# Patient Record
Sex: FEMALE | Race: WHITE | NOT HISPANIC OR LATINO | Employment: OTHER | URBAN - NONMETROPOLITAN AREA
[De-identification: names, ages, dates, MRNs, and addresses within clinical notes are randomized per-mention and may not be internally consistent; named-entity substitution may affect disease eponyms.]

---

## 2018-01-01 ENCOUNTER — OFFICE VISIT (OUTPATIENT)
Dept: FAMILY MEDICINE CLINIC | Facility: CLINIC | Age: 78
End: 2018-01-01

## 2018-01-01 ENCOUNTER — TELEPHONE (OUTPATIENT)
Dept: FAMILY MEDICINE CLINIC | Facility: CLINIC | Age: 78
End: 2018-01-01

## 2018-01-01 ENCOUNTER — LAB (OUTPATIENT)
Dept: LAB | Facility: HOSPITAL | Age: 78
End: 2018-01-01

## 2018-01-01 VITALS
BODY MASS INDEX: 21.79 KG/M2 | OXYGEN SATURATION: 99 % | HEIGHT: 61 IN | WEIGHT: 115.38 LBS | SYSTOLIC BLOOD PRESSURE: 128 MMHG | DIASTOLIC BLOOD PRESSURE: 70 MMHG | HEART RATE: 85 BPM

## 2018-01-01 VITALS
HEIGHT: 61 IN | HEART RATE: 97 BPM | RESPIRATION RATE: 18 BRPM | WEIGHT: 113.7 LBS | SYSTOLIC BLOOD PRESSURE: 142 MMHG | DIASTOLIC BLOOD PRESSURE: 80 MMHG | TEMPERATURE: 97 F | OXYGEN SATURATION: 98 % | BODY MASS INDEX: 21.47 KG/M2

## 2018-01-01 VITALS
WEIGHT: 113.13 LBS | DIASTOLIC BLOOD PRESSURE: 66 MMHG | HEIGHT: 61 IN | HEART RATE: 87 BPM | OXYGEN SATURATION: 99 % | SYSTOLIC BLOOD PRESSURE: 132 MMHG | BODY MASS INDEX: 21.36 KG/M2

## 2018-01-01 DIAGNOSIS — J01.00 ACUTE MAXILLARY SINUSITIS, RECURRENCE NOT SPECIFIED: Primary | ICD-10-CM

## 2018-01-01 DIAGNOSIS — I25.10 CORONARY ARTERY DISEASE INVOLVING NATIVE HEART WITHOUT ANGINA PECTORIS, UNSPECIFIED VESSEL OR LESION TYPE: ICD-10-CM

## 2018-01-01 DIAGNOSIS — G43.809 OTHER MIGRAINE WITHOUT STATUS MIGRAINOSUS, NOT INTRACTABLE: ICD-10-CM

## 2018-01-01 DIAGNOSIS — M81.0 OSTEOPOROSIS, UNSPECIFIED OSTEOPOROSIS TYPE, UNSPECIFIED PATHOLOGICAL FRACTURE PRESENCE: ICD-10-CM

## 2018-01-01 DIAGNOSIS — I38 VALVULAR HEART DISEASE: Primary | ICD-10-CM

## 2018-01-01 DIAGNOSIS — R60.9 EDEMA, UNSPECIFIED TYPE: ICD-10-CM

## 2018-01-01 DIAGNOSIS — J02.9 SORE THROAT: Primary | ICD-10-CM

## 2018-01-01 DIAGNOSIS — I38 VALVULAR HEART DISEASE: ICD-10-CM

## 2018-01-01 DIAGNOSIS — M81.0 OSTEOPOROSIS, UNSPECIFIED OSTEOPOROSIS TYPE, UNSPECIFIED PATHOLOGICAL FRACTURE PRESENCE: Primary | ICD-10-CM

## 2018-01-01 LAB
ALBUMIN SERPL-MCNC: 4.2 G/DL (ref 3.4–4.8)
ALBUMIN/GLOB SERPL: 0.8 G/DL (ref 1.1–1.8)
ALP SERPL-CCNC: 61 U/L (ref 38–126)
ALT SERPL W P-5'-P-CCNC: 22 U/L (ref 9–52)
ANION GAP SERPL CALCULATED.3IONS-SCNC: 9 MMOL/L (ref 5–15)
AST SERPL-CCNC: 33 U/L (ref 14–36)
BILIRUB SERPL-MCNC: 0.3 MG/DL (ref 0.2–1.3)
BUN BLD-MCNC: 16 MG/DL (ref 7–21)
BUN/CREAT SERPL: 29.1 (ref 7–25)
CALCIUM SPEC-SCNC: 9.2 MG/DL (ref 8.4–10.2)
CHLORIDE SERPL-SCNC: 101 MMOL/L (ref 95–110)
CO2 SERPL-SCNC: 28 MMOL/L (ref 22–31)
CREAT BLD-MCNC: 0.55 MG/DL (ref 0.5–1)
DEPRECATED RDW RBC AUTO: 43.3 FL (ref 36.4–46.3)
ERYTHROCYTE [DISTWIDTH] IN BLOOD BY AUTOMATED COUNT: 13.4 % (ref 11.5–14.5)
GFR SERPL CREATININE-BSD FRML MDRD: 107 ML/MIN/1.73 (ref 39–90)
GLOBULIN UR ELPH-MCNC: 5.3 GM/DL (ref 2.3–3.5)
GLUCOSE BLD-MCNC: 84 MG/DL (ref 60–100)
HCT VFR BLD AUTO: 36.4 % (ref 35–45)
HGB BLD-MCNC: 11.9 G/DL (ref 12–15.5)
MCH RBC QN AUTO: 29 PG (ref 26.5–34)
MCHC RBC AUTO-ENTMCNC: 32.7 G/DL (ref 31.4–36)
MCV RBC AUTO: 88.8 FL (ref 80–98)
PLATELET # BLD AUTO: 188 10*3/MM3 (ref 150–450)
PMV BLD AUTO: 9.8 FL (ref 8–12)
POTASSIUM BLD-SCNC: 4 MMOL/L (ref 3.5–5.1)
PROT SERPL-MCNC: 9.5 G/DL (ref 6.3–8.6)
RBC # BLD AUTO: 4.1 10*6/MM3 (ref 3.77–5.16)
SODIUM BLD-SCNC: 138 MMOL/L (ref 137–145)
WBC NRBC COR # BLD: 6.16 10*3/MM3 (ref 3.2–9.8)

## 2018-01-01 PROCEDURE — 99214 OFFICE O/P EST MOD 30 MIN: CPT | Performed by: FAMILY MEDICINE

## 2018-01-01 PROCEDURE — 85027 COMPLETE CBC AUTOMATED: CPT

## 2018-01-01 PROCEDURE — 36415 COLL VENOUS BLD VENIPUNCTURE: CPT

## 2018-01-01 PROCEDURE — 80053 COMPREHEN METABOLIC PANEL: CPT

## 2018-01-01 RX ORDER — AMITRIPTYLINE HYDROCHLORIDE 25 MG/1
50 TABLET, FILM COATED ORAL NIGHTLY
Qty: 180 TABLET | Refills: 1 | Status: SHIPPED | OUTPATIENT
Start: 2018-01-01 | End: 2019-01-01

## 2018-01-01 RX ORDER — CLINDAMYCIN HYDROCHLORIDE 300 MG/1
300 CAPSULE ORAL 3 TIMES DAILY
Qty: 30 CAPSULE | Refills: 0 | Status: SHIPPED | OUTPATIENT
Start: 2018-01-01 | End: 2019-01-01

## 2018-01-01 RX ORDER — FUROSEMIDE 20 MG/1
20 TABLET ORAL DAILY
Qty: 30 TABLET | Refills: 1 | Status: SHIPPED | OUTPATIENT
Start: 2018-01-01 | End: 2019-01-01

## 2018-01-01 RX ORDER — IBUPROFEN 600 MG/1
600 TABLET ORAL 3 TIMES DAILY PRN
Qty: 90 TABLET | Refills: 5 | Status: SHIPPED | OUTPATIENT
Start: 2018-01-01 | End: 2019-01-01 | Stop reason: ALTCHOICE

## 2018-01-01 RX ORDER — ALENDRONATE SODIUM 70 MG/1
70 TABLET ORAL
Qty: 12 TABLET | Refills: 1 | Status: SHIPPED | OUTPATIENT
Start: 2018-01-01 | End: 2019-01-01 | Stop reason: SDUPTHER

## 2018-01-01 RX ORDER — ALENDRONATE SODIUM 70 MG/1
70 TABLET ORAL
Qty: 12 TABLET | Refills: 1 | Status: SHIPPED | OUTPATIENT
Start: 2018-01-01 | End: 2018-01-01 | Stop reason: SDUPTHER

## 2018-01-03 ENCOUNTER — APPOINTMENT (OUTPATIENT)
Dept: LAB | Facility: HOSPITAL | Age: 78
End: 2018-01-03

## 2018-01-03 ENCOUNTER — TELEPHONE (OUTPATIENT)
Dept: INTERNAL MEDICINE | Facility: CLINIC | Age: 78
End: 2018-01-03

## 2018-01-03 ENCOUNTER — OFFICE VISIT (OUTPATIENT)
Dept: INTERNAL MEDICINE | Facility: CLINIC | Age: 78
End: 2018-01-03

## 2018-01-03 VITALS
BODY MASS INDEX: 21.34 KG/M2 | DIASTOLIC BLOOD PRESSURE: 90 MMHG | SYSTOLIC BLOOD PRESSURE: 130 MMHG | WEIGHT: 113 LBS | HEIGHT: 61 IN

## 2018-01-03 DIAGNOSIS — M81.0 OSTEOPOROSIS WITHOUT CURRENT PATHOLOGICAL FRACTURE, UNSPECIFIED OSTEOPOROSIS TYPE: ICD-10-CM

## 2018-01-03 DIAGNOSIS — G43.709 CHRONIC MIGRAINE WITHOUT AURA, NOT INTRACTABLE, WITHOUT STATUS MIGRAINOSUS: Primary | ICD-10-CM

## 2018-01-03 DIAGNOSIS — R03.0 ELEVATED BLOOD-PRESSURE READING WITHOUT DIAGNOSIS OF HYPERTENSION: ICD-10-CM

## 2018-01-03 LAB
25(OH)D3 SERPL-MCNC: 42.9 NG/ML (ref 30–100)
ALBUMIN SERPL-MCNC: 4.4 G/DL (ref 3.4–4.8)
ALBUMIN/GLOB SERPL: 1.1 G/DL (ref 1.1–1.8)
ALP SERPL-CCNC: 75 U/L (ref 38–126)
ALT SERPL W P-5'-P-CCNC: 28 U/L (ref 9–52)
ANION GAP SERPL CALCULATED.3IONS-SCNC: 11 MMOL/L (ref 5–15)
AST SERPL-CCNC: 35 U/L (ref 14–36)
BILIRUB SERPL-MCNC: 0.3 MG/DL (ref 0.2–1.3)
BUN BLD-MCNC: 18 MG/DL (ref 7–21)
BUN/CREAT SERPL: 30 (ref 7–25)
CALCIUM SPEC-SCNC: 9.7 MG/DL (ref 8.4–10.2)
CHLORIDE SERPL-SCNC: 99 MMOL/L (ref 95–110)
CO2 SERPL-SCNC: 28 MMOL/L (ref 22–31)
CREAT BLD-MCNC: 0.6 MG/DL (ref 0.5–1)
GFR SERPL CREATININE-BSD FRML MDRD: 97 ML/MIN/1.73 (ref 39–90)
GLOBULIN UR ELPH-MCNC: 3.9 GM/DL (ref 2.3–3.5)
GLUCOSE BLD-MCNC: 97 MG/DL (ref 60–100)
POTASSIUM BLD-SCNC: 4.2 MMOL/L (ref 3.5–5.1)
PROT SERPL-MCNC: 8.3 G/DL (ref 6.3–8.6)
SODIUM BLD-SCNC: 138 MMOL/L (ref 137–145)
VIT B12 BLD-MCNC: 523 PG/ML (ref 239–931)

## 2018-01-03 PROCEDURE — 82306 VITAMIN D 25 HYDROXY: CPT | Performed by: INTERNAL MEDICINE

## 2018-01-03 PROCEDURE — 80053 COMPREHEN METABOLIC PANEL: CPT | Performed by: INTERNAL MEDICINE

## 2018-01-03 PROCEDURE — 82607 VITAMIN B-12: CPT | Performed by: INTERNAL MEDICINE

## 2018-01-03 PROCEDURE — 36415 COLL VENOUS BLD VENIPUNCTURE: CPT | Performed by: INTERNAL MEDICINE

## 2018-01-03 PROCEDURE — 99203 OFFICE O/P NEW LOW 30 MIN: CPT | Performed by: INTERNAL MEDICINE

## 2018-01-03 RX ORDER — AMITRIPTYLINE HYDROCHLORIDE 25 MG/1
50 TABLET, FILM COATED ORAL NIGHTLY
Qty: 60 TABLET | Refills: 5 | Status: SHIPPED | OUTPATIENT
Start: 2018-01-03 | End: 2018-04-23 | Stop reason: SDUPTHER

## 2018-01-03 RX ORDER — PROMETHAZINE HYDROCHLORIDE 25 MG/1
25 TABLET ORAL EVERY 6 HOURS PRN
Qty: 30 TABLET | Refills: 0 | Status: SHIPPED | OUTPATIENT
Start: 2018-01-03 | End: 2019-01-01 | Stop reason: SDUPTHER

## 2018-01-03 RX ORDER — MECLIZINE HCL 25MG 25 MG/1
25 TABLET, CHEWABLE ORAL 3 TIMES DAILY PRN
COMMUNITY
End: 2018-01-19 | Stop reason: SDUPTHER

## 2018-01-03 RX ORDER — PROMETHAZINE HYDROCHLORIDE 25 MG/1
25 TABLET ORAL EVERY 6 HOURS PRN
Qty: 30 TABLET | Refills: 0 | Status: SHIPPED | OUTPATIENT
Start: 2018-01-03 | End: 2018-01-03 | Stop reason: SDUPTHER

## 2018-01-03 RX ORDER — AMITRIPTYLINE HYDROCHLORIDE 25 MG/1
20 TABLET, FILM COATED ORAL NIGHTLY
COMMUNITY
End: 2018-01-03 | Stop reason: SDUPTHER

## 2018-01-03 RX ORDER — AMITRIPTYLINE HYDROCHLORIDE 25 MG/1
50 TABLET, FILM COATED ORAL NIGHTLY
Qty: 60 TABLET | Refills: 5 | Status: SHIPPED | OUTPATIENT
Start: 2018-01-03 | End: 2018-01-03 | Stop reason: SDUPTHER

## 2018-01-03 RX ORDER — ALENDRONATE SODIUM 70 MG/1
70 TABLET ORAL
COMMUNITY
End: 2018-04-23 | Stop reason: SDUPTHER

## 2018-01-03 NOTE — PROGRESS NOTES
Subjective   Yaneli Bates is a 77 y.o. female with PMH of aortic valve replacement with bioprosthetic valve, chronic migraines, multiple foods intolerances and osteoporosis.  Patient is in to establish PCP.    Patient is still having frequent migraines. Describes throbbing headaches accompanied by nausea and photophobia. She usually takes ASA OTC for acute migraines.  Patient could not tolerate Midrin and Imitrex in the past. She was seen by neurologist and had Botox injections which were not effective.  She reports that she had MRI head which was normal.  Elavil is helping some with headaches and they are not as severe as in the past.      Her BP is noted to be elevated. She denies any history of HTN.  She denies any chest pain, dyspnea or edema in lower extremities  Patient follows with cardiologist in [place] on regular basis.    Osteoporosis.  She had bone density in August 2017 and was started on Fosamax.  Patient has not been taking medication as prescribed. She is not sure if it did cause some GI upset.  She is taking OTC Calcium and vitamin D.    Past Medical History:   Diagnosis Date   • Allergic    • Arthritis    • Coronary artery disease 2014    valave replacement   • Fibromyalgia, primary    • Headache    • Infectious viral hepatitis    • Low back pain    • Osteoporosis    • Visual impairment      Past Surgical History:   Procedure Laterality Date   • AORTIC VALVE REPAIR/REPLACEMENT  2014,may   • COLONOSCOPY     • KNEE SURGERY Right 1967   • TONSILLECTOMY         Social History   Substance Use Topics   • Smoking status: Never Smoker   • Smokeless tobacco: Never Used   • Alcohol use No     Family History   Problem Relation Age of Onset   • Arthritis Mother    • Cancer Mother    • Heart disease Mother    • Hyperlipidemia Mother    • Hypertension Mother    • Vision loss Mother    • Arthritis Father    • Hearing loss Father    • Heart disease Father    • Hyperlipidemia Father    • Hypertension Father    •  Vision loss Father    • Hypertension Sister    • Vision loss Sister    • Hyperlipidemia Brother    • Vision loss Brother    • Cancer Daughter    • Early death Daughter      Allergies   Allergen Reactions   • Codeine GI Intolerance   • Penicillins Hives   • Sulfa Antibiotics Hives   • Vicodin [Hydrocodone-Acetaminophen] GI Intolerance     vomiting   • Amoxicillin Rash     No current outpatient prescriptions on file prior to visit.     No current facility-administered medications on file prior to visit.      Review of Systems   Constitutional: Negative for activity change and fatigue.   Eyes: Negative.    Respiratory: Negative for chest tightness and shortness of breath.    Cardiovascular: Negative for chest pain, palpitations and leg swelling.   Gastrointestinal: Negative for abdominal pain, constipation and diarrhea.   Endocrine: Negative for cold intolerance, heat intolerance, polydipsia and polyuria.   Genitourinary: Negative for flank pain and frequency.   Musculoskeletal: Positive for back pain.   Neurological: Positive for headaches. Negative for dizziness and light-headedness.   Psychiatric/Behavioral: Negative for sleep disturbance. The patient is not nervous/anxious.        Objective   Physical Exam   Constitutional: She is oriented to person, place, and time. She appears well-developed and well-nourished.   HENT:   Head: Normocephalic and atraumatic.   Nose: Nose normal.   Mouth/Throat: Oropharynx is clear and moist.   Eyes: Conjunctivae and EOM are normal. Pupils are equal, round, and reactive to light. Left eye exhibits no discharge. No scleral icterus.   Neck: Neck supple. No JVD present. No thyromegaly present.   Cardiovascular: Normal rate and regular rhythm.    No murmur heard.  Pulmonary/Chest: Effort normal and breath sounds normal.   Abdominal: Soft. Bowel sounds are normal. She exhibits no mass.   Musculoskeletal: Normal range of motion. She exhibits no deformity.   Neurological: She is alert and  oriented to person, place, and time. She has normal reflexes.   Skin: Skin is warm and dry. No rash noted.   Psychiatric: She has a normal mood and affect. Her behavior is normal. Judgment and thought content normal.   Nursing note and vitals reviewed.          There is no problem list on file for this patient.              Assessment/Plan   Yaneli was seen today for establish care.    Diagnoses and all orders for this visit:    Chronic migraine without aura, not intractable, without status migrainosus  -     Vitamin B12    Elevated blood-pressure reading without diagnosis of hypertension    Osteoporosis without current pathological fracture, unspecified osteoporosis type  -     Comprehensive Metabolic Panel  -     Vitamin D 25 hydroxy    Other orders  -     amitriptyline (ELAVIL) 25 MG tablet; Take 2 tablets by mouth Every Night.  -     promethazine (PHENERGAN) 25 MG tablet; Take 1 tablet by mouth Every 6 (Six) Hours As Needed for Nausea or Vomiting.           Plan      1. Patient will increase Elavil to 50 mg qhs.      Medication side effects discussed.     Promethazine 25 mg as need for acute headache and nausea.      Advised to avoid excessive use of ASA.  2. Patient will start monitoring BP at home.      DASH.      1.5 gr Sodium restriction.  3. Discussed different options for treatment of osteoporosis.      Patient would like to try Fosamax again.      OTC Calcium and vitamin D.      Weight bearing exercises.     Will obtain results of previous bone density.        Follow up in 6 months.          This document has been electronically signed by Shante Tellez MD on January 3, 2018 12:21 PM

## 2018-01-19 RX ORDER — MECLIZINE HCL 25MG 25 MG/1
25 TABLET, CHEWABLE ORAL 3 TIMES DAILY PRN
Qty: 90 TABLET | Refills: 1 | Status: SHIPPED | OUTPATIENT
Start: 2018-01-19 | End: 2019-01-01

## 2018-02-28 ENCOUNTER — OFFICE VISIT (OUTPATIENT)
Dept: INTERNAL MEDICINE | Facility: CLINIC | Age: 78
End: 2018-02-28

## 2018-02-28 VITALS
HEIGHT: 61 IN | SYSTOLIC BLOOD PRESSURE: 140 MMHG | WEIGHT: 114.2 LBS | DIASTOLIC BLOOD PRESSURE: 82 MMHG | BODY MASS INDEX: 21.56 KG/M2

## 2018-02-28 DIAGNOSIS — J30.89 ACUTE NON-SEASONAL ALLERGIC RHINITIS, UNSPECIFIED TRIGGER: Primary | ICD-10-CM

## 2018-02-28 DIAGNOSIS — R42 DIZZINESS: ICD-10-CM

## 2018-02-28 PROCEDURE — 99213 OFFICE O/P EST LOW 20 MIN: CPT | Performed by: INTERNAL MEDICINE

## 2018-02-28 RX ORDER — FLUTICASONE PROPIONATE 50 MCG
2 SPRAY, SUSPENSION (ML) NASAL DAILY
Qty: 1 BOTTLE | Refills: 1 | Status: SHIPPED | OUTPATIENT
Start: 2018-02-28 | End: 2018-04-23

## 2018-04-23 RX ORDER — AMITRIPTYLINE HYDROCHLORIDE 25 MG/1
50 TABLET, FILM COATED ORAL NIGHTLY
Qty: 180 TABLET | Refills: 1 | Status: SHIPPED | OUTPATIENT
Start: 2018-04-23 | End: 2018-01-01 | Stop reason: SDUPTHER

## 2018-04-23 RX ORDER — ALENDRONATE SODIUM 70 MG/1
70 TABLET ORAL
Qty: 12 TABLET | Refills: 1 | Status: SHIPPED | OUTPATIENT
Start: 2018-04-23 | End: 2018-01-01 | Stop reason: SDUPTHER

## 2018-08-14 ENCOUNTER — TELEPHONE (OUTPATIENT)
Dept: CARDIOLOGY | Age: 78
End: 2018-08-14

## 2018-08-14 NOTE — TELEPHONE ENCOUNTER
The chart hasn't been abstracted so I don't have any diagnoses to associate it with. We will get this done closer to the time of her 2019 appointment.

## 2018-09-25 NOTE — PROGRESS NOTES
Subjective   Yaneli Bates is a 78 y.o. female.   Cc:Establish care  History of Present Illness The patient comes in for check of their chronic medical issues which include Migraines,Osteoporosis and chronic nausea. She is at her base line. .      The following portions of the patient's history were reviewed and updated as appropriate: allergies, current medications, past family history, past medical history, past social history, past surgical history and problem list.    Review of Systems   Constitutional: Negative for fatigue and fever.   Respiratory: Negative for cough, chest tightness and stridor.    Cardiovascular: Negative for chest pain, palpitations and leg swelling.   Musculoskeletal: Positive for back pain. Negative for arthralgias, neck pain and neck stiffness.   Neurological: Positive for headache.       Objective   Physical Exam   Constitutional: She appears well-developed and well-nourished.   HENT:   Head: Normocephalic and atraumatic.   Right Ear: External ear normal.   Left Ear: External ear normal.   Nose: Nose normal.   Mouth/Throat: Oropharynx is clear and moist.   Eyes: Pupils are equal, round, and reactive to light.   Cardiovascular: Normal rate, regular rhythm and normal heart sounds.  Exam reveals no gallop and no friction rub.    No murmur heard.  Pulmonary/Chest: Effort normal and breath sounds normal. No respiratory distress. She has no wheezes. She has no rales.   Abdominal: Soft. Bowel sounds are normal. She exhibits no distension. There is no tenderness.   Skin: Skin is warm and dry.   Vitals reviewed.        Assessment/Plan      Yaneli was seen today for establish care.    Diagnoses and all orders for this visit:    Valvular heart disease  -     Comprehensive metabolic panel; Future  -     CBC No Differential; Future    Osteoporosis, unspecified osteoporosis type, unspecified pathological fracture presence    Other migraine without status migrainosus, not intractable    Other orders  -      amitriptyline (ELAVIL) 25 MG tablet; Take 2 tablets by mouth Every Night.  -     ibuprofen (ADVIL,MOTRIN) 600 MG tablet; Take 1 tablet by mouth 3 (Three) Times a Day As Needed for Mild Pain .      Return to the clinic in 3 month/s.  Will contact with results as needed.  She doesn't do Mammograms anymore.  She will get a flu shot later next mpnth.

## 2018-11-12 NOTE — TELEPHONE ENCOUNTER
alendronate (FOSAMAX) 70 MG tablet NEEDS TO GO TO [facility] IN [place] NOT [facility]. ALSO PATIENT IS ASKING IF SHE IS SUPPOSED TO HAVE LABS DONE. PLEASE CALL HER [phone]

## 2018-12-26 NOTE — PROGRESS NOTES
Subjective   Yaneli Bates is a 78 y.o. female.    cc: Maxillary pain and pedal edema  History of Present Illness The patient comes in for check of their chronic medical issues which include Tenderness over her maxillary sinus. It has been bothering her for several days. She has also had pain in her lower extremities with swelling as well. .      The following portions of the patient's history were reviewed and updated as appropriate: allergies, current medications, past family history, past medical history, past social history, past surgical history and problem list.    Review of Systems   Constitutional: Negative for fatigue and fever.   Respiratory: Negative for cough, chest tightness and stridor.    Cardiovascular: Positive for leg swelling. Negative for chest pain and palpitations.       Objective   Physical Exam   Constitutional: She appears well-developed and well-nourished.   HENT:   Head: Normocephalic and atraumatic.   Right Ear: External ear normal.   Left Ear: External ear normal.   Nose: Nose normal.   Mouth/Throat: Oropharynx is clear and moist.   There is tenderness over the Maxillary sinuses.   Cardiovascular: Normal rate, regular rhythm and normal heart sounds. Exam reveals no gallop and no friction rub.   No murmur heard.  Pulmonary/Chest: Effort normal and breath sounds normal. No stridor. No respiratory distress. She has no wheezes. She has no rales.   Abdominal: Soft. Bowel sounds are normal. She exhibits no distension. There is no tenderness. There is no guarding.   Musculoskeletal: She exhibits edema.   Skin: Skin is warm and dry.   Vitals reviewed.        Assessment/Plan   Yaneli was seen today for leg pain.    Diagnoses and all orders for this visit:    Acute maxillary sinusitis, recurrence not specified    Edema, unspecified type    Coronary artery disease involving native heart without angina pectoris, unspecified vessel or lesion type    Other orders  -     clindamycin (CLEOCIN) 300 MG  capsule; Take 1 capsule by mouth 3 (Three) Times a Day.  -     furosemide (LASIX) 20 MG tablet; Take 1 tablet by mouth Daily.        Return to the clinic in 1 month/s.  Will contact with results as needed.

## 2019-01-01 ENCOUNTER — OFFICE VISIT (OUTPATIENT)
Dept: GASTROENTEROLOGY | Facility: CLINIC | Age: 79
End: 2019-01-01

## 2019-01-01 ENCOUNTER — APPOINTMENT (OUTPATIENT)
Dept: GENERAL RADIOLOGY | Facility: HOSPITAL | Age: 79
End: 2019-01-01

## 2019-01-01 ENCOUNTER — LAB (OUTPATIENT)
Dept: LAB | Facility: HOSPITAL | Age: 79
End: 2019-01-01

## 2019-01-01 ENCOUNTER — TELEPHONE (OUTPATIENT)
Dept: FAMILY MEDICINE CLINIC | Facility: CLINIC | Age: 79
End: 2019-01-01

## 2019-01-01 ENCOUNTER — APPOINTMENT (OUTPATIENT)
Dept: CARDIOLOGY | Facility: HOSPITAL | Age: 79
End: 2019-01-01

## 2019-01-01 ENCOUNTER — HOSPITAL ENCOUNTER (INPATIENT)
Facility: HOSPITAL | Age: 79
LOS: 12 days | Discharge: HOME HEALTH | End: 2019-04-23
Attending: FAMILY MEDICINE | Admitting: HOSPITALIST

## 2019-01-01 ENCOUNTER — OFFICE VISIT (OUTPATIENT)
Dept: FAMILY MEDICINE CLINIC | Facility: CLINIC | Age: 79
End: 2019-01-01

## 2019-01-01 ENCOUNTER — APPOINTMENT (OUTPATIENT)
Dept: CT IMAGING | Facility: HOSPITAL | Age: 79
End: 2019-01-01

## 2019-01-01 ENCOUNTER — HOSPITAL ENCOUNTER (INPATIENT)
Facility: HOSPITAL | Age: 79
LOS: 5 days | End: 2019-05-20
Attending: EMERGENCY MEDICINE | Admitting: HOSPITALIST

## 2019-01-01 ENCOUNTER — APPOINTMENT (OUTPATIENT)
Dept: ULTRASOUND IMAGING | Facility: HOSPITAL | Age: 79
End: 2019-01-01

## 2019-01-01 ENCOUNTER — HOSPITAL ENCOUNTER (OUTPATIENT)
Dept: MRI IMAGING | Facility: HOSPITAL | Age: 79
Discharge: HOME | End: 2019-02-13
Admitting: NURSE PRACTITIONER

## 2019-01-01 ENCOUNTER — READMISSION MANAGEMENT (OUTPATIENT)
Dept: CALL CENTER | Facility: HOSPITAL | Age: 79
End: 2019-01-01

## 2019-01-01 ENCOUNTER — APPOINTMENT (OUTPATIENT)
Dept: INTERVENTIONAL RADIOLOGY/VASCULAR | Facility: HOSPITAL | Age: 79
End: 2019-01-01

## 2019-01-01 ENCOUNTER — OFFICE VISIT (OUTPATIENT)
Dept: CARDIOLOGY | Facility: CLINIC | Age: 79
End: 2019-01-01

## 2019-01-01 ENCOUNTER — HOSPITAL ENCOUNTER (EMERGENCY)
Facility: HOSPITAL | Age: 79
Discharge: HOME | End: 2019-03-15
Attending: FAMILY MEDICINE | Admitting: FAMILY MEDICINE

## 2019-01-01 ENCOUNTER — LAB REQUISITION (OUTPATIENT)
Dept: LAB | Facility: HOSPITAL | Age: 79
End: 2019-01-01

## 2019-01-01 ENCOUNTER — APPOINTMENT (OUTPATIENT)
Dept: NUCLEAR MEDICINE | Facility: HOSPITAL | Age: 79
End: 2019-01-01

## 2019-01-01 ENCOUNTER — ANESTHESIA EVENT (OUTPATIENT)
Dept: GASTROENTEROLOGY | Facility: HOSPITAL | Age: 79
End: 2019-01-01

## 2019-01-01 ENCOUNTER — ANESTHESIA (OUTPATIENT)
Dept: GASTROENTEROLOGY | Facility: HOSPITAL | Age: 79
End: 2019-01-01

## 2019-01-01 ENCOUNTER — DOCUMENTATION (OUTPATIENT)
Dept: FAMILY MEDICINE CLINIC | Facility: CLINIC | Age: 79
End: 2019-01-01

## 2019-01-01 ENCOUNTER — LAB REQUISITION (OUTPATIENT)
Dept: LAB | Facility: OTHER | Age: 79
End: 2019-01-01

## 2019-01-01 ENCOUNTER — DOCUMENTATION (OUTPATIENT)
Dept: CARDIOLOGY | Facility: CLINIC | Age: 79
End: 2019-01-01

## 2019-01-01 ENCOUNTER — EPISODE CHANGES (OUTPATIENT)
Dept: CASE MANAGEMENT | Facility: OTHER | Age: 79
End: 2019-01-01

## 2019-01-01 ENCOUNTER — HOSPITAL ENCOUNTER (OUTPATIENT)
Facility: HOSPITAL | Age: 79
Discharge: HOME | End: 2019-03-14
Attending: EMERGENCY MEDICINE | Admitting: INTERNAL MEDICINE

## 2019-01-01 VITALS
DIASTOLIC BLOOD PRESSURE: 74 MMHG | HEIGHT: 61 IN | BODY MASS INDEX: 22.05 KG/M2 | WEIGHT: 116.8 LBS | SYSTOLIC BLOOD PRESSURE: 170 MMHG | RESPIRATION RATE: 18 BRPM | OXYGEN SATURATION: 97 % | HEART RATE: 78 BPM

## 2019-01-01 VITALS
WEIGHT: 142.86 LBS | DIASTOLIC BLOOD PRESSURE: 67 MMHG | HEIGHT: 61 IN | OXYGEN SATURATION: 91 % | TEMPERATURE: 96 F | HEART RATE: 91 BPM | SYSTOLIC BLOOD PRESSURE: 100 MMHG | RESPIRATION RATE: 8 BRPM | BODY MASS INDEX: 26.97 KG/M2

## 2019-01-01 VITALS
DIASTOLIC BLOOD PRESSURE: 62 MMHG | HEIGHT: 61 IN | WEIGHT: 110.8 LBS | BODY MASS INDEX: 20.92 KG/M2 | HEART RATE: 92 BPM | SYSTOLIC BLOOD PRESSURE: 118 MMHG

## 2019-01-01 VITALS
WEIGHT: 114.3 LBS | TEMPERATURE: 97.6 F | HEIGHT: 61 IN | OXYGEN SATURATION: 97 % | DIASTOLIC BLOOD PRESSURE: 80 MMHG | HEART RATE: 91 BPM | SYSTOLIC BLOOD PRESSURE: 148 MMHG | BODY MASS INDEX: 21.58 KG/M2 | RESPIRATION RATE: 18 BRPM

## 2019-01-01 VITALS
OXYGEN SATURATION: 93 % | RESPIRATION RATE: 18 BRPM | WEIGHT: 115 LBS | HEART RATE: 105 BPM | SYSTOLIC BLOOD PRESSURE: 164 MMHG | HEIGHT: 61 IN | BODY MASS INDEX: 21.71 KG/M2 | TEMPERATURE: 99.6 F | DIASTOLIC BLOOD PRESSURE: 74 MMHG

## 2019-01-01 VITALS
BODY MASS INDEX: 21.49 KG/M2 | OXYGEN SATURATION: 97 % | HEIGHT: 61 IN | WEIGHT: 113.8 LBS | SYSTOLIC BLOOD PRESSURE: 140 MMHG | DIASTOLIC BLOOD PRESSURE: 80 MMHG | HEART RATE: 63 BPM

## 2019-01-01 VITALS
OXYGEN SATURATION: 98 % | DIASTOLIC BLOOD PRESSURE: 76 MMHG | BODY MASS INDEX: 20.94 KG/M2 | HEART RATE: 78 BPM | SYSTOLIC BLOOD PRESSURE: 134 MMHG | RESPIRATION RATE: 16 BRPM | WEIGHT: 110.89 LBS | HEIGHT: 61 IN

## 2019-01-01 VITALS
HEART RATE: 99 BPM | SYSTOLIC BLOOD PRESSURE: 134 MMHG | HEIGHT: 61 IN | BODY MASS INDEX: 21.52 KG/M2 | DIASTOLIC BLOOD PRESSURE: 80 MMHG | WEIGHT: 114 LBS

## 2019-01-01 VITALS
OXYGEN SATURATION: 96 % | DIASTOLIC BLOOD PRESSURE: 65 MMHG | HEIGHT: 61 IN | WEIGHT: 132.6 LBS | HEART RATE: 77 BPM | RESPIRATION RATE: 18 BRPM | SYSTOLIC BLOOD PRESSURE: 144 MMHG | TEMPERATURE: 98.1 F | BODY MASS INDEX: 25.04 KG/M2

## 2019-01-01 VITALS
RESPIRATION RATE: 16 BRPM | WEIGHT: 121.8 LBS | OXYGEN SATURATION: 94 % | HEIGHT: 61 IN | HEART RATE: 80 BPM | TEMPERATURE: 97 F | BODY MASS INDEX: 23 KG/M2 | DIASTOLIC BLOOD PRESSURE: 61 MMHG | SYSTOLIC BLOOD PRESSURE: 134 MMHG

## 2019-01-01 VITALS
DIASTOLIC BLOOD PRESSURE: 82 MMHG | SYSTOLIC BLOOD PRESSURE: 146 MMHG | HEART RATE: 89 BPM | WEIGHT: 113.6 LBS | HEIGHT: 61 IN | BODY MASS INDEX: 21.45 KG/M2 | OXYGEN SATURATION: 99 % | RESPIRATION RATE: 18 BRPM

## 2019-01-01 VITALS
WEIGHT: 113.7 LBS | OXYGEN SATURATION: 97 % | HEIGHT: 61 IN | DIASTOLIC BLOOD PRESSURE: 80 MMHG | SYSTOLIC BLOOD PRESSURE: 158 MMHG | HEART RATE: 91 BPM | BODY MASS INDEX: 21.47 KG/M2 | TEMPERATURE: 97.2 F | RESPIRATION RATE: 18 BRPM

## 2019-01-01 VITALS
HEIGHT: 61 IN | DIASTOLIC BLOOD PRESSURE: 68 MMHG | HEART RATE: 100 BPM | SYSTOLIC BLOOD PRESSURE: 142 MMHG | BODY MASS INDEX: 21.41 KG/M2 | WEIGHT: 113.4 LBS

## 2019-01-01 VITALS
WEIGHT: 113.6 LBS | HEIGHT: 61 IN | SYSTOLIC BLOOD PRESSURE: 150 MMHG | RESPIRATION RATE: 16 BRPM | OXYGEN SATURATION: 94 % | BODY MASS INDEX: 21.45 KG/M2 | HEART RATE: 92 BPM | DIASTOLIC BLOOD PRESSURE: 80 MMHG

## 2019-01-01 DIAGNOSIS — Z74.09 IMPAIRED MOBILITY AND ADLS: ICD-10-CM

## 2019-01-01 DIAGNOSIS — R63.4 WEIGHT LOSS, UNINTENTIONAL: ICD-10-CM

## 2019-01-01 DIAGNOSIS — M54.41 ACUTE BILATERAL LOW BACK PAIN WITH BILATERAL SCIATICA: ICD-10-CM
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
## 2019-01-01 DIAGNOSIS — G89.29 CHRONIC LOW BACK PAIN, UNSPECIFIED BACK PAIN LATERALITY, WITH SCIATICA PRESENCE UNSPECIFIED: Primary | ICD-10-CM

## 2019-01-01 DIAGNOSIS — G62.9 PERIPHERAL POLYNEUROPATHY: ICD-10-CM

## 2019-01-01 DIAGNOSIS — D50.0 IRON DEFICIENCY ANEMIA DUE TO CHRONIC BLOOD LOSS: ICD-10-CM

## 2019-01-01 DIAGNOSIS — E87.1 HYPONATREMIA: ICD-10-CM

## 2019-01-01 DIAGNOSIS — I10 ESSENTIAL HYPERTENSION: ICD-10-CM

## 2019-01-01 DIAGNOSIS — D64.9 SYMPTOMATIC ANEMIA: Primary | ICD-10-CM

## 2019-01-01 DIAGNOSIS — K59.00 CONSTIPATION, UNSPECIFIED CONSTIPATION TYPE: ICD-10-CM

## 2019-01-01 DIAGNOSIS — D64.9 ANEMIA, UNSPECIFIED TYPE: Primary | ICD-10-CM
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
## 2019-01-01 DIAGNOSIS — R53.83 FATIGUE, UNSPECIFIED TYPE: ICD-10-CM

## 2019-01-01 DIAGNOSIS — R31.0 GROSS HEMATURIA: ICD-10-CM

## 2019-01-01 DIAGNOSIS — R60.9 PERIPHERAL EDEMA: ICD-10-CM

## 2019-01-01 DIAGNOSIS — M79.605 LEG PAIN, BILATERAL: ICD-10-CM

## 2019-01-01 DIAGNOSIS — T78.49XA OTHER ALLERGY, INITIAL ENCOUNTER: ICD-10-CM

## 2019-01-01 DIAGNOSIS — D64.9 ANEMIA, UNSPECIFIED TYPE: ICD-10-CM

## 2019-01-01 DIAGNOSIS — I11.9 HYPERTENSIVE HEART DISEASE WITHOUT HEART FAILURE: ICD-10-CM

## 2019-01-01 DIAGNOSIS — E83.42 HYPOMAGNESEMIA: ICD-10-CM

## 2019-01-01 DIAGNOSIS — M79.604 LEG PAIN, BILATERAL: ICD-10-CM

## 2019-01-01 DIAGNOSIS — R42 DIZZINESS: ICD-10-CM

## 2019-01-01 DIAGNOSIS — R60.9 PERIPHERAL EDEMA: Primary | ICD-10-CM

## 2019-01-01 DIAGNOSIS — N39.0 URINARY TRACT INFECTION WITH HEMATURIA, SITE UNSPECIFIED: ICD-10-CM

## 2019-01-01 DIAGNOSIS — D47.2 IGG MONOCLONAL PROTEIN DISORDER: Primary | ICD-10-CM

## 2019-01-01 DIAGNOSIS — R11.0 NAUSEA: Primary | ICD-10-CM

## 2019-01-01 DIAGNOSIS — R06.09 OTHER FORMS OF DYSPNEA: ICD-10-CM

## 2019-01-01 DIAGNOSIS — D50.9 IRON DEFICIENCY ANEMIA, UNSPECIFIED IRON DEFICIENCY ANEMIA TYPE: Primary | ICD-10-CM

## 2019-01-01 DIAGNOSIS — Z78.9 IMPAIRED MOBILITY AND ADLS: ICD-10-CM

## 2019-01-01 DIAGNOSIS — E87.6 HYPOKALEMIA: Primary | ICD-10-CM

## 2019-01-01 DIAGNOSIS — M54.41 CHRONIC BILATERAL LOW BACK PAIN WITH BILATERAL SCIATICA: ICD-10-CM

## 2019-01-01 DIAGNOSIS — G89.29 CHRONIC SI JOINT PAIN: Primary | ICD-10-CM

## 2019-01-01 DIAGNOSIS — Z95.2 ENCOUNTER FOR FOLLOW-UP FOR AORTIC VALVE REPLACEMENT: Primary | ICD-10-CM

## 2019-01-01 DIAGNOSIS — M53.3 CHRONIC SI JOINT PAIN: Primary | ICD-10-CM

## 2019-01-01 DIAGNOSIS — M81.0 OSTEOPOROSIS, UNSPECIFIED OSTEOPOROSIS TYPE, UNSPECIFIED PATHOLOGICAL FRACTURE PRESENCE: ICD-10-CM

## 2019-01-01 DIAGNOSIS — R11.0 NAUSEA: ICD-10-CM

## 2019-01-01 DIAGNOSIS — I73.9 PVD (PERIPHERAL VASCULAR DISEASE) (HCC): ICD-10-CM

## 2019-01-01 DIAGNOSIS — Z09 ENCOUNTER FOR FOLLOW-UP FOR AORTIC VALVE REPLACEMENT: Primary | ICD-10-CM

## 2019-01-01 DIAGNOSIS — E11.9 TYPE 2 DIABETES MELLITUS WITHOUT COMPLICATIONS (HCC): ICD-10-CM

## 2019-01-01 DIAGNOSIS — R31.9 URINARY TRACT INFECTION WITH HEMATURIA, SITE UNSPECIFIED: ICD-10-CM

## 2019-01-01 DIAGNOSIS — Z79.84 LONG TERM CURRENT USE OF ORAL HYPOGLYCEMIC DRUG: ICD-10-CM

## 2019-01-01 DIAGNOSIS — J01.00 ACUTE NON-RECURRENT MAXILLARY SINUSITIS: ICD-10-CM

## 2019-01-01 DIAGNOSIS — R93.7 ABNORMAL X-RAY OF LUMBAR SPINE: Primary | ICD-10-CM

## 2019-01-01 DIAGNOSIS — M53.3 CHRONIC SI JOINT PAIN: ICD-10-CM

## 2019-01-01 DIAGNOSIS — Z74.09 IMPAIRED FUNCTIONAL MOBILITY, BALANCE, GAIT, AND ENDURANCE: ICD-10-CM

## 2019-01-01 DIAGNOSIS — G89.29 CHRONIC BILATERAL LOW BACK PAIN WITH BILATERAL SCIATICA: ICD-10-CM

## 2019-01-01 DIAGNOSIS — M79.7 FIBROMYALGIA: ICD-10-CM

## 2019-01-01 DIAGNOSIS — E87.6 HYPOKALEMIA: ICD-10-CM

## 2019-01-01 DIAGNOSIS — R73.03 PREDIABETES: Primary | ICD-10-CM

## 2019-01-01 DIAGNOSIS — M54.42 ACUTE BILATERAL LOW BACK PAIN WITH BILATERAL SCIATICA: ICD-10-CM

## 2019-01-01 DIAGNOSIS — R53.83 OTHER FATIGUE: ICD-10-CM

## 2019-01-01 DIAGNOSIS — Z74.09 IMPAIRED MOBILITY AND ACTIVITIES OF DAILY LIVING: ICD-10-CM

## 2019-01-01 DIAGNOSIS — Z95.2 S/P AVR (AORTIC VALVE REPLACEMENT): Primary | ICD-10-CM

## 2019-01-01 DIAGNOSIS — R20.0 NUMBNESS AND TINGLING OF BOTH FEET: ICD-10-CM

## 2019-01-01 DIAGNOSIS — R20.2 PARESTHESIA OF BOTH LOWER EXTREMITIES: ICD-10-CM

## 2019-01-01 DIAGNOSIS — R53.1 GENERALIZED WEAKNESS: Primary | ICD-10-CM

## 2019-01-01 DIAGNOSIS — Z76.89 ENCOUNTER TO ESTABLISH CARE: Primary | ICD-10-CM

## 2019-01-01 DIAGNOSIS — E11.9 TYPE 2 DIABETES MELLITUS WITHOUT COMPLICATION, WITHOUT LONG-TERM CURRENT USE OF INSULIN (HCC): ICD-10-CM

## 2019-01-01 DIAGNOSIS — Z78.9 IMPAIRED MOBILITY AND ACTIVITIES OF DAILY LIVING: ICD-10-CM

## 2019-01-01 DIAGNOSIS — G89.29 CHRONIC SI JOINT PAIN: ICD-10-CM

## 2019-01-01 DIAGNOSIS — M54.42 CHRONIC BILATERAL LOW BACK PAIN WITH BILATERAL SCIATICA: ICD-10-CM

## 2019-01-01 DIAGNOSIS — M79.601 PARESTHESIA AND PAIN OF BOTH UPPER EXTREMITIES: ICD-10-CM

## 2019-01-01 DIAGNOSIS — J01.00 ACUTE NON-RECURRENT MAXILLARY SINUSITIS: Primary | ICD-10-CM

## 2019-01-01 DIAGNOSIS — Z00.00 ENCOUNTER FOR SCREENING AND PREVENTATIVE CARE: ICD-10-CM

## 2019-01-01 DIAGNOSIS — D64.9 SYMPTOMATIC ANEMIA: ICD-10-CM

## 2019-01-01 DIAGNOSIS — M25.50 ARTHRALGIA, UNSPECIFIED JOINT: Primary | ICD-10-CM

## 2019-01-01 DIAGNOSIS — D50.0 IRON DEFICIENCY ANEMIA DUE TO CHRONIC BLOOD LOSS: Primary | ICD-10-CM

## 2019-01-01 DIAGNOSIS — R20.2 PARESTHESIA AND PAIN OF BOTH UPPER EXTREMITIES: ICD-10-CM

## 2019-01-01 DIAGNOSIS — M54.5 CHRONIC LOW BACK PAIN, UNSPECIFIED BACK PAIN LATERALITY, WITH SCIATICA PRESENCE UNSPECIFIED: Primary | ICD-10-CM

## 2019-01-01 DIAGNOSIS — K59.04 CHRONIC IDIOPATHIC CONSTIPATION: ICD-10-CM

## 2019-01-01 DIAGNOSIS — K29.50 CHRONIC GASTRITIS WITHOUT BLEEDING, UNSPECIFIED GASTRITIS TYPE: ICD-10-CM

## 2019-01-01 DIAGNOSIS — R07.9 CHEST PAIN, RULE OUT ACUTE MYOCARDIAL INFARCTION: Primary | ICD-10-CM

## 2019-01-01 DIAGNOSIS — M79.602 PARESTHESIA AND PAIN OF BOTH UPPER EXTREMITIES: ICD-10-CM

## 2019-01-01 DIAGNOSIS — R20.2 NUMBNESS AND TINGLING OF BOTH FEET: ICD-10-CM

## 2019-01-01 DIAGNOSIS — Z95.2 HISTORY OF AORTIC VALVE REPLACEMENT: ICD-10-CM

## 2019-01-01 LAB
A PHAGOCYTOPH IGM TITR SER IF: NEGATIVE {TITER}
ABO + RH BLD: NORMAL
ABO GROUP BLD: NORMAL
ALBUMIN SERPL-MCNC: 1.5 G/DL (ref 3.5–5.2)
ALBUMIN SERPL-MCNC: 1.5 G/DL (ref 3.5–5.2)
ALBUMIN SERPL-MCNC: 1.6 G/DL (ref 3.5–5.2)
ALBUMIN SERPL-MCNC: 1.7 G/DL (ref 2.9–4.4)
ALBUMIN SERPL-MCNC: 1.8 G/DL (ref 3.5–5.2)
ALBUMIN SERPL-MCNC: 1.9 G/DL (ref 3.5–5.2)
ALBUMIN SERPL-MCNC: 2 G/DL (ref 3.5–5.2)
ALBUMIN SERPL-MCNC: 2 G/DL (ref 3.5–5.2)
ALBUMIN SERPL-MCNC: 2.1 G/DL (ref 3.5–5.2)
ALBUMIN SERPL-MCNC: 2.6 G/DL (ref 3.5–5.2)
ALBUMIN SERPL-MCNC: 2.7 G/DL (ref 3.4–4.8)
ALBUMIN SERPL-MCNC: 2.8 G/DL (ref 3.4–4.8)
ALBUMIN SERPL-MCNC: 2.8 G/DL (ref 3.5–5)
ALBUMIN SERPL-MCNC: 2.9 G/DL (ref 3.4–4.8)
ALBUMIN SERPL-MCNC: 3.2 G/DL (ref 3.4–4.8)
ALBUMIN SERPL-MCNC: 3.6 G/DL (ref 3.4–4.8)
ALBUMIN SERPL-MCNC: 4.1 G/DL (ref 3.4–4.8)
ALBUMIN/GLOB SERPL: 0.3 G/DL
ALBUMIN/GLOB SERPL: 0.4 G/DL
ALBUMIN/GLOB SERPL: 0.4 {RATIO} (ref 0.7–1.7)
ALBUMIN/GLOB SERPL: 0.5 G/DL (ref 1.1–1.8)
ALBUMIN/GLOB SERPL: 0.6 G/DL (ref 1.1–1.8)
ALBUMIN/GLOB SERPL: 0.8 G/DL (ref 1.1–1.8)
ALP SERPL-CCNC: 100 U/L (ref 39–117)
ALP SERPL-CCNC: 194 U/L (ref 39–117)
ALP SERPL-CCNC: 55 U/L (ref 38–126)
ALP SERPL-CCNC: 57 U/L (ref 38–126)
ALP SERPL-CCNC: 61 U/L (ref 39–117)
ALP SERPL-CCNC: 64 U/L (ref 39–117)
ALP SERPL-CCNC: 65 U/L (ref 39–117)
ALP SERPL-CCNC: 66 U/L (ref 38–126)
ALP SERPL-CCNC: 66 U/L (ref 39–117)
ALP SERPL-CCNC: 66 U/L (ref 39–117)
ALP SERPL-CCNC: 67 U/L (ref 38–126)
ALP SERPL-CCNC: 71 U/L (ref 39–117)
ALP SERPL-CCNC: 72 U/L (ref 39–117)
ALP SERPL-CCNC: 74 U/L (ref 39–117)
ALP SERPL-CCNC: 75 U/L (ref 38–126)
ALP SERPL-CCNC: 82 U/L (ref 38–126)
ALP SERPL-CCNC: 90 U/L (ref 38–126)
ALP SERPL-CCNC: 90 U/L (ref 39–117)
ALP SERPL-CCNC: 93 U/L (ref 39–117)
ALP SERPL-CCNC: 97 U/L (ref 39–117)
ALPHA GAL IGE: <0.1 KU/L
ALPHA1 GLOB FLD ELPH-MCNC: 0.5 G/DL (ref 0–0.4)
ALPHA2 GLOB SERPL ELPH-MCNC: 0.8 G/DL (ref 0.4–1)
ALT SERPL W P-5'-P-CCNC: 11 U/L (ref 9–52)
ALT SERPL W P-5'-P-CCNC: 11 U/L (ref 9–52)
ALT SERPL W P-5'-P-CCNC: 12 U/L
ALT SERPL W P-5'-P-CCNC: 13 U/L (ref 9–52)
ALT SERPL W P-5'-P-CCNC: 14 U/L (ref 1–33)
ALT SERPL W P-5'-P-CCNC: 14 U/L (ref 9–52)
ALT SERPL W P-5'-P-CCNC: 14 U/L (ref 9–52)
ALT SERPL W P-5'-P-CCNC: 17 U/L (ref 1–33)
ALT SERPL W P-5'-P-CCNC: 21 U/L (ref 9–52)
ALT SERPL W P-5'-P-CCNC: 30 U/L (ref 1–33)
ALT SERPL W P-5'-P-CCNC: 5 U/L (ref 1–33)
ALT SERPL W P-5'-P-CCNC: 6 U/L (ref 1–33)
ALT SERPL W P-5'-P-CCNC: 7 U/L (ref 1–33)
ALT SERPL W P-5'-P-CCNC: 8 U/L (ref 1–33)
ANION GAP SERPL CALCULATED.3IONS-SCNC: 10 MMOL/L
ANION GAP SERPL CALCULATED.3IONS-SCNC: 10 MMOL/L (ref 5–15)
ANION GAP SERPL CALCULATED.3IONS-SCNC: 11 MMOL/L
ANION GAP SERPL CALCULATED.3IONS-SCNC: 11.5 MMOL/L
ANION GAP SERPL CALCULATED.3IONS-SCNC: 12 MMOL/L
ANION GAP SERPL CALCULATED.3IONS-SCNC: 13 MMOL/L
ANION GAP SERPL CALCULATED.3IONS-SCNC: 14 MMOL/L
ANION GAP SERPL CALCULATED.3IONS-SCNC: 15 MMOL/L
ANION GAP SERPL CALCULATED.3IONS-SCNC: 17 MMOL/L
ANION GAP SERPL CALCULATED.3IONS-SCNC: 5 MMOL/L
ANION GAP SERPL CALCULATED.3IONS-SCNC: 5 MMOL/L (ref 5–15)
ANION GAP SERPL CALCULATED.3IONS-SCNC: 5 MMOL/L (ref 5–15)
ANION GAP SERPL CALCULATED.3IONS-SCNC: 6 MMOL/L (ref 5–15)
ANION GAP SERPL CALCULATED.3IONS-SCNC: 7 MMOL/L
ANION GAP SERPL CALCULATED.3IONS-SCNC: 7 MMOL/L
ANION GAP SERPL CALCULATED.3IONS-SCNC: 8 MMOL/L
ANION GAP SERPL CALCULATED.3IONS-SCNC: 8 MMOL/L (ref 5–15)
ANION GAP SERPL CALCULATED.3IONS-SCNC: 8.6 MMOL/L
ANION GAP SERPL CALCULATED.3IONS-SCNC: 9 MMOL/L
ANION GAP SERPL CALCULATED.3IONS-SCNC: 9 MMOL/L
ANISOCYTOSIS BLD QL: ABNORMAL
ARTERIAL PATENCY WRIST A: ABNORMAL
ARTICHOKE IGE QN: 68 MG/DL (ref 1–129)
ARTICHOKE IGE QN: 86 MG/DL (ref 1–129)
AST SERPL-CCNC: 112 U/L (ref 1–32)
AST SERPL-CCNC: 16 U/L (ref 1–32)
AST SERPL-CCNC: 17 U/L (ref 1–32)
AST SERPL-CCNC: 19 U/L (ref 1–32)
AST SERPL-CCNC: 20 U/L (ref 1–32)
AST SERPL-CCNC: 20 U/L (ref 1–32)
AST SERPL-CCNC: 21 U/L (ref 1–32)
AST SERPL-CCNC: 22 U/L (ref 1–32)
AST SERPL-CCNC: 23 U/L (ref 1–32)
AST SERPL-CCNC: 23 U/L (ref 1–32)
AST SERPL-CCNC: 26 U/L (ref 14–36)
AST SERPL-CCNC: 26 U/L (ref 1–32)
AST SERPL-CCNC: 34 U/L (ref 14–36)
AST SERPL-CCNC: 36 U/L (ref 14–36)
AST SERPL-CCNC: 37 U/L (ref 14–36)
AST SERPL-CCNC: 54 U/L (ref 14–36)
AST SERPL-CCNC: 58 U/L (ref 14–36)
AST SERPL-CCNC: 72 U/L (ref 14–36)
ATMOSPHERIC PRESS: 743 MMHG
B BURGDOR IGG+IGM SER-ACNC: <0.91 ISR (ref 0–0.9)
B PERT DNA SPEC QL NAA+PROBE: NOT DETECTED
B-GLOBULIN SERPL ELPH-MCNC: 0.5 G/DL (ref 0.7–1.3)
BACTERIA SPEC AEROBE CULT: ABNORMAL
BACTERIA SPEC AEROBE CULT: NORMAL
BACTERIA UR QL AUTO: ABNORMAL /HPF
BASE EXCESS BLDA CALC-SCNC: -5.2 MMOL/L (ref 0–2)
BASOPHILS # BLD AUTO: 0.02 10*3/MM3 (ref 0–0.2)
BASOPHILS # BLD AUTO: 0.03 10*3/MM3 (ref 0–0.2)
BASOPHILS # BLD AUTO: 0.04 10*3/MM3 (ref 0–0.2)
BASOPHILS # BLD AUTO: 0.05 10*3/MM3 (ref 0–0.2)
BASOPHILS # BLD AUTO: 0.05 10*3/MM3 (ref 0–0.2)
BASOPHILS # BLD MANUAL: 0.26 10*3/MM3 (ref 0–0.2)
BASOPHILS NFR BLD AUTO: 0.2 % (ref 0–1.5)
BASOPHILS NFR BLD AUTO: 0.3 % (ref 0–1.5)
BASOPHILS NFR BLD AUTO: 0.4 % (ref 0–1.5)
BASOPHILS NFR BLD AUTO: 0.4 % (ref 0–1.5)
BASOPHILS NFR BLD AUTO: 0.5 % (ref 0–1.5)
BASOPHILS NFR BLD AUTO: 0.5 % (ref 0–2)
BASOPHILS NFR BLD AUTO: 0.6 % (ref 0–1.5)
BASOPHILS NFR BLD AUTO: 2 % (ref 0–1.5)
BDY SITE: ABNORMAL
BEEF IGE QN: <0.1 KU/L
BH BB BLOOD EXPIRATION DATE: NORMAL
BH BB BLOOD TYPE BARCODE: 600
BH BB DISPENSE STATUS: NORMAL
BH BB PRODUCT CODE: NORMAL
BH BB UNIT NUMBER: NORMAL
BH CV ECHO MEAS - ACS: 1.3 CM
BH CV ECHO MEAS - AI DEC SLOPE: 130 CM/SEC^2
BH CV ECHO MEAS - AI DEC SLOPE: 262 CM/SEC^2
BH CV ECHO MEAS - AI MAX PG: 46.5 MMHG
BH CV ECHO MEAS - AI MAX PG: 87.2 MMHG
BH CV ECHO MEAS - AI MAX VEL: 341 CM/SEC
BH CV ECHO MEAS - AI MAX VEL: 467 CM/SEC
BH CV ECHO MEAS - AI P1/2T: 522.1 MSEC
BH CV ECHO MEAS - AI P1/2T: 768.3 MSEC
BH CV ECHO MEAS - AO MAX PG (FULL): 10.8 MMHG
BH CV ECHO MEAS - AO MAX PG (FULL): 8 MMHG
BH CV ECHO MEAS - AO MAX PG: 17.3 MMHG
BH CV ECHO MEAS - AO MAX PG: 22.8 MMHG
BH CV ECHO MEAS - AO MAX PG: 9.2 MMHG
BH CV ECHO MEAS - AO MEAN PG (FULL): 5 MMHG
BH CV ECHO MEAS - AO MEAN PG (FULL): 6 MMHG
BH CV ECHO MEAS - AO MEAN PG: 10 MMHG
BH CV ECHO MEAS - AO MEAN PG: 12 MMHG
BH CV ECHO MEAS - AO MEAN PG: 6 MMHG
BH CV ECHO MEAS - AO ROOT AREA (BSA CORRECTED): 1.7
BH CV ECHO MEAS - AO ROOT AREA (BSA CORRECTED): 1.9
BH CV ECHO MEAS - AO ROOT AREA: 6.2 CM^2
BH CV ECHO MEAS - AO ROOT AREA: 6.2 CM^2
BH CV ECHO MEAS - AO ROOT DIAM: 2.8 CM
BH CV ECHO MEAS - AO ROOT DIAM: 2.8 CM
BH CV ECHO MEAS - AO V2 MAX: 152 CM/SEC
BH CV ECHO MEAS - AO V2 MAX: 208 CM/SEC
BH CV ECHO MEAS - AO V2 MAX: 239 CM/SEC
BH CV ECHO MEAS - AO V2 MEAN: 109 CM/SEC
BH CV ECHO MEAS - AO V2 MEAN: 147 CM/SEC
BH CV ECHO MEAS - AO V2 MEAN: 160 CM/SEC
BH CV ECHO MEAS - AO V2 VTI: 27.6 CM
BH CV ECHO MEAS - AO V2 VTI: 44.6 CM
BH CV ECHO MEAS - AO V2 VTI: 46.7 CM
BH CV ECHO MEAS - ASC AORTA: 4.2 CM
BH CV ECHO MEAS - AVA(I,A): 1.1 CM^2
BH CV ECHO MEAS - AVA(I,A): 1.9 CM^2
BH CV ECHO MEAS - AVA(I,D): 1.1 CM^2
BH CV ECHO MEAS - AVA(I,D): 1.9 CM^2
BH CV ECHO MEAS - AVA(V,A): 1 CM^2
BH CV ECHO MEAS - AVA(V,A): 1.9 CM^2
BH CV ECHO MEAS - AVA(V,D): 1 CM^2
BH CV ECHO MEAS - AVA(V,D): 1.9 CM^2
BH CV ECHO MEAS - BSA(HAYCOCK): 1.5 M^2
BH CV ECHO MEAS - BSA(HAYCOCK): 1.5 M^2
BH CV ECHO MEAS - BSA(HAYCOCK): 1.7 M^2
BH CV ECHO MEAS - BSA: 1.5 M^2
BH CV ECHO MEAS - BSA: 1.5 M^2
BH CV ECHO MEAS - BSA: 1.6 M^2
BH CV ECHO MEAS - BZI_BMI: 20.8 KILOGRAMS/M^2
BH CV ECHO MEAS - BZI_BMI: 22.3 KILOGRAMS/M^2
BH CV ECHO MEAS - BZI_BMI: 26.6 KILOGRAMS/M^2
BH CV ECHO MEAS - BZI_METRIC_HEIGHT: 152.4 CM
BH CV ECHO MEAS - BZI_METRIC_HEIGHT: 154.9 CM
BH CV ECHO MEAS - BZI_METRIC_HEIGHT: 154.9 CM
BH CV ECHO MEAS - BZI_METRIC_WEIGHT: 49.9 KG
BH CV ECHO MEAS - BZI_METRIC_WEIGHT: 51.7 KG
BH CV ECHO MEAS - BZI_METRIC_WEIGHT: 64 KG
BH CV ECHO MEAS - EDV(CUBED): 50.7 ML
BH CV ECHO MEAS - EDV(CUBED): 51.9 ML
BH CV ECHO MEAS - EDV(CUBED): 55.3 ML
BH CV ECHO MEAS - EDV(MOD-SP2): 96.3 ML
BH CV ECHO MEAS - EDV(MOD-SP4): 64.3 ML
BH CV ECHO MEAS - EDV(TEICH): 58.1 ML
BH CV ECHO MEAS - EDV(TEICH): 59.3 ML
BH CV ECHO MEAS - EDV(TEICH): 62.3 ML
BH CV ECHO MEAS - EF(CUBED): 61.6 %
BH CV ECHO MEAS - EF(CUBED): 75.9 %
BH CV ECHO MEAS - EF(CUBED): 76 %
BH CV ECHO MEAS - EF(MOD-BP): 18 %
BH CV ECHO MEAS - EF(MOD-SP2): 21.4 %
BH CV ECHO MEAS - EF(MOD-SP4): 14.5 %
BH CV ECHO MEAS - EF(TEICH): 53.8 %
BH CV ECHO MEAS - EF(TEICH): 68.7 %
BH CV ECHO MEAS - EF(TEICH): 68.8 %
BH CV ECHO MEAS - ESV(CUBED): 12.2 ML
BH CV ECHO MEAS - ESV(CUBED): 12.5 ML
BH CV ECHO MEAS - ESV(CUBED): 21.3 ML
BH CV ECHO MEAS - ESV(MOD-SP2): 75.7 ML
BH CV ECHO MEAS - ESV(MOD-SP4): 55 ML
BH CV ECHO MEAS - ESV(TEICH): 18.1 ML
BH CV ECHO MEAS - ESV(TEICH): 18.5 ML
BH CV ECHO MEAS - ESV(TEICH): 28.8 ML
BH CV ECHO MEAS - FS: 27.3 %
BH CV ECHO MEAS - FS: 37.8 %
BH CV ECHO MEAS - FS: 37.8 %
BH CV ECHO MEAS - IVS/LVPW: 0.93
BH CV ECHO MEAS - IVS/LVPW: 1
BH CV ECHO MEAS - IVS/LVPW: 1.1
BH CV ECHO MEAS - IVSD: 0.8 CM
BH CV ECHO MEAS - IVSD: 0.82 CM
BH CV ECHO MEAS - IVSD: 0.99 CM
BH CV ECHO MEAS - LA DIMENSION: 3 CM
BH CV ECHO MEAS - LA DIMENSION: 3.7 CM
BH CV ECHO MEAS - LA/AO: 1.1
BH CV ECHO MEAS - LA/AO: 1.3
BH CV ECHO MEAS - LV DIASTOLIC VOL/BSA (35-75): 39.5 ML/M^2
BH CV ECHO MEAS - LV MASS(C)D: 103.3 GRAMS
BH CV ECHO MEAS - LV MASS(C)D: 82.3 GRAMS
BH CV ECHO MEAS - LV MASS(C)D: 94.1 GRAMS
BH CV ECHO MEAS - LV MASS(C)DI: 56 GRAMS/M^2
BH CV ECHO MEAS - LV MASS(C)DI: 57.8 GRAMS/M^2
BH CV ECHO MEAS - LV MASS(C)DI: 70.5 GRAMS/M^2
BH CV ECHO MEAS - LV MAX PG: 1.2 MMHG
BH CV ECHO MEAS - LV MAX PG: 6.6 MMHG
BH CV ECHO MEAS - LV MEAN PG: 1 MMHG
BH CV ECHO MEAS - LV MEAN PG: 4 MMHG
BH CV ECHO MEAS - LV SYSTOLIC VOL/BSA (12-30): 33.8 ML/M^2
BH CV ECHO MEAS - LV V1 MAX: 128 CM/SEC
BH CV ECHO MEAS - LV V1 MAX: 54.9 CM/SEC
BH CV ECHO MEAS - LV V1 MEAN: 37.8 CM/SEC
BH CV ECHO MEAS - LV V1 MEAN: 91.8 CM/SEC
BH CV ECHO MEAS - LV V1 VTI: 10.3 CM
BH CV ECHO MEAS - LV V1 VTI: 27.5 CM
BH CV ECHO MEAS - LVIDD: 3.7 CM
BH CV ECHO MEAS - LVIDD: 3.7 CM
BH CV ECHO MEAS - LVIDD: 3.8 CM
BH CV ECHO MEAS - LVIDS: 2.3 CM
BH CV ECHO MEAS - LVIDS: 2.3 CM
BH CV ECHO MEAS - LVIDS: 2.8 CM
BH CV ECHO MEAS - LVLD AP2: 7.5 CM
BH CV ECHO MEAS - LVLD AP4: 7.5 CM
BH CV ECHO MEAS - LVLS AP2: 7.6 CM
BH CV ECHO MEAS - LVLS AP4: 7.4 CM
BH CV ECHO MEAS - LVOT AREA (M): 2.8 CM^2
BH CV ECHO MEAS - LVOT AREA (M): 3.1 CM^2
BH CV ECHO MEAS - LVOT AREA (M): 3.1 CM^2
BH CV ECHO MEAS - LVOT AREA: 2.8 CM^2
BH CV ECHO MEAS - LVOT AREA: 3.1 CM^2
BH CV ECHO MEAS - LVOT AREA: 3.1 CM^2
BH CV ECHO MEAS - LVOT DIAM: 1.9 CM
BH CV ECHO MEAS - LVOT DIAM: 2 CM
BH CV ECHO MEAS - LVOT DIAM: 2 CM
BH CV ECHO MEAS - LVPWD: 0.8 CM
BH CV ECHO MEAS - LVPWD: 0.88 CM
BH CV ECHO MEAS - LVPWD: 0.88 CM
BH CV ECHO MEAS - MR MAX PG: 127.7 MMHG
BH CV ECHO MEAS - MR MAX PG: 134.6 MMHG
BH CV ECHO MEAS - MR MAX VEL: 565 CM/SEC
BH CV ECHO MEAS - MR MAX VEL: 580 CM/SEC
BH CV ECHO MEAS - MV A MAX VEL: 114 CM/SEC
BH CV ECHO MEAS - MV A MAX VEL: 84.9 CM/SEC
BH CV ECHO MEAS - MV DEC SLOPE: 453 CM/SEC^2
BH CV ECHO MEAS - MV DEC SLOPE: 771 CM/SEC^2
BH CV ECHO MEAS - MV E MAX VEL: 128 CM/SEC
BH CV ECHO MEAS - MV E MAX VEL: 57.2 CM/SEC
BH CV ECHO MEAS - MV E/A: 0.67
BH CV ECHO MEAS - MV E/A: 1.1
BH CV ECHO MEAS - MV MAX PG: 4.3 MMHG
BH CV ECHO MEAS - MV MEAN PG: 2 MMHG
BH CV ECHO MEAS - MV P1/2T MAX VEL: 137 CM/SEC
BH CV ECHO MEAS - MV P1/2T MAX VEL: 87.6 CM/SEC
BH CV ECHO MEAS - MV P1/2T: 33.3 MSEC
BH CV ECHO MEAS - MV P1/2T: 88.6 MSEC
BH CV ECHO MEAS - MV V2 MAX: 104 CM/SEC
BH CV ECHO MEAS - MV V2 MEAN: 67 CM/SEC
BH CV ECHO MEAS - MV V2 VTI: 14.1 CM
BH CV ECHO MEAS - MVA P1/2T LCG: 1.6 CM^2
BH CV ECHO MEAS - MVA P1/2T LCG: 2.5 CM^2
BH CV ECHO MEAS - MVA(P1/2T): 2.5 CM^2
BH CV ECHO MEAS - MVA(P1/2T): 6.6 CM^2
BH CV ECHO MEAS - MVA(VTI): 2.1 CM^2
BH CV ECHO MEAS - PA MAX PG (FULL): 0.25 MMHG
BH CV ECHO MEAS - PA MAX PG: 1.7 MMHG
BH CV ECHO MEAS - PA MAX PG: 5.6 MMHG
BH CV ECHO MEAS - PA V2 MAX: 118 CM/SEC
BH CV ECHO MEAS - PA V2 MAX: 65.2 CM/SEC
BH CV ECHO MEAS - RAP SYSTOLE: 10 MMHG
BH CV ECHO MEAS - RAP SYSTOLE: 8 MMHG
BH CV ECHO MEAS - RV MAX PG: 1.4 MMHG
BH CV ECHO MEAS - RV MEAN PG: 1 MMHG
BH CV ECHO MEAS - RV V1 MAX: 60.2 CM/SEC
BH CV ECHO MEAS - RV V1 MEAN: 43.4 CM/SEC
BH CV ECHO MEAS - RV V1 VTI: 13.3 CM
BH CV ECHO MEAS - RVDD: 2.5 CM
BH CV ECHO MEAS - RVSP: 39.4 MMHG
BH CV ECHO MEAS - RVSP: 58 MMHG
BH CV ECHO MEAS - SI(AO): 104.4 ML/M^2
BH CV ECHO MEAS - SI(AO): 186.8 ML/M^2
BH CV ECHO MEAS - SI(CUBED): 20.9 ML/M^2
BH CV ECHO MEAS - SI(CUBED): 26.2 ML/M^2
BH CV ECHO MEAS - SI(CUBED): 26.9 ML/M^2
BH CV ECHO MEAS - SI(LVOT): 17.9 ML/M^2
BH CV ECHO MEAS - SI(LVOT): 58.8 ML/M^2
BH CV ECHO MEAS - SI(MOD-SP2): 12.7 ML/M^2
BH CV ECHO MEAS - SI(MOD-SP4): 5.7 ML/M^2
BH CV ECHO MEAS - SI(TEICH): 20.6 ML/M^2
BH CV ECHO MEAS - SI(TEICH): 27.2 ML/M^2
BH CV ECHO MEAS - SI(TEICH): 27.8 ML/M^2
BH CV ECHO MEAS - SV(AO): 169.9 ML
BH CV ECHO MEAS - SV(AO): 274.6 ML
BH CV ECHO MEAS - SV(CUBED): 34.1 ML
BH CV ECHO MEAS - SV(CUBED): 38.5 ML
BH CV ECHO MEAS - SV(CUBED): 39.4 ML
BH CV ECHO MEAS - SV(LVOT): 29.2 ML
BH CV ECHO MEAS - SV(LVOT): 86.4 ML
BH CV ECHO MEAS - SV(MOD-SP2): 20.6 ML
BH CV ECHO MEAS - SV(MOD-SP4): 9.3 ML
BH CV ECHO MEAS - SV(TEICH): 33.6 ML
BH CV ECHO MEAS - SV(TEICH): 40 ML
BH CV ECHO MEAS - SV(TEICH): 40.7 ML
BH CV ECHO MEAS - TR MAX VEL: 271 CM/SEC
BH CV ECHO MEAS - TR MAX VEL: 342 CM/SEC
BILIRUB BLD-MCNC: NEGATIVE MG/DL
BILIRUB SERPL-MCNC: 0.2 MG/DL (ref 0.2–1.3)
BILIRUB SERPL-MCNC: 0.3 MG/DL (ref 0.2–1.2)
BILIRUB SERPL-MCNC: 0.3 MG/DL (ref 0.2–1.3)
BILIRUB SERPL-MCNC: 0.4 MG/DL (ref 0.2–1.2)
BILIRUB SERPL-MCNC: 0.5 MG/DL (ref 0.2–1.2)
BILIRUB SERPL-MCNC: 0.5 MG/DL (ref 0.2–1.3)
BILIRUB SERPL-MCNC: 0.7 MG/DL (ref 0.2–1.3)
BILIRUB UR QL STRIP: NEGATIVE
BLD GP AB SCN SERPL QL: NEGATIVE
BUN BLD-MCNC: 10 MG/DL (ref 7–21)
BUN BLD-MCNC: 10 MG/DL (ref 8–23)
BUN BLD-MCNC: 10 MG/DL (ref 8–23)
BUN BLD-MCNC: 11 MG/DL (ref 8–23)
BUN BLD-MCNC: 12 MG/DL (ref 7–21)
BUN BLD-MCNC: 12 MG/DL (ref 8–23)
BUN BLD-MCNC: 13 MG/DL (ref 7–21)
BUN BLD-MCNC: 13 MG/DL (ref 8–23)
BUN BLD-MCNC: 14 MG/DL (ref 7–21)
BUN BLD-MCNC: 14 MG/DL (ref 7–21)
BUN BLD-MCNC: 14 MG/DL (ref 8–23)
BUN BLD-MCNC: 15 MG/DL (ref 7–23)
BUN BLD-MCNC: 15 MG/DL (ref 8–23)
BUN BLD-MCNC: 16 MG/DL (ref 8–23)
BUN BLD-MCNC: 17 MG/DL (ref 8–23)
BUN BLD-MCNC: 18 MG/DL (ref 8–23)
BUN BLD-MCNC: 24 MG/DL (ref 8–23)
BUN BLD-MCNC: 8 MG/DL (ref 8–23)
BUN BLD-MCNC: 9 MG/DL (ref 8–23)
BUN/CREAT SERPL: 17 (ref 7–25)
BUN/CREAT SERPL: 18.6 (ref 7–25)
BUN/CREAT SERPL: 19.4 (ref 7–25)
BUN/CREAT SERPL: 19.6 (ref 7–25)
BUN/CREAT SERPL: 20.3 (ref 7–25)
BUN/CREAT SERPL: 20.8 (ref 7–25)
BUN/CREAT SERPL: 21.3 (ref 7–25)
BUN/CREAT SERPL: 21.6 (ref 7–25)
BUN/CREAT SERPL: 22 (ref 7–25)
BUN/CREAT SERPL: 22 (ref 7–25)
BUN/CREAT SERPL: 22.2 (ref 7–25)
BUN/CREAT SERPL: 22.7 (ref 7–25)
BUN/CREAT SERPL: 22.8 (ref 7–25)
BUN/CREAT SERPL: 23.4 (ref 7–25)
BUN/CREAT SERPL: 23.7 (ref 7–25)
BUN/CREAT SERPL: 24 (ref 7–25)
BUN/CREAT SERPL: 24.4 (ref 7–25)
BUN/CREAT SERPL: 24.5 (ref 7–25)
BUN/CREAT SERPL: 24.6 (ref 7–25)
BUN/CREAT SERPL: 25 (ref 7–25)
BUN/CREAT SERPL: 25.4 (ref 7–25)
BUN/CREAT SERPL: 25.6 (ref 7–25)
BUN/CREAT SERPL: 27.1 (ref 7–25)
BUN/CREAT SERPL: 27.3 (ref 7–25)
BUN/CREAT SERPL: 27.9 (ref 7–25)
BUN/CREAT SERPL: 28.6 (ref 7–25)
BUN/CREAT SERPL: 29.4 (ref 7–25)
BUN/CREAT SERPL: 32.5 (ref 7–25)
BUN/CREAT SERPL: 33.3 (ref 7–25)
BUN/CREAT SERPL: 37.5 (ref 7–25)
BUN/CREAT SERPL: 38.2 (ref 7–25)
BUN/CREAT SERPL: 40.6 (ref 7–25)
C PNEUM DNA NPH QL NAA+NON-PROBE: NOT DETECTED
CA-I BLD-MCNC: 3.71 MG/DL (ref 4.6–5.6)
CALCIUM SPEC-SCNC: 6.9 MG/DL (ref 8.6–10.5)
CALCIUM SPEC-SCNC: 7.1 MG/DL (ref 8.6–10.5)
CALCIUM SPEC-SCNC: 7.2 MG/DL (ref 8.6–10.5)
CALCIUM SPEC-SCNC: 7.4 MG/DL (ref 8.4–10.2)
CALCIUM SPEC-SCNC: 7.4 MG/DL (ref 8.6–10.5)
CALCIUM SPEC-SCNC: 7.6 MG/DL (ref 8.6–10.5)
CALCIUM SPEC-SCNC: 7.8 MG/DL (ref 8.6–10.5)
CALCIUM SPEC-SCNC: 7.9 MG/DL (ref 8.4–10.2)
CALCIUM SPEC-SCNC: 7.9 MG/DL (ref 8.6–10.5)
CALCIUM SPEC-SCNC: 8 MG/DL (ref 8.4–10.2)
CALCIUM SPEC-SCNC: 8 MG/DL (ref 8.4–10.2)
CALCIUM SPEC-SCNC: 8 MG/DL (ref 8.6–10.5)
CALCIUM SPEC-SCNC: 8.1 MG/DL (ref 8.4–10.2)
CALCIUM SPEC-SCNC: 8.1 MG/DL (ref 8.6–10.5)
CALCIUM SPEC-SCNC: 8.2 MG/DL (ref 8.4–10.2)
CALCIUM SPEC-SCNC: 8.2 MG/DL (ref 8.6–10.5)
CALCIUM SPEC-SCNC: 8.3 MG/DL (ref 8.6–10.5)
CALCIUM SPEC-SCNC: 8.7 MG/DL (ref 8.4–10.2)
CALCIUM SPEC-SCNC: 9 MG/DL (ref 8.4–10.2)
CHLORIDE SERPL-SCNC: 100 MMOL/L (ref 98–107)
CHLORIDE SERPL-SCNC: 101 MMOL/L (ref 95–110)
CHLORIDE SERPL-SCNC: 82 MMOL/L (ref 98–107)
CHLORIDE SERPL-SCNC: 88 MMOL/L (ref 98–107)
CHLORIDE SERPL-SCNC: 89 MMOL/L (ref 98–107)
CHLORIDE SERPL-SCNC: 90 MMOL/L (ref 98–107)
CHLORIDE SERPL-SCNC: 91 MMOL/L (ref 98–107)
CHLORIDE SERPL-SCNC: 91 MMOL/L (ref 98–107)
CHLORIDE SERPL-SCNC: 92 MMOL/L (ref 98–107)
CHLORIDE SERPL-SCNC: 93 MMOL/L (ref 98–107)
CHLORIDE SERPL-SCNC: 93 MMOL/L (ref 98–107)
CHLORIDE SERPL-SCNC: 94 MMOL/L (ref 98–107)
CHLORIDE SERPL-SCNC: 95 MMOL/L (ref 101–112)
CHLORIDE SERPL-SCNC: 95 MMOL/L (ref 95–110)
CHLORIDE SERPL-SCNC: 95 MMOL/L (ref 95–110)
CHLORIDE SERPL-SCNC: 95 MMOL/L (ref 98–107)
CHLORIDE SERPL-SCNC: 95 MMOL/L (ref 98–107)
CHLORIDE SERPL-SCNC: 96 MMOL/L (ref 98–107)
CHLORIDE SERPL-SCNC: 96 MMOL/L (ref 98–107)
CHLORIDE SERPL-SCNC: 97 MMOL/L (ref 95–110)
CHLORIDE SERPL-SCNC: 97 MMOL/L (ref 95–110)
CHLORIDE SERPL-SCNC: 97 MMOL/L (ref 98–107)
CHLORIDE SERPL-SCNC: 98 MMOL/L (ref 95–110)
CHLORIDE SERPL-SCNC: 98 MMOL/L (ref 98–107)
CHLORIDE SERPL-SCNC: 98 MMOL/L (ref 98–107)
CHLORIDE SERPL-SCNC: 99 MMOL/L (ref 95–110)
CHOLEST SERPL-MCNC: 113 MG/DL (ref 0–199)
CHOLEST SERPL-MCNC: 122 MG/DL (ref 0–200)
CHOLEST SERPL-MCNC: 189 MG/DL (ref 0–199)
CLARITY UR: ABNORMAL
CLARITY UR: CLEAR
CLARITY UR: CLEAR
CLARITY, POC: ABNORMAL
CO2 SERPL-SCNC: 16 MMOL/L (ref 22–29)
CO2 SERPL-SCNC: 20 MMOL/L (ref 22–29)
CO2 SERPL-SCNC: 21 MMOL/L (ref 22–29)
CO2 SERPL-SCNC: 23 MMOL/L (ref 22–29)
CO2 SERPL-SCNC: 23 MMOL/L (ref 22–29)
CO2 SERPL-SCNC: 24 MMOL/L (ref 22–29)
CO2 SERPL-SCNC: 24 MMOL/L (ref 22–31)
CO2 SERPL-SCNC: 25 MMOL/L (ref 22–29)
CO2 SERPL-SCNC: 25 MMOL/L (ref 22–31)
CO2 SERPL-SCNC: 26 MMOL/L (ref 22–29)
CO2 SERPL-SCNC: 26 MMOL/L (ref 22–30)
CO2 SERPL-SCNC: 26 MMOL/L (ref 22–31)
CO2 SERPL-SCNC: 26.5 MMOL/L (ref 22–29)
CO2 SERPL-SCNC: 27 MMOL/L (ref 22–29)
CO2 SERPL-SCNC: 27 MMOL/L (ref 22–29)
CO2 SERPL-SCNC: 27 MMOL/L (ref 22–31)
CO2 SERPL-SCNC: 27 MMOL/L (ref 22–31)
CO2 SERPL-SCNC: 27.4 MMOL/L (ref 22–29)
CO2 SERPL-SCNC: 28 MMOL/L (ref 22–29)
CO2 SERPL-SCNC: 29 MMOL/L (ref 22–29)
CO2 SERPL-SCNC: 29 MMOL/L (ref 22–29)
CO2 SERPL-SCNC: 31 MMOL/L (ref 22–31)
CO2 SERPL-SCNC: 32 MMOL/L (ref 22–31)
COARSE GRAN CASTS URNS QL MICRO: ABNORMAL /LPF
COARSE GRAN CASTS URNS QL MICRO: ABNORMAL /LPF
COLOR UR: ABNORMAL
COLOR UR: YELLOW
CONV HGE IGG TITER: NEGATIVE
COPPER BLD-MCNC: 1.05 UG/ML (ref 0.5–1.5)
CORTIS AM PEAK SERPL-MCNC: 17.65 MCG/DL
CREAT BLD-MCNC: 0.32 MG/DL (ref 0.5–1)
CREAT BLD-MCNC: 0.32 MG/DL (ref 0.5–1)
CREAT BLD-MCNC: 0.34 MG/DL (ref 0.5–1)
CREAT BLD-MCNC: 0.39 MG/DL (ref 0.57–1)
CREAT BLD-MCNC: 0.4 MG/DL (ref 0.5–1)
CREAT BLD-MCNC: 0.41 MG/DL (ref 0.57–1)
CREAT BLD-MCNC: 0.41 MG/DL (ref 0.57–1)
CREAT BLD-MCNC: 0.42 MG/DL (ref 0.57–1)
CREAT BLD-MCNC: 0.42 MG/DL (ref 0.5–1)
CREAT BLD-MCNC: 0.43 MG/DL (ref 0.57–1)
CREAT BLD-MCNC: 0.43 MG/DL (ref 0.57–1)
CREAT BLD-MCNC: 0.44 MG/DL (ref 0.5–1)
CREAT BLD-MCNC: 0.46 MG/DL (ref 0.57–1)
CREAT BLD-MCNC: 0.47 MG/DL (ref 0.57–1)
CREAT BLD-MCNC: 0.48 MG/DL (ref 0.57–1)
CREAT BLD-MCNC: 0.49 MG/DL (ref 0.57–1)
CREAT BLD-MCNC: 0.51 MG/DL (ref 0.52–1.04)
CREAT BLD-MCNC: 0.51 MG/DL (ref 0.57–1)
CREAT BLD-MCNC: 0.52 MG/DL (ref 0.57–1)
CREAT BLD-MCNC: 0.53 MG/DL (ref 0.57–1)
CREAT BLD-MCNC: 0.53 MG/DL (ref 0.57–1)
CREAT BLD-MCNC: 0.57 MG/DL (ref 0.57–1)
CREAT BLD-MCNC: 0.57 MG/DL (ref 0.57–1)
CREAT BLD-MCNC: 0.59 MG/DL (ref 0.57–1)
CREAT BLD-MCNC: 0.61 MG/DL (ref 0.57–1)
CREAT BLD-MCNC: 0.61 MG/DL (ref 0.57–1)
CREAT BLD-MCNC: 0.63 MG/DL (ref 0.57–1)
CREAT BLD-MCNC: 0.63 MG/DL (ref 0.57–1)
CREAT BLD-MCNC: 0.69 MG/DL (ref 0.57–1)
CREAT BLD-MCNC: 0.72 MG/DL (ref 0.5–1)
CREAT BLD-MCNC: 0.75 MG/DL (ref 0.57–1)
CREAT BLD-MCNC: 0.88 MG/DL (ref 0.57–1)
CRP SERPL-MCNC: 11.52 MG/DL (ref 0–0.5)
CYTO UR: NORMAL
DEPRECATED RDW RBC AUTO: 48.2 FL (ref 37–54)
DEPRECATED RDW RBC AUTO: 48.9 FL (ref 36.4–46.3)
DEPRECATED RDW RBC AUTO: 49 FL (ref 37–54)
DEPRECATED RDW RBC AUTO: 49.5 FL (ref 37–54)
DEPRECATED RDW RBC AUTO: 50 FL (ref 37–54)
DEPRECATED RDW RBC AUTO: 50 FL (ref 37–54)
DEPRECATED RDW RBC AUTO: 51.3 FL (ref 37–54)
DEPRECATED RDW RBC AUTO: 51.3 FL (ref 37–54)
DEPRECATED RDW RBC AUTO: 51.4 FL (ref 37–54)
DEPRECATED RDW RBC AUTO: 51.4 FL (ref 37–54)
DEPRECATED RDW RBC AUTO: 51.6 FL (ref 37–54)
DEPRECATED RDW RBC AUTO: 51.8 FL (ref 37–54)
DEPRECATED RDW RBC AUTO: 52.1 FL (ref 37–54)
DEPRECATED RDW RBC AUTO: 52.2 FL (ref 37–54)
DEPRECATED RDW RBC AUTO: 52.7 FL (ref 37–54)
DEPRECATED RDW RBC AUTO: 53.1 FL (ref 37–54)
DEPRECATED RDW RBC AUTO: 53.2 FL (ref 37–54)
DEPRECATED RDW RBC AUTO: 55 FL (ref 37–54)
DEPRECATED RDW RBC AUTO: 55 FL (ref 37–54)
DEPRECATED RDW RBC AUTO: 55.1 FL (ref 37–54)
DEPRECATED RDW RBC AUTO: 55.4 FL (ref 37–54)
DEPRECATED RDW RBC AUTO: 55.6 FL (ref 37–54)
DEPRECATED RDW RBC AUTO: 55.6 FL (ref 37–54)
DEPRECATED RDW RBC AUTO: 55.8 FL (ref 37–54)
DEPRECATED RDW RBC AUTO: 56.7 FL (ref 37–54)
DEPRECATED RDW RBC AUTO: 56.9 FL (ref 37–54)
DEPRECATED RDW RBC AUTO: 57 FL (ref 37–54)
DEPRECATED RDW RBC AUTO: 57.3 FL (ref 37–54)
DEPRECATED RDW RBC AUTO: 59.3 FL (ref 37–54)
DEPRECATED RDW RBC AUTO: 59.5 FL (ref 37–54)
DEPRECATED RDW RBC AUTO: 59.6 FL (ref 37–54)
DEPRECATED RDW RBC AUTO: 60.7 FL (ref 37–54)
E CHAFFEENSIS IGG TITR SER IF: NEGATIVE {TITER}
E. CHAFFEENSIS (HME) IGM TITER: NEGATIVE
EOSINOPHIL # BLD AUTO: 0 10*3/MM3 (ref 0–0.4)
EOSINOPHIL # BLD AUTO: 0 10*3/MM3 (ref 0–0.4)
EOSINOPHIL # BLD AUTO: 0.02 10*3/MM3 (ref 0–0.4)
EOSINOPHIL # BLD AUTO: 0.02 10*3/MM3 (ref 0–0.4)
EOSINOPHIL # BLD AUTO: 0.03 10*3/MM3 (ref 0–0.4)
EOSINOPHIL # BLD AUTO: 0.04 10*3/MM3 (ref 0–0.4)
EOSINOPHIL # BLD AUTO: 0.05 10*3/MM3 (ref 0–0.4)
EOSINOPHIL # BLD AUTO: 0.06 10*3/MM3 (ref 0–0.4)
EOSINOPHIL # BLD AUTO: 0.07 10*3/MM3 (ref 0–0.4)
EOSINOPHIL # BLD AUTO: 0.1 10*3/MM3 (ref 0–0.4)
EOSINOPHIL # BLD AUTO: 0.1 10*3/MM3 (ref 0–0.4)
EOSINOPHIL # BLD AUTO: 0.11 10*3/MM3 (ref 0–0.4)
EOSINOPHIL # BLD AUTO: 0.12 10*3/MM3 (ref 0–0.4)
EOSINOPHIL # BLD AUTO: 0.12 10*3/MM3 (ref 0–0.7)
EOSINOPHIL # BLD AUTO: 0.14 10*3/MM3 (ref 0–0.4)
EOSINOPHIL # BLD AUTO: 0.17 10*3/MM3 (ref 0–0.4)
EOSINOPHIL # BLD AUTO: 0.2 10*3/MM3 (ref 0–0.4)
EOSINOPHIL # BLD AUTO: 0.2 10*3/MM3 (ref 0–0.4)
EOSINOPHIL # BLD AUTO: 0.22 10*3/MM3 (ref 0–0.4)
EOSINOPHIL NFR BLD AUTO: 0 % (ref 0.3–6.2)
EOSINOPHIL NFR BLD AUTO: 0 % (ref 0.3–6.2)
EOSINOPHIL NFR BLD AUTO: 0.1 % (ref 0.3–6.2)
EOSINOPHIL NFR BLD AUTO: 0.2 % (ref 0.3–6.2)
EOSINOPHIL NFR BLD AUTO: 0.2 % (ref 0.3–6.2)
EOSINOPHIL NFR BLD AUTO: 0.3 % (ref 0.3–6.2)
EOSINOPHIL NFR BLD AUTO: 0.4 % (ref 0.3–6.2)
EOSINOPHIL NFR BLD AUTO: 0.5 % (ref 0.3–6.2)
EOSINOPHIL NFR BLD AUTO: 0.6 % (ref 0.3–6.2)
EOSINOPHIL NFR BLD AUTO: 0.7 % (ref 0.3–6.2)
EOSINOPHIL NFR BLD AUTO: 0.7 % (ref 0.3–6.2)
EOSINOPHIL NFR BLD AUTO: 0.9 % (ref 0.3–6.2)
EOSINOPHIL NFR BLD AUTO: 1 % (ref 0.3–6.2)
EOSINOPHIL NFR BLD AUTO: 1.3 % (ref 0.3–6.2)
EOSINOPHIL NFR BLD AUTO: 1.4 % (ref 0.3–6.2)
EOSINOPHIL NFR BLD AUTO: 1.5 % (ref 0.3–6.2)
EOSINOPHIL NFR BLD AUTO: 1.7 % (ref 0.3–6.2)
EOSINOPHIL NFR BLD AUTO: 1.9 % (ref 0.3–6.2)
EOSINOPHIL NFR BLD AUTO: 1.9 % (ref 0–7)
EOSINOPHIL NFR BLD AUTO: 2.1 % (ref 0.3–6.2)
EOSINOPHIL NFR BLD AUTO: 2.3 % (ref 0.3–6.2)
ERYTHROCYTE [DISTWIDTH] IN BLOOD BY AUTOMATED COUNT: 14.6 % (ref 11.5–14.5)
ERYTHROCYTE [DISTWIDTH] IN BLOOD BY AUTOMATED COUNT: 15.5 % (ref 12.3–15.4)
ERYTHROCYTE [DISTWIDTH] IN BLOOD BY AUTOMATED COUNT: 15.6 % (ref 12.3–15.4)
ERYTHROCYTE [DISTWIDTH] IN BLOOD BY AUTOMATED COUNT: 15.9 % (ref 12.3–15.4)
ERYTHROCYTE [DISTWIDTH] IN BLOOD BY AUTOMATED COUNT: 16.2 % (ref 12.3–15.4)
ERYTHROCYTE [DISTWIDTH] IN BLOOD BY AUTOMATED COUNT: 16.2 % (ref 12.3–15.4)
ERYTHROCYTE [DISTWIDTH] IN BLOOD BY AUTOMATED COUNT: 16.6 % (ref 12.3–15.4)
ERYTHROCYTE [DISTWIDTH] IN BLOOD BY AUTOMATED COUNT: 16.7 % (ref 12.3–15.4)
ERYTHROCYTE [DISTWIDTH] IN BLOOD BY AUTOMATED COUNT: 16.7 % (ref 12.3–15.4)
ERYTHROCYTE [DISTWIDTH] IN BLOOD BY AUTOMATED COUNT: 16.8 % (ref 12.3–15.4)
ERYTHROCYTE [DISTWIDTH] IN BLOOD BY AUTOMATED COUNT: 16.8 % (ref 12.3–15.4)
ERYTHROCYTE [DISTWIDTH] IN BLOOD BY AUTOMATED COUNT: 16.9 % (ref 12.3–15.4)
ERYTHROCYTE [DISTWIDTH] IN BLOOD BY AUTOMATED COUNT: 17 % (ref 12.3–15.4)
ERYTHROCYTE [DISTWIDTH] IN BLOOD BY AUTOMATED COUNT: 17.1 % (ref 12.3–15.4)
ERYTHROCYTE [DISTWIDTH] IN BLOOD BY AUTOMATED COUNT: 17.4 % (ref 12.3–15.4)
ERYTHROCYTE [DISTWIDTH] IN BLOOD BY AUTOMATED COUNT: 17.4 % (ref 12.3–15.4)
ERYTHROCYTE [DISTWIDTH] IN BLOOD BY AUTOMATED COUNT: 17.5 % (ref 12.3–15.4)
ERYTHROCYTE [DISTWIDTH] IN BLOOD BY AUTOMATED COUNT: 17.5 % (ref 12.3–15.4)
ERYTHROCYTE [DISTWIDTH] IN BLOOD BY AUTOMATED COUNT: 17.6 % (ref 12.3–15.4)
ERYTHROCYTE [DISTWIDTH] IN BLOOD BY AUTOMATED COUNT: 17.7 % (ref 12.3–15.4)
ERYTHROCYTE [DISTWIDTH] IN BLOOD BY AUTOMATED COUNT: 17.9 % (ref 12.3–15.4)
ERYTHROCYTE [DISTWIDTH] IN BLOOD BY AUTOMATED COUNT: 18.2 % (ref 12.3–15.4)
ERYTHROCYTE [DISTWIDTH] IN BLOOD BY AUTOMATED COUNT: 18.2 % (ref 12.3–15.4)
ERYTHROCYTE [DISTWIDTH] IN BLOOD BY AUTOMATED COUNT: 18.3 % (ref 12.3–15.4)
ERYTHROCYTE [DISTWIDTH] IN BLOOD BY AUTOMATED COUNT: 18.4 % (ref 12.3–15.4)
ERYTHROCYTE [DISTWIDTH] IN BLOOD BY AUTOMATED COUNT: 18.6 % (ref 12.3–15.4)
ERYTHROCYTE [DISTWIDTH] IN BLOOD BY AUTOMATED COUNT: 18.6 % (ref 12.3–15.4)
ERYTHROCYTE [SEDIMENTATION RATE] IN BLOOD: 116 MM/HR (ref 0–20)
ERYTHROCYTE [SEDIMENTATION RATE] IN BLOOD: 120 MM/HR (ref 0–20)
FERRITIN SERPL-MCNC: 739.9 NG/ML (ref 13–150)
FLUAV AG NPH QL: NEGATIVE
FLUAV H1 2009 PAND RNA NPH QL NAA+PROBE: NOT DETECTED
FLUAV H1 HA GENE NPH QL NAA+PROBE: NOT DETECTED
FLUAV H3 RNA NPH QL NAA+PROBE: NOT DETECTED
FLUAV SUBTYP SPEC NAA+PROBE: NOT DETECTED
FLUBV AG NPH QL IA: NEGATIVE
FLUBV RNA ISLT QL NAA+PROBE: NOT DETECTED
FOLATE SERPL-MCNC: 18.3 NG/ML (ref 2.76–21)
FOLATE SERPL-MCNC: 5.04 NG/ML (ref 4.78–24.2)
GAMMA GLOB SERPL ELPH-MCNC: 2.7 G/DL (ref 0.4–1.8)
GFR SERPL CREATININE-BSD FRML MDRD: 103 ML/MIN/1.73
GFR SERPL CREATININE-BSD FRML MDRD: 103 ML/MIN/1.73
GFR SERPL CREATININE-BSD FRML MDRD: 112 ML/MIN/1.73
GFR SERPL CREATININE-BSD FRML MDRD: 112 ML/MIN/1.73
GFR SERPL CREATININE-BSD FRML MDRD: 114 ML/MIN/1.73
GFR SERPL CREATININE-BSD FRML MDRD: 117 ML/MIN/1.73
GFR SERPL CREATININE-BSD FRML MDRD: 117 ML/MIN/1.73 (ref 39–90)
GFR SERPL CREATININE-BSD FRML MDRD: 122 ML/MIN/1.73
GFR SERPL CREATININE-BSD FRML MDRD: 125 ML/MIN/1.73
GFR SERPL CREATININE-BSD FRML MDRD: 128 ML/MIN/1.73
GFR SERPL CREATININE-BSD FRML MDRD: 131 ML/MIN/1.73
GFR SERPL CREATININE-BSD FRML MDRD: 138 ML/MIN/1.73 (ref 39–90)
GFR SERPL CREATININE-BSD FRML MDRD: 142 ML/MIN/1.73
GFR SERPL CREATININE-BSD FRML MDRD: 142 ML/MIN/1.73
GFR SERPL CREATININE-BSD FRML MDRD: 146 ML/MIN/1.73
GFR SERPL CREATININE-BSD FRML MDRD: 146 ML/MIN/1.73 (ref 39–90)
GFR SERPL CREATININE-BSD FRML MDRD: 150 ML/MIN/1.73
GFR SERPL CREATININE-BSD FRML MDRD: 150 ML/MIN/1.73
GFR SERPL CREATININE-BSD FRML MDRD: 154 ML/MIN/1.73 (ref 39–90)
GFR SERPL CREATININE-BSD FRML MDRD: 186 ML/MIN/1.73 (ref 39–90)
GFR SERPL CREATININE-BSD FRML MDRD: 200 ML/MIN/1.73 (ref 39–90)
GFR SERPL CREATININE-BSD FRML MDRD: 200 ML/MIN/1.73 (ref 39–90)
GFR SERPL CREATININE-BSD FRML MDRD: 62 ML/MIN/1.73
GFR SERPL CREATININE-BSD FRML MDRD: 75 ML/MIN/1.73
GFR SERPL CREATININE-BSD FRML MDRD: 78 ML/MIN/1.73 (ref 39–90)
GFR SERPL CREATININE-BSD FRML MDRD: 82 ML/MIN/1.73
GFR SERPL CREATININE-BSD FRML MDRD: 91 ML/MIN/1.73
GFR SERPL CREATININE-BSD FRML MDRD: 91 ML/MIN/1.73
GFR SERPL CREATININE-BSD FRML MDRD: 95 ML/MIN/1.73
GFR SERPL CREATININE-BSD FRML MDRD: 95 ML/MIN/1.73
GFR SERPL CREATININE-BSD FRML MDRD: 99 ML/MIN/1.73
GFR SERPL CREATININE-BSD FRML MDRD: >150 ML/MIN/1.73
GLOBULIN SER CALC-MCNC: 4.5 G/DL (ref 2.2–3.9)
GLOBULIN UR ELPH-MCNC: 4.7 GM/DL (ref 2.3–3.5)
GLOBULIN UR ELPH-MCNC: 4.8 GM/DL (ref 2.3–3.5)
GLOBULIN UR ELPH-MCNC: 4.9 GM/DL (ref 2.3–3.5)
GLOBULIN UR ELPH-MCNC: 5.1 GM/DL (ref 2.3–3.5)
GLOBULIN UR ELPH-MCNC: 5.2 GM/DL
GLOBULIN UR ELPH-MCNC: 5.2 GM/DL
GLOBULIN UR ELPH-MCNC: 5.3 GM/DL
GLOBULIN UR ELPH-MCNC: 5.3 GM/DL
GLOBULIN UR ELPH-MCNC: 5.4 GM/DL (ref 2.3–3.5)
GLOBULIN UR ELPH-MCNC: 5.4 GM/DL (ref 2.3–3.5)
GLOBULIN UR ELPH-MCNC: 5.7 GM/DL (ref 2.3–3.5)
GLOBULIN UR ELPH-MCNC: 5.9 GM/DL
GLOBULIN UR ELPH-MCNC: 6 GM/DL
GLOBULIN UR ELPH-MCNC: 6.1 GM/DL
GLOBULIN UR ELPH-MCNC: 6.2 GM/DL
GLOBULIN UR ELPH-MCNC: 6.4 GM/DL
GLOBULIN UR ELPH-MCNC: 6.5 GM/DL
GLOBULIN UR ELPH-MCNC: 6.6 GM/DL
GLOBULIN UR ELPH-MCNC: 6.7 GM/DL
GLOBULIN UR ELPH-MCNC: 6.8 GM/DL
GLUCOSE BLD-MCNC: 100 MG/DL (ref 60–100)
GLUCOSE BLD-MCNC: 100 MG/DL (ref 65–99)
GLUCOSE BLD-MCNC: 100 MG/DL (ref 70–99)
GLUCOSE BLD-MCNC: 101 MG/DL (ref 60–100)
GLUCOSE BLD-MCNC: 101 MG/DL (ref 60–100)
GLUCOSE BLD-MCNC: 101 MG/DL (ref 65–99)
GLUCOSE BLD-MCNC: 103 MG/DL (ref 60–100)
GLUCOSE BLD-MCNC: 103 MG/DL (ref 65–99)
GLUCOSE BLD-MCNC: 104 MG/DL (ref 65–99)
GLUCOSE BLD-MCNC: 104 MG/DL (ref 65–99)
GLUCOSE BLD-MCNC: 106 MG/DL (ref 60–100)
GLUCOSE BLD-MCNC: 107 MG/DL (ref 65–99)
GLUCOSE BLD-MCNC: 107 MG/DL (ref 65–99)
GLUCOSE BLD-MCNC: 109 MG/DL (ref 65–99)
GLUCOSE BLD-MCNC: 112 MG/DL (ref 65–99)
GLUCOSE BLD-MCNC: 113 MG/DL (ref 60–100)
GLUCOSE BLD-MCNC: 116 MG/DL (ref 65–99)
GLUCOSE BLD-MCNC: 119 MG/DL (ref 65–99)
GLUCOSE BLD-MCNC: 121 MG/DL (ref 60–100)
GLUCOSE BLD-MCNC: 144 MG/DL (ref 65–99)
GLUCOSE BLD-MCNC: 149 MG/DL (ref 65–99)
GLUCOSE BLD-MCNC: 217 MG/DL (ref 65–99)
GLUCOSE BLD-MCNC: 80 MG/DL (ref 65–99)
GLUCOSE BLD-MCNC: 80 MG/DL (ref 65–99)
GLUCOSE BLD-MCNC: 88 MG/DL (ref 65–99)
GLUCOSE BLD-MCNC: 93 MG/DL (ref 65–99)
GLUCOSE BLD-MCNC: 95 MG/DL (ref 65–99)
GLUCOSE BLD-MCNC: 97 MG/DL (ref 65–99)
GLUCOSE BLD-MCNC: 97 MG/DL (ref 65–99)
GLUCOSE BLD-MCNC: 98 MG/DL (ref 65–99)
GLUCOSE BLD-MCNC: 99 MG/DL (ref 65–99)
GLUCOSE BLD-MCNC: 99 MG/DL (ref 65–99)
GLUCOSE BLDC GLUCOMTR-MCNC: 100 MG/DL (ref 70–130)
GLUCOSE BLDC GLUCOMTR-MCNC: 107 MG/DL (ref 70–130)
GLUCOSE BLDC GLUCOMTR-MCNC: 119 MG/DL (ref 70–130)
GLUCOSE BLDC GLUCOMTR-MCNC: 122 MG/DL (ref 70–130)
GLUCOSE BLDC GLUCOMTR-MCNC: 125 MG/DL (ref 70–130)
GLUCOSE BLDC GLUCOMTR-MCNC: 126 MG/DL (ref 70–130)
GLUCOSE BLDC GLUCOMTR-MCNC: 143 MG/DL (ref 70–130)
GLUCOSE BLDC GLUCOMTR-MCNC: 160 MG/DL (ref 70–130)
GLUCOSE BLDC GLUCOMTR-MCNC: 231 MG/DL (ref 70–130)
GLUCOSE BLDC GLUCOMTR-MCNC: 64 MG/DL (ref 70–130)
GLUCOSE BLDC GLUCOMTR-MCNC: 74 MG/DL (ref 70–130)
GLUCOSE BLDC GLUCOMTR-MCNC: 78 MG/DL (ref 70–130)
GLUCOSE BLDC GLUCOMTR-MCNC: 90 MG/DL (ref 70–130)
GLUCOSE BLDC GLUCOMTR-MCNC: 96 MG/DL (ref 70–130)
GLUCOSE BLDC GLUCOMTR-MCNC: 98 MG/DL (ref 70–130)
GLUCOSE UR STRIP-MCNC: ABNORMAL MG/DL
GLUCOSE UR STRIP-MCNC: ABNORMAL MG/DL
GLUCOSE UR STRIP-MCNC: NEGATIVE MG/DL
HADV DNA SPEC NAA+PROBE: NOT DETECTED
HAPTOGLOB SERPL-MCNC: 188 MG/DL (ref 30–200)
HBA1C MFR BLD: 6.07 % (ref 4.8–5.6)
HBA1C MFR BLD: 6.1 % (ref 4–5.6)
HBA1C MFR BLD: 6.1 % (ref 4–5.6)
HCO3 BLDA-SCNC: 18.8 MMOL/L (ref 20–26)
HCOV 229E RNA SPEC QL NAA+PROBE: NOT DETECTED
HCOV HKU1 RNA SPEC QL NAA+PROBE: NOT DETECTED
HCOV NL63 RNA SPEC QL NAA+PROBE: NOT DETECTED
HCOV OC43 RNA SPEC QL NAA+PROBE: NOT DETECTED
HCT VFR BLD AUTO: 20.7 % (ref 34–46.6)
HCT VFR BLD AUTO: 21.9 % (ref 34–46.6)
HCT VFR BLD AUTO: 22.3 % (ref 34–46.6)
HCT VFR BLD AUTO: 23 % (ref 34–46.6)
HCT VFR BLD AUTO: 23.5 % (ref 34–46.6)
HCT VFR BLD AUTO: 23.6 % (ref 34–46.6)
HCT VFR BLD AUTO: 24 % (ref 34–46.6)
HCT VFR BLD AUTO: 24.8 % (ref 34–46.6)
HCT VFR BLD AUTO: 25 % (ref 34–46.6)
HCT VFR BLD AUTO: 25.2 % (ref 34–46.6)
HCT VFR BLD AUTO: 25.4 % (ref 34–46.6)
HCT VFR BLD AUTO: 25.4 % (ref 34–46.6)
HCT VFR BLD AUTO: 25.6 % (ref 34–46.6)
HCT VFR BLD AUTO: 25.7 % (ref 34–46.6)
HCT VFR BLD AUTO: 26.1 % (ref 34–46.6)
HCT VFR BLD AUTO: 26.1 % (ref 34–46.6)
HCT VFR BLD AUTO: 26.2 % (ref 34–46.6)
HCT VFR BLD AUTO: 26.4 % (ref 34–46.6)
HCT VFR BLD AUTO: 26.7 % (ref 34–46.6)
HCT VFR BLD AUTO: 26.9 % (ref 34–46.6)
HCT VFR BLD AUTO: 27 % (ref 34–46.6)
HCT VFR BLD AUTO: 27.1 % (ref 34–46.6)
HCT VFR BLD AUTO: 27.3 % (ref 34–46.6)
HCT VFR BLD AUTO: 27.6 % (ref 34–46.6)
HCT VFR BLD AUTO: 27.7 % (ref 34–46.6)
HCT VFR BLD AUTO: 28.3 % (ref 34–46.6)
HCT VFR BLD AUTO: 28.8 % (ref 34–46.6)
HCT VFR BLD AUTO: 31 % (ref 34–46.6)
HCT VFR BLD AUTO: 31.2 % (ref 34–46.6)
HCT VFR BLD AUTO: 32.8 % (ref 34–46.6)
HCT VFR BLD AUTO: 33.5 % (ref 34–46.6)
HCT VFR BLD AUTO: 34 % (ref 35–45)
HDLC SERPL-MCNC: 22 MG/DL (ref 60–200)
HDLC SERPL-MCNC: 54 MG/DL (ref 60–200)
HEMOCCULT STL QL: NEGATIVE
HGB BLD-MCNC: 10.1 G/DL (ref 12–15.9)
HGB BLD-MCNC: 10.3 G/DL (ref 12–15.9)
HGB BLD-MCNC: 10.7 G/DL (ref 12–15.5)
HGB BLD-MCNC: 6.5 G/DL (ref 12–15.9)
HGB BLD-MCNC: 7 G/DL (ref 12–15.9)
HGB BLD-MCNC: 7.2 G/DL (ref 12–15.9)
HGB BLD-MCNC: 7.3 G/DL (ref 12–15.9)
HGB BLD-MCNC: 7.4 G/DL (ref 12–15.9)
HGB BLD-MCNC: 7.4 G/DL (ref 12–15.9)
HGB BLD-MCNC: 7.7 G/DL (ref 12–15.9)
HGB BLD-MCNC: 7.7 G/DL (ref 12–15.9)
HGB BLD-MCNC: 7.9 G/DL (ref 12–15.9)
HGB BLD-MCNC: 7.9 G/DL (ref 12–15.9)
HGB BLD-MCNC: 8 G/DL (ref 12–15.9)
HGB BLD-MCNC: 8.1 G/DL (ref 12–15.9)
HGB BLD-MCNC: 8.1 G/DL (ref 12–15.9)
HGB BLD-MCNC: 8.2 G/DL (ref 12–15.9)
HGB BLD-MCNC: 8.3 G/DL (ref 12–15.9)
HGB BLD-MCNC: 8.3 G/DL (ref 12–15.9)
HGB BLD-MCNC: 8.4 G/DL (ref 12–15.9)
HGB BLD-MCNC: 8.4 G/DL (ref 12–15.9)
HGB BLD-MCNC: 8.5 G/DL (ref 12–15.9)
HGB BLD-MCNC: 8.5 G/DL (ref 12–15.9)
HGB BLD-MCNC: 8.6 G/DL (ref 12–15.9)
HGB BLD-MCNC: 8.8 G/DL (ref 12–15.9)
HGB BLD-MCNC: 9 G/DL (ref 12–15.9)
HGB BLD-MCNC: 9.1 G/DL (ref 12–15.9)
HGB BLD-MCNC: 9.3 G/DL (ref 12–15.9)
HGB BLD-MCNC: 9.7 G/DL (ref 12–15.9)
HGB BLD-MCNC: 9.9 G/DL (ref 12–15.9)
HGB RETIC QN AUTO: 32.2 PG (ref 29.8–36.1)
HGB UR QL STRIP.AUTO: ABNORMAL
HMPV RNA NPH QL NAA+NON-PROBE: NOT DETECTED
HOLD SPECIMEN: NORMAL
HOROWITZ INDEX BLD+IHG-RTO: 100 %
HPIV1 RNA SPEC QL NAA+PROBE: NOT DETECTED
HPIV2 RNA SPEC QL NAA+PROBE: NOT DETECTED
HPIV3 RNA NPH QL NAA+PROBE: NOT DETECTED
HPIV4 P GENE NPH QL NAA+PROBE: NOT DETECTED
HYALINE CASTS UR QL AUTO: ABNORMAL /LPF
HYPOCHROMIA BLD QL: ABNORMAL
IGA SERPL-MCNC: 39 MG/DL (ref 64–422)
IGG SERPL-MCNC: 2919 MG/DL (ref 700–1600)
IGM SERPL-MCNC: 15 MG/DL (ref 26–217)
IMM GRANULOCYTES # BLD AUTO: 0.01 10*3/MM3 (ref 0–0.02)
IMM GRANULOCYTES # BLD AUTO: 0.06 10*3/MM3 (ref 0–0.05)
IMM GRANULOCYTES # BLD AUTO: 0.06 10*3/MM3 (ref 0–0.05)
IMM GRANULOCYTES # BLD AUTO: 0.07 10*3/MM3 (ref 0–0.05)
IMM GRANULOCYTES # BLD AUTO: 0.08 10*3/MM3 (ref 0–0.05)
IMM GRANULOCYTES # BLD AUTO: 0.08 10*3/MM3 (ref 0–0.05)
IMM GRANULOCYTES # BLD AUTO: 0.09 10*3/MM3 (ref 0–0.05)
IMM GRANULOCYTES # BLD AUTO: 0.1 10*3/MM3 (ref 0–0.05)
IMM GRANULOCYTES # BLD AUTO: 0.11 10*3/MM3 (ref 0–0.05)
IMM GRANULOCYTES # BLD AUTO: 0.11 10*3/MM3 (ref 0–0.05)
IMM GRANULOCYTES # BLD AUTO: 0.12 10*3/MM3 (ref 0–0.05)
IMM GRANULOCYTES # BLD AUTO: 0.13 10*3/MM3 (ref 0–0.05)
IMM GRANULOCYTES # BLD AUTO: 0.13 10*3/MM3 (ref 0–0.05)
IMM GRANULOCYTES # BLD AUTO: 0.14 10*3/MM3 (ref 0–0.05)
IMM GRANULOCYTES # BLD AUTO: 0.15 10*3/MM3 (ref 0–0.05)
IMM GRANULOCYTES # BLD AUTO: 0.15 10*3/MM3 (ref 0–0.05)
IMM GRANULOCYTES # BLD AUTO: 0.16 10*3/MM3 (ref 0–0.05)
IMM GRANULOCYTES # BLD AUTO: 0.21 10*3/MM3 (ref 0–0.05)
IMM GRANULOCYTES NFR BLD AUTO: 0.2 % (ref 0–0.5)
IMM GRANULOCYTES NFR BLD AUTO: 0.5 % (ref 0–0.5)
IMM GRANULOCYTES NFR BLD AUTO: 0.6 % (ref 0–0.5)
IMM GRANULOCYTES NFR BLD AUTO: 0.7 % (ref 0–0.5)
IMM GRANULOCYTES NFR BLD AUTO: 0.7 % (ref 0–0.5)
IMM GRANULOCYTES NFR BLD AUTO: 0.8 % (ref 0–0.5)
IMM GRANULOCYTES NFR BLD AUTO: 0.9 % (ref 0–0.5)
IMM GRANULOCYTES NFR BLD AUTO: 1 % (ref 0–0.5)
IMM GRANULOCYTES NFR BLD AUTO: 1.1 % (ref 0–0.5)
IMM GRANULOCYTES NFR BLD AUTO: 1.1 % (ref 0–0.5)
IMM GRANULOCYTES NFR BLD AUTO: 1.2 % (ref 0–0.5)
IMM GRANULOCYTES NFR BLD AUTO: 1.3 % (ref 0–0.5)
IMM GRANULOCYTES NFR BLD AUTO: 1.3 % (ref 0–0.5)
IMM GRANULOCYTES NFR BLD AUTO: 1.4 % (ref 0–0.5)
IMM GRANULOCYTES NFR BLD AUTO: 1.5 % (ref 0–0.5)
IMM RETICS NFR: 12.3 % (ref 3–15.8)
INR PPP: 1.25 (ref 0.8–1.2)
IRON 24H UR-MRATE: 19 MCG/DL (ref 37–145)
IRON 24H UR-MRATE: 26 MCG/DL (ref 37–170)
IRON SATN MFR SERPL: 13 % (ref 15–50)
IRON SATN MFR SERPL: 20 % (ref 20–50)
KAPPA LC SERPL-MCNC: 13.3 MG/L (ref 3.3–19.4)
KAPPA LC/LAMBDA SER: 0.02 {RATIO} (ref 0.26–1.65)
KETONES UR QL STRIP: NEGATIVE
KETONES UR QL: NEGATIVE
LAB AP CASE REPORT: NORMAL
LAB AP DIAGNOSIS COMMENT: NORMAL
LAB AP NON-GYN INTERPRETATION: NORMAL
LAMB IGE QN: <0.1 KU/L
LAMBDA LC FREE SERPL-MCNC: 792.6 MG/L (ref 5.7–26.3)
LDH SERPL-CCNC: 206 U/L (ref 135–214)
LDLC/HDLC SERPL: 2.25 {RATIO} (ref 0–3.22)
LDLC/HDLC SERPL: 3.7 {RATIO} (ref 0–3.22)
LEUKOCYTE EST, POC: ABNORMAL
LEUKOCYTE ESTERASE UR QL STRIP.AUTO: ABNORMAL
LEUKOCYTE ESTERASE UR QL STRIP.AUTO: NEGATIVE
LEUKOCYTE ESTERASE UR QL STRIP.AUTO: NEGATIVE
LIPASE SERPL-CCNC: 19 U/L (ref 13–60)
LV EF 2D ECHO EST: 60 %
LV EF 2D ECHO EST: 61 %
LYMPHOCYTES # BLD AUTO: 0.62 10*3/MM3 (ref 0.7–3.1)
LYMPHOCYTES # BLD AUTO: 0.62 10*3/MM3 (ref 0.7–3.1)
LYMPHOCYTES # BLD AUTO: 0.74 10*3/MM3 (ref 0.7–3.1)
LYMPHOCYTES # BLD AUTO: 0.78 10*3/MM3 (ref 0.7–3.1)
LYMPHOCYTES # BLD AUTO: 0.85 10*3/MM3 (ref 0.7–3.1)
LYMPHOCYTES # BLD AUTO: 0.86 10*3/MM3 (ref 0.7–3.1)
LYMPHOCYTES # BLD AUTO: 0.87 10*3/MM3 (ref 0.7–3.1)
LYMPHOCYTES # BLD AUTO: 0.88 10*3/MM3 (ref 0.7–3.1)
LYMPHOCYTES # BLD AUTO: 0.89 10*3/MM3 (ref 0.7–3.1)
LYMPHOCYTES # BLD AUTO: 0.89 10*3/MM3 (ref 0.7–3.1)
LYMPHOCYTES # BLD AUTO: 0.93 10*3/MM3 (ref 0.7–3.1)
LYMPHOCYTES # BLD AUTO: 0.97 10*3/MM3 (ref 0.7–3.1)
LYMPHOCYTES # BLD AUTO: 0.98 10*3/MM3 (ref 0.7–3.1)
LYMPHOCYTES # BLD AUTO: 1 10*3/MM3 (ref 0.7–3.1)
LYMPHOCYTES # BLD AUTO: 1.01 10*3/MM3 (ref 0.7–3.1)
LYMPHOCYTES # BLD AUTO: 1.02 10*3/MM3 (ref 0.7–3.1)
LYMPHOCYTES # BLD AUTO: 1.04 10*3/MM3 (ref 0.7–3.1)
LYMPHOCYTES # BLD AUTO: 1.06 10*3/MM3 (ref 0.7–3.1)
LYMPHOCYTES # BLD AUTO: 1.08 10*3/MM3 (ref 0.7–3.1)
LYMPHOCYTES # BLD AUTO: 1.08 10*3/MM3 (ref 0.7–3.1)
LYMPHOCYTES # BLD AUTO: 1.17 10*3/MM3 (ref 0.7–3.1)
LYMPHOCYTES # BLD AUTO: 1.18 10*3/MM3 (ref 0.7–3.1)
LYMPHOCYTES # BLD AUTO: 1.21 10*3/MM3 (ref 0.7–3.1)
LYMPHOCYTES # BLD AUTO: 1.23 10*3/MM3 (ref 0.7–3.1)
LYMPHOCYTES # BLD AUTO: 1.3 10*3/MM3 (ref 0.7–3.1)
LYMPHOCYTES # BLD AUTO: 1.39 10*3/MM3 (ref 0.7–3.1)
LYMPHOCYTES # BLD AUTO: 1.45 10*3/MM3 (ref 0.7–3.1)
LYMPHOCYTES # BLD AUTO: 1.58 10*3/MM3 (ref 0.6–4.2)
LYMPHOCYTES # BLD AUTO: 1.58 10*3/MM3 (ref 0.7–3.1)
LYMPHOCYTES # BLD MANUAL: 0.58 10*3/MM3 (ref 0.7–3.1)
LYMPHOCYTES # BLD MANUAL: 0.9 10*3/MM3 (ref 0.7–3.1)
LYMPHOCYTES # BLD MANUAL: 1.32 10*3/MM3 (ref 0.7–3.1)
LYMPHOCYTES NFR BLD AUTO: 10.2 % (ref 19.6–45.3)
LYMPHOCYTES NFR BLD AUTO: 10.4 % (ref 19.6–45.3)
LYMPHOCYTES NFR BLD AUTO: 10.4 % (ref 19.6–45.3)
LYMPHOCYTES NFR BLD AUTO: 11.3 % (ref 19.6–45.3)
LYMPHOCYTES NFR BLD AUTO: 11.5 % (ref 19.6–45.3)
LYMPHOCYTES NFR BLD AUTO: 11.8 % (ref 19.6–45.3)
LYMPHOCYTES NFR BLD AUTO: 12.4 % (ref 19.6–45.3)
LYMPHOCYTES NFR BLD AUTO: 12.9 % (ref 19.6–45.3)
LYMPHOCYTES NFR BLD AUTO: 13.7 % (ref 19.6–45.3)
LYMPHOCYTES NFR BLD AUTO: 13.8 % (ref 19.6–45.3)
LYMPHOCYTES NFR BLD AUTO: 15.8 % (ref 19.6–45.3)
LYMPHOCYTES NFR BLD AUTO: 25.4 % (ref 10–50)
LYMPHOCYTES NFR BLD AUTO: 3.7 % (ref 19.6–45.3)
LYMPHOCYTES NFR BLD AUTO: 4.1 % (ref 19.6–45.3)
LYMPHOCYTES NFR BLD AUTO: 5.1 % (ref 19.6–45.3)
LYMPHOCYTES NFR BLD AUTO: 6.2 % (ref 19.6–45.3)
LYMPHOCYTES NFR BLD AUTO: 6.6 % (ref 19.6–45.3)
LYMPHOCYTES NFR BLD AUTO: 6.7 % (ref 19.6–45.3)
LYMPHOCYTES NFR BLD AUTO: 6.7 % (ref 19.6–45.3)
LYMPHOCYTES NFR BLD AUTO: 7.1 % (ref 19.6–45.3)
LYMPHOCYTES NFR BLD AUTO: 7.2 % (ref 19.6–45.3)
LYMPHOCYTES NFR BLD AUTO: 7.3 % (ref 19.6–45.3)
LYMPHOCYTES NFR BLD AUTO: 8 % (ref 19.6–45.3)
LYMPHOCYTES NFR BLD AUTO: 8.6 % (ref 19.6–45.3)
LYMPHOCYTES NFR BLD AUTO: 8.8 % (ref 19.6–45.3)
LYMPHOCYTES NFR BLD AUTO: 8.8 % (ref 19.6–45.3)
LYMPHOCYTES NFR BLD AUTO: 9.2 % (ref 19.6–45.3)
LYMPHOCYTES NFR BLD AUTO: 9.3 % (ref 19.6–45.3)
LYMPHOCYTES NFR BLD AUTO: 9.4 % (ref 19.6–45.3)
LYMPHOCYTES NFR BLD MANUAL: 11 % (ref 19.6–45.3)
LYMPHOCYTES NFR BLD MANUAL: 2 % (ref 5–12)
LYMPHOCYTES NFR BLD MANUAL: 5 % (ref 19.6–45.3)
LYMPHOCYTES NFR BLD MANUAL: 6 % (ref 5–12)
LYMPHOCYTES NFR BLD MANUAL: 7 % (ref 19.6–45.3)
LYMPHOCYTES NFR BLD MANUAL: 7 % (ref 5–12)
Lab: 0
Lab: ABNORMAL
Lab: ABNORMAL
Lab: NORMAL
M PNEUMO IGG SER IA-ACNC: NOT DETECTED
M-SPIKE: 2.5 G/DL
MAGNESIUM SERPL-MCNC: 1.3 MG/DL (ref 1.6–2.4)
MAGNESIUM SERPL-MCNC: 1.4 MG/DL (ref 1.6–2.4)
MAGNESIUM SERPL-MCNC: 1.4 MG/DL (ref 1.6–2.4)
MAGNESIUM SERPL-MCNC: 1.7 MG/DL (ref 1.6–2.3)
MAGNESIUM SERPL-MCNC: 1.9 MG/DL (ref 1.6–2.3)
MAGNESIUM SERPL-MCNC: 2.9 MG/DL (ref 1.6–2.4)
MAXIMAL PREDICTED HEART RATE: 142 BPM
MCH RBC QN AUTO: 26.1 PG (ref 26.6–33)
MCH RBC QN AUTO: 26.4 PG (ref 26.6–33)
MCH RBC QN AUTO: 26.5 PG (ref 26.6–33)
MCH RBC QN AUTO: 26.6 PG (ref 26.6–33)
MCH RBC QN AUTO: 26.7 PG (ref 26.6–33)
MCH RBC QN AUTO: 26.8 PG (ref 26.6–33)
MCH RBC QN AUTO: 26.9 PG (ref 26.6–33)
MCH RBC QN AUTO: 26.9 PG (ref 26.6–33)
MCH RBC QN AUTO: 27 PG (ref 26.6–33)
MCH RBC QN AUTO: 27.1 PG (ref 26.6–33)
MCH RBC QN AUTO: 27.4 PG (ref 26.6–33)
MCH RBC QN AUTO: 27.4 PG (ref 26.6–33)
MCH RBC QN AUTO: 27.5 PG (ref 26.6–33)
MCH RBC QN AUTO: 27.6 PG (ref 26.6–33)
MCH RBC QN AUTO: 27.8 PG (ref 26.6–33)
MCH RBC QN AUTO: 27.9 PG (ref 26.6–33)
MCH RBC QN AUTO: 28 PG (ref 26.6–33)
MCH RBC QN AUTO: 28.2 PG (ref 26.6–33)
MCH RBC QN AUTO: 28.2 PG (ref 26.6–33)
MCH RBC QN AUTO: 28.6 PG (ref 26.5–34)
MCHC RBC AUTO-ENTMCNC: 30.5 G/DL (ref 31.5–35.7)
MCHC RBC AUTO-ENTMCNC: 30.5 G/DL (ref 31.5–35.7)
MCHC RBC AUTO-ENTMCNC: 30.7 G/DL (ref 31.5–35.7)
MCHC RBC AUTO-ENTMCNC: 30.8 G/DL (ref 31.5–35.7)
MCHC RBC AUTO-ENTMCNC: 30.8 G/DL (ref 31.5–35.7)
MCHC RBC AUTO-ENTMCNC: 31.1 G/DL (ref 31.5–35.7)
MCHC RBC AUTO-ENTMCNC: 31.3 G/DL (ref 31.5–35.7)
MCHC RBC AUTO-ENTMCNC: 31.4 G/DL (ref 31.5–35.7)
MCHC RBC AUTO-ENTMCNC: 31.5 G/DL (ref 31.4–36)
MCHC RBC AUTO-ENTMCNC: 31.5 G/DL (ref 31.5–35.7)
MCHC RBC AUTO-ENTMCNC: 31.6 G/DL (ref 31.5–35.7)
MCHC RBC AUTO-ENTMCNC: 31.7 G/DL (ref 31.5–35.7)
MCHC RBC AUTO-ENTMCNC: 31.8 G/DL (ref 31.5–35.7)
MCHC RBC AUTO-ENTMCNC: 31.8 G/DL (ref 31.5–35.7)
MCHC RBC AUTO-ENTMCNC: 31.9 G/DL (ref 31.5–35.7)
MCHC RBC AUTO-ENTMCNC: 32 G/DL (ref 31.5–35.7)
MCHC RBC AUTO-ENTMCNC: 32.1 G/DL (ref 31.5–35.7)
MCHC RBC AUTO-ENTMCNC: 32.2 G/DL (ref 31.5–35.7)
MCHC RBC AUTO-ENTMCNC: 32.3 G/DL (ref 31.5–35.7)
MCHC RBC AUTO-ENTMCNC: 32.3 G/DL (ref 31.5–35.7)
MCHC RBC AUTO-ENTMCNC: 32.6 G/DL (ref 31.5–35.7)
MCHC RBC AUTO-ENTMCNC: 32.9 G/DL (ref 31.5–35.7)
MCV RBC AUTO: 83.2 FL (ref 79–97)
MCV RBC AUTO: 83.6 FL (ref 79–97)
MCV RBC AUTO: 83.7 FL (ref 79–97)
MCV RBC AUTO: 83.7 FL (ref 79–97)
MCV RBC AUTO: 84 FL (ref 79–97)
MCV RBC AUTO: 84.1 FL (ref 79–97)
MCV RBC AUTO: 84.3 FL (ref 79–97)
MCV RBC AUTO: 84.5 FL (ref 79–97)
MCV RBC AUTO: 84.7 FL (ref 79–97)
MCV RBC AUTO: 84.7 FL (ref 79–97)
MCV RBC AUTO: 85 FL (ref 79–97)
MCV RBC AUTO: 85.1 FL (ref 79–97)
MCV RBC AUTO: 85.2 FL (ref 79–97)
MCV RBC AUTO: 85.7 FL (ref 79–97)
MCV RBC AUTO: 85.9 FL (ref 79–97)
MCV RBC AUTO: 86 FL (ref 79–97)
MCV RBC AUTO: 86.1 FL (ref 79–97)
MCV RBC AUTO: 86.2 FL (ref 79–97)
MCV RBC AUTO: 86.4 FL (ref 79–97)
MCV RBC AUTO: 86.7 FL (ref 79–97)
MCV RBC AUTO: 86.8 FL (ref 79–97)
MCV RBC AUTO: 87.1 FL (ref 79–97)
MCV RBC AUTO: 87.3 FL (ref 79–97)
MCV RBC AUTO: 88.5 FL (ref 79–97)
MCV RBC AUTO: 88.8 FL (ref 79–97)
MCV RBC AUTO: 89.7 FL (ref 79–97)
MCV RBC AUTO: 90.5 FL (ref 79–97)
MCV RBC AUTO: 90.9 FL (ref 80–98)
MODALITY: ABNORMAL
MONOCYTES # BLD AUTO: 0.26 10*3/MM3 (ref 0.1–0.9)
MONOCYTES # BLD AUTO: 0.67 10*3/MM3 (ref 0–0.9)
MONOCYTES # BLD AUTO: 0.72 10*3/MM3 (ref 0.1–0.9)
MONOCYTES # BLD AUTO: 0.72 10*3/MM3 (ref 0.1–0.9)
MONOCYTES # BLD AUTO: 0.78 10*3/MM3 (ref 0.1–0.9)
MONOCYTES # BLD AUTO: 0.8 10*3/MM3 (ref 0.1–0.9)
MONOCYTES # BLD AUTO: 0.81 10*3/MM3 (ref 0.1–0.9)
MONOCYTES # BLD AUTO: 0.82 10*3/MM3 (ref 0.1–0.9)
MONOCYTES # BLD AUTO: 0.84 10*3/MM3 (ref 0.1–0.9)
MONOCYTES # BLD AUTO: 0.84 10*3/MM3 (ref 0.1–0.9)
MONOCYTES # BLD AUTO: 0.85 10*3/MM3 (ref 0.1–0.9)
MONOCYTES # BLD AUTO: 0.85 10*3/MM3 (ref 0.1–0.9)
MONOCYTES # BLD AUTO: 0.86 10*3/MM3 (ref 0.1–0.9)
MONOCYTES # BLD AUTO: 0.87 10*3/MM3 (ref 0.1–0.9)
MONOCYTES # BLD AUTO: 0.9 10*3/MM3 (ref 0.1–0.9)
MONOCYTES # BLD AUTO: 0.93 10*3/MM3 (ref 0.1–0.9)
MONOCYTES # BLD AUTO: 0.94 10*3/MM3 (ref 0.1–0.9)
MONOCYTES # BLD AUTO: 0.96 10*3/MM3 (ref 0.1–0.9)
MONOCYTES # BLD AUTO: 0.97 10*3/MM3 (ref 0.1–0.9)
MONOCYTES # BLD AUTO: 0.98 10*3/MM3 (ref 0.1–0.9)
MONOCYTES # BLD AUTO: 1.01 10*3/MM3 (ref 0.1–0.9)
MONOCYTES # BLD AUTO: 1.03 10*3/MM3 (ref 0.1–0.9)
MONOCYTES # BLD AUTO: 1.06 10*3/MM3 (ref 0.1–0.9)
MONOCYTES # BLD AUTO: 1.19 10*3/MM3 (ref 0.1–0.9)
MONOCYTES # BLD AUTO: 1.23 10*3/MM3 (ref 0.1–0.9)
MONOCYTES # BLD AUTO: 1.29 10*3/MM3 (ref 0.1–0.9)
MONOCYTES NFR BLD AUTO: 10.2 % (ref 5–12)
MONOCYTES NFR BLD AUTO: 10.5 % (ref 5–12)
MONOCYTES NFR BLD AUTO: 10.8 % (ref 0–12)
MONOCYTES NFR BLD AUTO: 5.2 % (ref 5–12)
MONOCYTES NFR BLD AUTO: 5.7 % (ref 5–12)
MONOCYTES NFR BLD AUTO: 5.8 % (ref 5–12)
MONOCYTES NFR BLD AUTO: 6.2 % (ref 5–12)
MONOCYTES NFR BLD AUTO: 6.3 % (ref 5–12)
MONOCYTES NFR BLD AUTO: 6.6 % (ref 5–12)
MONOCYTES NFR BLD AUTO: 6.7 % (ref 5–12)
MONOCYTES NFR BLD AUTO: 7.3 % (ref 5–12)
MONOCYTES NFR BLD AUTO: 7.4 % (ref 5–12)
MONOCYTES NFR BLD AUTO: 7.6 % (ref 5–12)
MONOCYTES NFR BLD AUTO: 7.7 % (ref 5–12)
MONOCYTES NFR BLD AUTO: 8.1 % (ref 5–12)
MONOCYTES NFR BLD AUTO: 8.1 % (ref 5–12)
MONOCYTES NFR BLD AUTO: 8.3 % (ref 5–12)
MONOCYTES NFR BLD AUTO: 8.3 % (ref 5–12)
MONOCYTES NFR BLD AUTO: 8.4 % (ref 5–12)
MONOCYTES NFR BLD AUTO: 8.4 % (ref 5–12)
MONOCYTES NFR BLD AUTO: 8.6 % (ref 5–12)
MONOCYTES NFR BLD AUTO: 8.9 % (ref 5–12)
MONOCYTES NFR BLD AUTO: 9.1 % (ref 5–12)
MONOCYTES NFR BLD AUTO: 9.2 % (ref 5–12)
MONOCYTES NFR BLD AUTO: 9.2 % (ref 5–12)
MONOCYTES NFR BLD AUTO: 9.3 % (ref 5–12)
MONOCYTES NFR BLD AUTO: 9.5 % (ref 5–12)
MUCOUS THREADS URNS QL MICRO: ABNORMAL /HPF
MUCOUS THREADS URNS QL MICRO: ABNORMAL /HPF
NEUTROPHILS # BLD AUTO: 10.13 10*3/MM3 (ref 1.7–7)
NEUTROPHILS # BLD AUTO: 10.26 10*3/MM3 (ref 1.7–7)
NEUTROPHILS # BLD AUTO: 10.65 10*3/MM3 (ref 1.4–7)
NEUTROPHILS # BLD AUTO: 10.7 10*3/MM3 (ref 1.7–7)
NEUTROPHILS # BLD AUTO: 10.93 10*3/MM3 (ref 1.7–7)
NEUTROPHILS # BLD AUTO: 11.43 10*3/MM3 (ref 1.7–7)
NEUTROPHILS # BLD AUTO: 11.48 10*3/MM3 (ref 1.4–7)
NEUTROPHILS # BLD AUTO: 11.94 10*3/MM3 (ref 1.7–7)
NEUTROPHILS # BLD AUTO: 11.98 10*3/MM3 (ref 1.7–7)
NEUTROPHILS # BLD AUTO: 12.62 10*3/MM3 (ref 1.7–7)
NEUTROPHILS # BLD AUTO: 13.66 10*3/MM3 (ref 1.7–7)
NEUTROPHILS # BLD AUTO: 14.8 10*3/MM3 (ref 1.7–7)
NEUTROPHILS # BLD AUTO: 3.8 10*3/MM3 (ref 2–8.6)
NEUTROPHILS # BLD AUTO: 6.3 10*3/MM3 (ref 1.4–7)
NEUTROPHILS # BLD AUTO: 6.42 10*3/MM3 (ref 1.4–7)
NEUTROPHILS # BLD AUTO: 6.58 10*3/MM3 (ref 1.7–7)
NEUTROPHILS # BLD AUTO: 7.04 10*3/MM3 (ref 1.4–7)
NEUTROPHILS # BLD AUTO: 7.05 10*3/MM3 (ref 1.4–7)
NEUTROPHILS # BLD AUTO: 7.22 10*3/MM3 (ref 1.4–7)
NEUTROPHILS # BLD AUTO: 7.8 10*3/MM3 (ref 1.4–7)
NEUTROPHILS # BLD AUTO: 8.03 10*3/MM3 (ref 1.7–7)
NEUTROPHILS # BLD AUTO: 8.3 10*3/MM3 (ref 1.4–7)
NEUTROPHILS # BLD AUTO: 8.31 10*3/MM3 (ref 1.7–7)
NEUTROPHILS # BLD AUTO: 8.9 10*3/MM3 (ref 1.4–7)
NEUTROPHILS # BLD AUTO: 9.14 10*3/MM3 (ref 1.4–7)
NEUTROPHILS # BLD AUTO: 9.16 10*3/MM3 (ref 1.4–7)
NEUTROPHILS # BLD AUTO: 9.44 10*3/MM3 (ref 1.4–7)
NEUTROPHILS # BLD AUTO: 9.52 10*3/MM3 (ref 1.4–7)
NEUTROPHILS # BLD AUTO: 9.72 10*3/MM3 (ref 1.4–7)
NEUTROPHILS # BLD AUTO: 9.79 10*3/MM3 (ref 1.4–7)
NEUTROPHILS # BLD AUTO: 9.94 10*3/MM3 (ref 1.7–7)
NEUTROPHILS # BLD AUTO: 9.98 10*3/MM3 (ref 1.7–7)
NEUTROPHILS NFR BLD AUTO: 61.2 % (ref 37–80)
NEUTROPHILS NFR BLD AUTO: 72 % (ref 42.7–76)
NEUTROPHILS NFR BLD AUTO: 73.5 % (ref 42.7–76)
NEUTROPHILS NFR BLD AUTO: 73.7 % (ref 42.7–76)
NEUTROPHILS NFR BLD AUTO: 74.6 % (ref 42.7–76)
NEUTROPHILS NFR BLD AUTO: 75.3 % (ref 42.7–76)
NEUTROPHILS NFR BLD AUTO: 76.2 % (ref 42.7–76)
NEUTROPHILS NFR BLD AUTO: 76.8 % (ref 42.7–76)
NEUTROPHILS NFR BLD AUTO: 77.4 % (ref 42.7–76)
NEUTROPHILS NFR BLD AUTO: 78.8 % (ref 42.7–76)
NEUTROPHILS NFR BLD AUTO: 78.9 % (ref 42.7–76)
NEUTROPHILS NFR BLD AUTO: 79.4 % (ref 42.7–76)
NEUTROPHILS NFR BLD AUTO: 79.7 % (ref 42.7–76)
NEUTROPHILS NFR BLD AUTO: 80.5 % (ref 42.7–76)
NEUTROPHILS NFR BLD AUTO: 80.7 % (ref 42.7–76)
NEUTROPHILS NFR BLD AUTO: 81.6 % (ref 42.7–76)
NEUTROPHILS NFR BLD AUTO: 81.7 % (ref 42.7–76)
NEUTROPHILS NFR BLD AUTO: 82.4 % (ref 42.7–76)
NEUTROPHILS NFR BLD AUTO: 83.2 % (ref 42.7–76)
NEUTROPHILS NFR BLD AUTO: 84.3 % (ref 42.7–76)
NEUTROPHILS NFR BLD AUTO: 84.6 % (ref 42.7–76)
NEUTROPHILS NFR BLD AUTO: 84.6 % (ref 42.7–76)
NEUTROPHILS NFR BLD AUTO: 84.9 % (ref 42.7–76)
NEUTROPHILS NFR BLD AUTO: 85.2 % (ref 42.7–76)
NEUTROPHILS NFR BLD AUTO: 85.3 % (ref 42.7–76)
NEUTROPHILS NFR BLD AUTO: 85.4 % (ref 42.7–76)
NEUTROPHILS NFR BLD AUTO: 87.6 % (ref 42.7–76)
NEUTROPHILS NFR BLD AUTO: 87.6 % (ref 42.7–76)
NEUTROPHILS NFR BLD AUTO: 89.6 % (ref 42.7–76)
NEUTROPHILS NFR BLD MANUAL: 83 % (ref 42.7–76)
NEUTROPHILS NFR BLD MANUAL: 88 % (ref 42.7–76)
NEUTROPHILS NFR BLD MANUAL: 89 % (ref 42.7–76)
NITRITE UR QL STRIP: NEGATIVE
NITRITE UR-MCNC: NEGATIVE MG/ML
NRBC BLD AUTO-RTO: 0 /100 WBC (ref 0–0)
NRBC BLD AUTO-RTO: 0 /100 WBC (ref 0–0.2)
NRBC BLD AUTO-RTO: 0.2 /100 WBC (ref 0–0.2)
NT-PROBNP SERPL-MCNC: 1230 PG/ML (ref 0–1800)
NT-PROBNP SERPL-MCNC: 1580 PG/ML (ref 5–1800)
NT-PROBNP SERPL-MCNC: 2564 PG/ML (ref 5–1800)
NT-PROBNP SERPL-MCNC: 641 PG/ML (ref 0–1800)
OSMOLALITY UR: 343 MOSM/KG (ref 38–1400)
OVALOCYTES BLD QL SMEAR: ABNORMAL
PATH REPORT.FINAL DX SPEC: NORMAL
PATH REPORT.GROSS SPEC: NORMAL
PCO2 BLDA: 30.7 MM HG (ref 35–45)
PH BLDA: 7.39 PH UNITS (ref 7.35–7.45)
PH UR STRIP.AUTO: 5.5 [PH] (ref 5–9)
PH UR STRIP.AUTO: 6 [PH] (ref 5–9)
PH UR STRIP.AUTO: 6.5 [PH] (ref 5–9)
PH UR STRIP.AUTO: <=5 [PH] (ref 5–9)
PH UR: 5 [PH] (ref 5–8)
PHOSPHATE SERPL-MCNC: 3.8 MG/DL (ref 2.5–4.5)
PLATELET # BLD AUTO: 189 10*3/MM3 (ref 140–450)
PLATELET # BLD AUTO: 190 10*3/MM3 (ref 140–450)
PLATELET # BLD AUTO: 202 10*3/MM3 (ref 140–450)
PLATELET # BLD AUTO: 206 10*3/MM3 (ref 140–450)
PLATELET # BLD AUTO: 228 10*3/MM3 (ref 140–450)
PLATELET # BLD AUTO: 229 10*3/MM3 (ref 140–450)
PLATELET # BLD AUTO: 232 10*3/MM3 (ref 140–450)
PLATELET # BLD AUTO: 233 10*3/MM3 (ref 140–450)
PLATELET # BLD AUTO: 245 10*3/MM3 (ref 140–450)
PLATELET # BLD AUTO: 247 10*3/MM3 (ref 140–450)
PLATELET # BLD AUTO: 257 10*3/MM3 (ref 140–450)
PLATELET # BLD AUTO: 258 10*3/MM3 (ref 140–450)
PLATELET # BLD AUTO: 261 10*3/MM3 (ref 140–450)
PLATELET # BLD AUTO: 264 10*3/MM3 (ref 140–450)
PLATELET # BLD AUTO: 269 10*3/MM3 (ref 140–450)
PLATELET # BLD AUTO: 271 10*3/MM3 (ref 140–450)
PLATELET # BLD AUTO: 271 10*3/MM3 (ref 140–450)
PLATELET # BLD AUTO: 274 10*3/MM3 (ref 140–450)
PLATELET # BLD AUTO: 279 10*3/MM3 (ref 140–450)
PLATELET # BLD AUTO: 284 10*3/MM3 (ref 140–450)
PLATELET # BLD AUTO: 289 10*3/MM3 (ref 150–450)
PLATELET # BLD AUTO: 291 10*3/MM3 (ref 140–450)
PLATELET # BLD AUTO: 297 10*3/MM3 (ref 140–450)
PLATELET # BLD AUTO: 302 10*3/MM3 (ref 140–450)
PLATELET # BLD AUTO: 305 10*3/MM3 (ref 140–450)
PLATELET # BLD AUTO: 306 10*3/MM3 (ref 140–450)
PLATELET # BLD AUTO: 310 10*3/MM3 (ref 140–450)
PLATELET # BLD AUTO: 310 10*3/MM3 (ref 140–450)
PLATELET # BLD AUTO: 317 10*3/MM3 (ref 140–450)
PLATELET # BLD AUTO: 317 10*3/MM3 (ref 140–450)
PLATELET # BLD AUTO: 322 10*3/MM3 (ref 140–450)
PLATELET # BLD AUTO: 342 10*3/MM3 (ref 140–450)
PMV BLD AUTO: 10.1 FL (ref 6–12)
PMV BLD AUTO: 10.1 FL (ref 6–12)
PMV BLD AUTO: 10.6 FL (ref 6–12)
PMV BLD AUTO: 9 FL (ref 6–12)
PMV BLD AUTO: 9 FL (ref 6–12)
PMV BLD AUTO: 9.1 FL (ref 6–12)
PMV BLD AUTO: 9.2 FL (ref 6–12)
PMV BLD AUTO: 9.3 FL (ref 6–12)
PMV BLD AUTO: 9.3 FL (ref 6–12)
PMV BLD AUTO: 9.4 FL (ref 6–12)
PMV BLD AUTO: 9.5 FL (ref 6–12)
PMV BLD AUTO: 9.6 FL (ref 6–12)
PMV BLD AUTO: 9.7 FL (ref 6–12)
PMV BLD AUTO: 9.8 FL (ref 6–12)
PMV BLD AUTO: 9.8 FL (ref 8–12)
PO2 BLDA: 135 MM HG (ref 83–108)
PORK IGE: <0.1 KU/L
POTASSIUM BLD-SCNC: 3.1 MMOL/L (ref 3.4–5)
POTASSIUM BLD-SCNC: 3.2 MMOL/L (ref 3.5–5.2)
POTASSIUM BLD-SCNC: 3.3 MMOL/L (ref 3.5–5.2)
POTASSIUM BLD-SCNC: 3.4 MMOL/L (ref 3.5–5.1)
POTASSIUM BLD-SCNC: 3.4 MMOL/L (ref 3.5–5.1)
POTASSIUM BLD-SCNC: 3.4 MMOL/L (ref 3.5–5.2)
POTASSIUM BLD-SCNC: 3.4 MMOL/L (ref 3.5–5.2)
POTASSIUM BLD-SCNC: 3.5 MMOL/L (ref 3.5–5.2)
POTASSIUM BLD-SCNC: 3.6 MMOL/L (ref 3.5–5.1)
POTASSIUM BLD-SCNC: 3.6 MMOL/L (ref 3.5–5.1)
POTASSIUM BLD-SCNC: 3.6 MMOL/L (ref 3.5–5.2)
POTASSIUM BLD-SCNC: 3.7 MMOL/L (ref 3.5–5.1)
POTASSIUM BLD-SCNC: 3.7 MMOL/L (ref 3.5–5.2)
POTASSIUM BLD-SCNC: 3.8 MMOL/L (ref 3.5–5.1)
POTASSIUM BLD-SCNC: 3.8 MMOL/L (ref 3.5–5.2)
POTASSIUM BLD-SCNC: 3.8 MMOL/L (ref 3.5–5.2)
POTASSIUM BLD-SCNC: 3.9 MMOL/L (ref 3.5–5.1)
POTASSIUM BLD-SCNC: 4.1 MMOL/L (ref 3.5–5.2)
POTASSIUM BLD-SCNC: 4.2 MMOL/L (ref 3.5–5.2)
POTASSIUM BLD-SCNC: 4.3 MMOL/L (ref 3.5–5.2)
POTASSIUM BLD-SCNC: 4.6 MMOL/L (ref 3.5–5.2)
POTASSIUM BLD-SCNC: 5 MMOL/L (ref 3.5–5.2)
PREALB SERPL-MCNC: <3 MG/DL (ref 20–40)
PROT PATTERN SERPL ELPH-IMP: ABNORMAL
PROT PATTERN SERPL IFE-IMP: ABNORMAL
PROT SERPL-MCNC: 6.2 G/DL (ref 6–8.5)
PROT SERPL-MCNC: 6.7 G/DL (ref 6–8.5)
PROT SERPL-MCNC: 6.8 G/DL (ref 6–8.5)
PROT SERPL-MCNC: 6.8 G/DL (ref 6–8.5)
PROT SERPL-MCNC: 6.9 G/DL (ref 6–8.5)
PROT SERPL-MCNC: 7.4 G/DL (ref 6.3–8.6)
PROT SERPL-MCNC: 7.5 G/DL (ref 6–8.5)
PROT SERPL-MCNC: 7.7 G/DL (ref 6.3–8.6)
PROT SERPL-MCNC: 7.7 G/DL (ref 6.3–8.6)
PROT SERPL-MCNC: 7.8 G/DL (ref 6–8.5)
PROT SERPL-MCNC: 8 G/DL (ref 6–8.5)
PROT SERPL-MCNC: 8.1 G/DL (ref 6–8.5)
PROT SERPL-MCNC: 8.2 G/DL (ref 6.3–8.6)
PROT SERPL-MCNC: 8.2 G/DL (ref 6–8.5)
PROT SERPL-MCNC: 8.6 G/DL (ref 6.3–8.6)
PROT SERPL-MCNC: 8.6 G/DL (ref 6–8.5)
PROT SERPL-MCNC: 8.6 G/DL (ref 6–8.5)
PROT SERPL-MCNC: 8.9 G/DL (ref 6–8.5)
PROT SERPL-MCNC: 9.1 G/DL (ref 6–8.5)
PROT SERPL-MCNC: 9.2 G/DL (ref 6.3–8.6)
PROT SERPL-MCNC: 9.3 G/DL (ref 6.3–8.6)
PROT UR QL STRIP: ABNORMAL
PROT UR QL STRIP: NEGATIVE
PROT UR STRIP-MCNC: ABNORMAL MG/DL
PROTHROMBIN TIME: 15.4 SECONDS (ref 11.1–15.3)
R RICKETTSI IGG SER QL IA: NEGATIVE
R RICKETTSI IGM TITR SER: 0.13 INDEX (ref 0–0.89)
RBC # BLD AUTO: 2.37 10*6/MM3 (ref 3.77–5.28)
RBC # BLD AUTO: 2.55 10*6/MM3 (ref 3.77–5.28)
RBC # BLD AUTO: 2.62 10*6/MM3 (ref 3.77–5.28)
RBC # BLD AUTO: 2.73 10*6/MM3 (ref 3.77–5.28)
RBC # BLD AUTO: 2.74 10*6/MM3 (ref 3.77–5.28)
RBC # BLD AUTO: 2.75 10*6/MM3 (ref 3.77–5.28)
RBC # BLD AUTO: 2.84 10*6/MM3 (ref 3.77–5.28)
RBC # BLD AUTO: 2.88 10*6/MM3 (ref 3.77–5.28)
RBC # BLD AUTO: 2.92 10*6/MM3 (ref 3.77–5.28)
RBC # BLD AUTO: 2.94 10*6/MM3 (ref 3.77–5.28)
RBC # BLD AUTO: 2.97 10*6/MM3 (ref 3.77–5.28)
RBC # BLD AUTO: 2.98 10*6/MM3 (ref 3.77–5.28)
RBC # BLD AUTO: 2.98 10*6/MM3 (ref 3.77–5.28)
RBC # BLD AUTO: 2.99 10*6/MM3 (ref 3.77–5.28)
RBC # BLD AUTO: 2.99 10*6/MM3 (ref 3.77–5.28)
RBC # BLD AUTO: 3.01 10*6/MM3 (ref 3.77–5.28)
RBC # BLD AUTO: 3.04 10*6/MM3 (ref 3.77–5.28)
RBC # BLD AUTO: 3.05 10*6/MM3 (ref 3.77–5.28)
RBC # BLD AUTO: 3.06 10*6/MM3 (ref 3.77–5.28)
RBC # BLD AUTO: 3.07 10*6/MM3 (ref 3.77–5.28)
RBC # BLD AUTO: 3.09 10*6/MM3 (ref 3.77–5.28)
RBC # BLD AUTO: 3.1 10*6/MM3 (ref 3.77–5.28)
RBC # BLD AUTO: 3.12 10*6/MM3 (ref 3.77–5.28)
RBC # BLD AUTO: 3.2 10*6/MM3 (ref 3.77–5.28)
RBC # BLD AUTO: 3.21 10*6/MM3 (ref 3.77–5.28)
RBC # BLD AUTO: 3.22 10*6/MM3 (ref 3.77–5.28)
RBC # BLD AUTO: 3.38 10*6/MM3 (ref 3.77–5.28)
RBC # BLD AUTO: 3.4 10*6/MM3 (ref 3.77–5.28)
RBC # BLD AUTO: 3.7 10*6/MM3 (ref 3.77–5.28)
RBC # BLD AUTO: 3.71 10*6/MM3 (ref 3.77–5.28)
RBC # BLD AUTO: 3.74 10*6/MM3 (ref 3.77–5.16)
RBC # BLD AUTO: 3.78 10*6/MM3 (ref 3.77–5.28)
RBC # UR STRIP: ABNORMAL /UL
RBC # UR: ABNORMAL /HPF
REF LAB TEST METHOD: ABNORMAL
RETICS # AUTO: 0.05 10*6/MM3 (ref 0.02–0.13)
RETICS/RBC NFR AUTO: 1.44 % (ref 0.7–1.9)
RETICS/RBC NFR AUTO: 1.69 % (ref 0.7–1.9)
RH BLD: NEGATIVE
RHINOVIRUS RNA SPEC NAA+PROBE: NOT DETECTED
RSV RNA NPH QL NAA+NON-PROBE: NOT DETECTED
SAO2 % BLDCOA: 98.8 % (ref 94–99)
SMALL PLATELETS BLD QL SMEAR: ADEQUATE
SODIUM BLD-SCNC: 122 MMOL/L (ref 136–145)
SODIUM BLD-SCNC: 125 MMOL/L (ref 136–145)
SODIUM BLD-SCNC: 126 MMOL/L (ref 136–145)
SODIUM BLD-SCNC: 127 MMOL/L (ref 136–145)
SODIUM BLD-SCNC: 127 MMOL/L (ref 137–145)
SODIUM BLD-SCNC: 128 MMOL/L (ref 136–145)
SODIUM BLD-SCNC: 129 MMOL/L (ref 136–145)
SODIUM BLD-SCNC: 130 MMOL/L (ref 136–145)
SODIUM BLD-SCNC: 130 MMOL/L (ref 137–145)
SODIUM BLD-SCNC: 130 MMOL/L (ref 137–145)
SODIUM BLD-SCNC: 131 MMOL/L (ref 136–145)
SODIUM BLD-SCNC: 131 MMOL/L (ref 137–145)
SODIUM BLD-SCNC: 131 MMOL/L (ref 137–145)
SODIUM BLD-SCNC: 132 MMOL/L (ref 136–145)
SODIUM BLD-SCNC: 132 MMOL/L (ref 136–145)
SODIUM BLD-SCNC: 132 MMOL/L (ref 137–145)
SODIUM BLD-SCNC: 132 MMOL/L (ref 137–145)
SODIUM BLD-SCNC: 133 MMOL/L (ref 136–145)
SODIUM BLD-SCNC: 133 MMOL/L (ref 136–145)
SODIUM BLD-SCNC: 134 MMOL/L (ref 137–145)
SODIUM UR-SCNC: 30 MMOL/L
SP GR UR STRIP: 1.01 (ref 1–1.03)
SP GR UR STRIP: 1.02 (ref 1–1.03)
SP GR UR STRIP: 1.02 (ref 1–1.03)
SP GR UR: 1.01 (ref 1–1.03)
SQUAMOUS #/AREA URNS HPF: ABNORMAL /HPF
STAT OF ADQ CVX/VAG CYTO-IMP: NORMAL
STRESS TARGET HR: 121 BPM
T&S EXPIRATION DATE: NORMAL
TARGETS BLD QL SMEAR: ABNORMAL
TIBC SERPL-MCNC: 196 MCG/DL (ref 265–497)
TIBC SERPL-MCNC: 94 MCG/DL (ref 298–536)
TOXIC GRANULATION: ABNORMAL
TRANSFERRIN SERPL-MCNC: 63 MG/DL (ref 200–360)
TRIGL SERPL-MCNC: 335 MG/DL (ref 0–150)
TRIGL SERPL-MCNC: 48 MG/DL (ref 20–199)
TRIGL SERPL-MCNC: 67 MG/DL (ref 20–199)
TROPONIN I SERPL-MCNC: 0.01 NG/ML
TROPONIN I SERPL-MCNC: <0.012 NG/ML
TROPONIN T SERPL-MCNC: 0.08 NG/ML (ref 0–0.03)
TROPONIN T SERPL-MCNC: 0.09 NG/ML (ref 0–0.03)
TSH SERPL DL<=0.05 MIU/L-ACNC: 2.28 MIU/ML (ref 0.46–4.68)
TSH SERPL DL<=0.05 MIU/L-ACNC: 3.8 MIU/ML (ref 0.46–4.68)
UNIT  ABO: NORMAL
UNIT  RH: NORMAL
UROBILINOGEN UR QL STRIP: ABNORMAL
UROBILINOGEN UR QL: NORMAL
VENTILATOR MODE: ABNORMAL
VIT B12 BLD-MCNC: 754 PG/ML (ref 239–931)
VIT B12 BLD-MCNC: 789 PG/ML (ref 211–946)
WBC CASTS #/AREA URNS LPF: ABNORMAL /LPF
WBC MORPH BLD: NORMAL
WBC MORPH BLD: NORMAL
WBC NRBC COR # BLD: 10.02 10*3/MM3 (ref 3.4–10.8)
WBC NRBC COR # BLD: 10.16 10*3/MM3 (ref 3.4–10.8)
WBC NRBC COR # BLD: 10.4 10*3/MM3 (ref 3.4–10.8)
WBC NRBC COR # BLD: 11.08 10*3/MM3 (ref 3.4–10.8)
WBC NRBC COR # BLD: 11.3 10*3/MM3 (ref 3.4–10.8)
WBC NRBC COR # BLD: 11.6 10*3/MM3 (ref 3.4–10.8)
WBC NRBC COR # BLD: 11.62 10*3/MM3 (ref 3.4–10.8)
WBC NRBC COR # BLD: 11.66 10*3/MM3 (ref 3.4–10.8)
WBC NRBC COR # BLD: 11.66 10*3/MM3 (ref 3.4–10.8)
WBC NRBC COR # BLD: 11.97 10*3/MM3 (ref 3.4–10.8)
WBC NRBC COR # BLD: 11.99 10*3/MM3 (ref 3.4–10.8)
WBC NRBC COR # BLD: 12.29 10*3/MM3 (ref 3.4–10.8)
WBC NRBC COR # BLD: 12.56 10*3/MM3 (ref 3.4–10.8)
WBC NRBC COR # BLD: 12.58 10*3/MM3 (ref 3.4–10.8)
WBC NRBC COR # BLD: 12.59 10*3/MM3 (ref 3.4–10.8)
WBC NRBC COR # BLD: 12.59 10*3/MM3 (ref 3.4–10.8)
WBC NRBC COR # BLD: 12.82 10*3/MM3 (ref 3.4–10.8)
WBC NRBC COR # BLD: 12.84 10*3/MM3 (ref 3.4–10.8)
WBC NRBC COR # BLD: 13.58 10*3/MM3 (ref 3.4–10.8)
WBC NRBC COR # BLD: 14.02 10*3/MM3 (ref 3.4–10.8)
WBC NRBC COR # BLD: 14.04 10*3/MM3 (ref 3.4–10.8)
WBC NRBC COR # BLD: 14.41 10*3/MM3 (ref 3.4–10.8)
WBC NRBC COR # BLD: 15.24 10*3/MM3 (ref 3.4–10.8)
WBC NRBC COR # BLD: 16.89 10*3/MM3 (ref 3.4–10.8)
WBC NRBC COR # BLD: 6.21 10*3/MM3 (ref 3.2–9.8)
WBC NRBC COR # BLD: 8.44 10*3/MM3 (ref 3.4–10.8)
WBC NRBC COR # BLD: 8.57 10*3/MM3 (ref 3.4–10.8)
WBC NRBC COR # BLD: 8.73 10*3/MM3 (ref 3.4–10.8)
WBC NRBC COR # BLD: 9.43 10*3/MM3 (ref 3.4–10.8)
WBC NRBC COR # BLD: 9.57 10*3/MM3 (ref 3.4–10.8)
WBC NRBC COR # BLD: 9.83 10*3/MM3 (ref 3.4–10.8)
WBC NRBC COR # BLD: 9.94 10*3/MM3 (ref 3.4–10.8)
WBC UR QL AUTO: ABNORMAL /HPF
WHOLE BLOOD HOLD SPECIMEN: NORMAL
YEAST URNS QL MICRO: ABNORMAL /HPF

## 2019-01-01 PROCEDURE — 63710000001 DEXAMETHASONE PER 0.25 MG: Performed by: INTERNAL MEDICINE

## 2019-01-01 PROCEDURE — 82962 GLUCOSE BLOOD TEST: CPT

## 2019-01-01 PROCEDURE — 84295 ASSAY OF SERUM SODIUM: CPT | Performed by: INTERNAL MEDICINE

## 2019-01-01 PROCEDURE — 81001 URINALYSIS AUTO W/SCOPE: CPT | Performed by: FAMILY MEDICINE

## 2019-01-01 PROCEDURE — 85014 HEMATOCRIT: CPT | Performed by: HOSPITALIST

## 2019-01-01 PROCEDURE — 93325 DOPPLER ECHO COLOR FLOW MAPG: CPT

## 2019-01-01 PROCEDURE — 80053 COMPREHEN METABOLIC PANEL: CPT

## 2019-01-01 PROCEDURE — 83880 ASSAY OF NATRIURETIC PEPTIDE: CPT | Performed by: EMERGENCY MEDICINE

## 2019-01-01 PROCEDURE — 36430 TRANSFUSION BLD/BLD COMPNT: CPT

## 2019-01-01 PROCEDURE — 86901 BLOOD TYPING SEROLOGIC RH(D): CPT | Performed by: PHYSICIAN ASSISTANT

## 2019-01-01 PROCEDURE — 25010000002 FUROSEMIDE PER 20 MG: Performed by: PHYSICIAN ASSISTANT

## 2019-01-01 PROCEDURE — 36415 COLL VENOUS BLD VENIPUNCTURE: CPT

## 2019-01-01 PROCEDURE — 83010 ASSAY OF HAPTOGLOBIN QUANT: CPT | Performed by: INTERNAL MEDICINE

## 2019-01-01 PROCEDURE — G0378 HOSPITAL OBSERVATION PER HR: HCPCS

## 2019-01-01 PROCEDURE — 86900 BLOOD TYPING SEROLOGIC ABO: CPT | Performed by: PHYSICIAN ASSISTANT

## 2019-01-01 PROCEDURE — 94760 N-INVAS EAR/PLS OXIMETRY 1: CPT

## 2019-01-01 PROCEDURE — 87086 URINE CULTURE/COLONY COUNT: CPT | Performed by: FAMILY MEDICINE

## 2019-01-01 PROCEDURE — 99233 SBSQ HOSP IP/OBS HIGH 50: CPT | Performed by: FAMILY MEDICINE

## 2019-01-01 PROCEDURE — 80053 COMPREHEN METABOLIC PANEL: CPT | Performed by: STUDENT IN AN ORGANIZED HEALTH CARE EDUCATION/TRAINING PROGRAM

## 2019-01-01 PROCEDURE — 97535 SELF CARE MNGMENT TRAINING: CPT

## 2019-01-01 PROCEDURE — 85025 COMPLETE CBC W/AUTO DIFF WBC: CPT | Performed by: FAMILY MEDICINE

## 2019-01-01 PROCEDURE — 99231 SBSQ HOSP IP/OBS SF/LOW 25: CPT | Performed by: INTERNAL MEDICINE

## 2019-01-01 PROCEDURE — 86901 BLOOD TYPING SEROLOGIC RH(D): CPT | Performed by: HOSPITALIST

## 2019-01-01 PROCEDURE — 93005 ELECTROCARDIOGRAM TRACING: CPT | Performed by: FAMILY MEDICINE

## 2019-01-01 PROCEDURE — 25010000002 CEFTRIAXONE PER 250 MG: Performed by: PHYSICIAN ASSISTANT

## 2019-01-01 PROCEDURE — 83735 ASSAY OF MAGNESIUM: CPT | Performed by: INTERNAL MEDICINE

## 2019-01-01 PROCEDURE — 84156 ASSAY OF PROTEIN URINE: CPT | Performed by: INTERNAL MEDICINE

## 2019-01-01 PROCEDURE — 86003 ALLG SPEC IGE CRUDE XTRC EA: CPT | Performed by: FAMILY MEDICINE

## 2019-01-01 PROCEDURE — 25010000002 ONDANSETRON PER 1 MG: Performed by: INTERNAL MEDICINE

## 2019-01-01 PROCEDURE — 36415 COLL VENOUS BLD VENIPUNCTURE: CPT | Performed by: PHYSICIAN ASSISTANT

## 2019-01-01 PROCEDURE — 99232 SBSQ HOSP IP/OBS MODERATE 35: CPT | Performed by: STUDENT IN AN ORGANIZED HEALTH CARE EDUCATION/TRAINING PROGRAM

## 2019-01-01 PROCEDURE — 85025 COMPLETE CBC W/AUTO DIFF WBC: CPT

## 2019-01-01 PROCEDURE — 84484 ASSAY OF TROPONIN QUANT: CPT | Performed by: PHYSICIAN ASSISTANT

## 2019-01-01 PROCEDURE — A9270 NON-COVERED ITEM OR SERVICE: HCPCS | Performed by: INTERNAL MEDICINE

## 2019-01-01 PROCEDURE — 71045 X-RAY EXAM CHEST 1 VIEW: CPT

## 2019-01-01 PROCEDURE — 85025 COMPLETE CBC W/AUTO DIFF WBC: CPT | Performed by: STUDENT IN AN ORGANIZED HEALTH CARE EDUCATION/TRAINING PROGRAM

## 2019-01-01 PROCEDURE — 85025 COMPLETE CBC W/AUTO DIFF WBC: CPT | Performed by: NURSE PRACTITIONER

## 2019-01-01 PROCEDURE — 85025 COMPLETE CBC W/AUTO DIFF WBC: CPT | Performed by: HOSPITALIST

## 2019-01-01 PROCEDURE — 99284 EMERGENCY DEPT VISIT MOD MDM: CPT

## 2019-01-01 PROCEDURE — 85018 HEMOGLOBIN: CPT | Performed by: FAMILY MEDICINE

## 2019-01-01 PROCEDURE — 81001 URINALYSIS AUTO W/SCOPE: CPT | Performed by: INTERNAL MEDICINE

## 2019-01-01 PROCEDURE — 83935 ASSAY OF URINE OSMOLALITY: CPT | Performed by: INTERNAL MEDICINE

## 2019-01-01 PROCEDURE — 88112 CYTOPATH CELL ENHANCE TECH: CPT | Performed by: STUDENT IN AN ORGANIZED HEALTH CARE EDUCATION/TRAINING PROGRAM

## 2019-01-01 PROCEDURE — 85018 HEMOGLOBIN: CPT | Performed by: HOSPITALIST

## 2019-01-01 PROCEDURE — 88313 SPECIAL STAINS GROUP 2: CPT | Performed by: INTERNAL MEDICINE

## 2019-01-01 PROCEDURE — 45380 COLONOSCOPY AND BIOPSY: CPT | Performed by: INTERNAL MEDICINE

## 2019-01-01 PROCEDURE — 25010000002 FLUCONAZOLE PER 200 MG: Performed by: NURSE PRACTITIONER

## 2019-01-01 PROCEDURE — 93010 ELECTROCARDIOGRAM REPORT: CPT | Performed by: INTERNAL MEDICINE

## 2019-01-01 PROCEDURE — 84100 ASSAY OF PHOSPHORUS: CPT | Performed by: FAMILY MEDICINE

## 2019-01-01 PROCEDURE — 82746 ASSAY OF FOLIC ACID SERUM: CPT | Performed by: PHYSICIAN ASSISTANT

## 2019-01-01 PROCEDURE — 81001 URINALYSIS AUTO W/SCOPE: CPT | Performed by: NURSE PRACTITIONER

## 2019-01-01 PROCEDURE — 25010000002 FUROSEMIDE PER 20 MG: Performed by: HOSPITALIST

## 2019-01-01 PROCEDURE — 97116 GAIT TRAINING THERAPY: CPT

## 2019-01-01 PROCEDURE — 97110 THERAPEUTIC EXERCISES: CPT

## 2019-01-01 PROCEDURE — 83735 ASSAY OF MAGNESIUM: CPT | Performed by: FAMILY MEDICINE

## 2019-01-01 PROCEDURE — 25010000002 ONDANSETRON PER 1 MG: Performed by: FAMILY MEDICINE

## 2019-01-01 PROCEDURE — 99213 OFFICE O/P EST LOW 20 MIN: CPT | Performed by: NURSE PRACTITIONER

## 2019-01-01 PROCEDURE — A9541 TC99M SULFUR COLLOID: HCPCS | Performed by: INTERNAL MEDICINE

## 2019-01-01 PROCEDURE — 88305 TISSUE EXAM BY PATHOLOGIST: CPT | Performed by: INTERNAL MEDICINE

## 2019-01-01 PROCEDURE — 86850 RBC ANTIBODY SCREEN: CPT | Performed by: FAMILY MEDICINE

## 2019-01-01 PROCEDURE — 83540 ASSAY OF IRON: CPT | Performed by: PHYSICIAN ASSISTANT

## 2019-01-01 PROCEDURE — 83880 ASSAY OF NATRIURETIC PEPTIDE: CPT

## 2019-01-01 PROCEDURE — 80053 COMPREHEN METABOLIC PANEL: CPT | Performed by: PHYSICIAN ASSISTANT

## 2019-01-01 PROCEDURE — 86757 RICKETTSIA ANTIBODY: CPT | Performed by: NURSE PRACTITIONER

## 2019-01-01 PROCEDURE — 86901 BLOOD TYPING SEROLOGIC RH(D): CPT

## 2019-01-01 PROCEDURE — 99214 OFFICE O/P EST MOD 30 MIN: CPT | Performed by: NURSE PRACTITIONER

## 2019-01-01 PROCEDURE — 02HV33Z INSERTION OF INFUSION DEVICE INTO SUPERIOR VENA CAVA, PERCUTANEOUS APPROACH: ICD-10-PCS | Performed by: FAMILY MEDICINE

## 2019-01-01 PROCEDURE — 83036 HEMOGLOBIN GLYCOSYLATED A1C: CPT | Performed by: NURSE PRACTITIONER

## 2019-01-01 PROCEDURE — 80053 COMPREHEN METABOLIC PANEL: CPT | Performed by: NURSE PRACTITIONER

## 2019-01-01 PROCEDURE — 80048 BASIC METABOLIC PNL TOTAL CA: CPT | Performed by: HOSPITALIST

## 2019-01-01 PROCEDURE — 93325 DOPPLER ECHO COLOR FLOW MAPG: CPT | Performed by: INTERNAL MEDICINE

## 2019-01-01 PROCEDURE — 25010000002 MORPHINE PER 10 MG: Performed by: STUDENT IN AN ORGANIZED HEALTH CARE EDUCATION/TRAINING PROGRAM

## 2019-01-01 PROCEDURE — 80061 LIPID PANEL: CPT

## 2019-01-01 PROCEDURE — 80048 BASIC METABOLIC PNL TOTAL CA: CPT | Performed by: FAMILY MEDICINE

## 2019-01-01 PROCEDURE — 94640 AIRWAY INHALATION TREATMENT: CPT

## 2019-01-01 PROCEDURE — 86008 ALLG SPEC IGE RECOMB EA: CPT

## 2019-01-01 PROCEDURE — 84484 ASSAY OF TROPONIN QUANT: CPT | Performed by: EMERGENCY MEDICINE

## 2019-01-01 PROCEDURE — 93000 ELECTROCARDIOGRAM COMPLETE: CPT | Performed by: INTERNAL MEDICINE

## 2019-01-01 PROCEDURE — 87804 INFLUENZA ASSAY W/OPTIC: CPT | Performed by: FAMILY MEDICINE

## 2019-01-01 PROCEDURE — 85007 BL SMEAR W/DIFF WBC COUNT: CPT

## 2019-01-01 PROCEDURE — 82607 VITAMIN B-12: CPT | Performed by: PHYSICIAN ASSISTANT

## 2019-01-01 PROCEDURE — 25010000002 MAGNESIUM SULFATE 2 GM/50ML SOLUTION: Performed by: FAMILY MEDICINE

## 2019-01-01 PROCEDURE — 87798 DETECT AGENT NOS DNA AMP: CPT | Performed by: PHYSICIAN ASSISTANT

## 2019-01-01 PROCEDURE — 81001 URINALYSIS AUTO W/SCOPE: CPT | Performed by: PHYSICIAN ASSISTANT

## 2019-01-01 PROCEDURE — 86900 BLOOD TYPING SEROLOGIC ABO: CPT | Performed by: FAMILY MEDICINE

## 2019-01-01 PROCEDURE — 96375 TX/PRO/DX INJ NEW DRUG ADDON: CPT

## 2019-01-01 PROCEDURE — 83036 HEMOGLOBIN GLYCOSYLATED A1C: CPT | Performed by: PHYSICIAN ASSISTANT

## 2019-01-01 PROCEDURE — 94799 UNLISTED PULMONARY SVC/PX: CPT

## 2019-01-01 PROCEDURE — 84443 ASSAY THYROID STIM HORMONE: CPT | Performed by: PHYSICIAN ASSISTANT

## 2019-01-01 PROCEDURE — 84300 ASSAY OF URINE SODIUM: CPT | Performed by: INTERNAL MEDICINE

## 2019-01-01 PROCEDURE — 93321 DOPPLER ECHO F-UP/LMTD STD: CPT | Performed by: INTERNAL MEDICINE

## 2019-01-01 PROCEDURE — 0DBE8ZX EXCISION OF LARGE INTESTINE, VIA NATURAL OR ARTIFICIAL OPENING ENDOSCOPIC, DIAGNOSTIC: ICD-10-PCS | Performed by: INTERNAL MEDICINE

## 2019-01-01 PROCEDURE — 86850 RBC ANTIBODY SCREEN: CPT | Performed by: PHYSICIAN ASSISTANT

## 2019-01-01 PROCEDURE — 82465 ASSAY BLD/SERUM CHOLESTEROL: CPT | Performed by: FAMILY MEDICINE

## 2019-01-01 PROCEDURE — 25010000002 IOPAMIDOL 61 % SOLUTION: Performed by: HOSPITALIST

## 2019-01-01 PROCEDURE — 25010000002 FUROSEMIDE PER 20 MG: Performed by: FAMILY MEDICINE

## 2019-01-01 PROCEDURE — 88305 TISSUE EXAM BY PATHOLOGIST: CPT | Performed by: PATHOLOGY

## 2019-01-01 PROCEDURE — 99220 PR INITIAL OBSERVATION CARE/DAY 70 MINUTES: CPT | Performed by: STUDENT IN AN ORGANIZED HEALTH CARE EDUCATION/TRAINING PROGRAM

## 2019-01-01 PROCEDURE — 96361 HYDRATE IV INFUSION ADD-ON: CPT

## 2019-01-01 PROCEDURE — 87486 CHLMYD PNEUM DNA AMP PROBE: CPT | Performed by: PHYSICIAN ASSISTANT

## 2019-01-01 PROCEDURE — 86335 IMMUNFIX E-PHORSIS/URINE/CSF: CPT | Performed by: FAMILY MEDICINE

## 2019-01-01 PROCEDURE — 86901 BLOOD TYPING SEROLOGIC RH(D): CPT | Performed by: FAMILY MEDICINE

## 2019-01-01 PROCEDURE — 87086 URINE CULTURE/COLONY COUNT: CPT | Performed by: PHYSICIAN ASSISTANT

## 2019-01-01 PROCEDURE — 86666 EHRLICHIA ANTIBODY: CPT | Performed by: NURSE PRACTITIONER

## 2019-01-01 PROCEDURE — 82525 ASSAY OF COPPER: CPT | Performed by: INTERNAL MEDICINE

## 2019-01-01 PROCEDURE — 99232 SBSQ HOSP IP/OBS MODERATE 35: CPT | Performed by: INTERNAL MEDICINE

## 2019-01-01 PROCEDURE — 25010000002 LORAZEPAM PER 2 MG

## 2019-01-01 PROCEDURE — 85046 RETICYTE/HGB CONCENTRATE: CPT | Performed by: INTERNAL MEDICINE

## 2019-01-01 PROCEDURE — 43239 EGD BIOPSY SINGLE/MULTIPLE: CPT | Performed by: INTERNAL MEDICINE

## 2019-01-01 PROCEDURE — 85025 COMPLETE CBC W/AUTO DIFF WBC: CPT | Performed by: PHYSICIAN ASSISTANT

## 2019-01-01 PROCEDURE — 36600 WITHDRAWAL OF ARTERIAL BLOOD: CPT

## 2019-01-01 PROCEDURE — 76775 US EXAM ABDO BACK WALL LIM: CPT

## 2019-01-01 PROCEDURE — 84443 ASSAY THYROID STIM HORMONE: CPT

## 2019-01-01 PROCEDURE — 82272 OCCULT BLD FECES 1-3 TESTS: CPT | Performed by: FAMILY MEDICINE

## 2019-01-01 PROCEDURE — 82330 ASSAY OF CALCIUM: CPT | Performed by: FAMILY MEDICINE

## 2019-01-01 PROCEDURE — 86900 BLOOD TYPING SEROLOGIC ABO: CPT

## 2019-01-01 PROCEDURE — 84132 ASSAY OF SERUM POTASSIUM: CPT | Performed by: HOSPITALIST

## 2019-01-01 PROCEDURE — 84478 ASSAY OF TRIGLYCERIDES: CPT | Performed by: FAMILY MEDICINE

## 2019-01-01 PROCEDURE — 93005 ELECTROCARDIOGRAM TRACING: CPT | Performed by: PHYSICIAN ASSISTANT

## 2019-01-01 PROCEDURE — 96365 THER/PROPH/DIAG IV INF INIT: CPT

## 2019-01-01 PROCEDURE — 25010000002 LORAZEPAM PER 2 MG: Performed by: FAMILY MEDICINE

## 2019-01-01 PROCEDURE — 99222 1ST HOSP IP/OBS MODERATE 55: CPT | Performed by: INTERNAL MEDICINE

## 2019-01-01 PROCEDURE — 83540 ASSAY OF IRON: CPT | Performed by: FAMILY MEDICINE

## 2019-01-01 PROCEDURE — 84134 ASSAY OF PREALBUMIN: CPT | Performed by: FAMILY MEDICINE

## 2019-01-01 PROCEDURE — 84132 ASSAY OF SERUM POTASSIUM: CPT | Performed by: INTERNAL MEDICINE

## 2019-01-01 PROCEDURE — 97530 THERAPEUTIC ACTIVITIES: CPT

## 2019-01-01 PROCEDURE — 94660 CPAP INITIATION&MGMT: CPT

## 2019-01-01 PROCEDURE — 75574 CT ANGIO HRT W/3D IMAGE: CPT

## 2019-01-01 PROCEDURE — 25010000002 LORAZEPAM PER 2 MG: Performed by: STUDENT IN AN ORGANIZED HEALTH CARE EDUCATION/TRAINING PROGRAM

## 2019-01-01 PROCEDURE — 25010000002 MORPHINE PER 10 MG: Performed by: FAMILY MEDICINE

## 2019-01-01 PROCEDURE — 85060 BLOOD SMEAR INTERPRETATION: CPT | Performed by: INTERNAL MEDICINE

## 2019-01-01 PROCEDURE — 93306 TTE W/DOPPLER COMPLETE: CPT | Performed by: INTERNAL MEDICINE

## 2019-01-01 PROCEDURE — 99283 EMERGENCY DEPT VISIT LOW MDM: CPT

## 2019-01-01 PROCEDURE — 84466 ASSAY OF TRANSFERRIN: CPT | Performed by: FAMILY MEDICINE

## 2019-01-01 PROCEDURE — 82570 ASSAY OF URINE CREATININE: CPT | Performed by: FAMILY MEDICINE

## 2019-01-01 PROCEDURE — 25010000002 MAGNESIUM SULFATE 2 GM/50ML SOLUTION: Performed by: PHYSICIAN ASSISTANT

## 2019-01-01 PROCEDURE — 83036 HEMOGLOBIN GLYCOSYLATED A1C: CPT

## 2019-01-01 PROCEDURE — 85610 PROTHROMBIN TIME: CPT | Performed by: PHYSICIAN ASSISTANT

## 2019-01-01 PROCEDURE — 82746 ASSAY OF FOLIC ACID SERUM: CPT | Performed by: FAMILY MEDICINE

## 2019-01-01 PROCEDURE — 86850 RBC ANTIBODY SCREEN: CPT | Performed by: HOSPITALIST

## 2019-01-01 PROCEDURE — 72148 MRI LUMBAR SPINE W/O DYE: CPT

## 2019-01-01 PROCEDURE — 80061 LIPID PANEL: CPT | Performed by: PHYSICIAN ASSISTANT

## 2019-01-01 PROCEDURE — 83550 IRON BINDING TEST: CPT | Performed by: PHYSICIAN ASSISTANT

## 2019-01-01 PROCEDURE — 93306 TTE W/DOPPLER COMPLETE: CPT

## 2019-01-01 PROCEDURE — 87086 URINE CULTURE/COLONY COUNT: CPT | Performed by: NURSE PRACTITIONER

## 2019-01-01 PROCEDURE — 81002 URINALYSIS NONAUTO W/O SCOPE: CPT | Performed by: NURSE PRACTITIONER

## 2019-01-01 PROCEDURE — A9270 NON-COVERED ITEM OR SERVICE: HCPCS | Performed by: PHYSICIAN ASSISTANT

## 2019-01-01 PROCEDURE — 36415 COLL VENOUS BLD VENIPUNCTURE: CPT | Performed by: NURSE PRACTITIONER

## 2019-01-01 PROCEDURE — 88112 CYTOPATH CELL ENHANCE TECH: CPT | Performed by: PATHOLOGY

## 2019-01-01 PROCEDURE — 87040 BLOOD CULTURE FOR BACTERIA: CPT | Performed by: INTERNAL MEDICINE

## 2019-01-01 PROCEDURE — 97162 PT EVAL MOD COMPLEX 30 MIN: CPT | Performed by: PHYSICAL THERAPIST

## 2019-01-01 PROCEDURE — 25010000002 PROPOFOL 10 MG/ML EMULSION: Performed by: NURSE ANESTHETIST, CERTIFIED REGISTERED

## 2019-01-01 PROCEDURE — 86618 LYME DISEASE ANTIBODY: CPT | Performed by: NURSE PRACTITIONER

## 2019-01-01 PROCEDURE — 99204 OFFICE O/P NEW MOD 45 MIN: CPT | Performed by: INTERNAL MEDICINE

## 2019-01-01 PROCEDURE — 96376 TX/PRO/DX INJ SAME DRUG ADON: CPT

## 2019-01-01 PROCEDURE — 86923 COMPATIBILITY TEST ELECTRIC: CPT

## 2019-01-01 PROCEDURE — 85045 AUTOMATED RETICULOCYTE COUNT: CPT | Performed by: FAMILY MEDICINE

## 2019-01-01 PROCEDURE — 93005 ELECTROCARDIOGRAM TRACING: CPT | Performed by: EMERGENCY MEDICINE

## 2019-01-01 PROCEDURE — 86900 BLOOD TYPING SEROLOGIC ABO: CPT | Performed by: HOSPITALIST

## 2019-01-01 PROCEDURE — 86140 C-REACTIVE PROTEIN: CPT | Performed by: FAMILY MEDICINE

## 2019-01-01 PROCEDURE — 84484 ASSAY OF TROPONIN QUANT: CPT | Performed by: FAMILY MEDICINE

## 2019-01-01 PROCEDURE — 93308 TTE F-UP OR LMTD: CPT

## 2019-01-01 PROCEDURE — 25010000002 ONDANSETRON PER 1 MG: Performed by: PHYSICIAN ASSISTANT

## 2019-01-01 PROCEDURE — 63710000001 ACETAMINOPHEN 325 MG TABLET: Performed by: PHYSICIAN ASSISTANT

## 2019-01-01 PROCEDURE — 82784 ASSAY IGA/IGD/IGG/IGM EACH: CPT | Performed by: INTERNAL MEDICINE

## 2019-01-01 PROCEDURE — 25010000002 DEXAMETHASONE PER 1 MG: Performed by: FAMILY MEDICINE

## 2019-01-01 PROCEDURE — 83880 ASSAY OF NATRIURETIC PEPTIDE: CPT | Performed by: PHYSICIAN ASSISTANT

## 2019-01-01 PROCEDURE — 99215 OFFICE O/P EST HI 40 MIN: CPT | Performed by: INTERNAL MEDICINE

## 2019-01-01 PROCEDURE — 83883 ASSAY NEPHELOMETRY NOT SPEC: CPT | Performed by: INTERNAL MEDICINE

## 2019-01-01 PROCEDURE — 82803 BLOOD GASES ANY COMBINATION: CPT

## 2019-01-01 PROCEDURE — 86334 IMMUNOFIX E-PHORESIS SERUM: CPT | Performed by: INTERNAL MEDICINE

## 2019-01-01 PROCEDURE — 93321 DOPPLER ECHO F-UP/LMTD STD: CPT

## 2019-01-01 PROCEDURE — 99223 1ST HOSP IP/OBS HIGH 75: CPT | Performed by: INTERNAL MEDICINE

## 2019-01-01 PROCEDURE — 93005 ELECTROCARDIOGRAM TRACING: CPT | Performed by: HOSPITALIST

## 2019-01-01 PROCEDURE — 85007 BL SMEAR W/DIFF WBC COUNT: CPT | Performed by: STUDENT IN AN ORGANIZED HEALTH CARE EDUCATION/TRAINING PROGRAM

## 2019-01-01 PROCEDURE — 93308 TTE F-UP OR LMTD: CPT | Performed by: INTERNAL MEDICINE

## 2019-01-01 PROCEDURE — 87040 BLOOD CULTURE FOR BACTERIA: CPT | Performed by: FAMILY MEDICINE

## 2019-01-01 PROCEDURE — 0 TECHNETIUM SULFUR COLLOID: Performed by: INTERNAL MEDICINE

## 2019-01-01 PROCEDURE — 83615 LACTATE (LD) (LDH) ENZYME: CPT | Performed by: INTERNAL MEDICINE

## 2019-01-01 PROCEDURE — 99214 OFFICE O/P EST MOD 30 MIN: CPT | Performed by: INTERNAL MEDICINE

## 2019-01-01 PROCEDURE — 82607 VITAMIN B-12: CPT | Performed by: FAMILY MEDICINE

## 2019-01-01 PROCEDURE — 99232 SBSQ HOSP IP/OBS MODERATE 35: CPT | Performed by: FAMILY MEDICINE

## 2019-01-01 PROCEDURE — 86003 ALLG SPEC IGE CRUDE XTRC EA: CPT

## 2019-01-01 PROCEDURE — 84165 PROTEIN E-PHORESIS SERUM: CPT | Performed by: INTERNAL MEDICINE

## 2019-01-01 PROCEDURE — 93971 EXTREMITY STUDY: CPT

## 2019-01-01 PROCEDURE — 84166 PROTEIN E-PHORESIS/URINE/CSF: CPT | Performed by: INTERNAL MEDICINE

## 2019-01-01 PROCEDURE — 71046 X-RAY EXAM CHEST 2 VIEWS: CPT

## 2019-01-01 PROCEDURE — 83690 ASSAY OF LIPASE: CPT | Performed by: STUDENT IN AN ORGANIZED HEALTH CARE EDUCATION/TRAINING PROGRAM

## 2019-01-01 PROCEDURE — C1751 CATH, INF, PER/CENT/MIDLINE: HCPCS

## 2019-01-01 PROCEDURE — 84132 ASSAY OF SERUM POTASSIUM: CPT | Performed by: FAMILY MEDICINE

## 2019-01-01 PROCEDURE — 63710000001 PROMETHAZINE PER 25 MG: Performed by: HOSPITALIST

## 2019-01-01 PROCEDURE — 0DB78ZX EXCISION OF STOMACH, PYLORUS, VIA NATURAL OR ARTIFICIAL OPENING ENDOSCOPIC, DIAGNOSTIC: ICD-10-PCS | Performed by: INTERNAL MEDICINE

## 2019-01-01 PROCEDURE — P9016 RBC LEUKOCYTES REDUCED: HCPCS

## 2019-01-01 PROCEDURE — 87581 M.PNEUMON DNA AMP PROBE: CPT | Performed by: PHYSICIAN ASSISTANT

## 2019-01-01 PROCEDURE — 97161 PT EVAL LOW COMPLEX 20 MIN: CPT

## 2019-01-01 PROCEDURE — A9270 NON-COVERED ITEM OR SERVICE: HCPCS | Performed by: NURSE PRACTITIONER

## 2019-01-01 PROCEDURE — 83880 ASSAY OF NATRIURETIC PEPTIDE: CPT | Performed by: FAMILY MEDICINE

## 2019-01-01 PROCEDURE — 97166 OT EVAL MOD COMPLEX 45 MIN: CPT

## 2019-01-01 PROCEDURE — 63710000001 FERROUS SULFATE 324 (65 FE) MG TABLET DELAYED-RELEASE: Performed by: NURSE PRACTITIONER

## 2019-01-01 PROCEDURE — 99233 SBSQ HOSP IP/OBS HIGH 50: CPT | Performed by: INTERNAL MEDICINE

## 2019-01-01 PROCEDURE — 83735 ASSAY OF MAGNESIUM: CPT | Performed by: PHYSICIAN ASSISTANT

## 2019-01-01 PROCEDURE — 63710000001 INSULIN ASPART PER 5 UNITS: Performed by: FAMILY MEDICINE

## 2019-01-01 PROCEDURE — 82728 ASSAY OF FERRITIN: CPT | Performed by: FAMILY MEDICINE

## 2019-01-01 PROCEDURE — 25010000002 DEXAMETHASONE PER 1 MG: Performed by: INTERNAL MEDICINE

## 2019-01-01 PROCEDURE — 63710000001 METOPROLOL SUCCINATE XL 50 MG TABLET SUSTAINED-RELEASE 24 HOUR: Performed by: INTERNAL MEDICINE

## 2019-01-01 PROCEDURE — 25010000002 CEFTRIAXONE PER 250 MG: Performed by: FAMILY MEDICINE

## 2019-01-01 PROCEDURE — 25010000002 FUROSEMIDE PER 20 MG

## 2019-01-01 PROCEDURE — 84156 ASSAY OF PROTEIN URINE: CPT | Performed by: FAMILY MEDICINE

## 2019-01-01 PROCEDURE — 85651 RBC SED RATE NONAUTOMATED: CPT

## 2019-01-01 PROCEDURE — 84166 PROTEIN E-PHORESIS/URINE/CSF: CPT | Performed by: FAMILY MEDICINE

## 2019-01-01 PROCEDURE — 0 IOPAMIDOL PER 1 ML: Performed by: INTERNAL MEDICINE

## 2019-01-01 PROCEDURE — 99225 PR SBSQ OBSERVATION CARE/DAY 25 MINUTES: CPT | Performed by: FAMILY MEDICINE

## 2019-01-01 PROCEDURE — 74178 CT ABD&PLV WO CNTR FLWD CNTR: CPT

## 2019-01-01 PROCEDURE — 85025 COMPLETE CBC W/AUTO DIFF WBC: CPT | Performed by: EMERGENCY MEDICINE

## 2019-01-01 PROCEDURE — 87631 RESP VIRUS 3-5 TARGETS: CPT | Performed by: PHYSICIAN ASSISTANT

## 2019-01-01 PROCEDURE — 82533 TOTAL CORTISOL: CPT | Performed by: INTERNAL MEDICINE

## 2019-01-01 PROCEDURE — 80053 COMPREHEN METABOLIC PANEL: CPT | Performed by: EMERGENCY MEDICINE

## 2019-01-01 PROCEDURE — 78264 GASTRIC EMPTYING IMG STUDY: CPT

## 2019-01-01 PROCEDURE — 71260 CT THORAX DX C+: CPT

## 2019-01-01 PROCEDURE — 85014 HEMATOCRIT: CPT | Performed by: FAMILY MEDICINE

## 2019-01-01 PROCEDURE — 85651 RBC SED RATE NONAUTOMATED: CPT | Performed by: STUDENT IN AN ORGANIZED HEALTH CARE EDUCATION/TRAINING PROGRAM

## 2019-01-01 PROCEDURE — 80053 COMPREHEN METABOLIC PANEL: CPT | Performed by: FAMILY MEDICINE

## 2019-01-01 PROCEDURE — 25010000002 AZITHROMYCIN PER 500 MG: Performed by: PHYSICIAN ASSISTANT

## 2019-01-01 RX ORDER — MIRTAZAPINE 15 MG/1
15 TABLET, FILM COATED ORAL NIGHTLY
Status: DISCONTINUED | OUTPATIENT
Start: 2019-01-01 | End: 2019-01-01

## 2019-01-01 RX ORDER — PROMETHAZINE HYDROCHLORIDE 12.5 MG/1
12.5 TABLET ORAL EVERY 6 HOURS PRN
Status: DISCONTINUED | OUTPATIENT
Start: 2019-01-01 | End: 2019-01-01

## 2019-01-01 RX ORDER — METOPROLOL SUCCINATE 50 MG/1
50 TABLET, EXTENDED RELEASE ORAL
Status: DISCONTINUED | OUTPATIENT
Start: 2019-01-01 | End: 2019-01-01

## 2019-01-01 RX ORDER — ONDANSETRON 2 MG/ML
4 INJECTION INTRAMUSCULAR; INTRAVENOUS EVERY 6 HOURS PRN
Status: DISCONTINUED | OUTPATIENT
Start: 2019-01-01 | End: 2019-01-01

## 2019-01-01 RX ORDER — IPRATROPIUM BROMIDE AND ALBUTEROL SULFATE 2.5; .5 MG/3ML; MG/3ML
3 SOLUTION RESPIRATORY (INHALATION)
Status: DISCONTINUED | OUTPATIENT
Start: 2019-01-01 | End: 2019-01-01

## 2019-01-01 RX ORDER — DEXTROSE AND SODIUM CHLORIDE 5; .45 G/100ML; G/100ML
30 INJECTION, SOLUTION INTRAVENOUS CONTINUOUS PRN
Status: DISCONTINUED | OUTPATIENT
Start: 2019-01-01 | End: 2019-01-01 | Stop reason: HOSPADM

## 2019-01-01 RX ORDER — DRONABINOL 2.5 MG/1
10 CAPSULE ORAL 2 TIMES DAILY
Status: DISCONTINUED | OUTPATIENT
Start: 2019-01-01 | End: 2019-01-01

## 2019-01-01 RX ORDER — METOPROLOL SUCCINATE 50 MG/1
50 TABLET, EXTENDED RELEASE ORAL EVERY 12 HOURS SCHEDULED
Status: DISCONTINUED | OUTPATIENT
Start: 2019-01-01 | End: 2019-01-01 | Stop reason: HOSPADM

## 2019-01-01 RX ORDER — ALBUMIN (HUMAN) 12.5 G/50ML
50 SOLUTION INTRAVENOUS ONCE
Status: DISCONTINUED | OUTPATIENT
Start: 2019-01-01 | End: 2019-01-01

## 2019-01-01 RX ORDER — LIDOCAINE HYDROCHLORIDE 10 MG/ML
INJECTION, SOLUTION INFILTRATION; PERINEURAL AS NEEDED
Status: DISCONTINUED | OUTPATIENT
Start: 2019-01-01 | End: 2019-01-01 | Stop reason: SURG

## 2019-01-01 RX ORDER — SODIUM CHLORIDE 1000 MG
1 TABLET, SOLUBLE MISCELLANEOUS
Status: DISCONTINUED | OUTPATIENT
Start: 2019-01-01 | End: 2019-01-01

## 2019-01-01 RX ORDER — SODIUM CHLORIDE 0.9 % (FLUSH) 0.9 %
3 SYRINGE (ML) INJECTION EVERY 12 HOURS SCHEDULED
Status: DISCONTINUED | OUTPATIENT
Start: 2019-01-01 | End: 2019-01-01 | Stop reason: HOSPADM

## 2019-01-01 RX ORDER — FERROUS SULFATE TAB EC 324 MG (65 MG FE EQUIVALENT) 324 (65 FE) MG
324 TABLET DELAYED RESPONSE ORAL 2 TIMES DAILY WITH MEALS
Qty: 60 TABLET | Refills: 3 | Status: SHIPPED | OUTPATIENT
Start: 2019-01-01 | End: 2019-06-09

## 2019-01-01 RX ORDER — MORPHINE SULFATE 10 MG/ML
6 INJECTION INTRAMUSCULAR; INTRAVENOUS; SUBCUTANEOUS
Status: DISCONTINUED | OUTPATIENT
Start: 2019-01-01 | End: 2019-01-01 | Stop reason: SDUPTHER

## 2019-01-01 RX ORDER — METOPROLOL TARTRATE 5 MG/5ML
5 INJECTION INTRAVENOUS
Status: DISCONTINUED | OUTPATIENT
Start: 2019-01-01 | End: 2019-01-01 | Stop reason: HOSPADM

## 2019-01-01 RX ORDER — ACETAMINOPHEN 325 MG/1
650 TABLET ORAL EVERY 4 HOURS PRN
Status: DISCONTINUED | OUTPATIENT
Start: 2019-01-01 | End: 2019-01-01 | Stop reason: HOSPADM

## 2019-01-01 RX ORDER — PANTOPRAZOLE SODIUM 40 MG/1
40 TABLET, DELAYED RELEASE ORAL
Status: DISCONTINUED | OUTPATIENT
Start: 2019-01-01 | End: 2019-01-01 | Stop reason: HOSPADM

## 2019-01-01 RX ORDER — DEXAMETHASONE SODIUM PHOSPHATE 4 MG/ML
4 INJECTION, SOLUTION INTRA-ARTICULAR; INTRALESIONAL; INTRAMUSCULAR; INTRAVENOUS; SOFT TISSUE ONCE
Status: COMPLETED | OUTPATIENT
Start: 2019-01-01 | End: 2019-01-01

## 2019-01-01 RX ORDER — METOPROLOL SUCCINATE 50 MG/1
50 TABLET, EXTENDED RELEASE ORAL
Qty: 90 TABLET | Refills: 5 | Status: SHIPPED | OUTPATIENT
Start: 2019-01-01

## 2019-01-01 RX ORDER — POTASSIUM CHLORIDE 750 MG/1
10 TABLET, EXTENDED RELEASE ORAL DAILY
Qty: 5 TABLET | Refills: 0 | Status: SHIPPED | OUTPATIENT
Start: 2019-01-01 | End: 2019-01-01 | Stop reason: SDUPTHER

## 2019-01-01 RX ORDER — SODIUM CHLORIDE 9 MG/ML
100 INJECTION, SOLUTION INTRAVENOUS CONTINUOUS
Status: DISCONTINUED | OUTPATIENT
Start: 2019-01-01 | End: 2019-01-01

## 2019-01-01 RX ORDER — ACETAMINOPHEN 325 MG/1
650 TABLET ORAL EVERY 4 HOURS PRN
Status: DISCONTINUED | OUTPATIENT
Start: 2019-01-01 | End: 2019-01-01

## 2019-01-01 RX ORDER — ALENDRONATE SODIUM 70 MG/1
70 TABLET ORAL
Qty: 12 TABLET | Refills: 1 | Status: SHIPPED | OUTPATIENT
Start: 2019-01-01

## 2019-01-01 RX ORDER — FUROSEMIDE 10 MG/ML
INJECTION INTRAMUSCULAR; INTRAVENOUS
Status: COMPLETED
Start: 2019-01-01 | End: 2019-01-01

## 2019-01-01 RX ORDER — POLYMYXIN B SULFATE AND TRIMETHOPRIM 1; 10000 MG/ML; [USP'U]/ML
1 SOLUTION OPHTHALMIC
Status: DISCONTINUED | OUTPATIENT
Start: 2019-01-01 | End: 2019-01-01 | Stop reason: HOSPADM

## 2019-01-01 RX ORDER — PROMETHAZINE HYDROCHLORIDE 25 MG/1
25 TABLET ORAL EVERY 6 HOURS PRN
Qty: 30 TABLET | Refills: 1 | Status: SHIPPED | OUTPATIENT
Start: 2019-01-01 | End: 2019-01-01

## 2019-01-01 RX ORDER — PROPOFOL 10 MG/ML
VIAL (ML) INTRAVENOUS AS NEEDED
Status: DISCONTINUED | OUTPATIENT
Start: 2019-01-01 | End: 2019-01-01 | Stop reason: SURG

## 2019-01-01 RX ORDER — FUROSEMIDE 10 MG/ML
20 INJECTION INTRAMUSCULAR; INTRAVENOUS EVERY 12 HOURS
Status: DISCONTINUED | OUTPATIENT
Start: 2019-01-01 | End: 2019-01-01

## 2019-01-01 RX ORDER — FUROSEMIDE 10 MG/ML
20 INJECTION INTRAMUSCULAR; INTRAVENOUS AS NEEDED
Status: DISPENSED | OUTPATIENT
Start: 2019-01-01 | End: 2019-01-01

## 2019-01-01 RX ORDER — DILTIAZEM HYDROCHLORIDE 5 MG/ML
10 INJECTION INTRAVENOUS ONCE
Status: COMPLETED | OUTPATIENT
Start: 2019-01-01 | End: 2019-01-01

## 2019-01-01 RX ORDER — ONDANSETRON HCL IN 0.9 % NACL 8 MG/50 ML
8 INTRAVENOUS SOLUTION, PIGGYBACK (ML) INTRAVENOUS ONCE
Status: COMPLETED | OUTPATIENT
Start: 2019-01-01 | End: 2019-01-01

## 2019-01-01 RX ORDER — METOCLOPRAMIDE HYDROCHLORIDE 5 MG/5ML
10 SOLUTION ORAL
Status: DISCONTINUED | OUTPATIENT
Start: 2019-01-01 | End: 2019-01-01

## 2019-01-01 RX ORDER — SODIUM CHLORIDE 1000 MG
1 TABLET, SOLUBLE MISCELLANEOUS
Qty: 90 TABLET | Refills: 0 | Status: SHIPPED | OUTPATIENT
Start: 2019-01-01

## 2019-01-01 RX ORDER — ONDANSETRON 2 MG/ML
4 INJECTION INTRAMUSCULAR; INTRAVENOUS EVERY 6 HOURS PRN
Status: DISCONTINUED | OUTPATIENT
Start: 2019-01-01 | End: 2019-01-01 | Stop reason: HOSPADM

## 2019-01-01 RX ORDER — GABAPENTIN 100 MG/1
100 CAPSULE ORAL DAILY PRN
Qty: 30 CAPSULE | Refills: 1 | Status: SHIPPED | OUTPATIENT
Start: 2019-01-01 | End: 2019-01-01 | Stop reason: SDUPTHER

## 2019-01-01 RX ORDER — FERROUS SULFATE TAB EC 324 MG (65 MG FE EQUIVALENT) 324 (65 FE) MG
324 TABLET DELAYED RESPONSE ORAL
Status: DISCONTINUED | OUTPATIENT
Start: 2019-01-01 | End: 2019-01-01 | Stop reason: HOSPADM

## 2019-01-01 RX ORDER — SODIUM CHLORIDE 0.9 % (FLUSH) 0.9 %
3-10 SYRINGE (ML) INJECTION AS NEEDED
Status: DISCONTINUED | OUTPATIENT
Start: 2019-01-01 | End: 2019-01-01 | Stop reason: HOSPADM

## 2019-01-01 RX ORDER — PROMETHAZINE HYDROCHLORIDE 12.5 MG/1
12.5 SUPPOSITORY RECTAL EVERY 6 HOURS PRN
Status: DISCONTINUED | OUTPATIENT
Start: 2019-01-01 | End: 2019-01-01

## 2019-01-01 RX ORDER — POTASSIUM CHLORIDE 1.5 G/1.77G
40 POWDER, FOR SOLUTION ORAL ONCE
Status: DISCONTINUED | OUTPATIENT
Start: 2019-01-01 | End: 2019-01-01

## 2019-01-01 RX ORDER — GABAPENTIN 100 MG/1
100 CAPSULE ORAL 3 TIMES DAILY PRN
Qty: 90 CAPSULE | Refills: 1 | Status: SHIPPED | OUTPATIENT
Start: 2019-01-01 | End: 2019-01-01 | Stop reason: SINTOL

## 2019-01-01 RX ORDER — MAGNESIUM SULFATE HEPTAHYDRATE 40 MG/ML
4 INJECTION, SOLUTION INTRAVENOUS AS NEEDED
Status: DISCONTINUED | OUTPATIENT
Start: 2019-01-01 | End: 2019-01-01 | Stop reason: HOSPADM

## 2019-01-01 RX ORDER — FLUCONAZOLE 2 MG/ML
200 INJECTION, SOLUTION INTRAVENOUS EVERY 24 HOURS
Status: DISCONTINUED | OUTPATIENT
Start: 2019-01-01 | End: 2019-01-01

## 2019-01-01 RX ORDER — FUROSEMIDE 10 MG/ML
20 INJECTION INTRAMUSCULAR; INTRAVENOUS ONCE
Status: COMPLETED | OUTPATIENT
Start: 2019-01-01 | End: 2019-01-01

## 2019-01-01 RX ORDER — LIDOCAINE HYDROCHLORIDE 20 MG/ML
INJECTION, SOLUTION INFILTRATION; PERINEURAL AS NEEDED
Status: DISCONTINUED | OUTPATIENT
Start: 2019-01-01 | End: 2019-01-01 | Stop reason: SURG

## 2019-01-01 RX ORDER — SODIUM CHLORIDE 0.9 % (FLUSH) 0.9 %
10 SYRINGE (ML) INJECTION AS NEEDED
Status: DISCONTINUED | OUTPATIENT
Start: 2019-01-01 | End: 2019-01-01 | Stop reason: HOSPADM

## 2019-01-01 RX ORDER — DOXYCYCLINE 100 MG/1
100 CAPSULE ORAL EVERY 12 HOURS SCHEDULED
Status: DISCONTINUED | OUTPATIENT
Start: 2019-01-01 | End: 2019-01-01

## 2019-01-01 RX ORDER — PANTOPRAZOLE SODIUM 40 MG/1
40 TABLET, DELAYED RELEASE ORAL DAILY
Qty: 30 TABLET | Refills: 0 | Status: SHIPPED | OUTPATIENT
Start: 2019-01-01 | End: 2019-01-01 | Stop reason: ALTCHOICE

## 2019-01-01 RX ORDER — LORAZEPAM 2 MG/ML
INJECTION INTRAMUSCULAR
Status: COMPLETED
Start: 2019-01-01 | End: 2019-01-01

## 2019-01-01 RX ORDER — MAGNESIUM SULFATE HEPTAHYDRATE 40 MG/ML
2 INJECTION, SOLUTION INTRAVENOUS ONCE
Status: COMPLETED | OUTPATIENT
Start: 2019-01-01 | End: 2019-01-01

## 2019-01-01 RX ORDER — ONDANSETRON 4 MG/1
4 TABLET, ORALLY DISINTEGRATING ORAL EVERY 8 HOURS PRN
Qty: 20 TABLET | Refills: 0 | Status: SHIPPED | OUTPATIENT
Start: 2019-01-01

## 2019-01-01 RX ORDER — AMITRIPTYLINE HYDROCHLORIDE 10 MG/1
10 TABLET, FILM COATED ORAL NIGHTLY
Qty: 30 TABLET | Refills: 3 | Status: SHIPPED | OUTPATIENT
Start: 2019-01-01 | End: 2019-01-01 | Stop reason: HOSPADM

## 2019-01-01 RX ORDER — DOCUSATE SODIUM 100 MG/1
100 CAPSULE, LIQUID FILLED ORAL 3 TIMES DAILY
COMMUNITY
End: 2019-01-01 | Stop reason: ALTCHOICE

## 2019-01-01 RX ORDER — PREDNISONE 20 MG/1
TABLET ORAL
Qty: 30 TABLET | Refills: 0 | Status: SHIPPED | OUTPATIENT
Start: 2019-01-01 | End: 2019-01-01

## 2019-01-01 RX ORDER — DOCUSATE SODIUM 100 MG/1
100 CAPSULE, LIQUID FILLED ORAL 3 TIMES DAILY
Status: DISCONTINUED | OUTPATIENT
Start: 2019-01-01 | End: 2019-01-01 | Stop reason: HOSPADM

## 2019-01-01 RX ORDER — SODIUM CHLORIDE 0.9 % (FLUSH) 0.9 %
10 SYRINGE (ML) INJECTION EVERY 12 HOURS SCHEDULED
Status: DISCONTINUED | OUTPATIENT
Start: 2019-01-01 | End: 2019-01-01

## 2019-01-01 RX ORDER — SODIUM CHLORIDE 9 MG/ML
75 INJECTION, SOLUTION INTRAVENOUS CONTINUOUS
Status: DISCONTINUED | OUTPATIENT
Start: 2019-01-01 | End: 2019-01-01 | Stop reason: HOSPADM

## 2019-01-01 RX ORDER — LORAZEPAM 2 MG/ML
1 INJECTION INTRAMUSCULAR EVERY 12 HOURS PRN
Status: DISCONTINUED | OUTPATIENT
Start: 2019-01-01 | End: 2019-01-01

## 2019-01-01 RX ORDER — POTASSIUM CHLORIDE 750 MG/1
40 CAPSULE, EXTENDED RELEASE ORAL DAILY
Status: DISCONTINUED | OUTPATIENT
Start: 2019-01-01 | End: 2019-01-01

## 2019-01-01 RX ORDER — PROMETHAZINE HYDROCHLORIDE 25 MG/ML
12.5 INJECTION, SOLUTION INTRAMUSCULAR; INTRAVENOUS ONCE AS NEEDED
Status: DISCONTINUED | OUTPATIENT
Start: 2019-01-01 | End: 2019-01-01 | Stop reason: HOSPADM

## 2019-01-01 RX ORDER — TRAMADOL HYDROCHLORIDE 50 MG/1
50 TABLET ORAL 2 TIMES DAILY PRN
Status: DISCONTINUED | OUTPATIENT
Start: 2019-01-01 | End: 2019-01-01

## 2019-01-01 RX ORDER — PROMETHAZINE HYDROCHLORIDE 25 MG/1
25 TABLET ORAL ONCE AS NEEDED
Status: DISCONTINUED | OUTPATIENT
Start: 2019-01-01 | End: 2019-01-01 | Stop reason: HOSPADM

## 2019-01-01 RX ORDER — LORAZEPAM 2 MG/ML
0.25 INJECTION INTRAMUSCULAR ONCE
Status: COMPLETED | OUTPATIENT
Start: 2019-01-01 | End: 2019-01-01

## 2019-01-01 RX ORDER — DOXYCYCLINE 100 MG/1
100 CAPSULE ORAL
Status: COMPLETED | OUTPATIENT
Start: 2019-01-01 | End: 2019-01-01

## 2019-01-01 RX ORDER — METOPROLOL TARTRATE 50 MG/1
50 TABLET, FILM COATED ORAL EVERY 12 HOURS SCHEDULED
Status: DISCONTINUED | OUTPATIENT
Start: 2019-01-01 | End: 2019-01-01

## 2019-01-01 RX ORDER — SODIUM CHLORIDE 9 MG/ML
50 INJECTION, SOLUTION INTRAVENOUS CONTINUOUS
Status: DISCONTINUED | OUTPATIENT
Start: 2019-01-01 | End: 2019-01-01 | Stop reason: HOSPADM

## 2019-01-01 RX ORDER — POTASSIUM CHLORIDE 750 MG/1
10 CAPSULE, EXTENDED RELEASE ORAL DAILY
Status: DISCONTINUED | OUTPATIENT
Start: 2019-01-01 | End: 2019-01-01

## 2019-01-01 RX ORDER — BUDESONIDE AND FORMOTEROL FUMARATE DIHYDRATE 80; 4.5 UG/1; UG/1
2 AEROSOL RESPIRATORY (INHALATION)
Status: DISCONTINUED | OUTPATIENT
Start: 2019-01-01 | End: 2019-01-01

## 2019-01-01 RX ORDER — PROMETHAZINE HYDROCHLORIDE 25 MG/ML
12.5 INJECTION, SOLUTION INTRAMUSCULAR; INTRAVENOUS EVERY 6 HOURS PRN
Status: DISCONTINUED | OUTPATIENT
Start: 2019-01-01 | End: 2019-01-01

## 2019-01-01 RX ORDER — MORPHINE SULFATE 2 MG/ML
2 INJECTION, SOLUTION INTRAMUSCULAR; INTRAVENOUS
Status: DISCONTINUED | OUTPATIENT
Start: 2019-01-01 | End: 2019-01-01

## 2019-01-01 RX ORDER — METFORMIN HYDROCHLORIDE 500 MG/1
500 TABLET, EXTENDED RELEASE ORAL
Qty: 30 TABLET | Refills: 1 | Status: SHIPPED | OUTPATIENT
Start: 2019-01-01 | End: 2019-01-01 | Stop reason: SDUPTHER

## 2019-01-01 RX ORDER — FUROSEMIDE 10 MG/ML
40 INJECTION INTRAMUSCULAR; INTRAVENOUS ONCE
Status: COMPLETED | OUTPATIENT
Start: 2019-01-01 | End: 2019-01-01

## 2019-01-01 RX ORDER — POTASSIUM CHLORIDE 7.45 MG/ML
10 INJECTION INTRAVENOUS
Status: DISCONTINUED | OUTPATIENT
Start: 2019-01-01 | End: 2019-01-01 | Stop reason: HOSPADM

## 2019-01-01 RX ORDER — POTASSIUM CHLORIDE 750 MG/1
40 CAPSULE, EXTENDED RELEASE ORAL ONCE
Status: COMPLETED | OUTPATIENT
Start: 2019-01-01 | End: 2019-01-01

## 2019-01-01 RX ORDER — TRAMADOL HYDROCHLORIDE 50 MG/1
50 TABLET ORAL 2 TIMES DAILY PRN
Qty: 60 TABLET | Refills: 1 | Status: SHIPPED | OUTPATIENT
Start: 2019-01-01 | End: 2019-01-01

## 2019-01-01 RX ORDER — ALPRAZOLAM 0.25 MG/1
0.25 TABLET ORAL ONCE
Status: COMPLETED | OUTPATIENT
Start: 2019-01-01 | End: 2019-01-01

## 2019-01-01 RX ORDER — FAMOTIDINE 10 MG/ML
10 INJECTION, SOLUTION INTRAVENOUS EVERY 12 HOURS
Status: DISCONTINUED | OUTPATIENT
Start: 2019-01-01 | End: 2019-01-01

## 2019-01-01 RX ORDER — METFORMIN HYDROCHLORIDE 500 MG/1
500 TABLET, EXTENDED RELEASE ORAL
Qty: 30 TABLET | Refills: 1 | Status: ON HOLD | OUTPATIENT
Start: 2019-01-01 | End: 2019-01-01

## 2019-01-01 RX ORDER — ONDANSETRON 2 MG/ML
4 INJECTION INTRAMUSCULAR; INTRAVENOUS ONCE AS NEEDED
Status: DISCONTINUED | OUTPATIENT
Start: 2019-01-01 | End: 2019-01-01 | Stop reason: HOSPADM

## 2019-01-01 RX ORDER — POTASSIUM CHLORIDE 750 MG/1
10 TABLET, EXTENDED RELEASE ORAL DAILY
Qty: 5 TABLET | Refills: 0 | Status: SHIPPED | OUTPATIENT
Start: 2019-01-01 | End: 2019-01-01

## 2019-01-01 RX ORDER — FERROUS SULFATE TAB EC 324 MG (65 MG FE EQUIVALENT) 324 (65 FE) MG
324 TABLET DELAYED RESPONSE ORAL
Qty: 30 TABLET | Refills: 3 | Status: SHIPPED | OUTPATIENT
Start: 2019-01-01 | End: 2019-01-01

## 2019-01-01 RX ORDER — AMITRIPTYLINE HYDROCHLORIDE 10 MG/1
10 TABLET, FILM COATED ORAL NIGHTLY
Status: COMPLETED | OUTPATIENT
Start: 2019-01-01 | End: 2019-01-01

## 2019-01-01 RX ORDER — SCOLOPAMINE TRANSDERMAL SYSTEM 1 MG/1
1 PATCH, EXTENDED RELEASE TRANSDERMAL
Status: DISCONTINUED | OUTPATIENT
Start: 2019-01-01 | End: 2019-05-21 | Stop reason: HOSPADM

## 2019-01-01 RX ORDER — MAGNESIUM SULFATE HEPTAHYDRATE 40 MG/ML
2 INJECTION, SOLUTION INTRAVENOUS AS NEEDED
Status: DISCONTINUED | OUTPATIENT
Start: 2019-01-01 | End: 2019-01-01 | Stop reason: HOSPADM

## 2019-01-01 RX ORDER — METOPROLOL SUCCINATE 50 MG/1
50 TABLET, EXTENDED RELEASE ORAL
Status: DISCONTINUED | OUTPATIENT
Start: 2019-01-01 | End: 2019-01-01 | Stop reason: HOSPADM

## 2019-01-01 RX ORDER — PANTOPRAZOLE SODIUM 40 MG/10ML
40 INJECTION, POWDER, LYOPHILIZED, FOR SOLUTION INTRAVENOUS
Status: DISCONTINUED | OUTPATIENT
Start: 2019-01-01 | End: 2019-01-01 | Stop reason: HOSPADM

## 2019-01-01 RX ORDER — LORAZEPAM 2 MG/ML
0.25 INJECTION INTRAMUSCULAR ONCE
Status: DISCONTINUED | OUTPATIENT
Start: 2019-01-01 | End: 2019-01-01

## 2019-01-01 RX ORDER — SODIUM CHLORIDE 0.9 % (FLUSH) 0.9 %
10 SYRINGE (ML) INJECTION AS NEEDED
Status: DISCONTINUED | OUTPATIENT
Start: 2019-01-01 | End: 2019-01-01

## 2019-01-01 RX ORDER — ONDANSETRON 2 MG/ML
4 INJECTION INTRAMUSCULAR; INTRAVENOUS ONCE
Status: DISCONTINUED | OUTPATIENT
Start: 2019-01-01 | End: 2019-01-01

## 2019-01-01 RX ORDER — DEXAMETHASONE 4 MG/1
8 TABLET ORAL EVERY 12 HOURS SCHEDULED
Status: DISCONTINUED | OUTPATIENT
Start: 2019-01-01 | End: 2019-01-01

## 2019-01-01 RX ORDER — ONDANSETRON 2 MG/ML
4 INJECTION INTRAMUSCULAR; INTRAVENOUS ONCE
Status: COMPLETED | OUTPATIENT
Start: 2019-01-01 | End: 2019-01-01

## 2019-01-01 RX ORDER — MORPHINE SULFATE 2 MG/ML
6 INJECTION, SOLUTION INTRAMUSCULAR; INTRAVENOUS
Status: DISCONTINUED | OUTPATIENT
Start: 2019-01-01 | End: 2019-01-01

## 2019-01-01 RX ORDER — METOPROLOL SUCCINATE 50 MG/1
50 TABLET, EXTENDED RELEASE ORAL EVERY 12 HOURS SCHEDULED
Status: DISCONTINUED | OUTPATIENT
Start: 2019-01-01 | End: 2019-01-01

## 2019-01-01 RX ORDER — SODIUM CHLORIDE 0.9 % (FLUSH) 0.9 %
3 SYRINGE (ML) INJECTION EVERY 12 HOURS SCHEDULED
Status: DISCONTINUED | OUTPATIENT
Start: 2019-01-01 | End: 2019-01-01

## 2019-01-01 RX ORDER — RANITIDINE HCL 75 MG
75 TABLET ORAL 2 TIMES DAILY
COMMUNITY

## 2019-01-01 RX ORDER — ONDANSETRON 4 MG/1
4 TABLET, FILM COATED ORAL EVERY 6 HOURS PRN
Status: DISCONTINUED | OUTPATIENT
Start: 2019-01-01 | End: 2019-01-01

## 2019-01-01 RX ORDER — FUROSEMIDE 10 MG/ML
40 INJECTION INTRAMUSCULAR; INTRAVENOUS ONCE
Status: DISCONTINUED | OUTPATIENT
Start: 2019-01-01 | End: 2019-01-01

## 2019-01-01 RX ORDER — NICOTINE POLACRILEX 4 MG
15 LOZENGE BUCCAL
Status: DISCONTINUED | OUTPATIENT
Start: 2019-01-01 | End: 2019-01-01 | Stop reason: HOSPADM

## 2019-01-01 RX ORDER — POTASSIUM CHLORIDE 750 MG/1
10 TABLET, EXTENDED RELEASE ORAL DAILY
Qty: 4 TABLET | Refills: 0 | Status: SHIPPED | OUTPATIENT
Start: 2019-01-01 | End: 2019-01-01 | Stop reason: SDUPTHER

## 2019-01-01 RX ORDER — PROMETHAZINE HYDROCHLORIDE 25 MG/1
25 TABLET ORAL EVERY 6 HOURS PRN
Status: DISCONTINUED | OUTPATIENT
Start: 2019-01-01 | End: 2019-01-01 | Stop reason: HOSPADM

## 2019-01-01 RX ORDER — ENALAPRILAT 2.5 MG/2ML
0.62 INJECTION INTRAVENOUS EVERY 6 HOURS PRN
Status: DISCONTINUED | OUTPATIENT
Start: 2019-01-01 | End: 2019-01-01

## 2019-01-01 RX ORDER — ATROPINE SULFATE 10 MG/ML
2 SOLUTION/ DROPS OPHTHALMIC 4 TIMES DAILY
Status: DISCONTINUED | OUTPATIENT
Start: 2019-01-01 | End: 2019-05-21 | Stop reason: HOSPADM

## 2019-01-01 RX ORDER — SODIUM CHLORIDE 0.9 % (FLUSH) 0.9 %
3-10 SYRINGE (ML) INJECTION AS NEEDED
Status: DISCONTINUED | OUTPATIENT
Start: 2019-01-01 | End: 2019-01-01

## 2019-01-01 RX ORDER — PANTOPRAZOLE SODIUM 40 MG/10ML
40 INJECTION, POWDER, LYOPHILIZED, FOR SOLUTION INTRAVENOUS ONCE
Status: COMPLETED | OUTPATIENT
Start: 2019-01-01 | End: 2019-01-01

## 2019-01-01 RX ORDER — SODIUM CHLORIDE 1000 MG
2 TABLET, SOLUBLE MISCELLANEOUS
Status: DISCONTINUED | OUTPATIENT
Start: 2019-01-01 | End: 2019-01-01

## 2019-01-01 RX ORDER — DOXYCYCLINE HYCLATE 100 MG/1
100 CAPSULE ORAL 2 TIMES DAILY
Qty: 20 CAPSULE | Refills: 0 | Status: SHIPPED | OUTPATIENT
Start: 2019-01-01

## 2019-01-01 RX ORDER — FAMOTIDINE 20 MG/1
20 TABLET, FILM COATED ORAL DAILY
Status: DISCONTINUED | OUTPATIENT
Start: 2019-01-01 | End: 2019-01-01

## 2019-01-01 RX ORDER — LORAZEPAM 2 MG/ML
0.25 INJECTION INTRAMUSCULAR EVERY 4 HOURS PRN
Status: DISCONTINUED | OUTPATIENT
Start: 2019-01-01 | End: 2019-01-01

## 2019-01-01 RX ORDER — ASPIRIN 325 MG
325 TABLET ORAL ONCE
Status: DISCONTINUED | OUTPATIENT
Start: 2019-01-01 | End: 2019-01-01

## 2019-01-01 RX ORDER — PROMETHAZINE HYDROCHLORIDE 25 MG/1
25 SUPPOSITORY RECTAL ONCE AS NEEDED
Status: DISCONTINUED | OUTPATIENT
Start: 2019-01-01 | End: 2019-01-01 | Stop reason: HOSPADM

## 2019-01-01 RX ORDER — LACTULOSE 10 G/15ML
20 SOLUTION ORAL 2 TIMES DAILY PRN
Qty: 236 ML | Refills: 1 | Status: SHIPPED | OUTPATIENT
Start: 2019-01-01

## 2019-01-01 RX ORDER — FERROUS SULFATE TAB EC 324 MG (65 MG FE EQUIVALENT) 324 (65 FE) MG
324 TABLET DELAYED RESPONSE ORAL 2 TIMES DAILY WITH MEALS
Status: DISCONTINUED | OUTPATIENT
Start: 2019-01-01 | End: 2019-01-01

## 2019-01-01 RX ORDER — LORAZEPAM 2 MG/ML
2 INJECTION INTRAMUSCULAR EVERY 12 HOURS PRN
Status: CANCELLED | OUTPATIENT
Start: 2019-01-01 | End: 2019-05-29

## 2019-01-01 RX ORDER — TRAMADOL HYDROCHLORIDE 50 MG/1
50 TABLET ORAL 2 TIMES DAILY PRN
Status: DISCONTINUED | OUTPATIENT
Start: 2019-01-01 | End: 2019-01-01 | Stop reason: HOSPADM

## 2019-01-01 RX ORDER — NAPROXEN 500 MG/1
500 TABLET ORAL 2 TIMES DAILY WITH MEALS
Qty: 30 TABLET | Refills: 1 | Status: SHIPPED | OUTPATIENT
Start: 2019-01-01 | End: 2019-01-01 | Stop reason: HOSPADM

## 2019-01-01 RX ORDER — DEXAMETHASONE SODIUM PHOSPHATE 4 MG/ML
4 INJECTION, SOLUTION INTRA-ARTICULAR; INTRALESIONAL; INTRAMUSCULAR; INTRAVENOUS; SOFT TISSUE EVERY 8 HOURS
Status: COMPLETED | OUTPATIENT
Start: 2019-01-01 | End: 2019-01-01

## 2019-01-01 RX ORDER — LORAZEPAM 2 MG/ML
2 INJECTION INTRAMUSCULAR
Status: DISCONTINUED | OUTPATIENT
Start: 2019-01-01 | End: 2019-05-21 | Stop reason: HOSPADM

## 2019-01-01 RX ORDER — LORAZEPAM 2 MG/ML
INJECTION INTRAMUSCULAR
Status: DISPENSED
Start: 2019-01-01 | End: 2019-01-01

## 2019-01-01 RX ORDER — TRAMADOL HYDROCHLORIDE 50 MG/1
50 TABLET ORAL 2 TIMES DAILY PRN
COMMUNITY

## 2019-01-01 RX ORDER — ASPIRIN 81 MG/1
324 TABLET, CHEWABLE ORAL ONCE
Status: COMPLETED | OUTPATIENT
Start: 2019-01-01 | End: 2019-01-01

## 2019-01-01 RX ORDER — FLUCONAZOLE 100 MG/1
100 TABLET ORAL EVERY 24 HOURS
Status: COMPLETED | OUTPATIENT
Start: 2019-01-01 | End: 2019-01-01

## 2019-01-01 RX ORDER — POTASSIUM CHLORIDE 750 MG/1
40 CAPSULE, EXTENDED RELEASE ORAL AS NEEDED
Status: DISCONTINUED | OUTPATIENT
Start: 2019-01-01 | End: 2019-01-01 | Stop reason: HOSPADM

## 2019-01-01 RX ORDER — DEXTROSE MONOHYDRATE 25 G/50ML
25 INJECTION, SOLUTION INTRAVENOUS
Status: DISCONTINUED | OUTPATIENT
Start: 2019-01-01 | End: 2019-01-01 | Stop reason: HOSPADM

## 2019-01-01 RX ORDER — POTASSIUM CHLORIDE 1.5 G/1.77G
40 POWDER, FOR SOLUTION ORAL AS NEEDED
Status: DISCONTINUED | OUTPATIENT
Start: 2019-01-01 | End: 2019-01-01 | Stop reason: HOSPADM

## 2019-01-01 RX ORDER — SODIUM CHLORIDE 1000 MG
1 TABLET, SOLUBLE MISCELLANEOUS
Status: DISCONTINUED | OUTPATIENT
Start: 2019-01-01 | End: 2019-01-01 | Stop reason: HOSPADM

## 2019-01-01 RX ORDER — DEXAMETHASONE SODIUM PHOSPHATE 4 MG/ML
8 INJECTION, SOLUTION INTRA-ARTICULAR; INTRALESIONAL; INTRAMUSCULAR; INTRAVENOUS; SOFT TISSUE ONCE AS NEEDED
Status: DISCONTINUED | OUTPATIENT
Start: 2019-01-01 | End: 2019-01-01 | Stop reason: HOSPADM

## 2019-01-01 RX ORDER — SODIUM CHLORIDE 0.9 % (FLUSH) 0.9 %
20 SYRINGE (ML) INJECTION AS NEEDED
Status: DISCONTINUED | OUTPATIENT
Start: 2019-01-01 | End: 2019-01-01

## 2019-01-01 RX ORDER — LACTULOSE 10 G/15ML
10 SOLUTION ORAL 3 TIMES DAILY
Status: DISCONTINUED | OUTPATIENT
Start: 2019-01-01 | End: 2019-01-01 | Stop reason: HOSPADM

## 2019-01-01 RX ORDER — POTASSIUM CHLORIDE 750 MG/1
40 CAPSULE, EXTENDED RELEASE ORAL 2 TIMES DAILY WITH MEALS
Status: DISPENSED | OUTPATIENT
Start: 2019-01-01 | End: 2019-01-01

## 2019-01-01 RX ORDER — MORPHINE SULFATE 2 MG/ML
2 INJECTION, SOLUTION INTRAMUSCULAR; INTRAVENOUS
Status: DISCONTINUED | OUTPATIENT
Start: 2019-01-01 | End: 2019-05-21 | Stop reason: HOSPADM

## 2019-01-01 RX ADMIN — POLYMYXIN B SULFATE AND TRIMETHOPRIM 1 DROP: 10000; 1 SOLUTION OPHTHALMIC at 09:58

## 2019-01-01 RX ADMIN — LACTULOSE 10 G: 10 SOLUTION ORAL at 09:05

## 2019-01-01 RX ADMIN — ONDANSETRON 4 MG: 2 INJECTION INTRAMUSCULAR; INTRAVENOUS at 12:22

## 2019-01-01 RX ADMIN — SODIUM CHLORIDE, PRESERVATIVE FREE 3 ML: 5 INJECTION INTRAVENOUS at 20:23

## 2019-01-01 RX ADMIN — PANTOPRAZOLE SODIUM 40 MG: 40 TABLET, DELAYED RELEASE ORAL at 05:44

## 2019-01-01 RX ADMIN — TRAMADOL HYDROCHLORIDE 50 MG: 50 TABLET, FILM COATED ORAL at 05:53

## 2019-01-01 RX ADMIN — MORPHINE SULFATE 6 MG: 10 INJECTION INTRAVENOUS at 20:35

## 2019-01-01 RX ADMIN — METOCLOPRAMIDE 10 MG: 5 SOLUTION ORAL at 20:32

## 2019-01-01 RX ADMIN — METOPROLOL SUCCINATE 50 MG: 50 TABLET, EXTENDED RELEASE ORAL at 21:12

## 2019-01-01 RX ADMIN — SODIUM CHLORIDE, PRESERVATIVE FREE 3 ML: 5 INJECTION INTRAVENOUS at 20:16

## 2019-01-01 RX ADMIN — METOPROLOL SUCCINATE 50 MG: 50 TABLET, EXTENDED RELEASE ORAL at 17:20

## 2019-01-01 RX ADMIN — FERROUS SULFATE TAB EC 324 MG (65 MG FE EQUIVALENT) 324 MG: 324 (65 FE) TABLET DELAYED RESPONSE at 09:05

## 2019-01-01 RX ADMIN — TRAMADOL HYDROCHLORIDE 50 MG: 50 TABLET, FILM COATED ORAL at 08:47

## 2019-01-01 RX ADMIN — FERROUS SULFATE TAB EC 324 MG (65 MG FE EQUIVALENT) 324 MG: 324 (65 FE) TABLET DELAYED RESPONSE at 09:33

## 2019-01-01 RX ADMIN — LORAZEPAM 0.25 MG: 2 INJECTION, SOLUTION INTRAMUSCULAR; INTRAVENOUS at 13:41

## 2019-01-01 RX ADMIN — SODIUM CHLORIDE, PRESERVATIVE FREE 3 ML: 5 INJECTION INTRAVENOUS at 09:10

## 2019-01-01 RX ADMIN — AMITRIPTYLINE HYDROCHLORIDE 10 MG: 10 TABLET, FILM COATED ORAL at 20:23

## 2019-01-01 RX ADMIN — POTASSIUM CHLORIDE 40 MEQ: 750 CAPSULE, EXTENDED RELEASE ORAL at 16:06

## 2019-01-01 RX ADMIN — FLUCONAZOLE 100 MG: 100 TABLET ORAL at 11:49

## 2019-01-01 RX ADMIN — LORAZEPAM 0.25 MG: 2 INJECTION INTRAMUSCULAR at 20:21

## 2019-01-01 RX ADMIN — POTASSIUM CHLORIDE 40 MEQ: 750 CAPSULE, EXTENDED RELEASE ORAL at 12:38

## 2019-01-01 RX ADMIN — Medication 10 ML: at 13:59

## 2019-01-01 RX ADMIN — METOPROLOL SUCCINATE 50 MG: 50 TABLET, EXTENDED RELEASE ORAL at 17:25

## 2019-01-01 RX ADMIN — SODIUM CHLORIDE, PRESERVATIVE FREE 3 ML: 5 INJECTION INTRAVENOUS at 21:29

## 2019-01-01 RX ADMIN — PANTOPRAZOLE SODIUM 40 MG: 40 INJECTION, POWDER, FOR SOLUTION INTRAVENOUS at 06:02

## 2019-01-01 RX ADMIN — METOPROLOL SUCCINATE 50 MG: 50 TABLET, EXTENDED RELEASE ORAL at 17:19

## 2019-01-01 RX ADMIN — SODIUM CHLORIDE, PRESERVATIVE FREE 3 ML: 5 INJECTION INTRAVENOUS at 21:16

## 2019-01-01 RX ADMIN — FUROSEMIDE 20 MG: 10 INJECTION, SOLUTION INTRAVENOUS at 18:05

## 2019-01-01 RX ADMIN — AZITHROMYCIN MONOHYDRATE 500 MG: 500 INJECTION, POWDER, LYOPHILIZED, FOR SOLUTION INTRAVENOUS at 16:20

## 2019-01-01 RX ADMIN — FUROSEMIDE 40 MG: 10 INJECTION, SOLUTION INTRAMUSCULAR; INTRAVENOUS at 15:26

## 2019-01-01 RX ADMIN — PROPOFOL 60 MG: 10 INJECTION, EMULSION INTRAVENOUS at 11:48

## 2019-01-01 RX ADMIN — SODIUM CHLORIDE TAB 1 GM 2 G: 1 TAB at 12:01

## 2019-01-01 RX ADMIN — TRAMADOL HYDROCHLORIDE 50 MG: 50 TABLET, FILM COATED ORAL at 18:04

## 2019-01-01 RX ADMIN — TRAMADOL HYDROCHLORIDE 50 MG: 50 TABLET, FILM COATED ORAL at 21:32

## 2019-01-01 RX ADMIN — FUROSEMIDE 20 MG: 10 INJECTION, SOLUTION INTRAVENOUS at 13:14

## 2019-01-01 RX ADMIN — DOCUSATE SODIUM 100 MG: 100 CAPSULE, LIQUID FILLED ORAL at 09:57

## 2019-01-01 RX ADMIN — AZTREONAM 1 G: 1 INJECTION, POWDER, LYOPHILIZED, FOR SOLUTION INTRAMUSCULAR; INTRAVENOUS at 05:48

## 2019-01-01 RX ADMIN — ASPIRIN 81 MG 324 MG: 81 TABLET ORAL at 08:49

## 2019-01-01 RX ADMIN — SODIUM CHLORIDE TAB 1 GM 1 G: 1 TAB at 17:28

## 2019-01-01 RX ADMIN — METOPROLOL SUCCINATE 50 MG: 50 TABLET, EXTENDED RELEASE ORAL at 17:45

## 2019-01-01 RX ADMIN — MAGNESIUM SULFATE HEPTAHYDRATE 2 G: 40 INJECTION, SOLUTION INTRAVENOUS at 03:00

## 2019-01-01 RX ADMIN — FERROUS SULFATE TAB EC 324 MG (65 MG FE EQUIVALENT) 324 MG: 324 (65 FE) TABLET DELAYED RESPONSE at 07:52

## 2019-01-01 RX ADMIN — DILTIAZEM HYDROCHLORIDE 10 MG: 5 INJECTION INTRAVENOUS at 20:25

## 2019-01-01 RX ADMIN — MAGNESIUM SULFATE HEPTAHYDRATE 2 G: 40 INJECTION, SOLUTION INTRAVENOUS at 14:57

## 2019-01-01 RX ADMIN — METOPROLOL SUCCINATE 50 MG: 50 TABLET, EXTENDED RELEASE ORAL at 17:51

## 2019-01-01 RX ADMIN — FERROUS SULFATE TAB EC 324 MG (65 MG FE EQUIVALENT) 324 MG: 324 (65 FE) TABLET DELAYED RESPONSE at 17:40

## 2019-01-01 RX ADMIN — PANTOPRAZOLE SODIUM 40 MG: 40 INJECTION, POWDER, FOR SOLUTION INTRAVENOUS at 05:52

## 2019-01-01 RX ADMIN — PROPOFOL 20 MG: 10 INJECTION, EMULSION INTRAVENOUS at 11:59

## 2019-01-01 RX ADMIN — DOCUSATE SODIUM 100 MG: 100 CAPSULE, LIQUID FILLED ORAL at 09:10

## 2019-01-01 RX ADMIN — SODIUM CHLORIDE, PRESERVATIVE FREE 3 ML: 5 INJECTION INTRAVENOUS at 20:00

## 2019-01-01 RX ADMIN — POLYMYXIN B SULFATE AND TRIMETHOPRIM 1 DROP: 10000; 1 SOLUTION OPHTHALMIC at 03:46

## 2019-01-01 RX ADMIN — FLUCONAZOLE 200 MG: 200 INJECTION, SOLUTION INTRAVENOUS at 20:07

## 2019-01-01 RX ADMIN — POTASSIUM CHLORIDE 40 MEQ: 750 CAPSULE, EXTENDED RELEASE ORAL at 17:32

## 2019-01-01 RX ADMIN — POLYMYXIN B SULFATE AND TRIMETHOPRIM 1 DROP: 10000; 1 SOLUTION OPHTHALMIC at 19:35

## 2019-01-01 RX ADMIN — SODIUM CHLORIDE 75 ML/HR: 900 INJECTION, SOLUTION INTRAVENOUS at 21:16

## 2019-01-01 RX ADMIN — METOPROLOL SUCCINATE 50 MG: 50 TABLET, EXTENDED RELEASE ORAL at 17:33

## 2019-01-01 RX ADMIN — AMITRIPTYLINE HYDROCHLORIDE 10 MG: 10 TABLET, FILM COATED ORAL at 21:44

## 2019-01-01 RX ADMIN — POLYMYXIN B SULFATE AND TRIMETHOPRIM 1 DROP: 10000; 1 SOLUTION OPHTHALMIC at 08:25

## 2019-01-01 RX ADMIN — FERROUS SULFATE TAB EC 324 MG (65 MG FE EQUIVALENT) 324 MG: 324 (65 FE) TABLET DELAYED RESPONSE at 20:24

## 2019-01-01 RX ADMIN — TRAMADOL HYDROCHLORIDE 50 MG: 50 TABLET, FILM COATED ORAL at 19:59

## 2019-01-01 RX ADMIN — AZTREONAM 1 G: 1 INJECTION, POWDER, LYOPHILIZED, FOR SOLUTION INTRAMUSCULAR; INTRAVENOUS at 05:16

## 2019-01-01 RX ADMIN — SODIUM CHLORIDE 75 ML/HR: 900 INJECTION, SOLUTION INTRAVENOUS at 14:48

## 2019-01-01 RX ADMIN — LACTULOSE 10 G: 10 SOLUTION ORAL at 08:42

## 2019-01-01 RX ADMIN — FERROUS SULFATE TAB EC 324 MG (65 MG FE EQUIVALENT) 324 MG: 324 (65 FE) TABLET DELAYED RESPONSE at 10:07

## 2019-01-01 RX ADMIN — POTASSIUM CHLORIDE 40 MEQ: 750 CAPSULE, EXTENDED RELEASE ORAL at 08:01

## 2019-01-01 RX ADMIN — TRAMADOL HYDROCHLORIDE 50 MG: 50 TABLET, FILM COATED ORAL at 09:02

## 2019-01-01 RX ADMIN — PROPOFOL 50 MG: 10 INJECTION, EMULSION INTRAVENOUS at 13:40

## 2019-01-01 RX ADMIN — TRAMADOL HYDROCHLORIDE 50 MG: 50 TABLET, FILM COATED ORAL at 09:38

## 2019-01-01 RX ADMIN — SODIUM CHLORIDE TAB 1 GM 1 G: 1 TAB at 17:20

## 2019-01-01 RX ADMIN — DOCUSATE SODIUM 100 MG: 100 CAPSULE, LIQUID FILLED ORAL at 21:24

## 2019-01-01 RX ADMIN — LACTULOSE 10 G: 10 SOLUTION ORAL at 09:27

## 2019-01-01 RX ADMIN — MAGNESIUM SULFATE HEPTAHYDRATE 2 G: 40 INJECTION, SOLUTION INTRAVENOUS at 05:05

## 2019-01-01 RX ADMIN — METOPROLOL SUCCINATE 50 MG: 50 TABLET, EXTENDED RELEASE ORAL at 20:24

## 2019-01-01 RX ADMIN — SODIUM CHLORIDE TAB 1 GM 1 G: 1 TAB at 11:19

## 2019-01-01 RX ADMIN — TRAMADOL HYDROCHLORIDE 50 MG: 50 TABLET, FILM COATED ORAL at 18:26

## 2019-01-01 RX ADMIN — LACTULOSE 10 G: 10 SOLUTION ORAL at 09:33

## 2019-01-01 RX ADMIN — LACTULOSE 10 G: 10 SOLUTION ORAL at 16:30

## 2019-01-01 RX ADMIN — MORPHINE SULFATE 6 MG: 2 INJECTION, SOLUTION INTRAMUSCULAR; INTRAVENOUS at 08:04

## 2019-01-01 RX ADMIN — PANTOPRAZOLE SODIUM 40 MG: 40 TABLET, DELAYED RELEASE ORAL at 05:45

## 2019-01-01 RX ADMIN — SODIUM CHLORIDE 75 ML/HR: 900 INJECTION, SOLUTION INTRAVENOUS at 09:38

## 2019-01-01 RX ADMIN — PROPOFOL 10 MG: 10 INJECTION, EMULSION INTRAVENOUS at 13:43

## 2019-01-01 RX ADMIN — TRAMADOL HYDROCHLORIDE 50 MG: 50 TABLET, FILM COATED ORAL at 17:28

## 2019-01-01 RX ADMIN — FLUCONAZOLE 200 MG: 200 INJECTION, SOLUTION INTRAVENOUS at 20:46

## 2019-01-01 RX ADMIN — SODIUM CHLORIDE 100 ML/HR: 900 INJECTION, SOLUTION INTRAVENOUS at 05:57

## 2019-01-01 RX ADMIN — SODIUM CHLORIDE TAB 1 GM 1 G: 1 TAB at 12:49

## 2019-01-01 RX ADMIN — FERROUS SULFATE TAB EC 324 MG (65 MG FE EQUIVALENT) 324 MG: 324 (65 FE) TABLET DELAYED RESPONSE at 17:41

## 2019-01-01 RX ADMIN — TRAMADOL HYDROCHLORIDE 50 MG: 50 TABLET, FILM COATED ORAL at 21:48

## 2019-01-01 RX ADMIN — METOPROLOL SUCCINATE 50 MG: 50 TABLET, EXTENDED RELEASE ORAL at 17:31

## 2019-01-01 RX ADMIN — ONDANSETRON 4 MG: 2 INJECTION INTRAMUSCULAR; INTRAVENOUS at 13:05

## 2019-01-01 RX ADMIN — POTASSIUM CHLORIDE 10 MEQ: 750 CAPSULE, EXTENDED RELEASE ORAL at 20:24

## 2019-01-01 RX ADMIN — SODIUM CHLORIDE 75 ML/HR: 900 INJECTION, SOLUTION INTRAVENOUS at 08:51

## 2019-01-01 RX ADMIN — POLYMYXIN B SULFATE AND TRIMETHOPRIM 1 DROP: 10000; 1 SOLUTION OPHTHALMIC at 00:00

## 2019-01-01 RX ADMIN — DOCUSATE SODIUM 100 MG: 100 CAPSULE, LIQUID FILLED ORAL at 22:07

## 2019-01-01 RX ADMIN — METOPROLOL TARTRATE 5 MG: 5 INJECTION INTRAVENOUS at 10:27

## 2019-01-01 RX ADMIN — POLYMYXIN B SULFATE AND TRIMETHOPRIM 1 DROP: 10000; 1 SOLUTION OPHTHALMIC at 23:50

## 2019-01-01 RX ADMIN — METOPROLOL SUCCINATE 50 MG: 50 TABLET, EXTENDED RELEASE ORAL at 08:16

## 2019-01-01 RX ADMIN — METOPROLOL SUCCINATE 50 MG: 50 TABLET, EXTENDED RELEASE ORAL at 17:39

## 2019-01-01 RX ADMIN — SODIUM CHLORIDE, PRESERVATIVE FREE 3 ML: 5 INJECTION INTRAVENOUS at 21:12

## 2019-01-01 RX ADMIN — TRAMADOL HYDROCHLORIDE 50 MG: 50 TABLET, FILM COATED ORAL at 10:26

## 2019-01-01 RX ADMIN — POTASSIUM CHLORIDE 40 MEQ: 750 CAPSULE, EXTENDED RELEASE ORAL at 18:26

## 2019-01-01 RX ADMIN — SODIUM CHLORIDE TAB 1 GM 1 G: 1 TAB at 20:24

## 2019-01-01 RX ADMIN — DOCUSATE SODIUM 100 MG: 100 CAPSULE, LIQUID FILLED ORAL at 09:38

## 2019-01-01 RX ADMIN — FERROUS SULFATE TAB EC 324 MG (65 MG FE EQUIVALENT) 324 MG: 324 (65 FE) TABLET DELAYED RESPONSE at 12:21

## 2019-01-01 RX ADMIN — SODIUM CHLORIDE, PRESERVATIVE FREE 3 ML: 5 INJECTION INTRAVENOUS at 21:00

## 2019-01-01 RX ADMIN — MORPHINE SULFATE 4 MG: 4 INJECTION INTRAVENOUS at 18:23

## 2019-01-01 RX ADMIN — LACTULOSE 10 G: 10 SOLUTION ORAL at 21:33

## 2019-01-01 RX ADMIN — CEFTRIAXONE SODIUM 1 G: 1 INJECTION, POWDER, FOR SOLUTION INTRAMUSCULAR; INTRAVENOUS at 15:18

## 2019-01-01 RX ADMIN — POLYMYXIN B SULFATE AND TRIMETHOPRIM 1 DROP: 10000; 1 SOLUTION OPHTHALMIC at 15:43

## 2019-01-01 RX ADMIN — SODIUM CHLORIDE 500 ML: 9 INJECTION, SOLUTION INTRAVENOUS at 23:22

## 2019-01-01 RX ADMIN — POLYMYXIN B SULFATE AND TRIMETHOPRIM 1 DROP: 10000; 1 SOLUTION OPHTHALMIC at 11:38

## 2019-01-01 RX ADMIN — DEXAMETHASONE SODIUM PHOSPHATE 4 MG: 4 INJECTION, SOLUTION INTRAMUSCULAR; INTRAVENOUS at 14:39

## 2019-01-01 RX ADMIN — POTASSIUM CHLORIDE 40 MEQ: 750 CAPSULE, EXTENDED RELEASE ORAL at 07:53

## 2019-01-01 RX ADMIN — PROPOFOL 20 MG: 10 INJECTION, EMULSION INTRAVENOUS at 11:52

## 2019-01-01 RX ADMIN — TRAMADOL HYDROCHLORIDE 50 MG: 50 TABLET, FILM COATED ORAL at 03:14

## 2019-01-01 RX ADMIN — LORAZEPAM 1 MG: 2 INJECTION, SOLUTION INTRAMUSCULAR; INTRAVENOUS at 18:49

## 2019-01-01 RX ADMIN — FAMOTIDINE 20 MG: 20 TABLET ORAL at 08:57

## 2019-01-01 RX ADMIN — PANTOPRAZOLE SODIUM 40 MG: 40 TABLET, DELAYED RELEASE ORAL at 06:45

## 2019-01-01 RX ADMIN — CEFTRIAXONE SODIUM 1 G: 1 INJECTION, POWDER, FOR SOLUTION INTRAMUSCULAR; INTRAVENOUS at 14:06

## 2019-01-01 RX ADMIN — LORAZEPAM 2 MG: 2 INJECTION, SOLUTION INTRAMUSCULAR; INTRAVENOUS at 20:35

## 2019-01-01 RX ADMIN — SODIUM CHLORIDE, PRESERVATIVE FREE 3 ML: 5 INJECTION INTRAVENOUS at 21:44

## 2019-01-01 RX ADMIN — DEXAMETHASONE 8 MG: 4 TABLET ORAL at 11:12

## 2019-01-01 RX ADMIN — FAMOTIDINE 10 MG: 10 INJECTION INTRAVENOUS at 12:35

## 2019-01-01 RX ADMIN — SODIUM CHLORIDE TAB 1 GM 1 G: 1 TAB at 07:52

## 2019-01-01 RX ADMIN — POTASSIUM CHLORIDE 40 MEQ: 750 CAPSULE, EXTENDED RELEASE ORAL at 08:57

## 2019-01-01 RX ADMIN — DOCUSATE SODIUM 100 MG: 100 CAPSULE, LIQUID FILLED ORAL at 20:00

## 2019-01-01 RX ADMIN — SODIUM CHLORIDE, PRESERVATIVE FREE 3 ML: 5 INJECTION INTRAVENOUS at 08:54

## 2019-01-01 RX ADMIN — TRAMADOL HYDROCHLORIDE 50 MG: 50 TABLET, FILM COATED ORAL at 06:20

## 2019-01-01 RX ADMIN — METOCLOPRAMIDE 10 MG: 5 SOLUTION ORAL at 11:19

## 2019-01-01 RX ADMIN — DOCUSATE SODIUM 100 MG: 100 CAPSULE, LIQUID FILLED ORAL at 21:33

## 2019-01-01 RX ADMIN — SODIUM CHLORIDE TAB 1 GM 1 G: 1 TAB at 17:33

## 2019-01-01 RX ADMIN — POTASSIUM CHLORIDE 40 MEQ: 750 CAPSULE, EXTENDED RELEASE ORAL at 12:50

## 2019-01-01 RX ADMIN — AZTREONAM 1 G: 1 INJECTION, POWDER, LYOPHILIZED, FOR SOLUTION INTRAMUSCULAR; INTRAVENOUS at 12:50

## 2019-01-01 RX ADMIN — SODIUM CHLORIDE TAB 1 GM 1 G: 1 TAB at 11:41

## 2019-01-01 RX ADMIN — SODIUM CHLORIDE TAB 1 GM 1 G: 1 TAB at 08:57

## 2019-01-01 RX ADMIN — DOCUSATE SODIUM 100 MG: 100 CAPSULE, LIQUID FILLED ORAL at 20:15

## 2019-01-01 RX ADMIN — SODIUM CHLORIDE TAB 1 GM 1 G: 1 TAB at 12:34

## 2019-01-01 RX ADMIN — FLUCONAZOLE 200 MG: 200 INJECTION, SOLUTION INTRAVENOUS at 21:00

## 2019-01-01 RX ADMIN — TRAMADOL HYDROCHLORIDE 50 MG: 50 TABLET, FILM COATED ORAL at 06:52

## 2019-01-01 RX ADMIN — SODIUM CHLORIDE, PRESERVATIVE FREE 3 ML: 5 INJECTION INTRAVENOUS at 08:44

## 2019-01-01 RX ADMIN — AZTREONAM 1 G: 1 INJECTION, POWDER, LYOPHILIZED, FOR SOLUTION INTRAMUSCULAR; INTRAVENOUS at 21:34

## 2019-01-01 RX ADMIN — PANTOPRAZOLE SODIUM 40 MG: 40 TABLET, DELAYED RELEASE ORAL at 05:48

## 2019-01-01 RX ADMIN — PANTOPRAZOLE SODIUM 40 MG: 40 TABLET, DELAYED RELEASE ORAL at 05:55

## 2019-01-01 RX ADMIN — LIDOCAINE HYDROCHLORIDE 40 MG: 10 INJECTION, SOLUTION INFILTRATION; PERINEURAL at 11:48

## 2019-01-01 RX ADMIN — METOPROLOL SUCCINATE 50 MG: 50 TABLET, EXTENDED RELEASE ORAL at 08:49

## 2019-01-01 RX ADMIN — DEXAMETHASONE SODIUM PHOSPHATE 4 MG: 4 INJECTION, SOLUTION INTRAMUSCULAR; INTRAVENOUS at 09:01

## 2019-01-01 RX ADMIN — AMITRIPTYLINE HYDROCHLORIDE 10 MG: 10 TABLET, FILM COATED ORAL at 21:15

## 2019-01-01 RX ADMIN — SODIUM CHLORIDE TAB 1 GM 1 G: 1 TAB at 11:38

## 2019-01-01 RX ADMIN — FERROUS SULFATE TAB EC 324 MG (65 MG FE EQUIVALENT) 324 MG: 324 (65 FE) TABLET DELAYED RESPONSE at 09:57

## 2019-01-01 RX ADMIN — DOCUSATE SODIUM 100 MG: 100 CAPSULE, LIQUID FILLED ORAL at 10:07

## 2019-01-01 RX ADMIN — FUROSEMIDE 20 MG: 10 INJECTION, SOLUTION INTRAVENOUS at 17:57

## 2019-01-01 RX ADMIN — METOPROLOL SUCCINATE 50 MG: 50 TABLET, EXTENDED RELEASE ORAL at 18:44

## 2019-01-01 RX ADMIN — ATROPINE SULFATE 2 DROP: 10 SOLUTION OPHTHALMIC at 13:35

## 2019-01-01 RX ADMIN — FERROUS SULFATE TAB EC 324 MG (65 MG FE EQUIVALENT) 324 MG: 324 (65 FE) TABLET DELAYED RESPONSE at 08:57

## 2019-01-01 RX ADMIN — FERROUS SULFATE TAB EC 324 MG (65 MG FE EQUIVALENT) 324 MG: 324 (65 FE) TABLET DELAYED RESPONSE at 08:43

## 2019-01-01 RX ADMIN — PROPOFOL 20 MG: 10 INJECTION, EMULSION INTRAVENOUS at 11:56

## 2019-01-01 RX ADMIN — IOPAMIDOL 80 ML: 612 INJECTION, SOLUTION INTRAVENOUS at 15:41

## 2019-01-01 RX ADMIN — FAMOTIDINE 20 MG: 20 TABLET ORAL at 20:24

## 2019-01-01 RX ADMIN — SODIUM CHLORIDE 1000 ML: 900 INJECTION, SOLUTION INTRAVENOUS at 14:57

## 2019-01-01 RX ADMIN — SODIUM CHLORIDE, PRESERVATIVE FREE 3 ML: 5 INJECTION INTRAVENOUS at 09:38

## 2019-01-01 RX ADMIN — SODIUM CHLORIDE: 900 INJECTION, SOLUTION INTRAVENOUS at 11:45

## 2019-01-01 RX ADMIN — LORAZEPAM 0.25 MG: 2 INJECTION, SOLUTION INTRAMUSCULAR; INTRAVENOUS at 20:21

## 2019-01-01 RX ADMIN — PANTOPRAZOLE SODIUM 40 MG: 40 INJECTION, POWDER, FOR SOLUTION INTRAVENOUS at 14:48

## 2019-01-01 RX ADMIN — FAMOTIDINE 20 MG: 20 TABLET ORAL at 08:01

## 2019-01-01 RX ADMIN — DOCUSATE SODIUM 100 MG: 100 CAPSULE, LIQUID FILLED ORAL at 08:42

## 2019-01-01 RX ADMIN — CEFTRIAXONE SODIUM 1 G: 1 INJECTION, POWDER, FOR SOLUTION INTRAMUSCULAR; INTRAVENOUS at 12:21

## 2019-01-01 RX ADMIN — AZTREONAM 1 G: 1 INJECTION, POWDER, LYOPHILIZED, FOR SOLUTION INTRAMUSCULAR; INTRAVENOUS at 22:00

## 2019-01-01 RX ADMIN — FERROUS SULFATE TAB EC 324 MG (65 MG FE EQUIVALENT) 324 MG: 324 (65 FE) TABLET DELAYED RESPONSE at 17:20

## 2019-01-01 RX ADMIN — TRAMADOL HYDROCHLORIDE 50 MG: 50 TABLET, FILM COATED ORAL at 08:54

## 2019-01-01 RX ADMIN — MAGNESIUM SULFATE HEPTAHYDRATE 2 G: 40 INJECTION, SOLUTION INTRAVENOUS at 00:18

## 2019-01-01 RX ADMIN — TRAMADOL HYDROCHLORIDE 50 MG: 50 TABLET, FILM COATED ORAL at 21:39

## 2019-01-01 RX ADMIN — TRAMADOL HYDROCHLORIDE 50 MG: 50 TABLET, FILM COATED ORAL at 16:30

## 2019-01-01 RX ADMIN — DOXYCYCLINE 100 MG: 100 CAPSULE ORAL at 09:27

## 2019-01-01 RX ADMIN — Medication 15 MG: at 12:33

## 2019-01-01 RX ADMIN — POLYMYXIN B SULFATE AND TRIMETHOPRIM 1 DROP: 10000; 1 SOLUTION OPHTHALMIC at 21:49

## 2019-01-01 RX ADMIN — DOCUSATE SODIUM 100 MG: 100 CAPSULE, LIQUID FILLED ORAL at 21:15

## 2019-01-01 RX ADMIN — SODIUM CHLORIDE: 900 INJECTION, SOLUTION INTRAVENOUS at 13:34

## 2019-01-01 RX ADMIN — SODIUM CHLORIDE 1000 ML: 9 INJECTION, SOLUTION INTRAVENOUS at 12:21

## 2019-01-01 RX ADMIN — ALPRAZOLAM 0.25 MG: 0.25 TABLET ORAL at 22:27

## 2019-01-01 RX ADMIN — DOCUSATE SODIUM 100 MG: 100 CAPSULE, LIQUID FILLED ORAL at 20:31

## 2019-01-01 RX ADMIN — DOCUSATE SODIUM 100 MG: 100 CAPSULE, LIQUID FILLED ORAL at 09:33

## 2019-01-01 RX ADMIN — POLYMYXIN B SULFATE AND TRIMETHOPRIM 1 DROP: 10000; 1 SOLUTION OPHTHALMIC at 00:37

## 2019-01-01 RX ADMIN — INSULIN ASPART 2 UNITS: 100 INJECTION, SOLUTION INTRAVENOUS; SUBCUTANEOUS at 12:33

## 2019-01-01 RX ADMIN — PANTOPRAZOLE SODIUM 40 MG: 40 TABLET, DELAYED RELEASE ORAL at 06:38

## 2019-01-01 RX ADMIN — METOPROLOL SUCCINATE 50 MG: 50 TABLET, EXTENDED RELEASE ORAL at 17:41

## 2019-01-01 RX ADMIN — SODIUM CHLORIDE, PRESERVATIVE FREE 3 ML: 5 INJECTION INTRAVENOUS at 21:28

## 2019-01-01 RX ADMIN — FLUCONAZOLE 100 MG: 100 TABLET ORAL at 12:01

## 2019-01-01 RX ADMIN — METOCLOPRAMIDE 10 MG: 5 SOLUTION ORAL at 11:12

## 2019-01-01 RX ADMIN — AZTREONAM 1 G: 1 INJECTION, POWDER, LYOPHILIZED, FOR SOLUTION INTRAMUSCULAR; INTRAVENOUS at 21:44

## 2019-01-01 RX ADMIN — POLYETHYLENE GLYCOL 3350, SODIUM SULFATE ANHYDROUS, SODIUM BICARBONATE, SODIUM CHLORIDE, POTASSIUM CHLORIDE 4000 ML: 236; 22.74; 6.74; 5.86; 2.97 POWDER, FOR SOLUTION ORAL at 07:09

## 2019-01-01 RX ADMIN — LIDOCAINE HYDROCHLORIDE 100 MG: 20 INJECTION, SOLUTION INFILTRATION; PERINEURAL at 13:40

## 2019-01-01 RX ADMIN — IOPAMIDOL 70 ML: 755 INJECTION, SOLUTION INTRAVENOUS at 10:54

## 2019-01-01 RX ADMIN — SODIUM CHLORIDE TAB 1 GM 1 G: 1 TAB at 17:25

## 2019-01-01 RX ADMIN — METOPROLOL SUCCINATE 50 MG: 50 TABLET, EXTENDED RELEASE ORAL at 18:15

## 2019-01-01 RX ADMIN — TRAMADOL HYDROCHLORIDE 50 MG: 50 TABLET, FILM COATED ORAL at 13:29

## 2019-01-01 RX ADMIN — TRAMADOL HYDROCHLORIDE 50 MG: 50 TABLET, FILM COATED ORAL at 06:46

## 2019-01-01 RX ADMIN — FLUCONAZOLE 100 MG: 100 TABLET ORAL at 12:38

## 2019-01-01 RX ADMIN — FERROUS SULFATE TAB EC 324 MG (65 MG FE EQUIVALENT) 324 MG: 324 (65 FE) TABLET DELAYED RESPONSE at 08:42

## 2019-01-01 RX ADMIN — POLYMYXIN B SULFATE AND TRIMETHOPRIM 1 DROP: 10000; 1 SOLUTION OPHTHALMIC at 20:00

## 2019-01-01 RX ADMIN — AZTREONAM 1 G: 1 INJECTION, POWDER, LYOPHILIZED, FOR SOLUTION INTRAMUSCULAR; INTRAVENOUS at 13:15

## 2019-01-01 RX ADMIN — FLUCONAZOLE 100 MG: 100 TABLET ORAL at 11:56

## 2019-01-01 RX ADMIN — IPRATROPIUM BROMIDE AND ALBUTEROL SULFATE 3 ML: 2.5; .5 SOLUTION RESPIRATORY (INHALATION) at 08:43

## 2019-01-01 RX ADMIN — FAMOTIDINE 20 MG: 20 TABLET ORAL at 08:35

## 2019-01-01 RX ADMIN — DOXYCYCLINE 100 MG: 100 CAPSULE ORAL at 12:01

## 2019-01-01 RX ADMIN — AMITRIPTYLINE HYDROCHLORIDE 10 MG: 10 TABLET, FILM COATED ORAL at 20:15

## 2019-01-01 RX ADMIN — METOPROLOL TARTRATE 5 MG: 5 INJECTION INTRAVENOUS at 09:38

## 2019-01-01 RX ADMIN — SODIUM CHLORIDE, PRESERVATIVE FREE 3 ML: 5 INJECTION INTRAVENOUS at 20:15

## 2019-01-01 RX ADMIN — POLYMYXIN B SULFATE AND TRIMETHOPRIM 1 DROP: 10000; 1 SOLUTION OPHTHALMIC at 05:00

## 2019-01-01 RX ADMIN — TRAMADOL HYDROCHLORIDE 50 MG: 50 TABLET, FILM COATED ORAL at 22:23

## 2019-01-01 RX ADMIN — ONDANSETRON 8 MG: 2 INJECTION INTRAMUSCULAR; INTRAVENOUS at 14:39

## 2019-01-01 RX ADMIN — DEXAMETHASONE SODIUM PHOSPHATE 4 MG: 4 INJECTION, SOLUTION INTRAMUSCULAR; INTRAVENOUS at 17:15

## 2019-01-01 RX ADMIN — SODIUM CHLORIDE, PRESERVATIVE FREE 3 ML: 5 INJECTION INTRAVENOUS at 20:32

## 2019-01-01 RX ADMIN — SODIUM CHLORIDE TAB 1 GM 1 G: 1 TAB at 17:19

## 2019-01-01 RX ADMIN — SODIUM CHLORIDE TAB 1 GM 1 G: 1 TAB at 08:42

## 2019-01-01 RX ADMIN — METOPROLOL SUCCINATE 50 MG: 50 TABLET, EXTENDED RELEASE ORAL at 19:37

## 2019-01-01 RX ADMIN — METOPROLOL SUCCINATE 50 MG: 50 TABLET, EXTENDED RELEASE ORAL at 17:23

## 2019-01-01 RX ADMIN — PANTOPRAZOLE SODIUM 40 MG: 40 INJECTION, POWDER, FOR SOLUTION INTRAVENOUS at 05:33

## 2019-01-01 RX ADMIN — DOXYCYCLINE 100 MG: 100 CAPSULE ORAL at 08:43

## 2019-01-01 RX ADMIN — LIDOCAINE HYDROCHLORIDE 40 MG: 10 INJECTION, SOLUTION INFILTRATION; PERINEURAL at 11:59

## 2019-01-01 RX ADMIN — POTASSIUM CHLORIDE 40 MEQ: 750 CAPSULE, EXTENDED RELEASE ORAL at 17:50

## 2019-01-01 RX ADMIN — METOPROLOL TARTRATE 10 MG: 5 INJECTION INTRAVENOUS at 23:21

## 2019-01-01 RX ADMIN — LACTULOSE 10 G: 10 SOLUTION ORAL at 22:07

## 2019-01-01 RX ADMIN — MORPHINE SULFATE 4 MG: 4 INJECTION INTRAVENOUS at 19:00

## 2019-01-01 RX ADMIN — SODIUM CHLORIDE TAB 1 GM 2 G: 1 TAB at 08:43

## 2019-01-01 RX ADMIN — SODIUM CHLORIDE, PRESERVATIVE FREE 3 ML: 5 INJECTION INTRAVENOUS at 20:07

## 2019-01-01 RX ADMIN — SODIUM CHLORIDE TAB 1 GM 2 G: 1 TAB at 18:13

## 2019-01-01 RX ADMIN — TRAMADOL HYDROCHLORIDE 50 MG: 50 TABLET, FILM COATED ORAL at 07:54

## 2019-01-01 RX ADMIN — LORAZEPAM 0.25 MG: 2 INJECTION, SOLUTION INTRAMUSCULAR; INTRAVENOUS at 08:53

## 2019-01-01 RX ADMIN — POLYMYXIN B SULFATE AND TRIMETHOPRIM 1 DROP: 10000; 1 SOLUTION OPHTHALMIC at 17:31

## 2019-01-01 RX ADMIN — SODIUM CHLORIDE TAB 1 GM 1 G: 1 TAB at 09:57

## 2019-01-01 RX ADMIN — TRAMADOL HYDROCHLORIDE 50 MG: 50 TABLET, FILM COATED ORAL at 10:09

## 2019-01-01 RX ADMIN — POLYMYXIN B SULFATE AND TRIMETHOPRIM 1 DROP: 10000; 1 SOLUTION OPHTHALMIC at 17:19

## 2019-01-01 RX ADMIN — FERROUS SULFATE TAB EC 324 MG (65 MG FE EQUIVALENT) 324 MG: 324 (65 FE) TABLET DELAYED RESPONSE at 11:18

## 2019-01-01 RX ADMIN — ACETAMINOPHEN 650 MG: 325 TABLET, FILM COATED ORAL at 12:21

## 2019-01-01 RX ADMIN — METOPROLOL SUCCINATE 50 MG: 50 TABLET, EXTENDED RELEASE ORAL at 17:13

## 2019-01-01 RX ADMIN — POTASSIUM CHLORIDE 40 MEQ: 750 CAPSULE, EXTENDED RELEASE ORAL at 10:00

## 2019-01-01 RX ADMIN — POLYMYXIN B SULFATE AND TRIMETHOPRIM 1 DROP: 10000; 1 SOLUTION OPHTHALMIC at 11:50

## 2019-01-01 RX ADMIN — POTASSIUM CHLORIDE 10 MEQ: 750 CAPSULE, EXTENDED RELEASE ORAL at 08:47

## 2019-01-01 RX ADMIN — POLYMYXIN B SULFATE AND TRIMETHOPRIM 1 DROP: 10000; 1 SOLUTION OPHTHALMIC at 08:45

## 2019-01-01 RX ADMIN — METOPROLOL SUCCINATE 50 MG: 50 TABLET, EXTENDED RELEASE ORAL at 17:28

## 2019-01-01 RX ADMIN — TRAMADOL HYDROCHLORIDE 50 MG: 50 TABLET, FILM COATED ORAL at 20:15

## 2019-01-01 RX ADMIN — AZTREONAM 1 G: 1 INJECTION, POWDER, LYOPHILIZED, FOR SOLUTION INTRAMUSCULAR; INTRAVENOUS at 05:45

## 2019-01-01 RX ADMIN — DOXYCYCLINE 100 MG: 100 CAPSULE ORAL at 08:46

## 2019-01-01 RX ADMIN — MORPHINE SULFATE 2 MG: 2 INJECTION, SOLUTION INTRAMUSCULAR; INTRAVENOUS at 16:06

## 2019-01-01 RX ADMIN — ACETAMINOPHEN 650 MG: 325 TABLET, FILM COATED ORAL at 21:15

## 2019-01-01 RX ADMIN — IPRATROPIUM BROMIDE AND ALBUTEROL SULFATE 3 ML: 2.5; .5 SOLUTION RESPIRATORY (INHALATION) at 13:11

## 2019-01-01 RX ADMIN — FERROUS SULFATE TAB EC 324 MG (65 MG FE EQUIVALENT) 324 MG: 324 (65 FE) TABLET DELAYED RESPONSE at 08:46

## 2019-01-01 RX ADMIN — MORPHINE SULFATE 6 MG: 2 INJECTION, SOLUTION INTRAMUSCULAR; INTRAVENOUS at 22:52

## 2019-01-01 RX ADMIN — CEFTRIAXONE SODIUM 1 G: 1 INJECTION, POWDER, FOR SOLUTION INTRAMUSCULAR; INTRAVENOUS at 15:26

## 2019-01-01 RX ADMIN — FUROSEMIDE 40 MG: 10 INJECTION INTRAMUSCULAR; INTRAVENOUS at 00:08

## 2019-01-01 RX ADMIN — SODIUM CHLORIDE, PRESERVATIVE FREE 3 ML: 5 INJECTION INTRAVENOUS at 10:07

## 2019-01-01 RX ADMIN — POLYMYXIN B SULFATE AND TRIMETHOPRIM 1 DROP: 10000; 1 SOLUTION OPHTHALMIC at 15:58

## 2019-01-01 RX ADMIN — SODIUM CHLORIDE 100 ML/HR: 900 INJECTION, SOLUTION INTRAVENOUS at 04:24

## 2019-01-01 RX ADMIN — METOCLOPRAMIDE 10 MG: 5 SOLUTION ORAL at 08:01

## 2019-01-01 RX ADMIN — METOPROLOL SUCCINATE 50 MG: 50 TABLET, EXTENDED RELEASE ORAL at 09:41

## 2019-01-01 RX ADMIN — DOCUSATE SODIUM 100 MG: 100 CAPSULE, LIQUID FILLED ORAL at 17:25

## 2019-01-01 RX ADMIN — FERROUS SULFATE TAB EC 324 MG (65 MG FE EQUIVALENT) 324 MG: 324 (65 FE) TABLET DELAYED RESPONSE at 09:01

## 2019-01-01 RX ADMIN — LACTULOSE 10 G: 10 SOLUTION ORAL at 16:07

## 2019-01-01 RX ADMIN — SODIUM CHLORIDE TAB 1 GM 1 G: 1 TAB at 08:46

## 2019-01-01 RX ADMIN — PANTOPRAZOLE SODIUM 40 MG: 40 TABLET, DELAYED RELEASE ORAL at 05:42

## 2019-01-01 RX ADMIN — ONDANSETRON 4 MG: 2 INJECTION INTRAMUSCULAR; INTRAVENOUS at 14:52

## 2019-01-01 RX ADMIN — LACTULOSE 10 G: 10 SOLUTION ORAL at 09:57

## 2019-01-01 RX ADMIN — SODIUM CHLORIDE, PRESERVATIVE FREE 3 ML: 5 INJECTION INTRAVENOUS at 22:12

## 2019-01-01 RX ADMIN — AMITRIPTYLINE HYDROCHLORIDE 10 MG: 10 TABLET, FILM COATED ORAL at 21:33

## 2019-01-01 RX ADMIN — POLYMYXIN B SULFATE AND TRIMETHOPRIM 1 DROP: 10000; 1 SOLUTION OPHTHALMIC at 20:17

## 2019-01-01 RX ADMIN — ONDANSETRON HYDROCHLORIDE 4 MG: 4 TABLET, FILM COATED ORAL at 08:35

## 2019-01-01 RX ADMIN — SODIUM CHLORIDE TAB 1 GM 2 G: 1 TAB at 09:28

## 2019-01-01 RX ADMIN — LACTULOSE 10 G: 10 SOLUTION ORAL at 20:31

## 2019-01-01 RX ADMIN — POLYMYXIN B SULFATE AND TRIMETHOPRIM 1 DROP: 10000; 1 SOLUTION OPHTHALMIC at 03:00

## 2019-01-01 RX ADMIN — SODIUM CHLORIDE, PRESERVATIVE FREE 3 ML: 5 INJECTION INTRAVENOUS at 21:34

## 2019-01-01 RX ADMIN — FLUCONAZOLE 100 MG: 100 TABLET ORAL at 11:38

## 2019-01-01 RX ADMIN — SODIUM CHLORIDE TAB 1 GM 2 G: 1 TAB at 19:37

## 2019-01-01 RX ADMIN — FAMOTIDINE 20 MG: 20 TABLET ORAL at 07:53

## 2019-01-01 RX ADMIN — DOXYCYCLINE 100 MG: 100 CAPSULE ORAL at 20:24

## 2019-01-01 RX ADMIN — LORAZEPAM 2 MG: 2 INJECTION, SOLUTION INTRAMUSCULAR; INTRAVENOUS at 22:51

## 2019-01-01 RX ADMIN — FUROSEMIDE 20 MG: 10 INJECTION, SOLUTION INTRAVENOUS at 08:54

## 2019-01-01 RX ADMIN — SODIUM CHLORIDE, PRESERVATIVE FREE 3 ML: 5 INJECTION INTRAVENOUS at 21:49

## 2019-01-01 RX ADMIN — FUROSEMIDE 40 MG: 10 INJECTION INTRAMUSCULAR; INTRAVENOUS at 20:09

## 2019-01-01 RX ADMIN — FAMOTIDINE 20 MG: 20 TABLET ORAL at 08:47

## 2019-01-01 RX ADMIN — SODIUM CHLORIDE TAB 1 GM 1 G: 1 TAB at 13:09

## 2019-01-01 RX ADMIN — FERROUS SULFATE TAB EC 324 MG (65 MG FE EQUIVALENT) 324 MG: 324 (65 FE) TABLET DELAYED RESPONSE at 08:01

## 2019-01-01 RX ADMIN — SODIUM CHLORIDE TAB 1 GM 1 G: 1 TAB at 17:41

## 2019-01-01 RX ADMIN — SODIUM CHLORIDE, PRESERVATIVE FREE 3 ML: 5 INJECTION INTRAVENOUS at 09:06

## 2019-01-01 RX ADMIN — AMITRIPTYLINE HYDROCHLORIDE 10 MG: 10 TABLET, FILM COATED ORAL at 21:34

## 2019-01-01 RX ADMIN — POTASSIUM CHLORIDE 40 MEQ: 750 CAPSULE, EXTENDED RELEASE ORAL at 09:29

## 2019-01-01 RX ADMIN — SCOPALAMINE 1 PATCH: 1 PATCH, EXTENDED RELEASE TRANSDERMAL at 18:38

## 2019-01-01 RX ADMIN — FERROUS SULFATE TAB EC 324 MG (65 MG FE EQUIVALENT) 324 MG: 324 (65 FE) TABLET DELAYED RESPONSE at 09:10

## 2019-01-01 RX ADMIN — SODIUM CHLORIDE, PRESERVATIVE FREE 3 ML: 5 INJECTION INTRAVENOUS at 09:02

## 2019-01-01 RX ADMIN — TRAMADOL HYDROCHLORIDE 50 MG: 50 TABLET, FILM COATED ORAL at 22:07

## 2019-01-01 RX ADMIN — FUROSEMIDE 20 MG: 10 INJECTION, SOLUTION INTRAMUSCULAR; INTRAVENOUS at 00:07

## 2019-01-01 RX ADMIN — TRAMADOL HYDROCHLORIDE 50 MG: 50 TABLET, FILM COATED ORAL at 21:34

## 2019-01-01 RX ADMIN — METOCLOPRAMIDE 10 MG: 5 SOLUTION ORAL at 17:51

## 2019-01-01 RX ADMIN — SODIUM CHLORIDE TAB 1 GM 1 G: 1 TAB at 12:38

## 2019-01-01 RX ADMIN — PROMETHAZINE HYDROCHLORIDE 25 MG: 25 TABLET ORAL at 21:24

## 2019-01-01 RX ADMIN — LACTULOSE 10 G: 10 SOLUTION ORAL at 20:14

## 2019-01-01 RX ADMIN — SODIUM CHLORIDE, PRESERVATIVE FREE 3 ML: 5 INJECTION INTRAVENOUS at 09:34

## 2019-01-01 RX ADMIN — SODIUM CHLORIDE 100 ML/HR: 900 INJECTION, SOLUTION INTRAVENOUS at 17:42

## 2019-01-01 RX ADMIN — FERROUS SULFATE TAB EC 324 MG (65 MG FE EQUIVALENT) 324 MG: 324 (65 FE) TABLET DELAYED RESPONSE at 09:28

## 2019-01-01 RX ADMIN — METOPROLOL SUCCINATE 50 MG: 50 TABLET, EXTENDED RELEASE ORAL at 20:28

## 2019-01-01 RX ADMIN — POLYMYXIN B SULFATE AND TRIMETHOPRIM 1 DROP: 10000; 1 SOLUTION OPHTHALMIC at 09:02

## 2019-01-01 RX ADMIN — MORPHINE SULFATE 2 MG: 2 INJECTION, SOLUTION INTRAMUSCULAR; INTRAVENOUS at 14:32

## 2019-01-01 RX ADMIN — AMITRIPTYLINE HYDROCHLORIDE 10 MG: 10 TABLET, FILM COATED ORAL at 21:24

## 2019-01-01 RX ADMIN — SODIUM CHLORIDE, PRESERVATIVE FREE 3 ML: 5 INJECTION INTRAVENOUS at 20:31

## 2019-01-01 RX ADMIN — FERROUS SULFATE TAB EC 324 MG (65 MG FE EQUIVALENT) 324 MG: 324 (65 FE) TABLET DELAYED RESPONSE at 09:38

## 2019-01-01 RX ADMIN — DOCUSATE SODIUM 100 MG: 100 CAPSULE, LIQUID FILLED ORAL at 16:30

## 2019-01-01 RX ADMIN — FUROSEMIDE 40 MG: 10 INJECTION, SOLUTION INTRAMUSCULAR; INTRAVENOUS at 20:09

## 2019-01-01 RX ADMIN — MIRTAZAPINE 15 MG: 15 TABLET, FILM COATED ORAL at 20:32

## 2019-01-01 RX ADMIN — POLYMYXIN B SULFATE AND TRIMETHOPRIM 1 DROP: 10000; 1 SOLUTION OPHTHALMIC at 00:24

## 2019-01-01 RX ADMIN — DOCUSATE SODIUM 100 MG: 100 CAPSULE, LIQUID FILLED ORAL at 09:05

## 2019-01-01 RX ADMIN — POLYMYXIN B SULFATE AND TRIMETHOPRIM 1 DROP: 10000; 1 SOLUTION OPHTHALMIC at 12:39

## 2019-01-01 RX ADMIN — SODIUM CHLORIDE, PRESERVATIVE FREE 10 ML: 5 INJECTION INTRAVENOUS at 08:25

## 2019-01-01 RX ADMIN — SODIUM CHLORIDE 100 ML/HR: 900 INJECTION, SOLUTION INTRAVENOUS at 22:59

## 2019-01-01 RX ADMIN — FERROUS SULFATE TAB EC 324 MG (65 MG FE EQUIVALENT) 324 MG: 324 (65 FE) TABLET DELAYED RESPONSE at 17:25

## 2019-01-01 RX ADMIN — FUROSEMIDE 40 MG: 10 INJECTION, SOLUTION INTRAMUSCULAR; INTRAVENOUS at 00:08

## 2019-01-01 RX ADMIN — SODIUM CHLORIDE TAB 1 GM 2 G: 1 TAB at 13:31

## 2019-01-01 RX ADMIN — PANTOPRAZOLE SODIUM 40 MG: 40 TABLET, DELAYED RELEASE ORAL at 05:50

## 2019-01-01 RX ADMIN — METOCLOPRAMIDE 10 MG: 5 SOLUTION ORAL at 17:39

## 2019-01-01 RX ADMIN — SODIUM CHLORIDE 100 ML/HR: 900 INJECTION, SOLUTION INTRAVENOUS at 17:29

## 2019-01-01 RX ADMIN — LACTULOSE 10 G: 10 SOLUTION ORAL at 20:00

## 2019-01-01 RX ADMIN — TRAMADOL HYDROCHLORIDE 50 MG: 50 TABLET, FILM COATED ORAL at 18:14

## 2019-01-01 RX ADMIN — TECHNETIUM TC 99M SULFUR COLLOID 1 DOSE: KIT at 06:23

## 2019-01-01 RX ADMIN — METOPROLOL SUCCINATE 50 MG: 50 TABLET, EXTENDED RELEASE ORAL at 17:32

## 2019-01-01 RX ADMIN — LORAZEPAM 0.25 MG: 2 INJECTION, SOLUTION INTRAMUSCULAR; INTRAVENOUS at 00:15

## 2019-01-04 NOTE — TELEPHONE ENCOUNTER
Per Dr. Greenfield, Ms. Bates has been called with test results.    Her hgb was a little low, no treatment, just follow  Sed Rate was elevated at 120.  Dr. Greenfield prescribed Prednisone 20 mg #30 Take 3 tabs for 3 days, Take 2 tabs daily for 3 days, 1 tab daily for 3 days then 1/2 tablet daily until she see's Dr. Barclay on 01/29/19.    She is to follow-up with DIOMEDES Reese on Monday 01/07/19 @ 9:15 as well.    She she was instructed to keep her legs elevated when sitting around and to continue with the humidifier and saline nasal spray.    Also Nystatin Swish & Swallow #240 ml Swish & Swallow 5 ml QID for painful tongue/mouth.    Patient states understanding of appointment information and medication instructions relayed.    Scripts sent to [facility]

## 2019-01-04 NOTE — TELEPHONE ENCOUNTER
Dr. Greenfield, Ms. Bates is Dr. Barclay's patient     She was seen on 12/26/18 for Sinus Infection and Leg Pain & Edema.  She has labs done on 01/02/19 and was wanting the results.    She was given Clindamycin 300 mg on 12/26/18 Take 1 TID for 10 days for the Sinus Infection.    She stopped it on 01/01/19 she states it caused migraines & a red raw tongue.  She states she doesn't tolerate antibiotics.  She states she her nasal passage & sinus cavity feels dry ans she has noticed blood when blowing her nose.  She sounds as if she has some head congestion.  She states she is using a humidifier &saline nasal spray for that.  She wants to know if something can be called in for her red raw tongue.    Her leg pain has not improved with using the compression stockings, her feet, ankles & legs still hurt and her calves feels like muscle cramps.  She states her legs are red, and warm feeling and she still has some edema.  She was given Lasix 20 mg Take 1 daily for the edema.  She states she can hardly walk.  She also reports tingling in her hands.    I did advise she follow-up with Dr. Barclay sooner than a month if she is not improving, but not knowing when he will be back, I was able to get her an appointment with DIOMEDES Reese on Monday Morning @ 9:15, or does she need to be seen sooner??      Please Advise on her lab work,  And can we call her something for the red & raw tongue.     Thank You

## 2019-01-04 NOTE — TELEPHONE ENCOUNTER
Advise hgb is a little low and test for inflammation is high, may have inflammation in muslces, recommend prednisone 20 mg 3 daily for 3 days, then 2 daily for 3 days, then 1 daily for 3 days then 1/2 tab daily until she sees Dr. Barclay. #30 and nystatin

## 2019-01-04 NOTE — TELEPHONE ENCOUNTER
YOLANDA SLADE HAD LABS DONE ON WED AND IS STILL IN ALOT OF PAIN.SHAID SHE HAD TO STOP THE ANTIBIOTICS CAUSING REACTIONS....[facility]ALK...SHE KNOWS DR [place] IS OUT OF OFFICE RIGHT NOW...PLEASE CALL HER BACK

## 2019-01-07 NOTE — PROGRESS NOTES
Subjective   Yaneli Bates is a 78 y.o. female.     Miss Bates 78-year-old female presents today for follow-up related to pain to bilateral hands and bilateral lower extremities. She was started on a tapering dose of prednisone. 3 days ago. Patient states her symptoms are much improved since that time.    Miss Bates also requests referral for cardiology. She has a history of aortic valve replacement. Her current cardiologist has moved to a different city and this is related to hard for her to follow him. Denies any acute issues such as chest pain, shortness breath palpitations.         The following portions of the patient's history were reviewed and updated as appropriate: allergies, current medications, past family history, past medical history, past social history, past surgical history and problem list.    Review of Systems   Constitutional: Negative for activity change, appetite change, fatigue, unexpected weight gain and unexpected weight loss.   HENT: Negative for congestion, sore throat, trouble swallowing and voice change.    Eyes: Negative.    Respiratory: Negative for cough, chest tightness, shortness of breath and wheezing.    Cardiovascular: Negative for chest pain, palpitations and leg swelling.   Gastrointestinal: Negative for abdominal pain, constipation, diarrhea, nausea and vomiting.   Endocrine: Negative.    Genitourinary: Negative for dysuria.   Musculoskeletal: Positive for arthralgias. Negative for myalgias.        Bilateral hand and lower leg pain. Vastly improved since starting steroids. 3 days ago.   Skin: Negative for rash.   Allergic/Immunologic: Negative.    Neurological: Negative.    Hematological: Negative.    Psychiatric/Behavioral: Negative.        Objective   Physical Exam   Constitutional: She is oriented to person, place, and time. She appears well-developed and well-nourished. No distress.   HENT:   Head: Normocephalic and atraumatic.   Eyes: Conjunctivae are normal.   Neck:  Normal range of motion.   Cardiovascular: Normal rate, regular rhythm and normal heart sounds.   Pulmonary/Chest: Effort normal and breath sounds normal. No respiratory distress. She has no wheezes. She has no rales.   Musculoskeletal: Normal range of motion. She exhibits no edema or tenderness.   Neurological: She is alert and oriented to person, place, and time.   Skin: Skin is warm and dry. No rash noted. She is not diaphoretic. No erythema. No pallor.   Psychiatric: She has a normal mood and affect. Her behavior is normal. Judgment and thought content normal.   Nursing note and vitals reviewed.        Assessment/Plan   Yaneli was seen today for follow-up.    Diagnoses and all orders for this visit:    Arthralgia, unspecified joint    History of aortic valve replacement  -     Ambulatory Referral to Cardiology      1. Arthralgia-continue steroids. Patient is to stop steroids after taking 3 days at the half tablet dose. Patient verbalized understanding of instruction.    2. History of aortic valve replacement-referral to cardiology for further evaluation.    3. Follow-up in 2 weeks to reevaluate symptoms, started care visit or sooner if needed.          This document has been electronically signed by DIOMEDES Lemon on January 7, 2019 12:45 PM

## 2019-01-17 NOTE — PROGRESS NOTES
Subjective   Yaneli Bates is a 78 y.o. female.     Miss Bates is a 78-year-old female presents today to establish care for management of bilateral feet pain, insomnia, osteoporosis, fibromyalgia, history of bilateral lower extremity edema, history of valve replacement. Patient's most concerning symptom is her bilateral feet pain. This is a new problem. This started approximately 2-3 months ago. She reports the pain is burning and stinging and going numb at times. Rest or NSAIDs do not help alleviate pain. She recently was on short course steroids which did not improve pain. Patient is not diabetic and has no known circulatory issues. Patient insomnia is controlled with amitriptyline 25 mg by mouth nightly. Patient states she's been on this for over 40 years. Osteoporosis managed with Fosamax 70 mg by mouth weekly. She was also recently placed on Lasix 20 mg by mouth daily related to bilateral lower 70 edema. This is improved, however she still has trace edema to her right lower extremity. She denies calf pain, chest pain shortness breath or palpitations. She has recently been referred to cardiology to establish care for follow-up post aortic valve replacement. Patient has recently moved to this area and it is not feasible to see her previous cardiologist.         The following portions of the patient's history were reviewed and updated as appropriate: allergies, current medications, past family history, past medical history, past social history, past surgical history and problem list.    Review of Systems   Constitutional: Negative for activity change, appetite change, chills, diaphoresis, fatigue, fever, unexpected weight gain and unexpected weight loss.   HENT: Negative for congestion, sore throat, trouble swallowing and voice change.    Eyes: Negative for blurred vision, double vision, photophobia, pain and visual disturbance.   Respiratory: Negative for cough, chest tightness, shortness of breath and  wheezing.    Cardiovascular: Negative for chest pain, palpitations and leg swelling.   Gastrointestinal: Negative for abdominal distention, abdominal pain, anal bleeding, blood in stool, constipation, diarrhea, nausea, vomiting, GERD and indigestion.   Endocrine: Negative for cold intolerance, heat intolerance, polydipsia, polyphagia and polyuria.   Genitourinary: Negative for dysuria, frequency, hematuria and urgency.   Musculoskeletal: Positive for back pain and gait problem (secondary to foot pain). Negative for arthralgias and myalgias.   Skin: Negative for rash.   Allergic/Immunologic: Negative.    Neurological: Negative for dizziness, syncope, weakness, light-headedness and headache.   Hematological: Negative.    Psychiatric/Behavioral: The patient is not nervous/anxious.        Objective   Physical Exam   Constitutional: She is oriented to person, place, and time. She appears well-developed and well-nourished. No distress.   HENT:   Head: Normocephalic and atraumatic.   Right Ear: External ear normal.   Left Ear: External ear normal.   Nose: Nose normal.   Mouth/Throat: Oropharynx is clear and moist.   Eyes: Conjunctivae and EOM are normal. Pupils are equal, round, and reactive to light.   Neck: Normal range of motion. Neck supple. No tracheal deviation present. No thyromegaly present.   Cardiovascular: Normal rate, regular rhythm, normal heart sounds and intact distal pulses. Exam reveals no gallop and no friction rub.   No murmur heard.  Pulmonary/Chest: Effort normal and breath sounds normal. No stridor. No respiratory distress. She has no wheezes. She has no rales.   Abdominal: Soft. Bowel sounds are normal. She exhibits no distension and no mass. There is no tenderness. There is no rebound and no guarding. No hernia.   Musculoskeletal: Normal range of motion. She exhibits edema. She exhibits no tenderness.   Bilateral SI joint tenderness.        Lymphadenopathy:     She has no cervical adenopathy.    Neurological: She is alert and oriented to person, place, and time. No cranial nerve deficit. Coordination normal.   Skin: Skin is warm and dry. No rash noted. She is not diaphoretic. No erythema. No pallor.   Psychiatric: She has a normal mood and affect. Her behavior is normal. Judgment and thought content normal.   Nursing note and vitals reviewed.        Assessment/Plan   Yaneli was seen today for establish care and leg pain.    Diagnoses and all orders for this visit:    Encounter to establish care    Encounter for screening and preventative care  -     Hemoglobin A1c; Future  -     TSH; Future  -     Lipid Panel; Future    Osteoporosis, unspecified osteoporosis type, unspecified pathological fracture presence    Fibromyalgia    Paresthesia of both lower extremities    Paresthesia and pain of both upper extremities    Chronic SI joint pain  -     XR Spine Lumbar 4+ View    1. Osteoporosis-continue Fosamax 70 mg 1 tablet weekly.    2. Fibromyalgia-continue amitriptyline by mouth nightly.    3. Paresthesia of upper and lower extremities lumbar to assess lower extremity pain. Will call with results. , We'll consider medication for neuropathy pending lab and x-ray results.    4. Chronic SI joint pain-x-ray lumbar to mention above. We'll call results.    5. Health maintenance-routine labs ordered. We'll call results.    6. Follow-up in 2 weeks or sooner if needed.            This document has been electronically signed by DIOMEDES Lemon on January 17, 2019 1:01 PM

## 2019-01-18 NOTE — PROGRESS NOTES
Hemoglobin A1c elevated. 2. Prediabetic range, 6.1. Instructed low-carb diet. We will discuss need for medication at follow-up. Her x-ray showed degenerative changes in her lower back. Per radiology report. I'm going to discontinue her ibuprofen and put her on naproxen 500 mg twice a day. Instructed her to avoid heavy lifting. We will see how this affects her symptoms. If it does not improve her leg pain, we may proceed with an MRI or with a specialist. Follow-up as scheduled

## 2019-01-31 NOTE — PROGRESS NOTES
Subjective   Yaneli Bates is a 78 y.o. female.     Miss Bates a 78-year-old female who since today for follow-up related to low back pain with bilateral sciatica. Since last visit. She was treated with naproxen 500 mg by mouth twice daily. She stopped the naproxen because she states she got better relief with ibuprofen 600 to she had previously taken. Ibuprofen does provide a moderate amount of pain relief. However, she states she usually wakes up at 4-5 in the morning and with moderate to severe all over pain. She does have a history of fibromyalgia. No known personal or family history of rheumatoid arthritis. Despite having all over pain, her low back pain is new within the last 2-3 months. Recent x-ray showed degenerative changes L4-5 and S1. This would be consistent with her sciatica type symptoms. To her knowledge she has not had an MRI of her lower back, nor has she seen an orthopedic or neurosurgeon. She has received chiropractic adjustments in the past, but states it's been years since her last visit.    Patient's recent lab work showed that she has prediabetes with hemoglobin A1c of 6.1. She has no known previous history of elevated blood sugars. She does have a family history of diabetes. She denies any symptoms. Characteristic of hyper or hypoglycemic episodes.         The following portions of the patient's history were reviewed and updated as appropriate: allergies, current medications, past family history, past medical history, past social history, past surgical history and problem list.    Review of Systems   Constitutional: Negative for activity change, appetite change, chills, diaphoresis, fatigue, fever, unexpected weight gain and unexpected weight loss.   HENT: Negative for congestion, sore throat, trouble swallowing and voice change.    Eyes: Negative for blurred vision, double vision, photophobia, pain and visual disturbance.   Respiratory: Negative for cough, chest tightness, shortness of  breath and wheezing.    Cardiovascular: Negative for chest pain, palpitations and leg swelling.   Gastrointestinal: Negative for abdominal distention, abdominal pain, anal bleeding, blood in stool, constipation, diarrhea, nausea, vomiting, GERD and indigestion.   Endocrine: Negative for cold intolerance, heat intolerance, polydipsia, polyphagia and polyuria.   Genitourinary: Negative for dysuria, frequency, hematuria and urgency.   Musculoskeletal: Positive for arthralgias, back pain and gait problem. Negative for myalgias.   Skin: Negative for rash.   Allergic/Immunologic: Negative.    Neurological: Negative for dizziness, syncope, weakness, light-headedness and headache.   Hematological: Negative.    Psychiatric/Behavioral: The patient is not nervous/anxious.        Objective   Physical Exam   Constitutional: She is oriented to person, place, and time. She appears well-developed and well-nourished. No distress.   HENT:   Head: Normocephalic and atraumatic.   Eyes: Conjunctivae are normal.   Neck: Normal range of motion.   Cardiovascular: Normal rate, regular rhythm and normal heart sounds.   Pulmonary/Chest: Effort normal and breath sounds normal. No respiratory distress. She has no wheezes. She has no rales.   Musculoskeletal: She exhibits no edema.        Lumbar back: She exhibits decreased range of motion, tenderness and pain. She exhibits no bony tenderness.   Neurological: She is alert and oriented to person, place, and time.   Skin: Skin is warm and dry. No rash noted. She is not diaphoretic. No erythema. No pallor.   Psychiatric: She has a normal mood and affect. Her behavior is normal. Judgment and thought content normal.   Nursing note and vitals reviewed.        Assessment/Plan   Yaneli was seen today for follow-up.    Diagnoses and all orders for this visit:    Prediabetes    Acute bilateral low back pain with bilateral sciatica    Fibromyalgia    Other orders  -     metFORMIN ER (GLUCOPHAGE-XR) 500 MG  24 hr tablet; Take 1 tablet by mouth Daily With Breakfast.    1. Prediabetes-metformin 500 mg 1 tablet daily with breakfast. Patient instructed in low carb diet.    2. Acute bilateral low back pain with sciatica-patient gave treatment options such as medication adjustments and referral to neurosurgery for further evaluation. She has chosen to pursue acupuncture first before entertaining medication changes or seeing a surgeon. I instructed patient that if symptoms worsen. Follow up before her next scheduled appointment for further evaluation. Patient verbalized understanding of instruction.    3. Fibromyalgia-patient does not wish to change any medication or see a specialist at this time. She is going to pursue chiropractory and acupuncture before discussing medication changes or seen a specialist.    4. Follow-up 2 months or sooner if needed.            This document has been electronically signed by DIOMEDES Lemon on January 31, 2019 12:56 PM

## 2019-02-05 NOTE — TELEPHONE ENCOUNTER
Pt wishes to speak to with Prateek about needing a MRI that he had mentioned to her before. Please call, thanks!

## 2019-02-06 NOTE — TELEPHONE ENCOUNTER
Yaneli Bates was called to inform her that a referral was placed and authorized for MRI.  Radiology to call to schedule appt. Patient verbalized understanding.            ----- Message from DIOMEDES Lemon sent at 2/6/2019  8:43 AM CST -----  Regarding: RE: Referral  MRI order placed. Radiology will call to schedule appt.   ----- Message -----  From: Catherine Bailey MA  Sent: 2/5/2019  10:24 AM  To: DIOMEDES Lemon  Subject: Referral                                         Prateek,  Patient called stating she had talked to you in her last visit about an MRI.  She would like referral.    Please Advise,    Thanks so much

## 2019-02-14 NOTE — PROGRESS NOTES
Degenerative changes noted on MRI with mild stenosis per radiology report.  If she willing for me to refer her to neurosurgery for further evaluation?

## 2019-02-15 PROBLEM — I10 ESSENTIAL HYPERTENSION: Status: ACTIVE | Noted: 2019-01-01

## 2019-02-15 PROBLEM — E11.9 TYPE 2 DIABETES MELLITUS WITHOUT COMPLICATION, WITHOUT LONG-TERM CURRENT USE OF INSULIN (HCC): Status: ACTIVE | Noted: 2019-01-01

## 2019-02-15 PROBLEM — Z95.2 S/P AVR (AORTIC VALVE REPLACEMENT): Status: ACTIVE | Noted: 2019-01-01

## 2019-02-15 NOTE — PROGRESS NOTES
[facility] Cardiology  OFFICE NOTE    Yaneli Bates  78 y.o. female    02/15/2019  1. S/P AVR (aortic valve replacement)    2. Type 2 diabetes mellitus without complication, without long-term current use of insulin (CMS/[facility])    3. Essential hypertension    4. PVD (peripheral vascular disease) (CMS/[facility])        Chief complaint -history of aortic valve replacement    History of present [id number]-old lady who has history of bioprosthetic aortic valve replacement in May 2014 at [facility] in [place] comes for establishing.  Her blood pressure has been slowly going up and she is borderline diabetic with a A1c of 6.1 currently on metformin.  She has significant pain in the right leg when she walks and that has decreased her walking.  She could not take B12 because of migraine attacks.  Denies any nausea or vomiting she takes Elavil at night and takes Fosamax for osteoporosis.  When she had the valve replacement she did not have any atherosclerotic coronary disease..              Allergies   Allergen Reactions   • Clindamycin/Lincomycin Other (See Comments)     Migraine headaches   • Amoxicillin Rash   • Codeine GI Intolerance   • Penicillins Hives   • Sulfa Antibiotics Hives   • Vicodin [Hydrocodone-Acetaminophen] GI Intolerance     vomiting         Past Medical History:   Diagnosis Date   • Allergic    • Arthritis    • Coronary artery disease 2014    valave replacement   • Fibromyalgia, primary    • Headache    • Infectious viral hepatitis    • Low back pain    • Osteoporosis    • Visual impairment          Past Surgical History:   Procedure Laterality Date   • AORTIC VALVE REPAIR/REPLACEMENT  2014,may   • COLONOSCOPY     • KNEE SURGERY Right 1967   • TONSILLECTOMY           Family History   Problem Relation Age of Onset   • Arthritis Mother    • Cancer Mother    • Heart disease Mother    • Hyperlipidemia Mother    • Hypertension Mother    • Vision loss Mother    • Arthritis Father     • Hearing loss Father    • Heart disease Father    • Hyperlipidemia Father    • Hypertension Father    • Vision loss Father    • Hypertension Sister    • Vision loss Sister    • Osteoporosis Sister    • Cataracts Sister    • Hyperlipidemia Brother    • Vision loss Brother    • Heart disease Brother    • Cancer Daughter    • Early death Daughter    • Osteoporosis Sister    • Parkinsonism Brother    • Hyperlipidemia Brother    • Thyroid disease Daughter          Social History     Socioeconomic History   • Marital status:      Spouse name: Not on file   • Number of children: Not on file   • Years of education: Not on file   • Highest education level: Not on file   Social Needs   • Financial resource strain: Not on file   • Food insecurity - worry: Not on file   • Food insecurity - inability: Not on file   • Transportation needs - medical: Not on file   • Transportation needs - non-medical: Not on file   Occupational History   • Not on file   Tobacco Use   • Smoking status: Never Smoker   • Smokeless tobacco: Never Used   Substance and Sexual Activity   • Alcohol use: No   • Drug use: No   • Sexual activity: No   Other Topics Concern   • Not on file   Social History Narrative   • Not on file         Current Outpatient Medications   Medication Sig Dispense Refill   • alendronate (FOSAMAX) 70 MG tablet Take 1 tablet by mouth Every 7 (Seven) Days. 12 tablet 1   • amitriptyline (ELAVIL) 25 MG tablet Take 2 tablets by mouth Every Night. 180 tablet 1   • metFORMIN ER (GLUCOPHAGE-XR) 500 MG 24 hr tablet Take 1 tablet by mouth Daily With Breakfast. 30 tablet 1   • Multiple Minerals-Vitamins (CALCIUM-MAGNESIUM-ZINC-D3 PO) Take 1 tablet by mouth Daily.     • naproxen (NAPROSYN) 500 MG tablet Take 1 tablet by mouth 2 (Two) Times a Day With Meals. 30 tablet 1   • promethazine (PHENERGAN) 25 MG tablet Take 1 tablet by mouth Every 6 (Six) Hours As Needed for Nausea or Vomiting. 30 tablet 0   • aspirin 81 MG tablet Take 1  "tablet by mouth Daily. 30 tablet 11   • metoprolol succinate XL (TOPROL-XL) 50 MG 24 hr tablet Take 1 tablet by mouth Daily With Dinner. 90 tablet 5     No current facility-administered medications for this visit.          Review of Systems     Constitution: Denies any fatigue, fever or chills    HENT: Denies any headache, hearing impairment,     Eyes: Denies any blurring of vision, or photophobia     Cardivascular - As per history of present illness     Respiratory system- denies any COPD,  shortness of breath, Sleep apnea     Endocrine: Mild diabetes                       Musculoskeletal: Significant pain in the right leg greater than the left leg with impairment in walking    Gastrointestinal: No nausea, vomiting, or melena    Genitourinary: No dysuria or hematuria    Neurological:   No history of seizure disorder, stroke, memory problems    Psychiatric/Behavioral:        No history of depression    Hematological- no history of easy bruising or any bleeding diathesis            OBJECTIVE    /80   Pulse 99   Ht 154.9 cm (60.98\")   Wt 51.7 kg (114 lb)   LMP 09/25/1996 (Within Months)   BMI 21.55 kg/m²       Physical Exam     Constitutional: is oriented to person, place, and time.     Skin-warm and dry    Well developed and nourished in no acute distress      Head: Normocephalic and atraumatic.     Eyes: Pupils are equal    Neck: Neck supple. No bruit in the carotids    Cardiovascular: [place] in the fifth intercostal space   Regular rate, and  Rhythm,    S1 greater than S2, prosthetic valve click heard    Pulmonary/Chest:   Air  Entry is equal on both sides  No wheezing or crackles,      Abdominal: Soft.  No hepatosplenomegaly, bowel sounds are present    Musculoskeletal: No kyphoscoliosis, no significant thickening of the joints    Neurological: is alert and oriented to person, place, and time.    cranial nerve are intact .   No motor or sensory deficit    Extremities-no edema, no radial femoral " delay      Psychiatric: He has a normal mood and affect.                  His behavior is normal.           Procedures      Lab Results   Component Value Date    WBC 6.21 01/02/2019    HGB 10.7 (L) 01/02/2019    HCT 34.0 (L) 01/02/2019    MCV 90.9 01/02/2019     01/02/2019     Lab Results   Component Value Date    GLUCOSE 106 (H) 01/02/2019    BUN 14 01/02/2019    CREATININE 0.72 01/02/2019    EGFRIFNONA 78 01/02/2019    BCR 19.4 01/02/2019    CO2 32.0 (H) 01/02/2019    CALCIUM 9.0 01/02/2019    ALBUMIN 4.10 01/02/2019    AST 37 (H) 01/02/2019    ALT 11 01/02/2019     Lab Results   Component Value Date    CHOL 189 01/18/2019     Lab Results   Component Value Date    TRIG 67 01/18/2019     Lab Results   Component Value Date    HDL 54 (L) 01/18/2019     No components found for: LDLCALC  Lab Results   Component Value Date    LDL 86 01/18/2019     No results found for: HDLLDLRATIO  No components found for: CHOLHDL  Lab Results   Component Value Date    HGBA1C 6.1 (H) 01/18/2019     Lab Results   Component Value Date    TSH 3.800 01/18/2019                  A/P  Status post aortic valve replacement with a bioprosthetic valve in May 2014 at [facility] in [place], started her back on aspirin daily, blood pressure is high start her on Toprol-XL 50 mg in the evening.  We will check an echo to assess LV function valvular function.    Claudication pain in the right leg greater than left leg with numbness and tingling, the pulses are good we will check an MAURILIO to rule out peripheral vascular disease, will refer to neurology to rule out neuropathy.    Mildly elevated blood pressure start her on Toprol-XL.    Borderline diabetes on metformin doing well.    Follow-up in 3 months              This document has been electronically signed by Toro Pryor MD on February 15, 2019 8:57 AM       EMR Dragon/Transcription disclaimer:   Some of this note may be an electronic transcription/translation of spoken  language to printed text. The electronic translation of spoken language may permit erroneous, or at times, nonsensical words or phrases to be inadvertently transcribed; Although I have reviewed the note for such errors, some may still exist.

## 2019-02-22 NOTE — TELEPHONE ENCOUNTER
[facility] HAS CALLED FOR YOLANDA SLADE..SHE HAS APPT TO COME IN MONDAY AT 1:00 FOR THERAPY FOR HER FEET/AND LEGS  THEY ARE NEEDING AN ORDER FX OVER TO    PH= [phone]

## 2019-02-25 NOTE — TELEPHONE ENCOUNTER
[facility] HAS CALLED AGAIN NEEDING ORDERS FOR YOLANDA SLADE...NEED THERAPY FOR FT AND LEGS..THERE IS NO SPEACIAL FORM JUST FX OVER NAME/ADDRESS AND ORDERS THAT SAY EVAL AND TREAT

## 2019-03-11 PROBLEM — R07.9 CHEST PAIN, RULE OUT ACUTE MYOCARDIAL INFARCTION: Status: ACTIVE | Noted: 2019-01-01

## 2019-03-11 NOTE — ED PROVIDER NOTES
Subjective   78 years old female presented in the ER with chief complaint of left-sided chest pain off and on since yesterday.  Patient report this is a tightness moderate intensity, no significant aggravating or relieving factors, associated with mild radiation to the left arm and jaw, not associated with any shortness of breath/nausea/vomiting.  Denies having similar symptoms in the past.          History provided by:  Patient  Chest Pain   Pain location:  L chest  Pain quality: tightness    Pain radiates to:  L arm and L jaw  Pain severity:  Moderate  Onset quality:  Gradual  Duration:  2 days  Timing:  Intermittent  Progression:  Waxing and waning  Chronicity:  New  Relieved by:  Nothing  Worsened by:  Nothing  Ineffective treatments:  None tried  Associated symptoms: no abdominal pain, no anxiety, no back pain, no cough, no dizziness, no fever, no nausea, no near-syncope, no palpitations, no shortness of breath and no vomiting    Risk factors: hypertension        Review of Systems   Constitutional: Negative for chills and fever.   HENT: Negative for congestion, postnasal drip, sinus pressure and sore throat.    Eyes: Negative for pain.   Respiratory: Positive for chest tightness. Negative for cough and shortness of breath.    Cardiovascular: Positive for chest pain. Negative for palpitations and near-syncope.   Gastrointestinal: Negative for abdominal pain, nausea and vomiting.   Genitourinary: Negative for flank pain.   Musculoskeletal: Negative for back pain.   Skin: Negative for color change.   Neurological: Negative for dizziness and seizures.   Psychiatric/Behavioral: Negative for agitation.       Past Medical History:   Diagnosis Date   • Allergic    • Arthritis    • Coronary artery disease 2014    valave replacement   • Fibromyalgia, primary    • Headache    • Infectious viral hepatitis    • Low back pain    • Osteoporosis    • Visual impairment        Allergies   Allergen Reactions   •  Clindamycin/Lincomycin Other (See Comments)     Migraine headaches   • Amoxicillin Rash   • Codeine GI Intolerance   • Penicillins Hives   • Sulfa Antibiotics Hives   • Vicodin [Hydrocodone-Acetaminophen] GI Intolerance     vomiting       Past Surgical History:   Procedure Laterality Date   • AORTIC VALVE REPAIR/REPLACEMENT  2014,may   • COLONOSCOPY     • KNEE SURGERY Right 1967   • TONSILLECTOMY         Family History   Problem Relation Age of Onset   • Arthritis Mother    • Cancer Mother    • Heart disease Mother    • Hyperlipidemia Mother    • Hypertension Mother    • Vision loss Mother    • Arthritis Father    • Hearing loss Father    • Heart disease Father    • Hyperlipidemia Father    • Hypertension Father    • Vision loss Father    • Hypertension Sister    • Vision loss Sister    • Osteoporosis Sister    • Cataracts Sister    • Hyperlipidemia Brother    • Vision loss Brother    • Heart disease Brother    • Cancer Daughter    • Early death Daughter    • Osteoporosis Sister    • Parkinsonism Brother    • Hyperlipidemia Brother    • Thyroid disease Daughter        Social History     Socioeconomic History   • Marital status:      Spouse name: Not on file   • Number of children: Not on file   • Years of education: Not on file   • Highest education level: Not on file   Tobacco Use   • Smoking status: Never Smoker   • Smokeless tobacco: Never Used   Substance and Sexual Activity   • Alcohol use: No   • Drug use: No   • Sexual activity: No           Objective   Physical Exam   Constitutional: She is oriented to person, place, and time. She appears well-developed and well-nourished.   HENT:   Head: Normocephalic and atraumatic.   Eyes: Pupils are equal, round, and reactive to light.   Neck: Normal range of motion. Neck supple.   Cardiovascular: Normal rate and regular rhythm.   Pulmonary/Chest: Effort normal and breath sounds normal. She has no decreased breath sounds. She has no wheezes. She has no rales.    Abdominal: Soft. Bowel sounds are normal. There is no tenderness.   Genitourinary: Rectal exam shows guaiac negative stool.   Musculoskeletal: Normal range of motion.   Neurological: She is alert and oriented to person, place, and time.   Skin: Skin is warm and dry. Capillary refill takes less than 2 seconds.   Psychiatric: Her mood appears anxious.   Nursing note and vitals reviewed.      ECG 12 Lead    Date/Time: 3/11/2019 10:12 AM  Performed by: Arcadio Schulz MD  Authorized by: Arcadio Schulz MD   Interpreted by physician  Rhythm: sinus tachycardia  Rate: tachycardic  BPM: 106  QRS axis: normal  Conduction: conduction normal  ST Segments: ST segments normal  T Waves: T waves normal  Clinical impression: non-specific ECG                 ED Course                  MDM  Number of Diagnoses or Management Options  Chest pain, rule out acute myocardial infarction:   Diagnosis management comments: 78 years old is evaluated for intermittent chest pain since yesterday.  She is offered nitro and morphine but patient refused.  EKG did not show any acute ST elevations.  Initial troponins are negative.  No acute finding on the chest x-ray.  Her hemoglobin is dropped from 10-8.2 but has negative stool occult.  Discussed with hospitalist Dr. Mendes and patient is admitted for observation       Amount and/or Complexity of Data Reviewed  Clinical lab tests: ordered and reviewed  Tests in the radiology section of CPT®: ordered and reviewed  Decide to obtain previous medical records or to obtain history from someone other than the patient: yes  Discuss the patient with other providers: yes      Labs Reviewed   COMPREHENSIVE METABOLIC PANEL - Abnormal; Notable for the following components:       Result Value    Glucose 101 (*)     Creatinine 0.44 (*)     Sodium 132 (*)     Total Protein 9.3 (*)     AST (SGOT) 54 (*)     eGFR Non  Amer 138 (*)     Globulin 5.7 (*)     A/G Ratio 0.6 (*)     All other components within normal  limits    Narrative:     The MDRD GFR formula is only valid for adults with stable renal function between ages 18 and 70.   CBC WITH AUTO DIFFERENTIAL - Abnormal; Notable for the following components:    RBC 3.09 (*)     Hemoglobin 8.2 (*)     Hematocrit 26.9 (*)     MCH 26.5 (*)     MCHC 30.5 (*)     RDW 15.5 (*)     Lymphocyte % 13.7 (*)     Immature Grans % 0.8 (*)     Immature Grans, Absolute 0.07 (*)     All other components within normal limits   TROPONIN (IN-HOUSE) - Normal   BNP (IN-HOUSE) - Normal    Narrative:     Among patients with dyspnea, NT-proBNP is highly sensitive for the detection of acute congestive heart failure. In addition NT-proBNP of <300 pg/ml effectively rules out acute congestive heart failure with 99% negative predictive value.   RAINBOW DRAW    Narrative:     The following orders were created for panel order [place] Draw.  Procedure                               Abnormality         Status                     ---------                               -----------         ------                     Light Blue Top[[id number]]                                   Final result               Green Top (Gel)[[id number]]                                  Final result               Lavender Top[[id number]]                                     Final result               Gold Top - SST[[id number]]                                   Final result                 Please view results for these tests on the individual orders.   TROPONIN (IN-HOUSE)   CBC AND DIFFERENTIAL    Narrative:     The following orders were created for panel order CBC & Differential.  Procedure                               Abnormality         Status                     ---------                               -----------         ------                     CBC Auto Differential[[id number]]        Abnormal            Final result                 Please view results for these tests on the individual orders.   LIGHT BLUE TOP   GREEN TOP   LAVENDER  [facility]       Mri Lumbar Spine Without Contrast    Result Date: 2/13/2019  Narrative: MRI lumbar spine without contrast on 2/13/2019 CLINICAL INDICATION: Low back pain with numbness and pain that radiates down both legs, bilateral sciatica TECHNIQUE: Multiplanar, multisequence MR images are obtained throughout the lumbar spine without the administration of contrast. This examination was performed on a 1.5 [place] magnet. COMPARISON: Lumbar spine x-ray from 1/18/2019 FINDINGS: There is disc desiccation and degeneration of the lumbar and lower thoracic discs. Degenerative endplate changes are noted from L1 through S1 and at T11-12. Vertebral body hemangioma is noted at L3. Facet arthropathy is noted in the mid to lower lumbar spine. There is grade 1 spondylolisthesis at L3-4 and L4-5 with anterolisthesis of L4 in relation to L3 and anterolisthesis of L5 in relation to L4 secondary to degenerative facet disease. There is a slight leftward curvature of the lumbar spine. Small Schmorl's node formation is noted in the mid to lower lumbar spine. Vertebral body height, alignment and signal intensity is otherwise unremarkable. 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 There is normal signal within the conus which terminates at a normal level. At the L1-2 level, minimal broad-based disc bulge produces no significant canal stenosis or significant foraminal narrowing. At the L2-3 level, minimal broad-based disc bulge and mild facet arthropathy produces minimal canal stenosis and no significant foraminal narrowing. At the L3-4 level, mild broad-based disc bulge combines with ligamentum flavum hypertrophy and facet arthropathy producing mild canal stenosis and slight bilateral foraminal narrowing. At the L4-5 level, broad-based disc osteophyte complex combines with the spondylolisthesis, ligamentum flavum hypertrophy and facet arthropathy producing mild to moderate canal stenosis and mild to moderate bilateral foraminal  narrowing. There is a small right-sided facet joint effusion at this level. At the L5-S1 level, broad-based disc bulge combines with ligamentum flavum hypertrophy and facet arthropathy producing mild canal stenosis and mild bilateral foraminal narrowing.     Impression: Multilevel degenerative disc disease and facet arthropathy producing levels of canal stenosis and foraminal narrowing as above. Electronically signed by:  Louis Mcdonald  2/13/2019 9:17 PM CST Workstation: [id number]    Xr Chest 1 View    Result Date: 3/11/2019  Narrative: Chest single view. CLINICAL INDICATION: Shortness of breath chest pain. COMPARISON: None FINDINGS: Cardiac silhouette is normal in size. Pulmonary vascularity is unremarkable. Midline sternal sutures from prior cardiac surgery. Cardiac valvular prosthesis. No focal infiltrate or consolidation.  No pleural effusion.  No pneumothorax.     Impression: CONCLUSION: Midline sternal sutures from prior cardiac surgery. Cardiac valvular prosthesis. Otherwise unremarkable chest. No evidence of active disease.  Electronically signed by:  Agustín Winters MD  3/11/2019 9:09 AM CDT Workstation: [id number]          Final diagnoses:   Chest pain, rule out acute myocardial infarction            Arcadio Schulz MD  03/11/19 1056

## 2019-03-11 NOTE — CONSULTS
[facility] Cardiology  INPATIENT CONSULT NOTE  Yaneli Bates  78 y.o. female    Reason for the consult: Chest pain      History of present Illness: 78-year-old lady who has history of bioprosthetic valve replacement in May 2014 at [facility] was doing well she started having precordial chest pain radiating to jaw and left shoulder.  She is a diabetic on metformin.  She did not have any obstructive CAD during the cardiac catheterization prior to the valve replacement.  She does have pain in the legs with cramps and had MAURILIO which was unremarkable.  Echo did not show any significant LV dysfunction from before.  An echo is pending today            Allergies:   Allergies   Allergen Reactions   • Clindamycin/Lincomycin Other (See Comments)     Migraine headaches   • Amoxicillin Rash   • Codeine GI Intolerance   • Penicillins Hives   • Sulfa Antibiotics Hives   • Vicodin [Hydrocodone-Acetaminophen] GI Intolerance     vomiting         Past Medical History:   Diagnosis Date   • Allergic    • Arthritis    • Coronary artery disease 2014    valave replacement   • Fibromyalgia, primary    • Headache    • Infectious viral hepatitis    • Low back pain    • Osteoporosis    • Visual impairment          Past Surgical History:   Procedure Laterality Date   • AORTIC VALVE REPAIR/REPLACEMENT  2014,may   • COLONOSCOPY     • KNEE SURGERY Right 1967   • TONSILLECTOMY           Family History   Problem Relation Age of Onset   • Arthritis Mother    • Cancer Mother    • Heart disease Mother    • Hyperlipidemia Mother    • Hypertension Mother    • Vision loss Mother    • Arthritis Father    • Hearing loss Father    • Heart disease Father    • Hyperlipidemia Father    • Hypertension Father    • Vision loss Father    • Hypertension Sister    • Vision loss Sister    • Osteoporosis Sister    • Cataracts Sister    • Hyperlipidemia Brother    • Vision loss Brother    • Heart disease Brother    • Cancer Daughter    •  Early death Daughter    • Osteoporosis Sister    • Parkinsonism Brother    • Hyperlipidemia Brother    • Thyroid disease Daughter          Social History     Socioeconomic History   • Marital status:      Spouse name: Not on file   • Number of children: Not on file   • Years of education: Not on file   • Highest education level: Not on file   Social Needs   • Financial resource strain: Not on file   • Food insecurity - worry: Not on file   • Food insecurity - inability: Not on file   • Transportation needs - medical: Not on file   • Transportation needs - non-medical: Not on file   Occupational History   • Not on file   Tobacco Use   • Smoking status: Never Smoker   • Smokeless tobacco: Never Used   Substance and Sexual Activity   • Alcohol use: No   • Drug use: No   • Sexual activity: No   Other Topics Concern   • Not on file   Social History Narrative   • Not on file         Current Facility-Administered Medications   Medication Dose Route Frequency Provider Last Rate Last Dose   • acetaminophen (TYLENOL) tablet 650 mg  650 mg Oral Q4H PRN Darrell Pyle PA       • dextrose (D50W) 25 g/ 50mL Intravenous Solution 25 g  25 g Intravenous Q15 Min PRN Darrell Pyle PA       • dextrose (GLUTOSE) oral gel 15 g  15 g Oral Q15 Min PRN Darrell Pyle PA       • glucagon (human recombinant) (GLUCAGEN DIAGNOSTIC) injection 1 mg  1 mg Subcutaneous PRN Darrell Pyle PA       • insulin aspart (novoLOG) injection 0-7 Units  0-7 Units Subcutaneous 4x Daily AC & at Bedtime Darrell Pyle PA       • metoprolol succinate XL (TOPROL-XL) 24 hr tablet 50 mg  50 mg Oral Daily With Dinner Darrell Pyle PA       • ondansetron (ZOFRAN) injection 4 mg  4 mg Intravenous Q6H PRN Darrell Pyle PA       • [START ON 3/12/2019] pantoprazole (PROTONIX) injection 40 mg  40 mg Intravenous Q AM Darrell Pyle PA       • sodium chloride 0.9 % flush 10 mL  10 mL Intravenous PRN Arcadio Schulz MD       • sodium  "chloride 0.9 % flush 3 mL  3 mL Intravenous Q12H Darrell Pyle PA       • sodium chloride 0.9 % flush 3-10 mL  3-10 mL Intravenous PRN Darrell Pyle PA       • sodium chloride 0.9 % infusion  75 mL/hr Intravenous Continuous Darrell Pyle PA 75 mL/hr at 03/11/19 1448 75 mL/hr at 03/11/19 1448         Review of Systems:     Constitution:  Denies any fatigue, fever or chills.    HENT:  Denies any headache, hearing impairment.    Eyes:  Denies any blurring of vision, or photophobia.     Cardiovascular:  As per history of present illness.     Respiratory:  Denies any COPD, shortness of breath.     Endocrine:   history of hyperlipidemia, diabetes.       Musculoskeletal:   history of arthritis with cramps in both legs    Gastrointestinal:  No nausea, vomiting, or melena.    Genitourinary:  No dysuria or hematuria.    Neurological:  No history of seizure disorder, stroke, or memory problems.    Psychiatric/Behavioral:  No history of depression, bipolar disorder or schizophrenia.     Hematological:  No history of easy bruising.            OBJECTIVE:    /84 (BP Location: Left arm, Patient Position: Lying)   Pulse 77   Temp 96.8 °F (36 °C) (Oral)   Resp 18   Ht 154.9 cm (61\")   Wt 49.9 kg (110 lb)   LMP 09/25/1996 (Within Months)   SpO2 98%   BMI 20.78 kg/m²       Physical Exam:     Constitutional:  Well developed and nourished, in no acute distress .  Is oriented to person, place, and time.     Skin:  Warm and dry.     Head:  Normocephalic and atraumatic.     Eyes:  Pupils are equal,    Neck:  Neck supple. No bruit in the carotids.      Cardiovascular:  [place] in the fifth intercostal space, regular rate, and rhythm.  S1 greater than S2, prosthetic valve click heard.     Pulmonary/Chest:  Air entry is equal on both sides.  No wheezing or crackles.      Abdominal:  Soft. No hepatosplenomegaly, bowel sounds are present.    Musculoskeletal:  No kyphoscoliosis.    Neurological:  Is alert and oriented to " person, place, and time.    Cranial nerves are intact.  No motor or sensory deficit.    Extremities:  No edema, no radial femoral delay.    Psychiatric:  The patient has a normal mood and affect.  Behavior is normal.        Lab Results (last 24 hours)     Procedure Component Value Units Date/Time    Troponin [[id number]]  (Normal) Collected:  03/11/19 0828    Specimen:  Blood Updated:  03/11/19 0907     Troponin I <0.012 ng/mL     CBC & Differential [[id number]] Collected:  03/11/19 0828    Specimen:  Blood Updated:  03/11/19 0849    Narrative:       The following orders were created for panel order CBC & Differential.  Procedure                               Abnormality         Status                     ---------                               -----------         ------                     CBC Auto Differential[[id number]]        Abnormal            Final result                 Please view results for these tests on the individual orders.    Comprehensive Metabolic Panel [[id number]]  (Abnormal) Collected:  03/11/19 0828    Specimen:  Blood Updated:  03/11/19 0855     Glucose 101 mg/dL      BUN 10 mg/dL      Creatinine 0.44 mg/dL      Sodium 132 mmol/L      Potassium 3.7 mmol/L      Chloride 95 mmol/L      CO2 31.0 mmol/L      Calcium 8.7 mg/dL      Total Protein 9.3 g/dL      Albumin 3.60 g/dL      ALT (SGPT) 11 U/L      AST (SGOT) 54 U/L      Alkaline Phosphatase 75 U/L      Total Bilirubin 0.3 mg/dL      eGFR Non African Amer 138 mL/min/1.73      Globulin 5.7 gm/dL      A/G Ratio 0.6 g/dL      BUN/Creatinine Ratio 22.7     Anion Gap 6.0 mmol/L     Narrative:       The MDRD GFR formula is only valid for adults with stable renal function between ages 18 and 70.    BNP [[id number]]  (Normal) Collected:  03/11/19 0828    Specimen:  Blood Updated:  03/11/19 0907     proBNP 641.0 pg/mL     Narrative:       Among patients with dyspnea, NT-proBNP is highly sensitive for the detection of acute congestive heart failure. In  addition NT-proBNP of <300 pg/ml effectively rules out acute congestive heart failure with 99% negative predictive value.    CBC Auto Differential [[id number]]  (Abnormal) Collected:  03/11/19 0828    Specimen:  Blood Updated:  03/11/19 0849     WBC 8.57 10*3/mm3      RBC 3.09 10*6/mm3      Hemoglobin 8.2 g/dL      Hematocrit 26.9 %      MCV 87.1 fL      MCH 26.5 pg      MCHC 30.5 g/dL      RDW 15.5 %      RDW-SD 49.5 fl      MPV 9.4 fL      Platelets 342 10*3/mm3      Neutrophil % 73.5 %      Lymphocyte % 13.7 %      Monocyte % 9.1 %      Eosinophil % 2.3 %      Basophil % 0.6 %      Immature Grans % 0.8 %      Neutrophils, Absolute 6.30 10*3/mm3      Lymphocytes, Absolute 1.17 10*3/mm3      Monocytes, Absolute 0.78 10*3/mm3      Eosinophils, Absolute 0.20 10*3/mm3      Basophils, Absolute 0.05 10*3/mm3      Immature Grans, Absolute 0.07 10*3/mm3      nRBC 0.0 /100 WBC     Troponin [[id number]]  (Normal) Collected:  03/11/19 1325    Specimen:  Blood Updated:  03/11/19 1403     Troponin I <0.012 ng/mL                  A/P:  Chest pain-history of AVR in 2014, no CAD at that time now with significant anemia with a hemoglobin of 8.2, could be demand ischemia.  We will get a CT coronary angiogram to rule out obstructive CAD tomorrow.    Status post AVR doing okay we will check an echo to assess LV function valvular function    Diabetes on metformin.    Hypertension controlled with Toprol-XL    Anemia will need GI workup to rule out bleeding issues            This document has been electronically signed by Toro Pryor MD on March 11, 2019 3:57 PM           EMR Dragon/Transcription disclaimer:   Some of this note may be an electronic transcription/translation of spoken language to printed text. The electronic translation of spoken language may permit erroneous, or at times, nonsensical words or phrases to be inadvertently transcribed; Although I have reviewed the note for such errors, some may still exist.

## 2019-03-11 NOTE — PLAN OF CARE
Problem: Patient Care Overview  Goal: Plan of Care Review  Outcome: Ongoing (interventions implemented as appropriate)      Problem: Skin Injury Risk (Adult)  Goal: Identify Related Risk Factors and Signs and Symptoms  Outcome: Resolved for upgrade, new template will be applied   03/11/19 7453   Skin Injury Risk (Adult)   Related Risk Factors (Skin Injury Risk) nutritional deficiencies     Goal: Skin Health and Integrity  Outcome: Ongoing (interventions implemented as appropriate)

## 2019-03-11 NOTE — H&P
[facility] Medicine Admission      Date of Admission: 3/11/2019      Primary Care Physician: Elian Pacheco APRN      Chief Complaint: Chest pain    HPI: This 78-year-old  female reported to the emergency department with complaints of chest pressure and pain that radiated into her neck and arms.  Also reports jaw pain.  Denies nausea, vomiting, shortness of air, orthopnea.  Denies fever, chills, aggravating or relieving factors.  Denies dizziness, syncope, presyncope.  Does demonstrate a worsening anemia.  She does report that she has been taking 600 mg of ibuprofen 3 times a day since January.  Reports that her stool does seem darker.    Concurrent Medical History:  has a past medical history of Allergic, Arthritis, Coronary artery disease (2014), Fibromyalgia, primary, Headache, Infectious viral hepatitis, Low back pain, Osteoporosis, and Visual impairment.    Past Surgical History:  has a past surgical history that includes Tonsillectomy; Knee surgery (Right, 1967); Aortic valve replacement (2014,may); and Colonoscopy.    Family History: family history includes Arthritis in her father and mother; Cancer in her daughter and mother; Cataracts in her sister; Early death in her daughter; Hearing loss in her father; Heart disease in her brother, father, and mother; Hyperlipidemia in her brother, brother, father, and mother; Hypertension in her father, mother, and sister; Osteoporosis in her sister and sister; Parkinsonism in her brother; Thyroid disease in her daughter; Vision loss in her brother, father, mother, and sister.     Social History:  reports that  has never smoked. she has never used smokeless tobacco. She reports that she does not drink alcohol or use drugs.    Allergies:   Allergies   Allergen Reactions   • Clindamycin/Lincomycin Other (See Comments)     Migraine headaches   • Amoxicillin Rash   • Codeine GI Intolerance   • Penicillins Hives   •  Sulfa Antibiotics Hives   • Vicodin [Hydrocodone-Acetaminophen] GI Intolerance     vomiting       Medications:   Prior to Admission medications    Medication Sig Start Date End Date Taking? Authorizing Provider   alendronate (FOSAMAX) 70 MG tablet Take 1 tablet by mouth Every 7 (Seven) Days. 11/12/18   Matthew Barclay MD   amitriptyline (ELAVIL) 25 MG tablet Take 2 tablets by mouth Every Night. 9/25/18   Matthew Barclay MD   aspirin 81 MG tablet Take 1 tablet by mouth Daily.    Toro Pryor MD   metFORMIN ER (GLUCOPHAGE-XR) 500 MG 24 hr tablet Take 1 tablet by mouth Daily With Breakfast. 1/31/19   Elian Pacheco APRN   metoprolol succinate XL (TOPROL-XL) 50 MG 24 hr tablet Take 1 tablet by mouth Daily With Dinner. 2/15/19   Toro Pryor MD   Multiple Minerals-Vitamins (CALCIUM-MAGNESIUM-ZINC-D3 PO) Take 1 tablet by mouth Daily.    Provider, MD Klarissa   naproxen (NAPROSYN) 500 MG tablet Take 1 tablet by mouth 2 (Two) Times a Day With Meals. 1/18/19   Elian Pacheco APRN   promethazine (PHENERGAN) 25 MG tablet Take 1 tablet by mouth Every 6 (Six) Hours As Needed for Nausea or Vomiting. 1/3/18   Shante Tellez MD       Review of Systems:  Review of Systems   Constitutional: Negative for activity change, appetite change, chills, diaphoresis, fatigue and fever.   Respiratory: Negative for cough, chest tightness, shortness of breath, wheezing and stridor.    Cardiovascular: Positive for chest pain. Negative for palpitations and leg swelling.   Gastrointestinal: Positive for blood in stool. Negative for abdominal pain, constipation, diarrhea, nausea and vomiting.   Genitourinary: Negative for decreased urine volume, difficulty urinating, dysuria, flank pain and hematuria.   Neurological: Negative for dizziness, syncope, weakness, light-headedness and headaches.   Psychiatric/Behavioral: Negative for confusion.      Otherwise complete ROS is negative except as mentioned  above.    Physical Exam:   Temp:  [97.8 °F (36.6 °C)-98.1 °F (36.7 °C)] 98.1 °F (36.7 °C)  Heart Rate:  [] 102  Resp:  [18] 18  BP: (133-162)/(64-88) 150/80  Physical Exam   Constitutional: She is oriented to person, place, and time. She appears well-developed and well-nourished. No distress.   HENT:   Head: Normocephalic and atraumatic.   Eyes: Conjunctivae are normal. Pupils are equal, round, and reactive to light.   Cardiovascular: Normal rate and regular rhythm.   Pulmonary/Chest: Effort normal and breath sounds normal. No stridor. No respiratory distress.   Abdominal: Soft. Bowel sounds are normal. She exhibits no distension. There is no tenderness.   Musculoskeletal: She exhibits no edema.   Neurological: She is alert and oriented to person, place, and time.   Skin: Skin is warm and dry. Capillary refill takes less than 2 seconds. She is not diaphoretic.   Psychiatric: She has a normal mood and affect. Her behavior is normal.     Results Reviewed:  I have personally reviewed current lab, radiology, and data and agree with results.  Lab Results (last 24 hours)     Procedure Component Value Units Date/Time    Extra Tubes [[id number]] Collected:  03/11/19 1354    Specimen:  Blood, Venous Line Updated:  03/11/19 1424    Narrative:       The following orders were created for panel order Extra Tubes.  Procedure                               Abnormality         Status                     ---------                               -----------         ------                     Light Blue Top[[id number]]                                   In process                 Gold Top - [facility][[id number]]                                   In process                   Please view results for these tests on the individual orders.    Light Blue Top [[id number]] Collected:  03/11/19 1354    Specimen:  Blood Updated:  03/11/19 1424    Gold Top - [facility] [[id number]] Collected:  03/11/19 1354    Specimen:  Blood Updated:  03/11/19 1424     Troponin [[id number]]  (Normal) Collected:  03/11/19 1325    Specimen:  Blood Updated:  03/11/19 1403     Troponin I <0.012 ng/mL     [place] Draw [[id number]] Collected:  03/11/19 0828    Specimen:  Blood Updated:  03/11/19 0931    Narrative:       The following orders were created for panel order [place] Draw.  Procedure                               Abnormality         Status                     ---------                               -----------         ------                     Light Blue Top[[id number]]                                   Final result               Green Top (Gel)[[id number]]                                  Final result               Lavender Top[[id number]]                                     Final result               Gold Top - [facility][[id number]]                                   Final result                 Please view results for these tests on the individual orders.    Light Blue Top [[id number]] Collected:  03/11/19 0828    Specimen:  Blood Updated:  03/11/19 0931     Extra Tube hold for add-on     Comment: Auto resulted       Green Top (Gel) [[id number]] Collected:  03/11/19 0828    Specimen:  Blood Updated:  03/11/19 0931     Extra Tube Hold for add-ons.     Comment: Auto resulted.       Lavender Top [[id number]] Collected:  03/11/19 0828    Specimen:  Blood Updated:  03/11/19 0931     Extra Tube hold for add-on     Comment: Auto resulted       Gold Top - [facility] [[id number]] Collected:  03/11/19 0828    Specimen:  Blood Updated:  03/11/19 0931     Extra Tube Hold for add-ons.     Comment: Auto resulted.       Troponin [[id number]]  (Normal) Collected:  03/11/19 0828    Specimen:  Blood Updated:  03/11/19 0907     Troponin I <0.012 ng/mL     BNP [[id number]]  (Normal) Collected:  03/11/19 0828    Specimen:  Blood Updated:  03/11/19 0907     proBNP 641.0 pg/mL     Narrative:       Among patients with dyspnea, NT-proBNP is highly sensitive for the detection of acute congestive heart failure. In addition  NT-proBNP of <300 pg/ml effectively rules out acute congestive heart failure with 99% negative predictive value.    Comprehensive Metabolic Panel [[id number]]  (Abnormal) Collected:  03/11/19 0828    Specimen:  Blood Updated:  03/11/19 0855     Glucose 101 mg/dL      BUN 10 mg/dL      Creatinine 0.44 mg/dL      Sodium 132 mmol/L      Potassium 3.7 mmol/L      Chloride 95 mmol/L      CO2 31.0 mmol/L      Calcium 8.7 mg/dL      Total Protein 9.3 g/dL      Albumin 3.60 g/dL      ALT (SGPT) 11 U/L      AST (SGOT) 54 U/L      Alkaline Phosphatase 75 U/L      Total Bilirubin 0.3 mg/dL      eGFR Non African Amer 138 mL/min/1.73      Globulin 5.7 gm/dL      A/G Ratio 0.6 g/dL      BUN/Creatinine Ratio 22.7     Anion Gap 6.0 mmol/L     Narrative:       The MDRD GFR formula is only valid for adults with stable renal function between ages 18 and 70.    CBC & Differential [[id number]] Collected:  03/11/19 0828    Specimen:  Blood Updated:  03/11/19 0849    Narrative:       The following orders were created for panel order CBC & Differential.  Procedure                               Abnormality         Status                     ---------                               -----------         ------                     CBC Auto Differential[[id number]]        Abnormal            Final result                 Please view results for these tests on the individual orders.    CBC Auto Differential [[id number]]  (Abnormal) Collected:  03/11/19 0828    Specimen:  Blood Updated:  03/11/19 0849     WBC 8.57 10*3/mm3      RBC 3.09 10*6/mm3      Hemoglobin 8.2 g/dL      Hematocrit 26.9 %      MCV 87.1 fL      MCH 26.5 pg      MCHC 30.5 g/dL      RDW 15.5 %      RDW-SD 49.5 fl      MPV 9.4 fL      Platelets 342 10*3/mm3      Neutrophil % 73.5 %      Lymphocyte % 13.7 %      Monocyte % 9.1 %      Eosinophil % 2.3 %      Basophil % 0.6 %      Immature Grans % 0.8 %      Neutrophils, Absolute 6.30 10*3/mm3      Lymphocytes, Absolute 1.17 10*3/mm3       Monocytes, Absolute 0.78 10*3/mm3      Eosinophils, Absolute 0.20 10*3/mm3      Basophils, Absolute 0.05 10*3/mm3      Immature Grans, Absolute 0.07 10*3/mm3      nRBC 0.0 /100 WBC         Imaging Results (last 24 hours)     Procedure Component Value Units Date/Time    XR Chest 1 View [[id number]] Collected:  03/11/19 0848     Updated:  03/11/19 0910    Narrative:       Chest single view.      CLINICAL INDICATION: Shortness of breath chest pain.    COMPARISON: None    FINDINGS: Cardiac silhouette is normal in size. Pulmonary  vascularity is unremarkable.   Midline sternal sutures from prior cardiac surgery. Cardiac  valvular prosthesis.  No focal infiltrate or consolidation.  No pleural effusion.  No  pneumothorax.      Impression:       CONCLUSION: Midline sternal sutures from prior cardiac surgery.  Cardiac valvular prosthesis. Otherwise unremarkable chest. No  evidence of active disease.       Electronically signed by:  Agustín Winters MD  3/11/2019 9:09 AM CDT  Workstation: [id number]            Assessment:    Active Hospital Problems    Diagnosis   • Chest pain, rule out acute myocardial infarction     Suspected upper GI bleed secondary to NSAID use    Bovine valve, patient denies anticoagulation, follows with Dr. Pryor      Hypertension      Plan: Trend cardiac enzymes; echocardiogram; cardiology consult, Dr. Pryor is agreeable to see; hold NSAIDs, Dr. Schaeffer of gastroenterology has agreed to see; continue to treat his hospital course dictates.            This document has been electronically signed by YU Gutiérrez on March 11, 2019 3:08 PM

## 2019-03-11 NOTE — CONSULTS
SUBJECTIVE:   3/11/2019    Name: Yaneli Bates  DOD: 1940    REASON FOR CONSULT: Patient with anemia.    Chief Complaint:     Chief Complaint   Patient presents with   • Chest Pain       Subjective     Patient is 78 y.o. female who presented to the hospital with weakness and chest pressure being evaluated by cardiology.  Patient has been taking ibuprofen 600 mg 3 times a day and states that her stool has turned black.  Patient was noted to have a decrease in her hemoglobin of approximately 2 g.  Patient has had an upper endoscopy many years ago but has not had one recently and is unsure of what the findings were.  The scope was not done at [facility].  Patient denies any hematemesis.  Patient has not had a recent colonoscopy and is due for one but on an emergency basis patient should undergo upper endoscopy to     ROS/HISTORY/ CURRENT MEDICATIONS/OBJECTIVE/VS/PE:   Review of Systems:   Review of Systems   Constitutional: Negative for activity change, appetite change, chills, diaphoresis, fatigue, fever and unexpected weight change.   HENT: Negative for sore throat and trouble swallowing.    Respiratory: Positive for shortness of breath.    Gastrointestinal: Negative for abdominal distention, abdominal pain, anal bleeding, blood in stool, constipation, diarrhea, nausea, rectal pain and vomiting.   Endocrine: Negative for polydipsia, polyphagia and polyuria.   Genitourinary: Negative for difficulty urinating.   Musculoskeletal: Negative for arthralgias.   Skin: Negative for pallor.   Allergic/Immunologic: Negative for food allergies.   Neurological: Negative for weakness and light-headedness.   Psychiatric/Behavioral: Negative for behavioral problems.       History:     Past Medical History:   Diagnosis Date   • Allergic    • Arthritis    • Coronary artery disease 2014    valave replacement   • Fibromyalgia, primary    • Headache    • Infectious viral hepatitis    • Low back pain    • Osteoporosis    •  Visual impairment      Past Surgical History:   Procedure Laterality Date   • AORTIC VALVE REPAIR/REPLACEMENT  2014,may   • COLONOSCOPY     • KNEE SURGERY Right 1967   • TONSILLECTOMY       Family History   Problem Relation Age of Onset   • Arthritis Mother    • Cancer Mother    • Heart disease Mother    • Hyperlipidemia Mother    • Hypertension Mother    • Vision loss Mother    • Arthritis Father    • Hearing loss Father    • Heart disease Father    • Hyperlipidemia Father    • Hypertension Father    • Vision loss Father    • Hypertension Sister    • Vision loss Sister    • Osteoporosis Sister    • Cataracts Sister    • Hyperlipidemia Brother    • Vision loss Brother    • Heart disease Brother    • Cancer Daughter    • Early death Daughter    • Osteoporosis Sister    • Parkinsonism Brother    • Hyperlipidemia Brother    • Thyroid disease Daughter      Social History     Tobacco Use   • Smoking status: Never Smoker   • Smokeless tobacco: Never Used   Substance Use Topics   • Alcohol use: No   • Drug use: No     Medications Prior to Admission   Medication Sig Dispense Refill Last Dose   • alendronate (FOSAMAX) 70 MG tablet Take 1 tablet by mouth Every 7 (Seven) Days. 12 tablet 1 Taking   • amitriptyline (ELAVIL) 25 MG tablet Take 2 tablets by mouth Every Night. 180 tablet 1 Taking   • aspirin 81 MG tablet Take 1 tablet by mouth Daily. 30 tablet 11    • metFORMIN ER (GLUCOPHAGE-XR) 500 MG 24 hr tablet Take 1 tablet by mouth Daily With Breakfast. 30 tablet 1 Taking   • metoprolol succinate XL (TOPROL-XL) 50 MG 24 hr tablet Take 1 tablet by mouth Daily With Dinner. 90 tablet 5    • Multiple Minerals-Vitamins (CALCIUM-MAGNESIUM-ZINC-D3 PO) Take 1 tablet by mouth Daily.   Taking   • naproxen (NAPROSYN) 500 MG tablet Take 1 tablet by mouth 2 (Two) Times a Day With Meals. 30 tablet 1 Taking   • promethazine (PHENERGAN) 25 MG tablet Take 1 tablet by mouth Every 6 (Six) Hours As Needed for Nausea or Vomiting. 30 tablet 0  Taking     Allergies:  Clindamycin/lincomycin; Amoxicillin; Codeine; Penicillins; Sulfa antibiotics; and Vicodin [hydrocodone-acetaminophen]    I have reviewed the patient's medical history, surgical history and family history in the available medical record system.     Current Medications:     Current Facility-Administered Medications   Medication Dose Route Frequency Provider Last Rate Last Dose   • acetaminophen (TYLENOL) tablet 650 mg  650 mg Oral Q4H PRN Darrell Pyle PA       • dextrose (D50W) 25 g/ 50mL Intravenous Solution 25 g  25 g Intravenous Q15 Min PRN Darrell Pyle PA       • dextrose (GLUTOSE) oral gel 15 g  15 g Oral Q15 Min PRN Darrell Pyle PA       • glucagon (human recombinant) (GLUCAGEN DIAGNOSTIC) injection 1 mg  1 mg Subcutaneous PRN Darrell Pyle PA       • insulin aspart (novoLOG) injection 0-7 Units  0-7 Units Subcutaneous 4x Daily AC & at Bedtime Darrell Pyle PA       • metoprolol succinate XL (TOPROL-XL) 24 hr tablet 50 mg  50 mg Oral Q12H Toro Pryor MD   50 mg at 03/11/19 1745   • ondansetron (ZOFRAN) injection 4 mg  4 mg Intravenous Q6H PRN Darrell Plye PA       • [START ON 3/12/2019] pantoprazole (PROTONIX) injection 40 mg  40 mg Intravenous Q AM Darrell Pyle PA       • sodium chloride 0.9 % flush 10 mL  10 mL Intravenous PRN Arcadio Schulz MD       • sodium chloride 0.9 % flush 3 mL  3 mL Intravenous Q12H Darrell Pyle PA       • sodium chloride 0.9 % flush 3-10 mL  3-10 mL Intravenous PRN Darrell Pyle PA       • sodium chloride 0.9 % infusion  75 mL/hr Intravenous Continuous Darrell Pyle PA 75 mL/hr at 03/11/19 1703 75 mL/hr at 03/11/19 1703       Objective     Physical Exam:   Temp:  [96.8 °F (36 °C)-98.1 °F (36.7 °C)] 96.8 °F (36 °C)  Heart Rate:  [] 92  Resp:  [18] 18  BP: (133-162)/(64-88) 144/84    Physical Exam:  General Appearance:    Alert, cooperative, in no acute distress   Head:    Normocephalic,  without obvious abnormality, atraumatic   Eyes:            Lids and lashes normal, conjunctivae and sclerae normal, no   icterus, no pallor, corneas clear, PERRLA   Ears:    Ears appear intact with no abnormalities noted   Throat:   No oral lesions, no thrush, oral mucosa moist   Neck:   No adenopathy, supple, trachea midline, no thyromegaly, no     carotid bruit, no JVD   Back:     No kyphosis present, no scoliosis present, no skin lesions,       erythema or scars, no tenderness to percussion or                   palpation,   range of motion normal   Lungs:     Clear to auscultation,respirations regular, even and                   unlabored    Heart:    Regular rhythm and normal rate, normal S1 and S2, no            murmur, no gallop, no rub, no click   Breast Exam:    Deferred   Abdomen:     Normal bowel sounds, no masses, no organomegaly, soft        non-tender, non-distended, no guarding, no rebound                 tenderness   Genitalia:    Deferred   Extremities:   Moves all extremities well, no edema, no cyanosis, no              redness   Pulses:   Pulses palpable and equal bilaterally   Skin:   No bleeding, bruising or rash   Lymph nodes:   No palpable adenopathy   Neurologic:   Cranial nerves 2 - 12 grossly intact, sensation intact, DTR        present and equal bilaterally      Results Review:     Lab Results   Component Value Date    WBC 8.57 03/11/2019    WBC 6.21 01/02/2019    WBC 6.16 09/25/2018    HGB 8.2 (L) 03/11/2019    HGB 10.7 (L) 01/02/2019    HGB 11.9 (L) 09/25/2018    HCT 26.9 (L) 03/11/2019    HCT 34.0 (L) 01/02/2019    HCT 36.4 09/25/2018     03/11/2019     01/02/2019     09/25/2018     Results from last 7 days   Lab Units 03/11/19  0828   ALK PHOS U/L 75   ALT (SGPT) U/L 11   AST (SGOT) U/L 54*     Results from last 7 days   Lab Units 03/11/19  0828   BILIRUBIN mg/dL 0.3   ALK PHOS U/L 75     No results found for: LIPASE  No results found for: INR      Radiology  Review:  Imaging Results (last 72 hours)     Procedure Component Value Units Date/Time    XR Chest 1 View [[id number]] Collected:  03/11/19 0848     Updated:  03/11/19 0910    Narrative:       Chest single view.      CLINICAL INDICATION: Shortness of breath chest pain.    COMPARISON: None    FINDINGS: Cardiac silhouette is normal in size. Pulmonary  vascularity is unremarkable.   Midline sternal sutures from prior cardiac surgery. Cardiac  valvular prosthesis.  No focal infiltrate or consolidation.  No pleural effusion.  No  pneumothorax.      Impression:       CONCLUSION: Midline sternal sutures from prior cardiac surgery.  Cardiac valvular prosthesis. Otherwise unremarkable chest. No  evidence of active disease.       Electronically signed by:  Agustín Winters MD  3/11/2019 9:09 AM CDT  Workstation: [id number]          I reviewed the patient's new clinical results.    I reviewed the patient's new imaging results and agree with the interpretation.     ASSESSMENT/PLAN:   ASSESSMENT: Patient with anemia and melena.  Patient with a dramatic decrease in hemoglobin.  Patient will need EGD to evaluate.  Patient states she is scheduled for cardiac evaluation tomorrow so we will schedule patient for EGD to be done on Wednesday.    PLAN: #1 continue to monitor patient's hemoglobin if hemoglobin less than 7 please transfuse 2 units packed RBCs.  #2 patient should be on a proton pump inhibitor.    #3 we will schedule patient for EGD to be done on Wednesday.  The risks, benefits, and alternatives of this procedure have been discussed with the patient or the responsible party. The patient understands and agrees to proceed.         Carlos Schaeffer MD  03/11/19  6:16 PM         This document has been electronically signed by Carlso Schaeffer MD on March 11, 2019 6:16 PM

## 2019-03-12 PROBLEM — D64.9 ANEMIA: Status: ACTIVE | Noted: 2019-01-01

## 2019-03-12 NOTE — PLAN OF CARE
Problem: Patient Care Overview  Goal: Plan of Care Review  Outcome: Ongoing (interventions implemented as appropriate)   03/12/19 6145   Coping/Psychosocial   Plan of Care Reviewed With patient   Plan of Care Review   Progress no change   OTHER   Outcome Summary VSS, plan EGD tomorrow, 2 units of blood given, CTA today, and NPO at midnight. Will continue to monitor.      Goal: Individualization and Mutuality  Outcome: Ongoing (interventions implemented as appropriate)    Goal: Discharge Needs Assessment  Outcome: Ongoing (interventions implemented as appropriate)    Goal: Interprofessional Rounds/Family Conf  Outcome: Ongoing (interventions implemented as appropriate)      Problem: Cardiac: ACS (Acute Coronary Syndrome) (Adult)  Goal: Signs and Symptoms of Listed Potential Problems Will be Absent, Minimized or Managed (Cardiac: ACS)  Outcome: Ongoing (interventions implemented as appropriate)

## 2019-03-12 NOTE — NURSING NOTE
Called Dr. Encarnacion and made him aware of patient's heart rate and that the patient's heart rate needed to be less than 60 for the CTA. He said to wait till cardiology arrives this morning.

## 2019-03-12 NOTE — H&P (VIEW-ONLY)
SUBJECTIVE:   3/12/2019  Chief Complaint:     Subjective      Patient is 78 y.o. female who states she feels well.  Patient denies any abdominal pain nausea vomiting.  Patient's anemia has worsened slightly since yesterday.  Patient is denying any blood in the stool or melena at this time.     CURRENT MEDICATIONS/OBJECTIVE/VS/PE:     Current Medications:     Current Facility-Administered Medications   Medication Dose Route Frequency Provider Last Rate Last Dose   • acetaminophen (TYLENOL) tablet 650 mg  650 mg Oral Q4H PRN Darrell Pyle PA   650 mg at 03/11/19 2115   • cefTRIAXone (ROCEPHIN) 1 g/100 mL 0.9% NS (MBP)  1 g Intravenous Q24H Darrell Pyle PA       • dextrose (D50W) 25 g/ 50mL Intravenous Solution 25 g  25 g Intravenous Q15 Min PRN Darrell Pyle PA       • dextrose (GLUTOSE) oral gel 15 g  15 g Oral Q15 Min PRN Darrell Pyle PA       • furosemide (LASIX) injection 20 mg  20 mg Intravenous Once Darrell Pyle PA       • glucagon (human recombinant) (GLUCAGEN DIAGNOSTIC) injection 1 mg  1 mg Subcutaneous PRN Darrell Pyle PA       • insulin aspart (novoLOG) injection 0-7 Units  0-7 Units Subcutaneous 4x Daily AC & at Bedtime Darrell Pyle PA       • metoprolol succinate XL (TOPROL-XL) 24 hr tablet 50 mg  50 mg Oral Q12H Toro Pryor MD   50 mg at 03/12/19 0816   • metoprolol tartrate (LOPRESSOR) injection 5 mg  5 mg Intravenous Q5 Min PRN Darrell Pyle PA   5 mg at 03/12/19 1027   • ondansetron (ZOFRAN) injection 4 mg  4 mg Intravenous Q6H PRN Darrell Pyle PA       • pantoprazole (PROTONIX) injection 40 mg  40 mg Intravenous Q AM Darrell Pyle PA   40 mg at 03/12/19 0602   • potassium chloride (MICRO-K) CR capsule 40 mEq  40 mEq Oral BID With Meals Darrell Pyle PA       • sodium chloride 0.9 % flush 10 mL  10 mL Intravenous PRN Arcadio Schulz MD       • sodium chloride 0.9 % flush 3 mL  3 mL Intravenous Q12H Darrell Pyle, PA          • sodium chloride 0.9 % flush 3-10 mL  3-10 mL Intravenous PRN Darrell Pyle PA       • sodium chloride 0.9 % infusion  75 mL/hr Intravenous Continuous Darrell Pyle PA 75 mL/hr at 03/12/19 1209 75 mL/hr at 03/12/19 1209       Objective     Physical Exam:   Temp:  [96.1 °F (35.6 °C)-100.8 °F (38.2 °C)] 96.5 °F (35.8 °C)  Heart Rate:  [] 72  Resp:  [16-18] 18  BP: (106-144)/(58-84) 130/58     Physical Exam:  General Appearance:    Alert, cooperative, in no acute distress   Head:    Normocephalic, without obvious abnormality, atraumatic   Eyes:            Lids and lashes normal, conjunctivae and sclerae normal, no   icterus, no pallor, corneas clear, PERRLA   Ears:    Ears appear intact with no abnormalities noted   Throat:   No oral lesions, no thrush, oral mucosa moist   Neck:   No adenopathy, supple, trachea midline, no thyromegaly, no     carotid bruit, no JVD   Back:     No kyphosis present, no scoliosis present, no skin lesions,       erythema or scars, no tenderness to percussion or                   palpation,   range of motion normal   Lungs:     Clear to auscultation,respirations regular, even and                   unlabored    Heart:    Regular rhythm and normal rate, normal S1 and S2, no            murmur, no gallop, no rub, no click   Breast Exam:    Deferred   Abdomen:     Normal bowel sounds, no masses, no organomegaly, soft        non-tender, non-distended, no guarding, no rebound                 tenderness   Genitalia:    Deferred   Extremities:   Moves all extremities well, no edema, no cyanosis, no              redness   Pulses:   Pulses palpable and equal bilaterally   Skin:   No bleeding, bruising or rash   Lymph nodes:   No palpable adenopathy   Neurologic:   Cranial nerves 2 - 12 grossly intact, sensation intact, DTR        present and equal bilaterally      Results Review:     Lab Results (last 24 hours)     Procedure Component Value Units Date/Time    Blood Culture - Blood,  Arm, Left [[id number]] Collected:  03/11/19 2348    Specimen:  Blood from Arm, Left Updated:  03/12/19 1200     Blood Culture No growth at less than 24 hours    POC Glucose Once [[id number]]  (Normal) Collected:  03/12/19 1114    Specimen:  Blood Updated:  03/12/19 1149     Glucose 78 mg/dL      Comment: : [id number] TIREY ANGELITAMeter ID: [id number]       POC Glucose Once [[id number]]  (Normal) Collected:  03/12/19 0706    Specimen:  Blood Updated:  03/12/19 0723     Glucose 90 mg/dL      Comment: : [id number] TIREY ANGELITAMeter ID: [id number]       Lipid Panel [[id number]]  (Abnormal) Collected:  03/12/19 0535    Specimen:  Blood Updated:  03/12/19 0714     Total Cholesterol 113 mg/dL      Triglycerides 48 mg/dL      HDL Cholesterol 22 mg/dL      LDL Cholesterol  68 mg/dL      LDL/HDL Ratio 3.70    Comprehensive Metabolic Panel [[id number]]  (Abnormal) Collected:  03/12/19 0535    Specimen:  Blood Updated:  03/12/19 0704     Glucose 101 mg/dL      BUN 13 mg/dL      Creatinine 0.34 mg/dL      Sodium 130 mmol/L      Potassium 3.4 mmol/L      Chloride 98 mmol/L      CO2 27.0 mmol/L      Calcium 8.0 mg/dL      Total Protein 7.7 g/dL      Albumin 2.80 g/dL      ALT (SGPT) 14 U/L      AST (SGOT) 72 U/L      Alkaline Phosphatase 55 U/L      Total Bilirubin 0.2 mg/dL      eGFR Non African Amer 186 mL/min/1.73      Globulin 4.9 gm/dL      A/G Ratio 0.6 g/dL      BUN/Creatinine Ratio 38.2     Anion Gap 5.0 mmol/L     Narrative:       The MDRD GFR formula is only valid for adults with stable renal function between ages 18 and 70.    Magnesium [[id number]]  (Normal) Collected:  03/12/19 0535    Specimen:  Blood Updated:  03/12/19 0704     Magnesium 1.9 mg/dL     Troponin [[id number]]  (Normal) Collected:  03/12/19 0535    Specimen:  Blood Updated:  03/12/19 0703     Troponin I <0.012 ng/mL     CBC Auto Differential [[id number]]  (Abnormal) Collected:  03/12/19 0535    Specimen:  Blood Updated:  03/12/19 0646      WBC 10.02 10*3/mm3      RBC 2.55 10*6/mm3      Hemoglobin 7.0 g/dL      Hematocrit 21.9 %      MCV 85.9 fL      MCH 27.5 pg      MCHC 32.0 g/dL      RDW 15.6 %      RDW-SD 49.0 fl      MPV 9.7 fL      Platelets 310 10*3/mm3      Neutrophil % 72.0 %      Lymphocyte % 15.8 %      Monocyte % 9.3 %      Eosinophil % 1.7 %      Basophil % 0.4 %      Immature Grans % 0.8 %      Neutrophils, Absolute 7.22 10*3/mm3      Lymphocytes, Absolute 1.58 10*3/mm3      Monocytes, Absolute 0.93 10*3/mm3      Eosinophils, Absolute 0.17 10*3/mm3      Basophils, Absolute 0.04 10*3/mm3      Immature Grans, Absolute 0.08 10*3/mm3      nRBC 0.0 /100 WBC     Urine Culture - Urine, Urine, Clean Catch [[id number]]  (Normal) Collected:  03/11/19 2327    Specimen:  Urine, Clean Catch Updated:  03/12/19 0602     Urine Culture Culture in progress    Troponin [[id number]]  (Normal) Collected:  03/12/19 0314    Specimen:  Blood Updated:  03/12/19 0351     Troponin I 0.012 ng/mL     POC Glucose Once [[id number]]  (Normal) Collected:  03/11/19 1642    Specimen:  Blood Updated:  03/12/19 0329     Glucose 107 mg/dL      Comment: : [id number] ROSABEBETO AMBROCIOLINMeter ID: [id number]       Blood Culture - Blood, Arm, Left [[id number]] Collected:  03/12/19 0314    Specimen:  Blood from Arm, Left Updated:  03/12/19 0321    Hemoglobin A1c [[id number]]  (Abnormal) Collected:  03/11/19 0828    Specimen:  Blood Updated:  03/12/19 0317     Hemoglobin A1C 6.1 %     Respiratory Panel, PCR - Swab, Nasopharynx [[id number]]  (Normal) Collected:  03/11/19 2329    Specimen:  Swab from Nasopharynx Updated:  03/12/19 0210     ADENOVIRUS, PCR Not Detected     Coronavirus 229E Not Detected     Coronavirus HKU1 Not Detected     Coronavirus NL63 Not Detected     Coronavirus OC43 Not Detected     Human Metapneumovirus Not Detected     Human Rhinovirus/Enterovirus Not Detected     Influenza B PCR Not Detected     Parainfluenza Virus 1 Not Detected     Parainfluenza Virus 2 Not  Detected     Parainfluenza Virus 3 Not Detected     Parainfluenza Virus 4 Not Detected     Bordetella pertussis pcr Not Detected     Influenza A H1 2009 PCR Not Detected     Chlamydophila pneumoniae PCR Not Detected     Mycoplasma pneumo by PCR Not Detected     Influenza A PCR Not Detected     Influenza A H3 Not Detected     Influenza A H1 Not Detected     RSV, PCR Not Detected    Basic Metabolic Panel [[id number]]  (Abnormal) Collected:  03/11/19 2348    Specimen:  Blood Updated:  03/12/19 0021     Glucose 121 mg/dL      BUN 14 mg/dL      Creatinine 0.42 mg/dL      Sodium 127 mmol/L      Potassium 3.6 mmol/L      Chloride 95 mmol/L      CO2 26.0 mmol/L      Calcium 8.2 mg/dL      eGFR Non African Amer 146 mL/min/1.73      BUN/Creatinine Ratio 33.3     Anion Gap 6.0 mmol/L     Narrative:       The MDRD GFR formula is only valid for adults with stable renal function between ages 18 and 70.    Magnesium [[id number]]  (Normal) Collected:  03/11/19 2348    Specimen:  Blood Updated:  03/12/19 0017     Magnesium 1.7 mg/dL     Urinalysis, Microscopic Only - Urine, Clean Catch [[id number]]  (Abnormal) Collected:  03/11/19 2327    Specimen:  Urine, Clean Catch Updated:  03/12/19 0015     RBC, UA 13-20 /HPF      WBC, UA 6-12 /HPF      Bacteria, UA Trace /HPF      Squamous Epithelial Cells, UA 0-2 /HPF      Hyaline Casts, UA 7-12 /LPF      Methodology Automated Microscopy    Urinalysis With Culture If Indicated - Urine, Clean Catch [[id number]]  (Abnormal) Collected:  03/11/19 2327    Specimen:  Urine, Clean Catch Updated:  03/11/19 2346     Color, UA Yellow     Appearance, UA Clear     pH, UA 6.5     Specific Gravity, UA 1.016     Glucose, UA Negative     Ketones, UA Negative     Bilirubin, UA Negative     Blood, UA Large (3+)     Protein, UA Trace     Leuk Esterase, UA Small (1+)     Nitrite, UA Negative     Urobilinogen, UA 0.2 E.U./dL    Vitamin B12 [[id number]]  (Normal) Collected:  03/11/19 0828    Specimen:  Blood Updated:   03/11/19 2253     Vitamin B-12 754 pg/mL     Folate [[id number]]  (Normal) Collected:  03/11/19 0828    Specimen:  Blood Updated:  03/11/19 2253     Folate 18.30 ng/mL     TSH [[id number]]  (Normal) Collected:  03/11/19 0828    Specimen:  Blood Updated:  03/11/19 2218     TSH 2.280 mIU/mL     Iron Profile [[id number]]  (Abnormal) Collected:  03/11/19 0828    Specimen:  Blood Updated:  03/11/19 2154     Iron 26 mcg/dL      TIBC 196 mcg/dL      Iron Saturation 13 %     Troponin [[id number]]  (Normal) Collected:  03/11/19 2002    Specimen:  Blood Updated:  03/11/19 2118     Troponin I <0.012 ng/mL     POC Glucose Once [[id number]]  (Normal) Collected:  03/11/19 1945    Specimen:  Blood Updated:  03/11/19 2003     Glucose 125 mg/dL      Comment: RN NotifiedOperator: [id number] [facility] ID: [id number]       Extra Tubes [[id number]] Collected:  03/11/19 1354    Specimen:  Blood, Venous Line Updated:  03/11/19 1501    Narrative:       The following orders were created for panel order Extra Tubes.  Procedure                               Abnormality         Status                     ---------                               -----------         ------                     Light Blue Top[[id number]]                                   Final result               Gold Top - SST[[id number]]                                   Final result                 Please view results for these tests on the individual orders.    Light Blue Top [[id number]] Collected:  03/11/19 1354    Specimen:  Blood Updated:  03/11/19 1501     Extra Tube hold for add-on     Comment: Auto resulted       Gold Top - SST [[id number]] Collected:  03/11/19 1354    Specimen:  Blood Updated:  03/11/19 1501     Extra Tube Hold for add-ons.     Comment: Auto resulted.       Troponin [[id number]]  (Normal) Collected:  03/11/19 1325    Specimen:  Blood Updated:  03/11/19 1403     Troponin I <0.012 ng/mL            I reviewed the patient's new clinical results.  I  reviewed the patient's new imaging results and agree with the interpretation.     ASSESSMENT/PLAN:   ASSESSMENT: Patient with anemia possibly secondary to upper GI bleed.  Patient is no longer having chest pain is tolerating food at this time well.  Patient is to receive 1 unit of packed RBCs.  Patient is scheduled for EGD to be done tomorrow.    PLAN: #1 continue to monitor patient's hemoglobin if hemoglobin less than 7 please transfuse 2 units packed RBCs.  #2 we will patient is scheduled for EGD to be done tomorrow.  #3 continue patient on proton pump inhibitor.  The risks, benefits, and alternatives of this procedure have been discussed with the patient or the responsible party- the patient understands and agrees to proceed.         Carlos Schaeffer MD  03/12/19  1:55 PM           This document has been electronically signed by Carlos Schaeffer MD on March 12, 2019 1:55 PM

## 2019-03-12 NOTE — PLAN OF CARE
Problem: Patient Care Overview  Goal: Plan of Care Review  Outcome: Ongoing (interventions implemented as appropriate)   03/12/19 0355   Coping/Psychosocial   Plan of Care Reviewed With patient   Plan of Care Review   Progress no change   OTHER   Outcome Summary VSS, NPO, afib controlled now, resting throughout the night, continue to monitor     Goal: Individualization and Mutuality  Outcome: Ongoing (interventions implemented as appropriate)      Problem: Cardiac: ACS (Acute Coronary Syndrome) (Adult)  Goal: Signs and Symptoms of Listed Potential Problems Will be Absent, Minimized or Managed (Cardiac: ACS)  Outcome: Ongoing (interventions implemented as appropriate)

## 2019-03-12 NOTE — PROGRESS NOTES
[facility] Services  INPATIENT PROGRESS NOTE    Length of Stay: 0  Date of Admission: 3/11/2019  Primary Care Physician: Elian Pacheco APRN    Subjective   Chief Complaint/HPI: This 78-year-old  female reported to the emergency department complaints of chest pain.  Patient also demonstrated a worsening anemia and a history of taking 600 mg of ibuprofen 3 times a day since January.  Patient has undergone CTA of the coronary arteries today, results pending.  Will have an EGD tomorrow per gastroenterology note.  Hemoglobin has now dropped to 7 and patient will receive blood transfusion.  Patient has been advised of the risks and benefits of blood transfusion.  She is agreeable.  Also had a fever overnight, respiratory viruses panel negative, blood cultures pending, patient on ceftriaxone at this time for possible hemorrhagic cystitis.    Review of Systems   Constitutional: Positive for fatigue. Negative for chills, diaphoresis and fever.   Respiratory: Negative for shortness of breath.    Cardiovascular: Negative for chest pain.   Gastrointestinal: Negative for abdominal pain, nausea and vomiting.   Genitourinary: Negative for dysuria and hematuria.   Neurological: Negative for dizziness and light-headedness.      All pertinent negatives and positives are as above. All other systems have been reviewed and are negative unless otherwise stated.     Objective    Temp:  [96.1 °F (35.6 °C)-100.8 °F (38.2 °C)] 96.5 °F (35.8 °C)  Heart Rate:  [] 72  Resp:  [16-18] 18  BP: (106-150)/(58-84) 130/58    Physical Exam   Constitutional: She is oriented to person, place, and time. She appears well-developed and well-nourished. No distress.   HENT:   Head: Normocephalic and atraumatic.   Cardiovascular: Normal rate and regular rhythm.   Pulmonary/Chest: Effort normal and breath sounds normal. No stridor. No respiratory distress.   Abdominal: Soft. Bowel sounds are  normal. She exhibits no distension. There is no tenderness.   Musculoskeletal: She exhibits no edema.   Neurological: She is alert and oriented to person, place, and time.   Skin: Skin is warm and dry. She is not diaphoretic. There is pallor.     Results Review:  I have reviewed the labs, radiology results, and diagnostic studies.    Laboratory Data:   Results from last 7 days   Lab Units 03/12/19  0535 03/11/19  2348 03/11/19  0828   SODIUM mmol/L 130* 127* 132*   POTASSIUM mmol/L 3.4* 3.6 3.7   CHLORIDE mmol/L 98 95 95   CO2 mmol/L 27.0 26.0 31.0   BUN mg/dL 13 14 10   CREATININE mg/dL 0.34* 0.42* 0.44*   GLUCOSE mg/dL 101* 121* 101*   CALCIUM mg/dL 8.0* 8.2* 8.7   BILIRUBIN mg/dL 0.2  --  0.3   ALK PHOS U/L 55  --  75   ALT (SGPT) U/L 14  --  11   AST (SGOT) U/L 72*  --  54*   ANION GAP mmol/L 5.0 6.0 6.0     Estimated Creatinine Clearance: 47.8 mL/min (A) (by C-G formula based on SCr of 0.34 mg/dL (L)).  Results from last 7 days   Lab Units 03/12/19  0535 03/11/19  2348   MAGNESIUM mg/dL 1.9 1.7         Results from last 7 days   Lab Units 03/12/19  0535 03/11/19  0828   WBC 10*3/mm3 10.02 8.57   HEMOGLOBIN g/dL 7.0* 8.2*   HEMATOCRIT % 21.9* 26.9*   PLATELETS 10*3/mm3 310 342           Culture Data:   Blood Culture   Date Value Ref Range Status   03/11/2019 No growth at less than 24 hours  Preliminary     Urine Culture   Date Value Ref Range Status   03/11/2019 Culture in progress  Preliminary     No results found for: RESPCX  No results found for: WOUNDCX  No results found for: STOOLCX  No components found for: BODYFLD    Radiology Data:   Imaging Results (last 24 hours)     Procedure Component Value Units Date/Time    CT Angiogram Coronary [[id number]] Updated:  03/12/19 1057          I have reviewed the patient's current medications.     Assessment/Plan     Active Hospital Problems    Diagnosis   • **Anemia     Added automatically from request for surgery [id number]     • Chest pain, rule out acute myocardial  infarction       Plan: Transfuse 2 units of packed red blood cells, awaiting CTA of the coronary arteries, EGD tomorrow, ceftriaxone for now while following possible acute hemorrhagic cystitis, follow cultures, replete potassium, continue as hospital course dictates.                This document has been electronically signed by YU Gutiérrez on March 12, 2019 12:40 PM

## 2019-03-12 NOTE — PROGRESS NOTES
SUBJECTIVE:   3/12/2019  Chief Complaint:     Subjective      Patient is 78 y.o. female who states she feels well.  Patient denies any abdominal pain nausea vomiting.  Patient's anemia has worsened slightly since yesterday.  Patient is denying any blood in the stool or melena at this time.     CURRENT MEDICATIONS/OBJECTIVE/VS/PE:     Current Medications:     Current Facility-Administered Medications   Medication Dose Route Frequency Provider Last Rate Last Dose   • acetaminophen (TYLENOL) tablet 650 mg  650 mg Oral Q4H PRN Darrell Pyle PA   650 mg at 03/11/19 2115   • cefTRIAXone (ROCEPHIN) 1 g/100 mL 0.9% NS (MBP)  1 g Intravenous Q24H Darrell Pyle PA       • dextrose (D50W) 25 g/ 50mL Intravenous Solution 25 g  25 g Intravenous Q15 Min PRN Darrell Pyle PA       • dextrose (GLUTOSE) oral gel 15 g  15 g Oral Q15 Min PRN Darrell Pyle PA       • furosemide (LASIX) injection 20 mg  20 mg Intravenous Once Darrell Pyle PA       • glucagon (human recombinant) (GLUCAGEN DIAGNOSTIC) injection 1 mg  1 mg Subcutaneous PRN Darrell Pyle PA       • insulin aspart (novoLOG) injection 0-7 Units  0-7 Units Subcutaneous 4x Daily AC & at Bedtime Darrell Pyle PA       • metoprolol succinate XL (TOPROL-XL) 24 hr tablet 50 mg  50 mg Oral Q12H Toro Pryor MD   50 mg at 03/12/19 0816   • metoprolol tartrate (LOPRESSOR) injection 5 mg  5 mg Intravenous Q5 Min PRN Darrell Pyle PA   5 mg at 03/12/19 1027   • ondansetron (ZOFRAN) injection 4 mg  4 mg Intravenous Q6H PRN Darrell Pyle PA       • pantoprazole (PROTONIX) injection 40 mg  40 mg Intravenous Q AM Darrell Pyle PA   40 mg at 03/12/19 0602   • potassium chloride (MICRO-K) CR capsule 40 mEq  40 mEq Oral BID With Meals Darrell Pyle PA       • sodium chloride 0.9 % flush 10 mL  10 mL Intravenous PRN Arcadio Schulz MD       • sodium chloride 0.9 % flush 3 mL  3 mL Intravenous Q12H Darrell Pyle, PA        • sodium chloride 0.9 % flush 3-10 mL  3-10 mL Intravenous PRN Darrell Pyle PA       • sodium chloride 0.9 % infusion  75 mL/hr Intravenous Continuous Darrell Pyle PA 75 mL/hr at 03/12/19 1209 75 mL/hr at 03/12/19 1209       Objective     Physical Exam:   Temp:  [96.1 °F (35.6 °C)-100.8 °F (38.2 °C)] 96.5 °F (35.8 °C)  Heart Rate:  [] 72  Resp:  [16-18] 18  BP: (106-144)/(58-84) 130/58     Physical Exam:  General Appearance:    Alert, cooperative, in no acute distress   Head:    Normocephalic, without obvious abnormality, atraumatic   Eyes:            Lids and lashes normal, conjunctivae and sclerae normal, no   icterus, no pallor, corneas clear, PERRLA   Ears:    Ears appear intact with no abnormalities noted   Throat:   No oral lesions, no thrush, oral mucosa moist   Neck:   No adenopathy, supple, trachea midline, no thyromegaly, no     carotid bruit, no JVD   Back:     No kyphosis present, no scoliosis present, no skin lesions,       erythema or scars, no tenderness to percussion or                   palpation,   range of motion normal   Lungs:     Clear to auscultation,respirations regular, even and                   unlabored    Heart:    Regular rhythm and normal rate, normal S1 and S2, no            murmur, no gallop, no rub, no click   Breast Exam:    Deferred   Abdomen:     Normal bowel sounds, no masses, no organomegaly, soft        non-tender, non-distended, no guarding, no rebound                 tenderness   Genitalia:    Deferred   Extremities:   Moves all extremities well, no edema, no cyanosis, no              redness   Pulses:   Pulses palpable and equal bilaterally   Skin:   No bleeding, bruising or rash   Lymph nodes:   No palpable adenopathy   Neurologic:   Cranial nerves 2 - 12 grossly intact, sensation intact, DTR        present and equal bilaterally      Results Review:     Lab Results (last 24 hours)     Procedure Component Value Units Date/Time    Blood Culture - Blood,  Arm, Left [[id number]] Collected:  03/11/19 2348    Specimen:  Blood from Arm, Left Updated:  03/12/19 1200     Blood Culture No growth at less than 24 hours    POC Glucose Once [[id number]]  (Normal) Collected:  03/12/19 1114    Specimen:  Blood Updated:  03/12/19 1149     Glucose 78 mg/dL      Comment: : [id number] TIREY ANGELITAMeter ID: [id number]       POC Glucose Once [[id number]]  (Normal) Collected:  03/12/19 0706    Specimen:  Blood Updated:  03/12/19 0723     Glucose 90 mg/dL      Comment: : [id number] TIREY ANGELITAMeter ID: [id number]       Lipid Panel [[id number]]  (Abnormal) Collected:  03/12/19 0535    Specimen:  Blood Updated:  03/12/19 0714     Total Cholesterol 113 mg/dL      Triglycerides 48 mg/dL      HDL Cholesterol 22 mg/dL      LDL Cholesterol  68 mg/dL      LDL/HDL Ratio 3.70    Comprehensive Metabolic Panel [[id number]]  (Abnormal) Collected:  03/12/19 0535    Specimen:  Blood Updated:  03/12/19 0704     Glucose 101 mg/dL      BUN 13 mg/dL      Creatinine 0.34 mg/dL      Sodium 130 mmol/L      Potassium 3.4 mmol/L      Chloride 98 mmol/L      CO2 27.0 mmol/L      Calcium 8.0 mg/dL      Total Protein 7.7 g/dL      Albumin 2.80 g/dL      ALT (SGPT) 14 U/L      AST (SGOT) 72 U/L      Alkaline Phosphatase 55 U/L      Total Bilirubin 0.2 mg/dL      eGFR Non African Amer 186 mL/min/1.73      Globulin 4.9 gm/dL      A/G Ratio 0.6 g/dL      BUN/Creatinine Ratio 38.2     Anion Gap 5.0 mmol/L     Narrative:       The MDRD GFR formula is only valid for adults with stable renal function between ages 18 and 70.    Magnesium [[id number]]  (Normal) Collected:  03/12/19 0535    Specimen:  Blood Updated:  03/12/19 0704     Magnesium 1.9 mg/dL     Troponin [[id number]]  (Normal) Collected:  03/12/19 0535    Specimen:  Blood Updated:  03/12/19 0703     Troponin I <0.012 ng/mL     CBC Auto Differential [[id number]]  (Abnormal) Collected:  03/12/19 0535    Specimen:  Blood Updated:  03/12/19 0646      WBC 10.02 10*3/mm3      RBC 2.55 10*6/mm3      Hemoglobin 7.0 g/dL      Hematocrit 21.9 %      MCV 85.9 fL      MCH 27.5 pg      MCHC 32.0 g/dL      RDW 15.6 %      RDW-SD 49.0 fl      MPV 9.7 fL      Platelets 310 10*3/mm3      Neutrophil % 72.0 %      Lymphocyte % 15.8 %      Monocyte % 9.3 %      Eosinophil % 1.7 %      Basophil % 0.4 %      Immature Grans % 0.8 %      Neutrophils, Absolute 7.22 10*3/mm3      Lymphocytes, Absolute 1.58 10*3/mm3      Monocytes, Absolute 0.93 10*3/mm3      Eosinophils, Absolute 0.17 10*3/mm3      Basophils, Absolute 0.04 10*3/mm3      Immature Grans, Absolute 0.08 10*3/mm3      nRBC 0.0 /100 WBC     Urine Culture - Urine, Urine, Clean Catch [[id number]]  (Normal) Collected:  03/11/19 2327    Specimen:  Urine, Clean Catch Updated:  03/12/19 0602     Urine Culture Culture in progress    Troponin [[id number]]  (Normal) Collected:  03/12/19 0314    Specimen:  Blood Updated:  03/12/19 0351     Troponin I 0.012 ng/mL     POC Glucose Once [[id number]]  (Normal) Collected:  03/11/19 1642    Specimen:  Blood Updated:  03/12/19 0329     Glucose 107 mg/dL      Comment: : [id number] ROSABEBETO AMBROCIOLINMeter ID: [id number]       Blood Culture - Blood, Arm, Left [[id number]] Collected:  03/12/19 0314    Specimen:  Blood from Arm, Left Updated:  03/12/19 0321    Hemoglobin A1c [[id number]]  (Abnormal) Collected:  03/11/19 0828    Specimen:  Blood Updated:  03/12/19 0317     Hemoglobin A1C 6.1 %     Respiratory Panel, PCR - Swab, Nasopharynx [[id number]]  (Normal) Collected:  03/11/19 2329    Specimen:  Swab from Nasopharynx Updated:  03/12/19 0210     ADENOVIRUS, PCR Not Detected     Coronavirus 229E Not Detected     Coronavirus HKU1 Not Detected     Coronavirus NL63 Not Detected     Coronavirus OC43 Not Detected     Human Metapneumovirus Not Detected     Human Rhinovirus/Enterovirus Not Detected     Influenza B PCR Not Detected     Parainfluenza Virus 1 Not Detected     Parainfluenza Virus 2 Not  Detected     Parainfluenza Virus 3 Not Detected     Parainfluenza Virus 4 Not Detected     Bordetella pertussis pcr Not Detected     Influenza A H1 2009 PCR Not Detected     Chlamydophila pneumoniae PCR Not Detected     Mycoplasma pneumo by PCR Not Detected     Influenza A PCR Not Detected     Influenza A H3 Not Detected     Influenza A H1 Not Detected     RSV, PCR Not Detected    Basic Metabolic Panel [[id number]]  (Abnormal) Collected:  03/11/19 2348    Specimen:  Blood Updated:  03/12/19 0021     Glucose 121 mg/dL      BUN 14 mg/dL      Creatinine 0.42 mg/dL      Sodium 127 mmol/L      Potassium 3.6 mmol/L      Chloride 95 mmol/L      CO2 26.0 mmol/L      Calcium 8.2 mg/dL      eGFR Non African Amer 146 mL/min/1.73      BUN/Creatinine Ratio 33.3     Anion Gap 6.0 mmol/L     Narrative:       The MDRD GFR formula is only valid for adults with stable renal function between ages 18 and 70.    Magnesium [[id number]]  (Normal) Collected:  03/11/19 2348    Specimen:  Blood Updated:  03/12/19 0017     Magnesium 1.7 mg/dL     Urinalysis, Microscopic Only - Urine, Clean Catch [[id number]]  (Abnormal) Collected:  03/11/19 2327    Specimen:  Urine, Clean Catch Updated:  03/12/19 0015     RBC, UA 13-20 /HPF      WBC, UA 6-12 /HPF      Bacteria, UA Trace /HPF      Squamous Epithelial Cells, UA 0-2 /HPF      Hyaline Casts, UA 7-12 /LPF      Methodology Automated Microscopy    Urinalysis With Culture If Indicated - Urine, Clean Catch [[id number]]  (Abnormal) Collected:  03/11/19 2327    Specimen:  Urine, Clean Catch Updated:  03/11/19 2346     Color, UA Yellow     Appearance, UA Clear     pH, UA 6.5     Specific Gravity, UA 1.016     Glucose, UA Negative     Ketones, UA Negative     Bilirubin, UA Negative     Blood, UA Large (3+)     Protein, UA Trace     Leuk Esterase, UA Small (1+)     Nitrite, UA Negative     Urobilinogen, UA 0.2 E.U./dL    Vitamin B12 [[id number]]  (Normal) Collected:  03/11/19 0828    Specimen:  Blood Updated:   03/11/19 2253     Vitamin B-12 754 pg/mL     Folate [[id number]]  (Normal) Collected:  03/11/19 0828    Specimen:  Blood Updated:  03/11/19 2253     Folate 18.30 ng/mL     TSH [[id number]]  (Normal) Collected:  03/11/19 0828    Specimen:  Blood Updated:  03/11/19 2218     TSH 2.280 mIU/mL     Iron Profile [[id number]]  (Abnormal) Collected:  03/11/19 0828    Specimen:  Blood Updated:  03/11/19 2154     Iron 26 mcg/dL      TIBC 196 mcg/dL      Iron Saturation 13 %     Troponin [[id number]]  (Normal) Collected:  03/11/19 2002    Specimen:  Blood Updated:  03/11/19 2118     Troponin I <0.012 ng/mL     POC Glucose Once [[id number]]  (Normal) Collected:  03/11/19 1945    Specimen:  Blood Updated:  03/11/19 2003     Glucose 125 mg/dL      Comment: RN NotifiedOperator: [id number] [facility] [id number]       Extra Tubes [[id number]] Collected:  03/11/19 1354    Specimen:  Blood, Venous Line Updated:  03/11/19 1501    Narrative:       The following orders were created for panel order Extra Tubes.  Procedure                               Abnormality         Status                     ---------                               -----------         ------                     Light Blue Top[[id number]]                                   Final result               Gold Top - SST[[id number]]                                   Final result                 Please view results for these tests on the individual orders.    Light Blue Top [[id number]] Collected:  03/11/19 1354    Specimen:  Blood Updated:  03/11/19 1501     Extra Tube hold for add-on     Comment: Auto resulted       Gold Top - SST [[id number]] Collected:  03/11/19 1354    Specimen:  Blood Updated:  03/11/19 1501     Extra Tube Hold for add-ons.     Comment: Auto resulted.       Troponin [[id number]]  (Normal) Collected:  03/11/19 1325    Specimen:  Blood Updated:  03/11/19 1403     Troponin I <0.012 ng/mL            I reviewed the patient's new clinical results.  I  reviewed the patient's new imaging results and agree with the interpretation.     ASSESSMENT/PLAN:   ASSESSMENT: Patient with anemia possibly secondary to upper GI bleed.  Patient is no longer having chest pain is tolerating food at this time well.  Patient is to receive 1 unit of packed RBCs.  Patient is scheduled for EGD to be done tomorrow.    PLAN: #1 continue to monitor patient's hemoglobin if hemoglobin less than 7 please transfuse 2 units packed RBCs.  #2 we will patient is scheduled for EGD to be done tomorrow.  #3 continue patient on proton pump inhibitor.  The risks, benefits, and alternatives of this procedure have been discussed with the patient or the responsible party- the patient understands and agrees to proceed.         Carlos Schaeffer MD  03/12/19  1:55 PM           This document has been electronically signed by Carlos Schaeffer MD on March 12, 2019 1:55 PM

## 2019-03-12 NOTE — PLAN OF CARE
Face to face discussion with the patient and her  re: CCTA for which results are Ca+ score of 23 thus low risk study.  The patient will keep her previously scheduled appointment with Dr Engel.   The patient has received GI evaluation per Dr Schaeffer with plans for blood transfusion today and EGD in the AM (03/13/2019).  The patient and her  indicate that information has been provided in a fashion of their understanding.   Primary care team has been made aware of the above findings and plan.  Cardiology will sign off at this time and see patient as requested.         This document has been electronically signed by DIOMEDES Cain on March 12, 2019 3:15 PM

## 2019-03-12 NOTE — NURSING NOTE
Darrell Pyle PA-C notified for patients arrival back to unit and patient requesting diet. Heart Healthy ADA diet ordered.

## 2019-03-13 NOTE — PROGRESS NOTES
[facility] Medicine Services  INPATIENT PROGRESS NOTE    Length of Stay: 0  Date of Admission: 3/11/2019  Primary Care Physician: Elian Pacheco APRN    Subjective   Please note that all previous progress notes, lab findings, radiographical findings, medication changes, and physical exam findings have been noted and updated as appropriate.    3/13/2019: Patient feels better after transfusion of 2 units of packed red blood cells.  CTA of the coronary arteries demonstrated no significant stenosis and cardiology cleared the patient from their point of view.  Patient is undergoing EGD today with Dr. Schaeffer.  No chest pain, shortness of air, dizziness, syncope.  Urinalysis demonstrated contaminated culture.  Patient has been afebrile since her single fever on 311.  Will treat 3 days for acute cystitis given the fact that she had significant hematuria on her microscopic UA.  May need a referral to urology for hematuria.    Chief Complaint/HPI: This 78-year-old  female reported to the emergency department complaints of chest pain.  Patient also demonstrated a worsening anemia and a history of taking 600 mg of ibuprofen 3 times a day since January.  Patient has undergone CTA of the coronary arteries today, results pending.  Will have an EGD tomorrow per gastroenterology note.  Hemoglobin has now dropped to 7 and patient will receive blood transfusion.  Patient has been advised of the risks and benefits of blood transfusion.  She is agreeable.  Also had a fever overnight, respiratory viruses panel negative, blood cultures pending, patient on ceftriaxone at this time for possible hemorrhagic cystitis.    Review of Systems   Constitutional: Positive for fatigue. Negative for chills, diaphoresis and fever.   Respiratory: Negative for shortness of breath.    Cardiovascular: Negative for chest pain.   Gastrointestinal: Negative for abdominal pain, nausea and vomiting.    Genitourinary: Negative for dysuria and hematuria.   Neurological: Negative for dizziness and light-headedness.      All pertinent negatives and positives are as above. All other systems have been reviewed and are negative unless otherwise stated.     Objective    Temp:  [96 °F (35.6 °C)-98.8 °F (37.1 °C)] 96.2 °F (35.7 °C)  Heart Rate:  [] 100  Resp:  [18] 18  BP: (120-167)/(58-90) 128/72    Physical Exam   Constitutional: She is oriented to person, place, and time. She appears well-developed and well-nourished. No distress.   HENT:   Head: Normocephalic and atraumatic.   Cardiovascular: Regular rhythm.   Sinus tachycardia   Pulmonary/Chest: Effort normal and breath sounds normal. No stridor. No respiratory distress.   Abdominal: Soft. Bowel sounds are normal. She exhibits no distension. There is no tenderness.   Musculoskeletal: She exhibits no edema.   Neurological: She is alert and oriented to person, place, and time.   Skin: Skin is warm and dry. She is not diaphoretic. There is pallor.     Results Review:  I have reviewed the labs, radiology results, and diagnostic studies.    Laboratory Data:   Results from last 7 days   Lab Units 03/13/19  0622 03/12/19  0535 03/11/19  2348 03/11/19  0828   SODIUM mmol/L 132* 130* 127* 132*   POTASSIUM mmol/L 3.9 3.4* 3.6 3.7   CHLORIDE mmol/L 97 98 95 95   CO2 mmol/L 27.0 27.0 26.0 31.0   BUN mg/dL 13 13 14 10   CREATININE mg/dL 0.40* 0.34* 0.42* 0.44*   GLUCOSE mg/dL 103* 101* 121* 101*   CALCIUM mg/dL 8.1* 8.0* 8.2* 8.7   BILIRUBIN mg/dL 0.5 0.2  --  0.3   ALK PHOS U/L 67 55  --  75   ALT (SGPT) U/L 13 14  --  11   AST (SGOT) U/L 34 72*  --  54*   ANION GAP mmol/L 8.0 5.0 6.0 6.0     Estimated Creatinine Clearance: 47.8 mL/min (A) (by C-G formula based on SCr of 0.4 mg/dL (L)).  Results from last 7 days   Lab Units 03/12/19  0535 03/11/19  2348   MAGNESIUM mg/dL 1.9 1.7         Results from last 7 days   Lab Units 03/13/19  0622 03/12/19  0535 03/11/19  0828   WBC  10*3/mm3 12.29* 10.02 8.57   HEMOGLOBIN g/dL 9.9* 7.0* 8.2*   HEMATOCRIT % 31.2* 21.9* 26.9*   PLATELETS 10*3/mm3 317 310 342     Results from last 7 days   Lab Units 03/13/19  0622   INR  1.25*       Culture Data:   Blood Culture   Date Value Ref Range Status   03/12/2019 No growth at 24 hours  Preliminary   03/11/2019 No growth at 24 hours  Preliminary     Urine Culture   Date Value Ref Range Status   03/11/2019 Mixed Majo Isolated  Final     No results found for: RESPCX  No results found for: WOUNDCX  No results found for: STOOLCX  No components found for: BODYFLD    Radiology Data:   Imaging Results (last 24 hours)     Procedure Component Value Units Date/Time    CT Angiogram Coronary [[id number]] Collected:  03/12/19 1040     Updated:  03/12/19 1408    Narrative:       CT coronary arteriography. Calcium score. Functional analysis.  CLINICAL HISTORY: 78-year-old female presenting with chest pain.    COMPARISON: Chest x-ray March 11, 2019. None.    Post processing was performed by the radiologist at the "[employer]a  workstation. Serial axial CT images were obtained through the  heart at 3 mm thickness without contrast for calcium scoring. 3D  images including vessel probing technique were also obtained.  Subsequently, following the intravenous administration of 70     ml of Isovue-370   , serial axial CT images were obtained through  the heart at 0.6 mm thickness utilizing retrospective gating.  Images were obtained after premedication with 50    mg of  metoprolol    by mouth. Heart rate 63-67.      Full field of view reconstructed images were used for evaluation  of the extracardiac tissues.    This exam was performed according to our departmental  dose-optimization program, which includes automated exposure  control, adjustment of the mA and/or kV according to patient size  and/or use of iterative reconstruction technique.      CALCIUM PLAQUE BURDEN:    REGION                                         CALCIUM  SCORE  ([place])  Left Main                                                      0       Right Coronary Artery                                 2      Left Anterior Descending                            17      Circumflex                                                    4       Posterior Descending Artery                       0             Your Calcium Score is 23   .      This places the patent in the low    risk for coronary artery  disease. (Definite at least mild atherosclerotic plaque. Mild or  minimal coronary narrowings likely.)    CTA OF THE CORONARY ARTERIES: There is adequate    visualization  of the left main, LAD, diagonals, circumflex, and RCA. There is a  type C    LAD. The patient is right    dominant.    LEFT MAIN: No calcified or soft itssue density plaque and no  stenosis.  LAD: Scattered areas of calcified plaque without stenosis. No  stenosis.    CIRCUMFLEX: No calcified or soft tissue density plaque and no  stenosis.  RCA: No calcified or soft tisue density plaque and no stenosis.    There is a prosthetic aortic valve.  There is no myocardial  bridging. On short axis views, the myocardium is homogeneous in  thickness.    EXTRACARDIAC SOFT TISSUES:  Mediastinum: The ascending aorta is dilated 4.0 x 4.11 cm, series  30 image 45 . There is no mediastinal or hilar lymphadenopathy.  The pericardium is normal.  Lungs: There are basilar infiltrative changes probably  atelectasis and small pleural effusions. No  pneumothorax . The  pulmonary arteries are normal in appearance.  Abdomen: No evidence of hiatal hernia. The imaged liver and  spleen are unremarkable.   Bones: There are no lytic or blastic lesions within the osseous  structures.    CT FUNCTIONAL ANALYSIS:  Ejection Fraction      95  %  Diastolic Volume      126    ml  Systolic Volume       5.9    ml  Stroke Volume         120    ml  Cardiac Output        7.5    L/minute      Impression:       CONCLUSION: Coronary atherosclerotic disease  without significant  stenosis. Calcium score 23, low risk for coronary disease.    Functional analysis is normal. The ejection fraction >75%.    Aortic valvular prosthesis.    Dilated ascending aorta 4.11 cm. Yearly follow-up examination  suggested to assess increase in size over time.    Bilateral basilar linear infiltrative changes probably  atelectasis. Small pleural effusions.    Electronically signed by:  Agustín Winters MD  3/12/2019 2:07 PM CDT  Workstation: [id number]          I have reviewed the patient's current medications.     Assessment/Plan     Active Hospital Problems    Diagnosis   • **Anemia     Added automatically from request for surgery [id number]     • Chest pain, rule out acute myocardial infarction   Suspected upper GI bleed  NSAID use  Suspected acute hemorrhagic cystitis      Plan: EGD today, continue ceftriaxone for a total of 3 days, today is day 2.  Will finish ceftriaxone tomorrow.  Suspect if blood counts are stable and once patient finishes ceftriaxone she can be discharged with GI follow-up and urology follow-up.                This document has been electronically signed by YU Gutiérrez on March 13, 2019 10:01 AM

## 2019-03-13 NOTE — PLAN OF CARE
Problem: Patient Care Overview  Goal: Plan of Care Review  Outcome: Ongoing (interventions implemented as appropriate)   03/13/19 0059 03/13/19 1144 03/13/19 1352   Coping/Psychosocial   Plan of Care Reviewed With --  patient --    Plan of Care Review   Progress no change --  --    OTHER   Outcome Summary --  --  EGD today, vss, no complaints of pain, will continue to monitor        Problem: Cardiac: ACS (Acute Coronary Syndrome) (Adult)  Goal: Signs and Symptoms of Listed Potential Problems Will be Absent, Minimized or Managed (Cardiac: ACS)  Outcome: Ongoing (interventions implemented as appropriate)

## 2019-03-13 NOTE — ANESTHESIA PREPROCEDURE EVALUATION
Anesthesia Evaluation     Patient summary reviewed and Nursing notes reviewed   NPO Solid Status: > 8 hours  NPO Liquid Status: > 8 hours           Airway   Mallampati: III  TM distance: <3 FB  Neck ROM: full  Possible difficult intubation  Dental - normal exam     Pulmonary - normal exam   Cardiovascular - normal exam    (+) hypertension, CAD,       Neuro/Psych  (+) headaches,     GI/Hepatic/Renal/Endo    (+)   hepatitis, liver disease, diabetes mellitus,     Musculoskeletal     (+) myalgias,   Abdominal  - normal exam   Substance History      OB/GYN          Other   (+) arthritis                     Anesthesia Plan    ASA 4 - emergent     MAC     intravenous induction   Anesthetic plan, all risks, benefits, and alternatives have been provided, discussed and informed consent has been obtained with: patient.    Plan discussed with CRNA.

## 2019-03-13 NOTE — ANESTHESIA POSTPROCEDURE EVALUATION
Patient: Yaneli Bates    Procedure Summary     Date:  03/13/19 Room / Location:  [facility] ENDOSCOPY 1 / [facility] ENDOSCOPY    Anesthesia Start:  1334 Anesthesia Stop:  1352    Procedure:  ESOPHAGOGASTRODUODENOSCOPY (N/A ) Diagnosis:       Anemia, unspecified type      (Anemia, unspecified type [D64.9])    Surgeon:  Carlos Schaeffer MD Provider:  Haris Duke CRNA    Anesthesia Type:  MAC ASA Status:  4 - Emergent          Anesthesia Type: MAC  Last vitals  BP   150/86 (03/13/19 1137)   Temp   100 °F (37.8 °C) (03/13/19 1137)   Pulse   102 (03/13/19 1137)   Resp   16 (03/13/19 1137)     SpO2   96 % (03/13/19 1137)     Post Anesthesia Care and Evaluation    Patient location during evaluation: bedside  Patient participation: waiting for patient participation  Level of consciousness: responsive to physical stimuli  Pain score: 0  Pain management: adequate  Airway patency: patent  Anesthetic complications: No anesthetic complications  PONV Status: none  Cardiovascular status: acceptable  Respiratory status: acceptable, spontaneous ventilation, room air and unassisted  Hydration status: acceptable

## 2019-03-13 NOTE — PLAN OF CARE
Problem: Patient Care Overview  Goal: Plan of Care Review  Outcome: Ongoing (interventions implemented as appropriate)   03/13/19 0059   Coping/Psychosocial   Plan of Care Reviewed With patient   Plan of Care Review   Progress no change   OTHER   Outcome Summary VSS, no pain, one unit of blood given, pt tolerated well, NPO after midnight       Problem: Cardiac: ACS (Acute Coronary Syndrome) (Adult)  Goal: Signs and Symptoms of Listed Potential Problems Will be Absent, Minimized or Managed (Cardiac: ACS)  Outcome: Ongoing (interventions implemented as appropriate)   03/13/19 0059   Goal/Outcome Evaluation   Problems Assessed (Acute Coronary Syndrome) all   Problems Present (Acute Coronary Syn) none

## 2019-03-14 NOTE — DISCHARGE SUMMARY
[facility] Medicine Services  DISCHARGE SUMMARY       Date of Admission: 3/11/2019  Date of Discharge:  3/14/2019  Primary Care Physician: Elian Pacheco APRN    Presenting Problem/History of Present Illness:  Chest pain, rule out acute myocardial infarction [R07.9]       Final Discharge Diagnoses:  Active Hospital Problems    Diagnosis   • **Anemia     Added automatically from request for surgery [id number]     • Chest pain, rule out acute myocardial infarction       Consults:   Consults     Date and Time Order Name Status Description    3/11/2019 1339 Inpatient Gastroenterology Consult Completed     3/11/2019 1339 Inpatient Cardiology Consult Completed           Procedures Performed: Procedure(s):  ESOPHAGOGASTRODUODENOSCOPY           03/13 1335 UPPER GI ENDOSCOPY    Pertinent Test Results:   Lab Results (last 24 hours)     Procedure Component Value Units Date/Time    POC Glucose Once [[id number]]  (Normal) Collected:  03/14/19 1118    Specimen:  Blood Updated:  03/14/19 1137     Glucose 122 mg/dL      Comment: RN NotifiedOperator: [id number] ALEN Spence ID: [id number]       POC Glucose Once [[id number]]  (Normal) Collected:  03/14/19 0727    Specimen:  Blood Updated:  03/14/19 0757     Glucose 100 mg/dL      Comment: : [id number] ALEN Spence ID: [id number]       Comprehensive Metabolic Panel [[id number]]  (Abnormal) Collected:  03/14/19 0531    Specimen:  Blood Updated:  03/14/19 0648     Glucose 100 mg/dL      BUN 13 mg/dL      Creatinine 0.32 mg/dL      Sodium 131 mmol/L      Potassium 3.6 mmol/L      Chloride 101 mmol/L      CO2 24.0 mmol/L      Calcium 7.4 mg/dL      Total Protein 7.4 g/dL      Albumin 2.70 g/dL      ALT (SGPT) 14 U/L      AST (SGOT) 36 U/L      Alkaline Phosphatase 57 U/L      Total Bilirubin 0.3 mg/dL      eGFR Non African Amer 200 mL/min/1.73      Globulin 4.7 gm/dL      A/G Ratio 0.6 g/dL      BUN/Creatinine Ratio 40.6      Anion Gap 6.0 mmol/L     Narrative:       The MDRD GFR formula is only valid for adults with stable renal function between ages 18 and 70.    CBC & Differential [[id number]] Collected:  03/14/19 0531    Specimen:  Blood Updated:  03/14/19 0625    Narrative:       The following orders were created for panel order CBC & Differential.  Procedure                               Abnormality         Status                     ---------                               -----------         ------                     CBC Auto Differential[[id number]]        Abnormal            Final result                 Please view results for these tests on the individual orders.    CBC Auto Differential [[id number]]  (Abnormal) Collected:  03/14/19 0531    Specimen:  Blood Updated:  03/14/19 0625     WBC 12.56 10*3/mm3      RBC 3.40 10*6/mm3      Hemoglobin 9.1 g/dL      Hematocrit 28.8 %      MCV 84.7 fL      MCH 26.8 pg      MCHC 31.6 g/dL      RDW 16.2 %      RDW-SD 50.0 fl      MPV 9.7 fL      Platelets 284 10*3/mm3      Neutrophil % 77.4 %      Lymphocyte % 11.5 %      Monocyte % 9.5 %      Eosinophil % 0.3 %      Basophil % 0.3 %      Immature Grans % 1.0 %      Neutrophils, Absolute 9.72 10*3/mm3      Lymphocytes, Absolute 1.45 10*3/mm3      Monocytes, Absolute 1.19 10*3/mm3      Eosinophils, Absolute 0.04 10*3/mm3      Basophils, Absolute 0.04 10*3/mm3      Immature Grans, Absolute 0.12 10*3/mm3      nRBC 0.0 /100 WBC     POC Glucose Once [[id number]]  (Normal) Collected:  03/13/19 2029    Specimen:  Blood Updated:  03/14/19 0444     Glucose 126 mg/dL      Comment: RN NotifiedOperator: [id number] GEOVANI ANALISAISSAMeter ID: [id number]       Blood Culture - Blood, Arm, Left [[id number]] Collected:  03/12/19 0314    Specimen:  Blood from Arm, Left Updated:  03/14/19 0331     Blood Culture No growth at 2 days    Blood Culture - Blood, Arm, Left [[id number]] Collected:  03/11/19 9357    Specimen:  Blood from Arm, Left Updated:  03/14/19 0017      Blood Culture No growth at 2 days    POC Glucose Once [[id number]]  (Normal) Collected:  03/13/19 1645    Specimen:  Blood Updated:  03/13/19 1659     Glucose 119 mg/dL      Comment: RN NotifiedOperator: [id number] BING Mcgrath ID: [id number]           Imaging Results (last 24 hours)     Procedure Component Value Units Date/Time    XR Chest PA & Lateral [[id number]] Collected:  03/13/19 1435     Updated:  03/13/19 1702    Narrative:         EXAM:          Radiograph(s), Chest   VIEWS:    PA / Lat ; 2       DATE/TIME:  3/13/2019 4:50 PM CDT                INDICATION:   Fever, previous infiltrative changes on other  radiographical tests, R07.9 Chest pain, unspecified D64.9 Anemia,  unspecified    COMPARISON:  CXR: 3/11/19             FINDINGS:             - lines/tubes:    none     - cardiac:         size within normal limits         - mediastinum: contour within normal limits         - lungs:         no focal air space process, pulmonary  interstitial edema, nodule(s)/mass             - pleura:         no evidence of  fluid                  - osseous:         Median sternotomy                  - misc.:         Impression:       CONCLUSION:        1. No evidence of an active cardiopulmonary process.                                                Electronically signed by:  LEXA Almonte MD  3/13/2019 5:01  PM CDT Workstation: [id number]          Chief Complaint on Day of Discharge: No complaints    Hospital Course: This is a 78-year-old  female with past medical history of CAD, fibromyalgia and osteoporosis that presented to [facility] on 3/11/2019 with complaints of chest pain.  Patient underwent CTA of the coronary arteries that indicated a low risk study.  During her admission the patient was noted to have a hemoglobin of 7 and required 2 units of packed red blood cells.  The patient was evaluated by Dr. Schaeffer with gastroenterology and underwent a upper GI study and noted to have gastritis.   "The patient had been on naproxen due to bilateral leg pain.  She states she is followed with a neurologist and had failed treatment with Neurontin.  She is counseled to discontinue aspirin and naproxen and to try Tylenol instead.  Patient will follow-up in 1 week with Carmita Allison to receive results of biopsies taken at EGD.  She is to follow with her primary care physician within 1 week.  A prescription is given for oral iron as the patient was noted to have iron deficiency.    Condition on Discharge: Stable    Physical Exam on Discharge:  /74 (BP Location: Left arm, Patient Position: Lying)   Pulse 105   Temp 99.6 °F (37.6 °C) (Oral)   Resp 18   Ht 154.9 cm (61\")   Wt 52.2 kg (115 lb)   LMP 09/25/1996 (Within Months)   SpO2 93%   BMI 21.73 kg/m²   Physical Exam   Constitutional: She is oriented to person, place, and time. She appears well-developed and well-nourished.   HENT:   Head: Normocephalic and atraumatic.   Eyes: EOM are normal. Pupils are equal, round, and reactive to light.   Neck: Normal range of motion. Neck supple.   Cardiovascular: Normal rate and regular rhythm.   Pulmonary/Chest: Effort normal and breath sounds normal.   Abdominal: Soft. Bowel sounds are normal.   Musculoskeletal: Normal range of motion.   Neurological: She is alert and oriented to person, place, and time.   Skin: Skin is warm and dry.   Psychiatric: She has a normal mood and affect. Her behavior is normal.       Discharge Disposition:  Home or Self Care    Discharge Medications:     Discharge Medications      New Medications      Instructions Start Date   ferrous sulfate 324 (65 Fe) MG tablet delayed-release EC tablet   324 mg, Oral, Daily With Breakfast         Continue These Medications      Instructions Start Date   alendronate 70 MG tablet  Commonly known as:  FOSAMAX  Notes to patient:  Resume as previously prescribed    70 mg, Oral, Every 7 Days      amitriptyline 25 MG tablet  Commonly known as:  ELAVIL   50 " mg, Oral, Nightly      CALCIUM-MAGNESIUM-ZINC-D3 PO   1 tablet, Oral, Daily      metFORMIN  MG 24 hr tablet  Commonly known as:  GLUCOPHAGE-XR   500 mg, Oral, Daily With Breakfast      metoprolol succinate XL 50 MG 24 hr tablet  Commonly known as:  TOPROL-XL   50 mg, Oral, Daily With Dinner      promethazine 25 MG tablet  Commonly known as:  PHENERGAN   25 mg, Oral, Every 6 Hours PRN         Stop These Medications    aspirin 81 MG tablet     naproxen 500 MG tablet  Commonly known as:  NAPROSYN            Discharge Diet: Cardiac    Activity at Discharge: As tolerated    Discharge Care Plan/Instructions: As above    Follow-up Appointments:   Future Appointments   Date Time Provider Department Center   3/21/2019  1:45 PM Gabrielle Allison APRN [facility] None   3/21/2019  3:00 PM Elian Pacheco APRN [facility] None   5/24/2019  9:15 AM Toro Pryor MD [facility] None       Test Results Pending at Discharge:    Order Current Status    Blood Culture - Blood, Arm, Left Preliminary result    Blood Culture - Blood, Arm, Left Preliminary result            This document has been electronically signed by DIOMEDES Avila on March 14, 2019 4:11 PM        Time: Greater than 30 minutes.

## 2019-03-14 NOTE — PROGRESS NOTES
Nutrition Services    Patient Name:  Yaneli Bates  YOB: 1940  MRN: [id number]  Admit Date:  3/11/2019    Spoke with pt by phone to confirm food allergies prior to lunch meal.. Noted pt with nursing consult. Pt was dismissed before RDN staff could speak with pt further.     Electronically signed by:  Kiara Shukla RD  03/14/19 3:52 PM

## 2019-03-14 NOTE — PLAN OF CARE
Problem: Patient Care Overview  Goal: Plan of Care Review  Outcome: Ongoing (interventions implemented as appropriate)   03/13/19 2319   Coping/Psychosocial   Plan of Care Reviewed With patient   Plan of Care Review   Progress improving   OTHER   Outcome Summary VSS, no c/o pain, patient rested well overnight       Problem: Cardiac: ACS (Acute Coronary Syndrome) (Adult)  Goal: Signs and Symptoms of Listed Potential Problems Will be Absent, Minimized or Managed (Cardiac: ACS)  Outcome: Ongoing (interventions implemented as appropriate)   03/13/19 2319   Goal/Outcome Evaluation   Problems Assessed (Acute Coronary Syndrome) all   Problems Present (Acute Coronary Syn) none       Problem: Fall Risk (Adult)  Goal: Absence of Fall  Outcome: Ongoing (interventions implemented as appropriate)   03/13/19 2319   Fall Risk (Adult)   Absence of Fall achieves outcome

## 2019-03-15 NOTE — ED PROVIDER NOTES
Subjective     History provided by:  Patient   used: No    Patient is a 78 years old female who presented to the ER today because of sweating dizziness and nausea that wakes up from sleep.  Patient was recently discharged from the hospital today because of chest pain possible gastritis.  Alvarado workup was done regarding her heart and all was negative.  Was given some blood transfusion for low hemoglobin.    Review of Systems   All other systems reviewed and are negative.      Past Medical History:   Diagnosis Date   • Allergic    • Arthritis    • Coronary artery disease 2014    valave replacement   • Fibromyalgia, primary    • Headache    • Infectious viral hepatitis    • Low back pain    • Osteoporosis    • Visual impairment        Allergies   Allergen Reactions   • Clindamycin/Lincomycin Other (See Comments)     Migraine headaches   • Amoxicillin Rash   • Codeine GI Intolerance   • Penicillins Hives   • Sulfa Antibiotics Hives   • Vicodin [Hydrocodone-Acetaminophen] GI Intolerance     vomiting       Past Surgical History:   Procedure Laterality Date   • AORTIC VALVE REPAIR/REPLACEMENT  2014,may   • CARDIAC SURGERY     • COLONOSCOPY     • ENDOSCOPY N/A 3/13/2019    Procedure: ESOPHAGOGASTRODUODENOSCOPY;  Surgeon: Carlos Schaeffer MD;  Location: [facility] ENDOSCOPY;  Service: Gastroenterology   • KNEE SURGERY Right 1967   • TONSILLECTOMY         Family History   Problem Relation Age of Onset   • Arthritis Mother    • Cancer Mother    • Heart disease Mother    • Hyperlipidemia Mother    • Hypertension Mother    • Vision loss Mother    • Arthritis Father    • Hearing loss Father    • Heart disease Father    • Hyperlipidemia Father    • Hypertension Father    • Vision loss Father    • Hypertension Sister    • Vision loss Sister    • Osteoporosis Sister    • Cataracts Sister    • Hyperlipidemia Brother    • Vision loss Brother    • Heart disease Brother    • Cancer Daughter    • Early death Daughter    •  "Osteoporosis Sister    • Parkinsonism Brother    • Hyperlipidemia Brother    • Thyroid disease Daughter        Social History     Socioeconomic History   • Marital status:      Spouse name: Not on file   • Number of children: Not on file   • Years of education: Not on file   • Highest education level: Not on file   Tobacco Use   • Smoking status: Never Smoker   • Smokeless tobacco: Never Used   Substance and Sexual Activity   • Alcohol use: No   • Drug use: No   • Sexual activity: No         /76 (BP Location: Left arm, Patient Position: Lying)   Pulse 75   Temp 98.1 °F (36.7 °C) (Oral)   Resp 18   Ht 154.9 cm (61\")   Wt 60.1 kg (132 lb 9.6 oz)   LMP 09/25/1996 (Within Months)   SpO2 96%   BMI 25.05 kg/m²   Objective   Physical Exam   Constitutional: She is oriented to person, place, and time. She appears well-developed and well-nourished.   HENT:   Head: Normocephalic.   Right Ear: External ear normal.   Left Ear: External ear normal.   Nose: Nose normal.   Mouth/Throat: Oropharynx is clear and moist.   Neck: Normal range of motion. Neck supple.   Cardiovascular: Normal rate, regular rhythm, normal heart sounds and intact distal pulses.   Pulmonary/Chest: Effort normal and breath sounds normal.   Abdominal: Soft. Bowel sounds are normal.   Musculoskeletal: Normal range of motion.   Neurological: She is alert and oriented to person, place, and time.   Skin: Skin is warm. Capillary refill takes less than 2 seconds.   Psychiatric: She has a normal mood and affect. Her behavior is normal. Judgment and thought content normal.   Nursing note and vitals reviewed.      Procedures           ED Course        Labs Reviewed   COMPREHENSIVE METABOLIC PANEL - Abnormal; Notable for the following components:       Result Value    Glucose 113 (*)     Creatinine 0.32 (*)     Sodium 130 (*)     Potassium 3.4 (*)     Calcium 8.0 (*)     Albumin 2.90 (*)     AST (SGOT) 58 (*)     eGFR Non African Amer 200 (*)     " Globulin 4.8 (*)     A/G Ratio 0.6 (*)     BUN/Creatinine Ratio 37.5 (*)     All other components within normal limits    Narrative:     The MDRD GFR formula is only valid for adults with stable renal function between ages 18 and 70.   CBC WITH AUTO DIFFERENTIAL - Abnormal; Notable for the following components:    WBC 11.30 (*)     RBC 3.38 (*)     Hemoglobin 9.3 (*)     Hematocrit 28.3 (*)     RDW 15.9 (*)     Neutrophil % 78.8 (*)     Lymphocyte % 9.2 (*)     Immature Grans % 0.8 (*)     Neutrophils, Absolute 8.90 (*)     Monocytes, Absolute 1.01 (*)     Immature Grans, Absolute 0.09 (*)     All other components within normal limits   INFLUENZA ANTIGEN, RAPID - Normal   TROPONIN (IN-HOUSE) - Normal   BNP (IN-HOUSE) - Normal    Narrative:     Among patients with dyspnea, NT-proBNP is highly sensitive for the detection of acute congestive heart failure. In addition NT-proBNP of <300 pg/ml effectively rules out acute congestive heart failure with 99% negative predictive value.   RAINBOW DRAW    Narrative:     The following orders were created for panel order [place] Draw.  Procedure                               Abnormality         Status                     ---------                               -----------         ------                     Light Blue Top[[id number]]                                   In process                 Green Top (Gel)[[id number]]                                  In process                 Lavender Top[[id number]]                                     In process                 Gold Top - SST[[id number]]                                   In process                   Please view results for these tests on the individual orders.   TROPONIN (IN-HOUSE)   CBC AND DIFFERENTIAL    Narrative:     The following orders were created for panel order CBC & Differential.  Procedure                               Abnormality         Status                     ---------                               -----------          ------                     CBC Auto Differential[[id number]]        Abnormal            Final result                 Please view results for these tests on the individual orders.   LIGHT BLUE TOP   GREEN TOP   LAVENDER TOP   GOLD TOP - SST       XR Chest 1 View   Final Result   No acute disease.      Electronically signed by:  Louis Mcdonald  3/15/2019 5:34 AM   CDT Workstation: [id number]      result discussed with patient. Follow up with pcp in 3 days. Come back if worse.          MDM      Final diagnoses:   Nausea   Dizziness            Mikeyor, Ld Ferrera MD  03/15/19 0552

## 2019-03-18 NOTE — TELEPHONE ENCOUNTER
Please disregard message. I relayed message to patient about needing to come in to see Prateek in order to get anything stronger. She is coming in to the office in the morning.

## 2019-03-18 NOTE — TELEPHONE ENCOUNTER
Pt was just released from the hospital. She states while she was there they gave her tylenol arthritis for her leg pain.  She states it is not working and she wants Prateek to give her something for the pain.  Please send to [facility].

## 2019-03-18 NOTE — TELEPHONE ENCOUNTER
I cannot give her anything other than Tylenol.  She cannot have NSAIDs because of her GI bleed.  Anything stronger than Tylenol she will have to see me in person for.

## 2019-03-19 NOTE — PROGRESS NOTES
Subjective   Yaneli Bates is a 78 y.o. female.     Ms. Bates is a 78-year-old female presents today for hospital follow-up secondary to anemia due to blood loss.  While hospitalized she had an endoscopy which showed gastritis but no bleeding ulcers per endoscopy report.  She was given a blood transfusion and her blood levels began to increase.  Follow-up CBCs continue to show increase in hemoglobin levels.  Patient continues to have fatigue but states she is starting to feel better.  Patient was taken off of NSAIDs for her leg pain related to probable GI bleed.  She also presents today seeking treatment alternatives to NSAIDs for her leg pain.  She recently had an MRI of her lower back which showed abnormalities in her low back which would be congruent with her leg pain.  She reports numbness and tingling to her feet and shooting pain down her legs.  She has not taken anything other than NSAIDs for this pain other than Tylenol which provides minimal relief.  She has seen a neurologist for this as well.  In reviewing his notes he also states that this is neuropathy type pain.  Patient has not seen a neurosurgeon for this condition.         The following portions of the patient's history were reviewed and updated as appropriate: allergies, current medications, past family history, past medical history, past social history, past surgical history and problem list.    Review of Systems   Constitutional: Negative for activity change, appetite change, fatigue, unexpected weight gain and unexpected weight loss.   HENT: Negative for congestion, sore throat, trouble swallowing and voice change.    Eyes: Negative.    Respiratory: Negative for cough, chest tightness, shortness of breath and wheezing.    Cardiovascular: Negative for chest pain, palpitations and leg swelling.   Gastrointestinal: Negative for abdominal pain, constipation, diarrhea, nausea and vomiting.   Endocrine: Negative.    Genitourinary: Negative for  dysuria.   Musculoskeletal: Negative for arthralgias and myalgias.        BLE neuropathy pain.   Skin: Negative for rash.   Allergic/Immunologic: Negative.    Neurological: Positive for headache.        Chronic migraines.   Hematological: Negative.    Psychiatric/Behavioral: Negative.        Objective   Physical Exam   Constitutional: She is oriented to person, place, and time. She appears well-developed and well-nourished. No distress.   HENT:   Head: Normocephalic and atraumatic.   Eyes: Conjunctivae are normal.   Neck: Normal range of motion.   Cardiovascular: Normal rate, regular rhythm and normal heart sounds.   Pulmonary/Chest: Effort normal and breath sounds normal. No respiratory distress. She has no wheezes. She has no rales.   Musculoskeletal: Normal range of motion. She exhibits edema (1+ble). She exhibits no tenderness.   Neurological: She is alert and oriented to person, place, and time.   Skin: Skin is warm and dry. No rash noted. She is not diaphoretic. No erythema. No pallor.   Psychiatric: She has a normal mood and affect. Her behavior is normal. Judgment and thought content normal.   Nursing note and vitals reviewed.    Current outpatient and discharge medications have been reconciled for the patient.  Reviewed by: DIOMEDES Lemon      Assessment/Plan   Yaneli was seen today for follow-up.    Diagnoses and all orders for this visit:    Anemia, unspecified type  -     CBC & Differential; Future    Peripheral polyneuropathy  -     Ambulatory Referral to Neurosurgery    Other orders  -     amitriptyline (ELAVIL) 10 MG tablet; Take 1 tablet by mouth Every Night for 30 days.  -     gabapentin (NEURONTIN) 100 MG capsule; Take 1 capsule by mouth Daily As Needed (for leg pain) for up to 30 days.    1.  Anemia-repeat CBC.  Will call with results.    2.  Peripheral neuropathy- Neurontin 100 mg 1 capsule daily as needed.  [facility] #[id number] reviewed.  Controlled substance contract signed.  Decrease  amitriptyline to 10 mg 1 tablet p.o. nightly.  Referral to neurosurgery for further evaluation.    3.  Follow-up in 1 month or sooner if needed.            This document has been electronically signed by DIOMEDES Lemon on March 19, 2019 12:38 PM

## 2019-03-21 NOTE — PROGRESS NOTES
Chief Complaint   Patient presents with   • Heartburn       Subjective    Yaneli Bates is a 78 y.o. female. she is here today for follow-up.    History of Present Illness  78-year-old female presents for hospital follow-up.  She was hospitalized due to weakness fatigue and found to have decreased hemoglobin of 7 and was given 2 units of packed red blood cells.  She was evaluated by Dr. Schaeffer while hospitalized underwent EGD and found to have gastritis.  She denies any symptoms of reflux, dysphagia, nausea or vomiting.  She denies any abdominal pain.  States she has intermittent constipation which has worsened with use of iron daily.  EGD was 3/13/19 noted gastritis, normal duodenum normal esophagus and a small hiatal hernia.  Duodenal biopsy no no significant histologic abnormality.  Antrum of stomach biopsy noted reactive gastropathy.  Plan; have discussed colonoscopy to evaluate anemia however patient prefers not to do any further procedures.  Will order stool for occult blood is negative may consider cologuard.  Follow-up in 1 month for recheck return office sooner if needed.     The following portions of the patient's history were reviewed and updated as appropriate:   Past Medical History:   Diagnosis Date   • Allergic    • Arthritis    • Coronary artery disease 2014    valave replacement   • Fibromyalgia, primary    • Headache    • Infectious viral hepatitis    • Low back pain    • Osteoporosis    • Visual impairment      Past Surgical History:   Procedure Laterality Date   • AORTIC VALVE REPAIR/REPLACEMENT  2014,may   • CARDIAC SURGERY     • COLONOSCOPY     • ENDOSCOPY N/A 3/13/2019    Procedure: ESOPHAGOGASTRODUODENOSCOPY;  Surgeon: Carlos Schaeffer MD;  Location: [facility] ENDOSCOPY;  Service: Gastroenterology   • KNEE SURGERY Right 1967   • TONSILLECTOMY       Family History   Problem Relation Age of Onset   • Arthritis Mother    • Cancer Mother    • Heart disease Mother    • Hyperlipidemia Mother    •  Hypertension Mother    • Vision loss Mother    • Arthritis Father    • Hearing loss Father    • Heart disease Father    • Hyperlipidemia Father    • Hypertension Father    • Vision loss Father    • Hypertension Sister    • Vision loss Sister    • Osteoporosis Sister    • Cataracts Sister    • Hyperlipidemia Brother    • Vision loss Brother    • Heart disease Brother    • Cancer Daughter    • Early death Daughter    • Osteoporosis Sister    • Parkinsonism Brother    • Hyperlipidemia Brother    • Thyroid disease Daughter      OB History     No data available        Prior to Admission medications    Medication Sig Start Date End Date Taking? Authorizing Provider   alendronate (FOSAMAX) 70 MG tablet Take 1 tablet by mouth Every 7 (Seven) Days. 11/12/18  Yes Matthew Barclay MD   amitriptyline (ELAVIL) 10 MG tablet Take 1 tablet by mouth Every Night for 30 days. 3/19/19 4/18/19 Yes Elian Pacheco APRN   ferrous sulfate 324 (65 Fe) MG tablet delayed-release EC tablet Take 1 tablet by mouth Daily With Breakfast. 3/14/19  Yes Domi Ramos APRN   gabapentin (NEURONTIN) 100 MG capsule Take 1 capsule by mouth Daily As Needed (for leg pain) for up to 30 days. 3/19/19 4/18/19 Yes Elian Pacheco APRN   metFORMIN ER (GLUCOPHAGE-XR) 500 MG 24 hr tablet Take 1 tablet by mouth Daily With Breakfast. 1/31/19  Yes Elian Pacheco APRN   metoprolol succinate XL (TOPROL-XL) 50 MG 24 hr tablet Take 1 tablet by mouth Daily With Dinner. 2/15/19  Yes Toro Pryor MD   Multiple Minerals-Vitamins (CALCIUM-MAGNESIUM-ZINC-D3 PO) Take 1 tablet by mouth Daily.   Yes Klarissa Coe MD   promethazine (PHENERGAN) 25 MG tablet Take 1 tablet by mouth Every 6 (Six) Hours As Needed for Nausea or Vomiting. 1/3/18  Yes Shante Tellez MD     Allergies   Allergen Reactions   • Clindamycin/Lincomycin Other (See Comments)     Migraine headaches   • Amoxicillin Rash   • Codeine GI Intolerance   • Penicillins Hives   • Sulfa  "Antibiotics Hives   • Vicodin [Hydrocodone-Acetaminophen] GI Intolerance     vomiting     Social History     Socioeconomic History   • Marital status:      Spouse name: Not on file   • Number of children: Not on file   • Years of education: Not on file   • Highest education level: Not on file   Tobacco Use   • Smoking status: Never Smoker   • Smokeless tobacco: Never Used   Substance and Sexual Activity   • Alcohol use: No   • Drug use: No   • Sexual activity: No       Review of Systems  Review of Systems   Constitutional: Positive for fatigue. Negative for activity change, appetite change, chills, diaphoresis, fever and unexpected weight change.   HENT: Negative for sore throat and trouble swallowing.    Respiratory: Negative for shortness of breath.    Gastrointestinal: Positive for constipation (worsened with iron ). Negative for abdominal distention, abdominal pain, anal bleeding, blood in stool, diarrhea, nausea, rectal pain and vomiting.   Musculoskeletal: Negative for arthralgias.   Skin: Negative for pallor.   Neurological: Negative for light-headedness.        /68 (BP Location: Left arm)   Pulse 100   Ht 154.9 cm (61\")   Wt 51.4 kg (113 lb 6.4 oz)   LMP 09/25/1996 (Within Months)   BMI 21.43 kg/m²     Objective    Physical Exam   Constitutional: She is oriented to person, place, and time. She appears well-developed and well-nourished. She is cooperative. No distress.   HENT:   Head: Normocephalic and atraumatic.   Neck: Normal range of motion. Neck supple. No thyromegaly present.   Cardiovascular: Normal rate, regular rhythm and normal heart sounds.   Pulmonary/Chest: Effort normal and breath sounds normal. She has no wheezes. She has no rhonchi. She has no rales.   Abdominal: Soft. Normal appearance and bowel sounds are normal. She exhibits no shifting dullness, no distension, no fluid wave and no ascites. There is no hepatosplenomegaly. There is no tenderness. There is no rigidity and no " guarding. No hernia.   Lymphadenopathy:     She has no cervical adenopathy.   Neurological: She is alert and oriented to person, place, and time.   Skin: Skin is warm, dry and intact. No rash noted. No pallor.   Psychiatric: She has a normal mood and affect. Her speech is normal.     Lab on 03/19/2019   Component Date Value Ref Range Status   • WBC 03/19/2019 9.57  3.40 - 10.80 10*3/mm3 Final   • RBC 03/19/2019 3.78  3.77 - 5.28 10*6/mm3 Final   • Hemoglobin 03/19/2019 10.1* 12.0 - 15.9 g/dL Final   • Hematocrit 03/19/2019 32.8* 34.0 - 46.6 % Final   • MCV 03/19/2019 86.8  79.0 - 97.0 fL Final   • MCH 03/19/2019 26.7  26.6 - 33.0 pg Final   • MCHC 03/19/2019 30.8* 31.5 - 35.7 g/dL Final   • RDW 03/19/2019 16.6* 12.3 - 15.4 % Final   • RDW-SD 03/19/2019 51.8  37.0 - 54.0 fl Final   • MPV 03/19/2019 9.7  6.0 - 12.0 fL Final   • Platelets 03/19/2019 317  140 - 450 10*3/mm3 Final   • Neutrophil % 03/19/2019 73.7  42.7 - 76.0 % Final   • Lymphocyte % 03/19/2019 12.9* 19.6 - 45.3 % Final   • Monocyte % 03/19/2019 10.2  5.0 - 12.0 % Final   • Eosinophil % 03/19/2019 2.1  0.3 - 6.2 % Final   • Basophil % 03/19/2019 0.5  0.0 - 1.5 % Final   • Immature Grans % 03/19/2019 0.6* 0.0 - 0.5 % Final   • Neutrophils, Absolute 03/19/2019 7.05* 1.40 - 7.00 10*3/mm3 Final   • Lymphocytes, Absolute 03/19/2019 1.23  0.70 - 3.10 10*3/mm3 Final   • Monocytes, Absolute 03/19/2019 0.98* 0.10 - 0.90 10*3/mm3 Final   • Eosinophils, Absolute 03/19/2019 0.20  0.00 - 0.40 10*3/mm3 Final   • Basophils, Absolute 03/19/2019 0.05  0.00 - 0.20 10*3/mm3 Final   • Immature Grans, Absolute 03/19/2019 0.06* 0.00 - 0.05 10*3/mm3 Final   • nRBC 03/19/2019 0.0  0.0 - 0.0 /100 WBC Final     Assessment/Plan      1. Iron deficiency anemia, unspecified iron deficiency anemia type    2. Chronic gastritis without bleeding, unspecified gastritis type    .       Orders placed during this encounter include:  Orders Placed This Encounter   Procedures   • Occult Blood,  Fecal By Immunoassay - Stool, Per Rectum       * Surgery not found *    Review and/or summary of lab tests, radiology, procedures, medications. Review and summary of old records and obtaining of history. The risks and benefits of my recommendations, as well as other treatment options were discussed with the patient today. Questions were answered.    No orders of the defined types were placed in this encounter.      Follow-up: Return in about 4 weeks (around 4/18/2019).          This document has been electronically signed by DIOMEDES Morgan on March 21, 2019 2:05 PM             Results for orders placed or performed in visit on 03/19/19   CBC Auto Differential   Result Value Ref Range    WBC 9.57 3.40 - 10.80 10*3/mm3    RBC 3.78 3.77 - 5.28 10*6/mm3    Hemoglobin 10.1 (L) 12.0 - 15.9 g/dL    Hematocrit 32.8 (L) 34.0 - 46.6 %    MCV 86.8 79.0 - 97.0 fL    MCH 26.7 26.6 - 33.0 pg    MCHC 30.8 (L) 31.5 - 35.7 g/dL    RDW 16.6 (H) 12.3 - 15.4 %    RDW-SD 51.8 37.0 - 54.0 fl    MPV 9.7 6.0 - 12.0 fL    Platelets 317 140 - 450 10*3/mm3    Neutrophil % 73.7 42.7 - 76.0 %    Lymphocyte % 12.9 (L) 19.6 - 45.3 %    Monocyte % 10.2 5.0 - 12.0 %    Eosinophil % 2.1 0.3 - 6.2 %    Basophil % 0.5 0.0 - 1.5 %    Immature Grans % 0.6 (H) 0.0 - 0.5 %    Neutrophils, Absolute 7.05 (H) 1.40 - 7.00 10*3/mm3    Lymphocytes, Absolute 1.23 0.70 - 3.10 10*3/mm3    Monocytes, Absolute 0.98 (H) 0.10 - 0.90 10*3/mm3    Eosinophils, Absolute 0.20 0.00 - 0.40 10*3/mm3    Basophils, Absolute 0.05 0.00 - 0.20 10*3/mm3    Immature Grans, Absolute 0.06 (H) 0.00 - 0.05 10*3/mm3    nRBC 0.0 0.0 - 0.0 /100 WBC   Results for orders placed or performed during the hospital encounter of 03/15/19   Gold Top - SST   Result Value Ref Range    Extra Tube Hold for add-ons.    Green Top (Gel)   Result Value Ref Range    Extra Tube Hold for add-ons.    CBC Auto Differential   Result Value Ref Range    WBC 11.30 (H) 3.40 - 10.80 10*3/mm3    RBC 3.38 (L) 3.77  - 5.28 10*6/mm3    Hemoglobin 9.3 (L) 12.0 - 15.9 g/dL    Hematocrit 28.3 (L) 34.0 - 46.6 %    MCV 83.7 79.0 - 97.0 fL    MCH 27.5 26.6 - 33.0 pg    MCHC 32.9 31.5 - 35.7 g/dL    RDW 15.9 (H) 12.3 - 15.4 %    RDW-SD 48.2 37.0 - 54.0 fl    MPV 9.0 6.0 - 12.0 fL    Platelets 274 140 - 450 10*3/mm3    Neutrophil % 78.8 (H) 42.7 - 76.0 %    Lymphocyte % 9.2 (L) 19.6 - 45.3 %    Monocyte % 8.9 5.0 - 12.0 %    Eosinophil % 1.9 0.3 - 6.2 %    Basophil % 0.4 0.0 - 1.5 %    Immature Grans % 0.8 (H) 0.0 - 0.5 %    Neutrophils, Absolute 8.90 (H) 1.40 - 7.00 10*3/mm3    Lymphocytes, Absolute 1.04 0.70 - 3.10 10*3/mm3    Monocytes, Absolute 1.01 (H) 0.10 - 0.90 10*3/mm3    Eosinophils, Absolute 0.22 0.00 - 0.40 10*3/mm3    Basophils, Absolute 0.04 0.00 - 0.20 10*3/mm3    Immature Grans, Absolute 0.09 (H) 0.00 - 0.05 10*3/mm3    nRBC 0.0 0.0 - 0.0 /100 WBC   Lavender Top   Result Value Ref Range    Extra Tube hold for add-on    Light Blue Top   Result Value Ref Range    Extra Tube hold for add-on    Troponin   Result Value Ref Range    Troponin I <0.012 <=0.034 ng/mL   Influenza Antigen, Rapid - Swab, Nasopharynx   Result Value Ref Range    Influenza A Ag, EIA Negative Negative    Influenza B Ag, EIA Negative Negative   BNP   Result Value Ref Range    proBNP 1,230.0 0.0-1,800.0 pg/mL   Comprehensive Metabolic Panel   Result Value Ref Range    Glucose 113 (H) 60 - 100 mg/dL    BUN 12 7 - 21 mg/dL    Creatinine 0.32 (L) 0.50 - 1.00 mg/dL    Sodium 130 (L) 137 - 145 mmol/L    Potassium 3.4 (L) 3.5 - 5.1 mmol/L    Chloride 99 95 - 110 mmol/L    CO2 25.0 22.0 - 31.0 mmol/L    Calcium 8.0 (L) 8.4 - 10.2 mg/dL    Total Protein 7.7 6.3 - 8.6 g/dL    Albumin 2.90 (L) 3.40 - 4.80 g/dL    ALT (SGPT) 21 9 - 52 U/L    AST (SGOT) 58 (H) 14 - 36 U/L    Alkaline Phosphatase 66 38 - 126 U/L    Total Bilirubin 0.3 0.2 - 1.3 mg/dL    eGFR Non  Amer 200 (H) 39 - 90 mL/min/1.73    Globulin 4.8 (H) 2.3 - 3.5 gm/dL    A/G Ratio 0.6 (L) 1.1 - 1.8  g/dL    BUN/Creatinine Ratio 37.5 (H) 7.0 - 25.0    Anion Gap 6.0 5.0 - 15.0 mmol/L   Results for orders placed or performed during the hospital encounter of 03/11/19   PREVIOUS HISTORY   Result Value Ref Range    Previous History No record on File    Gold Top - SST   Result Value Ref Range    Extra Tube Hold for add-ons.    Gold Top - SST   Result Value Ref Range    Extra Tube Hold for add-ons.    Green Top (Gel)   Result Value Ref Range    Extra Tube Hold for add-ons.    Urinalysis, Microscopic Only - Urine, Clean Catch   Result Value Ref Range    RBC, UA 13-20 (A) None Seen /HPF    WBC, UA 6-12 (A) None Seen, 0-2, 3-5 /HPF    Bacteria, UA Trace (A) None Seen /HPF    Squamous Epithelial Cells, UA 0-2 None Seen, 0-2 /HPF    Hyaline Casts, UA 7-12 None Seen /LPF    Methodology Automated Microscopy    Urinalysis With Culture If Indicated - Urine, Clean Catch   Result Value Ref Range    Color, UA Yellow Yellow, Straw, Dark Yellow,     Appearance, UA Clear Clear    pH, UA 6.5 5.0 - 9.0    Specific Gravity, UA 1.016 1.003 - 1.030    Glucose, UA Negative Negative    Ketones, UA Negative Negative    Bilirubin, UA Negative Negative    Blood, UA Large (3+) (A) Negative    Protein, UA Trace (A) Negative    Leuk Esterase, UA Small (1+) (A) Negative    Nitrite, UA Negative Negative    Urobilinogen, UA 0.2 E.U./dL 0.2 - 1.0 E.U./dL   Respiratory Panel, PCR - Swab, Nasopharynx   Result Value Ref Range    ADENOVIRUS, PCR Not Detected Not Detected    Coronavirus 229E Not Detected Not Detected    Coronavirus HKU1 Not Detected Not Detected    Coronavirus NL63 Not Detected Not Detected    Coronavirus OC43 Not Detected Not Detected    Human Metapneumovirus Not Detected Not Detected    Human Rhinovirus/Enterovirus Not Detected Not Detected    Influenza B PCR Not Detected Not Detected    Parainfluenza Virus 1 Not Detected Not Detected    Parainfluenza Virus 2 Not Detected Not Detected    Parainfluenza Virus 3 Not Detected Not  Detected    Parainfluenza Virus 4 Not Detected Not Detected    Bordetella pertussis pcr Not Detected Not Detected    Influenza A H1 2009 PCR Not Detected Not Detected    Chlamydophila pneumoniae PCR Not Detected Not Detected    Mycoplasma pneumo by PCR Not Detected Not Detected    Influenza A PCR Not Detected Not Detected    Influenza A H3 Not Detected Not Detected    Influenza A H1 Not Detected Not Detected    RSV, PCR Not Detected Not Detected     *Note: Due to a large number of results and/or encounters for the requested time period, some results have not been displayed. A complete set of results can be found in Results Review.

## 2019-03-21 NOTE — PATIENT INSTRUCTIONS
Heartburn  Heartburn is a type of pain or discomfort that can happen in the throat or chest. It is often described as a burning pain. It may also cause a bad taste in the mouth. Heartburn may feel worse when you lie down or bend over, and it is often worse at night. Heartburn may be caused by stomach contents that move back up into the esophagus (reflux).  Follow these instructions at home:  Take these actions to decrease your discomfort and to help avoid complications.  Diet  · Follow a diet as recommended by your health care provider. This may involve avoiding foods and drinks such as:  ? Coffee and tea (with or without caffeine).  ? Drinks that contain alcohol.  ? Energy drinks and sports drinks.  ? Carbonated drinks or sodas.  ? Chocolate and cocoa.  ? Peppermint and mint flavorings.  ? Garlic and onions.  ? Horseradish.  ? Spicy and acidic foods, including peppers, chili powder, downey powder, vinegar, hot sauces, and barbecue sauce.  ? Citrus fruit juices and citrus fruits, such as oranges, donta, and limes.  ? Tomato-based foods, such as red sauce, chili, salsa, and pizza with red sauce.  ? Fried and fatty foods, such as donuts, french fries, potato chips, and high-fat dressings.  ? High-fat meats, such as hot dogs and fatty cuts of red and white meats, such as rib eye steak, sausage, ham, and tolentino.  ? High-fat dairy items, such as whole milk, butter, and cream cheese.  · Eat small, frequent meals instead of large meals.  · Avoid drinking large amounts of liquid with your meals.  · Avoid eating meals during the 2-3 hours before bedtime.  · Avoid lying down right after you eat.  · Do not exercise right after you eat.  General instructions  · Pay attention to any changes in your symptoms.  · Take over-the-counter and prescription medicines only as told by your health care provider. Do not take aspirin, ibuprofen, or other NSAIDs unless your health care provider told you to do so.  · Do not use any tobacco  products, including cigarettes, chewing tobacco, and e-cigarettes. If you need help quitting, ask your health care provider.  · Wear loose-fitting clothing. Do not wear anything tight around your waist that causes pressure on your abdomen.  · Raise (elevate) the head of your bed about 6 inches (15 cm).  · Try to reduce your stress, such as with yoga or meditation. If you need help reducing stress, ask your health care provider.  · If you are overweight, reduce your weight to an amount that is healthy for you. Ask your health care provider for guidance about a safe weight loss goal.  · Keep all follow-up visits as told by your health care provider. This is important.  Contact a health care provider if:  · You have new symptoms.  · You have unexplained weight loss.  · You have difficulty swallowing, or it hurts to swallow.  · You have wheezing or a persistent cough.  · Your symptoms do not improve with treatment.  · You have frequent heartburn for more than two weeks.  Get help right away if:  · You have pain in your arms, neck, jaw, teeth, or back.  · You feel sweaty, dizzy, or light-headed.  · You have chest pain or shortness of breath.  · You vomit and your vomit looks like blood or coffee grounds.  · Your stool is bloody or black.  This information is not intended to replace advice given to you by your health care provider. Make sure you discuss any questions you have with your health care provider.  Document Released: 05/06/2010 Document Revised: 05/25/2017 Document Reviewed: 04/13/2016  [employer] Interactive Patient Education © 2019 [employer]

## 2019-03-26 NOTE — TELEPHONE ENCOUNTER
Patient called and states her legs are worse. She is asking if Prateek can call her in something for pain. Call her at [phone]

## 2019-03-27 NOTE — TELEPHONE ENCOUNTER
If she is having no adverse effects to the dose of Neurontin I will increase it.  If so I will need a [facility] report.

## 2019-03-28 NOTE — PROGRESS NOTES
[facility] #[id number] reviewed in regards to gabapentin prescription prescribed on 3/27/2019.  Controlled substance contract on file.

## 2019-04-09 NOTE — TELEPHONE ENCOUNTER
I will need to see her for further evaluation.  It could be some of her medications causing the issue so I will need to see her.  Has she seen a neurologist yet?

## 2019-04-09 NOTE — TELEPHONE ENCOUNTER
"Prateek,    I called patient regarding message about her lungs.  Its not her lungs.  The fluid is on her legs.  She stated that they have been swollen for about a week and feels like they \"weight about 10 pounds.\"  She sounds fatigued on the phone. No oozing, no SOB.    Please advise,  Thanks so much.  "

## 2019-04-09 NOTE — TELEPHONE ENCOUNTER
YOLANDA SLADE HAS CALLED TO TALK TO CHRIS DIAZ ABOUT A BUILD UP OF FLUID ON HER LUNGS..SHE IS WANTING SOMETHING TO HELP REMOVE THIS??PLEASE CALL HER BACK  [phone]

## 2019-04-10 NOTE — PROGRESS NOTES
"Subjective   Yaneli Bates is a 78 y.o. female.     Ms. Bates is a 78-year-old female who presents today with progressively increasing bilateral lower extremity edema.  She states she is \"always had some edema\" to her feet.  However she is noticed that the edema has increased since starting on Neurontin.  She denies shortness of breath however she does report increased tiredness and fatigue and wanting to sleep more often.  Her O2 saturation today is 94% on room air but she denies any difficulty breathing and appears to be in no distress.  She has no chest pain or palpitations.  She is taking all other medications as prescribed.         The following portions of the patient's history were reviewed and updated as appropriate: allergies, current medications, past family history, past medical history, past social history, past surgical history and problem list.    Review of Systems   Constitutional: Positive for fatigue. Negative for activity change, appetite change, unexpected weight gain and unexpected weight loss.   HENT: Negative for congestion, sore throat, trouble swallowing and voice change.    Eyes: Negative.    Respiratory: Negative for cough, chest tightness, shortness of breath and wheezing.    Cardiovascular: Positive for leg swelling. Negative for chest pain and palpitations.   Gastrointestinal: Negative for abdominal pain, constipation, diarrhea, nausea and vomiting.   Endocrine: Negative.    Genitourinary: Negative for dysuria.   Musculoskeletal: Negative for arthralgias and myalgias.   Skin: Negative for rash.   Allergic/Immunologic: Negative.    Neurological: Negative.    Hematological: Negative.    Psychiatric/Behavioral: Negative.        Objective   Physical Exam   Constitutional: She is oriented to person, place, and time. She appears well-developed and well-nourished. No distress.   HENT:   Head: Normocephalic and atraumatic.   Eyes: Conjunctivae are normal.   Neck: Normal range of motion. "   Cardiovascular: Normal rate, regular rhythm, normal heart sounds and intact distal pulses.   Pulmonary/Chest: Effort normal and breath sounds normal. No stridor. No respiratory distress. She has no wheezes. She has no rales.   o2 sat 94% on room air   Musculoskeletal: Normal range of motion. She exhibits edema (3+ pittine ble edmema to mid shin). She exhibits no tenderness.   Neurological: She is alert and oriented to person, place, and time.   Skin: Skin is warm and dry. No rash noted. She is not diaphoretic. No erythema. No pallor.   Psychiatric: She has a normal mood and affect. Her behavior is normal. Judgment and thought content normal.   Nursing note and vitals reviewed.        Assessment/Plan   Yaneli was seen today for leg swelling.    Diagnoses and all orders for this visit:    Peripheral edema  -     BNP; Future  -     XR Chest 2 View  -     CBC & Differential  -     Comprehensive Metabolic Panel  -     CBC Auto Differential    Peripheral polyneuropathy    Other fatigue  -     BNP; Future  -     XR Chest 2 View    Other forms of dyspnea   -     BNP; Future    Other orders  -     traMADol (ULTRAM) 50 MG tablet; Take 1 tablet by mouth 2 (Two) Times a Day As Needed for Moderate Pain  for up to 30 days.    1.  Peripheral edema-BNP, chest x-ray, CBC, CMP.  Will call with results.  Estimated ejection fraction on 3/11/2019 was 60%.  EKG on 3/15/2019 showed normal sinus rhythm with occasional PVCs per cardiology.  Will wait to consider adding any medication at this time pending lab and x-ray results and to monitor the effectiveness of discontinuing Neurontin.    2.  Peripheral neuropathy-out of concern for Neurontin causing edema will discontinue at this time.  Will place patient on tramadol 50 mg 1 tablet twice a day as needed for neuropathy pain.  [facility] #[id number] reviewed.  Patient instructed to take medication with caution and use assistance with ambulation if needed.  Controlled substance contract on  file.    3.  Fatigue-BNP, chest x-ray.  Will call with results.    4.  Follow-up in 1 week or sooner if needed.          This document has been electronically signed by DIOMEDES Lemon on April 10, 2019 1:18 PM

## 2019-04-11 PROBLEM — D64.9 SYMPTOMATIC ANEMIA: Status: ACTIVE | Noted: 2019-01-01

## 2019-04-11 NOTE — PROGRESS NOTES
Please call patient and have her go to the emergency room.  Her hemoglobin has dropped 2 points in 3 weeks and her white blood cell count is elevated.  Her sodium was also low at 126.  There is multiple reasons she could have fatigue.  She needs to be evaluated further.

## 2019-04-11 NOTE — PROGRESS NOTES
Labs resulted this morning and reviewed.  Patient has elevated white count of 12.5.  Patient reported no signs or symptoms of infection yesterday her main complaint was fatigue and increased edema in bilateral lower legs.  Sodium level was 126 BNP 1580 and 1 month ago it was 641.  Chest x-ray unremarkable per radiology report.  Hemoglobin was 8.1 today in approximately 3 weeks ago it was 10.1.  She was hospitalized 3 weeks ago for GI bleed.  Patient was notified of these lab results and sent to the emergency room for further evaluation.  She has no identified history of CHF in her chart.  She has had recent cardiac workup approximately 1 month ago with an ejection fraction of 60%.  Report called to [facility] emergency room.  Report given to NGHIA Carter at 0887.

## 2019-04-11 NOTE — TELEPHONE ENCOUNTER
Per DIOMEDES Bernstein, Yaneli Bates was called regarding lab results. Yaneli Bates was instructed to report to the Emergency Room due to abnormal lab results. Patient verbalized understanding.

## 2019-04-12 PROBLEM — I10 HYPERTENSION: Chronic | Status: ACTIVE | Noted: 2019-01-01

## 2019-04-12 PROBLEM — R31.9 HEMATURIA: Status: ACTIVE | Noted: 2019-01-01

## 2019-04-12 PROBLEM — E11.9 DIABETES MELLITUS (HCC): Chronic | Status: ACTIVE | Noted: 2019-01-01

## 2019-04-12 PROBLEM — Z95.2 S/P AVR (AORTIC VALVE REPLACEMENT): Chronic | Status: ACTIVE | Noted: 2019-01-01

## 2019-04-22 PROBLEM — E87.1 HYPONATREMIA: Status: ACTIVE | Noted: 2019-01-01

## 2019-04-22 NOTE — ANESTHESIA PREPROCEDURE EVALUATION
Anesthesia Evaluation     no history of anesthetic complications:  NPO Solid Status: > 8 hours  NPO Liquid Status: > 2 hours           Airway   Mallampati: II  TM distance: >3 FB  Neck ROM: full  No difficulty expected  Dental - normal exam     Pulmonary - negative pulmonary ROS and normal exam   Cardiovascular - normal exam    (+) hypertension less than 2 medications, valvular problems/murmurs (h/o aortic valve replacement), CAD, dysrhythmias Atrial Fib,       Neuro/Psych  (+) headaches,     GI/Hepatic/Renal/Endo    (+)   hepatitis (infectious viral hepatitis), liver disease, diabetes mellitus,     Musculoskeletal     (+) myalgias (fibromyalgia),   Abdominal    Substance History      OB/GYN          Other   (+) arthritis                     Anesthesia Plan    ASA 3     MAC     intravenous induction   Anesthetic plan, all risks, benefits, and alternatives have been provided, discussed and informed consent has been obtained with: patient.

## 2019-04-22 NOTE — ANESTHESIA POSTPROCEDURE EVALUATION
Patient: Yaneli Bates    Procedure Summary     Date:  04/22/19 Room / Location:  [facility] ENDOSCOPY 1 / [facility] ENDOSCOPY    Anesthesia Start:  1145 Anesthesia Stop:  1204    Procedures:       ESOPHAGOGASTRODUODENOSCOPY (N/A )      COLONOSCOPY (N/A ) Diagnosis:       Symptomatic anemia      (Symptomatic anemia [D64.9])    Surgeon:  Carlos Schaeffer MD Provider:  Yenny Avelar CRNA    Anesthesia Type:  MAC ASA Status:  3          Anesthesia Type: MAC  Last vitals  BP   157/82 (04/22/19 1048)   Temp   97.5 °F (36.4 °C) (04/22/19 1048)   Pulse   90 (04/22/19 0826)   Resp   16 (04/22/19 1048)     SpO2   97 % (04/22/19 1048)     Post Anesthesia Care and Evaluation    Patient location during evaluation: bedside  Patient participation: complete - patient participated  Level of consciousness: sleepy but conscious  Pain management: adequate  Airway patency: patent  Anesthetic complications: No anesthetic complications  PONV Status: none  Cardiovascular status: acceptable  Respiratory status: acceptable  Hydration status: acceptable

## 2019-04-25 NOTE — OUTREACH NOTE
Prep Survey      Responses   Facility patient discharged from?  [facility]   Is patient eligible?  Yes   Discharge diagnosis  Symptomatic anemia, Hyponatremia, DM, Hematuria HTN, S/P AVR, CAD   Does the patient have one of the following disease processes/diagnoses(primary or secondary)?  Other   Does the patient have Home health ordered?  Yes   What is the Home health agency?   [facility]   Is there a DME ordered?  No   Comments regarding appointments  See AVS   Prep survey completed?  Yes          Janay Walton RN

## 2019-04-26 NOTE — PROGRESS NOTES
Sodium stable, 128.  Potassium 3.5.  Hemoglobin slightly improving, 8.3.  Continue medications as prescribed.  Have her come to lab next week to repeat labs to monitor electrolytes and hemoglobin levels.

## 2019-04-26 NOTE — PROGRESS NOTES
Subjective   Yaneli Bates is a 78 y.o. female.     Ms. Bates is a 78-year-old female who presents today for hospital follow-up related to anemia and hyponatremia.  Patient was transfused with packed red blood cells while hospitalized and hemoglobin improved subsequently.  Patient remained hyponatremia and was started on salt tablets 1 g 3 times daily while hospitalized.  No definitive etiology was determined for her hyponatremia.  She continues follow-up with nephrology on outpatient basis for further evaluation.  She does report feeling better today compared to when she was first admitted to the hospital.  She denies any dark stools however she does report constipation and nausea.  Her Elavil was discontinued in the hospital related to hyponatremia.  Despite this she has not had an increase in headaches.  At this time she does not wish to go on any other new medication for preventative care of headaches.         The following portions of the patient's history were reviewed and updated as appropriate: allergies, current medications, past family history, past medical history, past social history, past surgical history and problem list.    Review of Systems   Constitutional: Positive for fatigue. Negative for activity change, appetite change, unexpected weight gain and unexpected weight loss.        Fatigue improving per patient report.   HENT: Negative for congestion, sore throat, trouble swallowing and voice change.    Eyes: Negative.    Respiratory: Negative for cough, chest tightness, shortness of breath and wheezing.    Cardiovascular: Positive for palpitations. Negative for chest pain and leg swelling.        Bilateral feet.   Gastrointestinal: Positive for constipation. Negative for abdominal pain, diarrhea, nausea and vomiting.   Endocrine: Negative.    Genitourinary: Negative for dysuria.   Musculoskeletal: Negative for arthralgias and myalgias.   Skin: Negative for rash.   Allergic/Immunologic:  Negative.    Neurological: Positive for weakness and headache.        No increase in headache frequency since stopping Elavil.  Patient denies any migraines since stopping Elavil.   Hematological: Negative.    Psychiatric/Behavioral: Negative.        Objective   Physical Exam   Constitutional: She is oriented to person, place, and time. She appears well-developed and well-nourished. She is cooperative. She is easily aroused.  Non-toxic appearance. She does not have a sickly appearance. She does not appear ill. No distress.   HENT:   Head: Normocephalic and atraumatic.   Eyes: Conjunctivae are normal.   Neck: Normal range of motion.   Cardiovascular: Normal rate, regular rhythm, normal heart sounds and intact distal pulses.   Pulmonary/Chest: Effort normal and breath sounds normal. No stridor. No respiratory distress. She has no wheezes. She has no rales.   Abdominal: Soft. Normal appearance. She exhibits no distension and no mass. Bowel sounds are decreased. There is no tenderness. There is no rebound and no guarding.   Musculoskeletal: Normal range of motion. She exhibits edema. She exhibits no tenderness.   3+ bilateral pedal edema.  Pulses intact.   Neurological: She is alert, oriented to person, place, and time and easily aroused. She is not disoriented. No cranial nerve deficit or sensory deficit.   Skin: Skin is warm and dry. No rash noted. She is not diaphoretic. No cyanosis or erythema. No pallor.   Psychiatric: She has a normal mood and affect. Her behavior is normal. Judgment and thought content normal.   Nursing note and vitals reviewed.    Current outpatient and discharge medications have been reconciled for the patient.  Reviewed by: DIOMEDES Lemon      Assessment/Plan   Yaneli was seen today for follow-up.    Diagnoses and all orders for this visit:    Anemia, unspecified type  -     CBC & Differential; Future  -     Comprehensive Metabolic Panel; Future    Hyponatremia  -     CBC &  Differential; Future  -     Comprehensive Metabolic Panel; Future    Nausea    Constipation, unspecified constipation type    Other orders  -     lactulose (CHRONULAC) 10 GM/15ML solution; Take 30 mL by mouth 2 (Two) Times a Day As Needed (constipation).  -     promethazine (PHENERGAN) 25 MG tablet; Take 1 tablet by mouth Every 6 (Six) Hours As Needed for Nausea or Vomiting.    1.  Anemia-CBC.  Will call with results.    2.  Hyponatremia-CMP.  Will call with results.  Continue sodium tablets as prescribed.    3.  Nausea-refill Phenergan 25 mg 1 tablet every 6 hours as needed for nausea.    4.  Constipation-lactulose 10 g / 15 mL's take 30 mL's twice a day as needed for constipation.  Decrease dose if diarrhea occurs.    5.  Follow-up in 1 month or sooner if needed.  Follow-up with specialist as scheduled.            This document has been electronically signed by DIOMEDES Lemon on April 26, 2019 12:52 PM

## 2019-04-29 NOTE — OUTREACH NOTE
Medical Week 1 Survey      Responses   Facility patient discharged from?  [facility]   Does the patient have one of the following disease processes/diagnoses(primary or secondary)?  Other   Is there a successful TCM telephone encounter documented?  No   Week 1 attempt successful?  Yes   Call start time  1008   Call end time  1018   Is patient permission given to speak with other caregiver?  Yes   List who call center can speak with  Anyone   Meds reviewed with patient/caregiver?  Yes   Is the patient having any side effects they believe may be caused by any medication additions or changes?  No   Does the patient have all medications ordered at discharge?  Yes   Is the patient taking all medications as directed (includes completed medication regime)?  Yes   Comments regarding appointments  See AVS   Does the patient have a primary care provider?   Yes   Does the patient have an appointment with their PCP within 7 days of discharge?  Yes   Has the patient kept scheduled appointments due by today?  N/A   What is the Home health agency?   [facility]   Has home health visited the patient within 72 hours of discharge?  Yes   Psychosocial issues?  No   Did the patient receive a copy of their discharge instructions?  Yes   Nursing interventions  Reviewed instructions with patient   What is the patient's perception of their health status since discharge?  Improving   Is the patient/caregiver able to teach back signs and symptoms related to disease process for when to call PCP?  Yes   Is the patient/caregiver able to teach back signs and symptoms related to disease process for when to call 911?  Yes   Is the patient/caregiver able to teach back the hierarchy of who to call/visit for symptoms/problems? PCP, Specialist, Home health nurse, Urgent Care, ED, 911  Yes   Week 1 call completed?  Yes   Wrap up additional comments  Hands and fingers hurting her badly. Elavil was d/c and since then having more pain. Has fibromyalgia.            Adelia Cabral, RN

## 2019-05-02 NOTE — PROGRESS NOTES
White count slightly elevated.  If she having any signs of infection?.  Sodium improving, 131.  Potassium low, 3.1.  I have sent in a prescription for potassium 10 mEq daily for 4 days.  Please call [facility] and give them a verbal to collect a CMP early next week.  Thank you

## 2019-05-03 NOTE — TELEPHONE ENCOUNTER
Patient has called and states that the promethazine (PHENERGAN) 25 MG tablet is not helping. She states she is very nauseas and can't eat. She is losing weight. She needs to know what Prateek wants her to do. Call her at [phone]

## 2019-05-06 NOTE — PROGRESS NOTES
Labs stable.  Potassium improved, 3.4.  I refilled her potassium prescription for another 5 days.  This should be enough to cover her until she can see nephrology who will follow up with at that time.

## 2019-05-07 NOTE — OUTREACH NOTE
Medical Week 2 Survey      Responses   Facility patient discharged from?  [facility]   Does the patient have one of the following disease processes/diagnoses(primary or secondary)?  Other   Week 2 attempt successful?  Yes   Call start time  1302   Discharge diagnosis  Symptomatic anemia, Hyponatremia, DM, Hematuria HTN, S/P AVR, CAD   Call end time  1304   Is patient permission given to speak with other caregiver?  Yes   Meds reviewed with patient/caregiver?  Yes   Is the patient having any side effects they believe may be caused by any medication additions or changes?  No   Is the patient taking all medications as directed (includes completed medication regime)?  Yes   Medication comments  fluid restriction   Comments regarding appointments  See AVS   Does the patient have a primary care provider?   Yes   Does the patient have an appointment with their PCP within 7 days of discharge?  Yes   Comments regarding PCP  DIOMEDES Bernstein   Has the patient kept scheduled appointments due by today?  N/A   What is the Home health agency?   [facility]   Has home health visited the patient within 72 hours of discharge?  Yes   Psychosocial issues?  No   Did the patient receive a copy of their discharge instructions?  Yes   Nursing interventions  Reviewed instructions with patient   What is the patient's perception of their health status since discharge?  Improving   Is the patient/caregiver able to teach back signs and symptoms related to disease process for when to call PCP?  Yes   Is the patient/caregiver able to teach back signs and symptoms related to disease process for when to call 911?  Yes   Is the patient/caregiver able to teach back the hierarchy of who to call/visit for symptoms/problems? PCP, Specialist, Home health nurse, Urgent Care, ED, 911  Yes   Additional teach back comments  States she is still having quite a bit of weakness.    Week 2 Call Completed?  Yes          Ana Garcia RN

## 2019-05-08 NOTE — PROGRESS NOTES
Chief Complaint   Patient presents with   • Anemia       Subjective    Yaneli Bates is a 78 y.o. female. she is here today for follow-up.    History of Present Illness  78-year-old female presents for hospital follow-up.  She was hospitalized March 2019 for GI bleed and underwent EGD.  Follow-up she was feeling better and declined to schedule colonoscopy while hospitalized hemoglobin trended down and she underwent EGD and colonoscopy.  EGD noted gastritis normal duodenum normal esophagus.  Antral biopsy noted mild chronic gastritis.  Negative for H. pylori.  Colonoscopy had adequate prep noted perianal digital rectal exam were normal external and internal hemorrhoids were found.  Several biopsies were obtained which noted no significant pathologic diagnosis.  Patient reports she still feels very weak and nauseated.  States she wants to eat however food makes her sick so unable to eat very much when she starts eating.  Her weight is down 6 pounds from hospital discharge.  She is being followed by home health.  During hospitalization stool was negative for occult blood.  Hemoglobin increased to 8.8 at recent check for primary care provider.  States lactulose has well-controlled constipation symptoms  Plan; I have recommended capsule endoscopy due to continual anemia, weight loss to rule out any underlying lesion in the small bowel.  Patient declines to schedule.  Recommended PPI daily for gastritis.  Discussed possible referral to nutritionist as she cannot eat several things due to migraines.  She declines referral at this point.  Follow-up in 1 month for recheck return office sooner if needed.     The following portions of the patient's history were reviewed and updated as appropriate:   Past Medical History:   Diagnosis Date   • Allergic    • Arthritis    • Coronary artery disease 2014    valave replacement   • Fibromyalgia, primary    • Headache    • Infectious viral hepatitis    • Low back pain    •  Osteoporosis    • Visual impairment      Past Surgical History:   Procedure Laterality Date   • AORTIC VALVE REPAIR/REPLACEMENT  2014,may   • CARDIAC SURGERY     • COLONOSCOPY     • COLONOSCOPY N/A 4/22/2019    Procedure: COLONOSCOPY;  Surgeon: Carlos Schaeffer MD;  Location: [facility] ENDOSCOPY;  Service: Gastroenterology   • ENDOSCOPY N/A 3/13/2019    Procedure: ESOPHAGOGASTRODUODENOSCOPY;  Surgeon: Carlos Schaeffer MD;  Location: [facility] ENDOSCOPY;  Service: Gastroenterology   • ENDOSCOPY N/A 4/22/2019    Procedure: ESOPHAGOGASTRODUODENOSCOPY;  Surgeon: Carlos Schaeffer MD;  Location: [facility] ENDOSCOPY;  Service: Gastroenterology   • KNEE SURGERY Right 1967   • TONSILLECTOMY       Family History   Problem Relation Age of Onset   • Arthritis Mother    • Cancer Mother    • Heart disease Mother    • Hyperlipidemia Mother    • Hypertension Mother    • Vision loss Mother    • Arthritis Father    • Hearing loss Father    • Heart disease Father    • Hyperlipidemia Father    • Hypertension Father    • Vision loss Father    • Hypertension Sister    • Vision loss Sister    • Osteoporosis Sister    • Cataracts Sister    • Hyperlipidemia Brother    • Vision loss Brother    • Heart disease Brother    • Cancer Daughter    • Early death Daughter    • Osteoporosis Sister    • Parkinsonism Brother    • Hyperlipidemia Brother    • Thyroid disease Daughter      OB History     No data available        Prior to Admission medications    Medication Sig Start Date End Date Taking? Authorizing Provider   alendronate (FOSAMAX) 70 MG tablet Take 1 tablet by mouth Every 7 (Seven) Days. 4/24/19  Yes Elian Pacheco APRN   docusate sodium (COLACE) 100 MG capsule Take 100 mg by mouth 3 (Three) Times a Day.   Yes Provider, MD Klarissa   ferrous sulfate 324 (65 Fe) MG tablet delayed-release EC tablet Take 1 tablet by mouth Daily With Breakfast. 3/14/19  Yes Domi Ramos APRN   lactulose (CHRONULAC) 10 GM/15ML solution Take 30 mL by mouth 2  (Two) Times a Day As Needed (constipation). 4/26/19  Yes Elian Pacheco APRN   metFORMIN ER (GLUCOPHAGE-XR) 500 MG 24 hr tablet Take 1 tablet by mouth Daily With Breakfast. 4/4/19  Yes Elian aPcheco APRN   metoprolol succinate XL (TOPROL-XL) 50 MG 24 hr tablet Take 1 tablet by mouth Daily With Dinner. 2/15/19  Yes Toro Pryor MD   Multiple Minerals-Vitamins (CALCIUM-MAGNESIUM-ZINC-D3 PO) Take 1 tablet by mouth Daily.   Yes Klarissa Coe MD   ondansetron ODT (ZOFRAN ODT) 4 MG disintegrating tablet Take 1 tablet by mouth Every 8 (Eight) Hours As Needed for Nausea or Vomiting. 5/2/19  Yes Elian Pacheco APRN   pantoprazole (PROTONIX) 40 MG EC tablet Take 1 tablet by mouth Daily. 4/23/19  Yes Lewis Tsang MD   potassium chloride (K-DUR,KLOR-CON) 10 MEQ CR tablet Take 1 tablet by mouth Daily for 5 days. 5/7/19 5/12/19 Yes Elian Pacheco APRN   sodium chloride 1 g tablet Take 1 tablet by mouth 3 (Three) Times a Day With Meals. 4/23/19  Yes Lewis Tsang MD   traMADol (ULTRAM) 50 MG tablet Take 1 tablet by mouth 2 (Two) Times a Day As Needed for Moderate Pain  for up to 30 days. 4/10/19 5/10/19 Yes Elian Pacheco APRN   promethazine (PHENERGAN) 25 MG tablet Take 1 tablet by mouth Every 6 (Six) Hours As Needed for Nausea or Vomiting. 4/26/19 5/8/19 Yes Elian Pacheco APRN     Allergies   Allergen Reactions   • Clindamycin/Lincomycin Other (See Comments)     Migraine headaches   • Corn-Containing Products Other (See Comments)     migraines   • Tomato Flavor [Flavoring Agent] Other (See Comments)     migraines   • Vinegar [Acetic Acid] Other (See Comments)     migraines   • Amoxicillin Rash   • Codeine GI Intolerance   • Penicillins Hives   • Sulfa Antibiotics Hives   • Vicodin [Hydrocodone-Acetaminophen] GI Intolerance     vomiting     Social History     Socioeconomic History   • Marital status:      Spouse name: Not on file   • Number of children: Not on file   • Years of  "education: Not on file   • Highest education level: Not on file   Tobacco Use   • Smoking status: Never Smoker   • Smokeless tobacco: Never Used   Substance and Sexual Activity   • Alcohol use: No   • Drug use: No   • Sexual activity: No       Review of Systems  Review of Systems   Constitutional: Positive for fatigue and unexpected weight change. Negative for activity change, appetite change, chills, diaphoresis and fever.   HENT: Negative for sore throat and trouble swallowing.    Respiratory: Negative for shortness of breath.    Gastrointestinal: Positive for constipation (lactulose ) and nausea (with any intake and early satiety ). Negative for abdominal distention, abdominal pain, anal bleeding, blood in stool, diarrhea, rectal pain and vomiting.   Musculoskeletal: Negative for arthralgias.   Skin: Negative for pallor.   Neurological: Negative for light-headedness.        /62 (BP Location: Left arm)   Pulse 92   Ht 154.9 cm (61\")   Wt 50.3 kg (110 lb 12.8 oz)   LMP 09/25/1996 (Within Months)   BMI 20.94 kg/m²     Objective    Physical Exam   Constitutional: She is oriented to person, place, and time. She appears well-developed and well-nourished. She is cooperative. No distress.   HENT:   Head: Normocephalic and atraumatic.   Neck: Normal range of motion. Neck supple. No thyromegaly present.   Cardiovascular: Normal rate, regular rhythm and normal heart sounds.   Pulmonary/Chest: Effort normal and breath sounds normal. She has no wheezes. She has no rhonchi. She has no rales.   Abdominal: Soft. Normal appearance and bowel sounds are normal. She exhibits no shifting dullness, no distension, no fluid wave and no ascites. There is no hepatosplenomegaly. There is no tenderness. There is no rigidity and no guarding. No hernia.   Lymphadenopathy:     She has no cervical adenopathy.   Neurological: She is alert and oriented to person, place, and time.   Skin: Skin is warm, dry and intact. No rash noted. No " pallor.   Psychiatric: She has a normal mood and affect. Her speech is normal.     Lab Requisition on 05/06/2019   Component Date Value Ref Range Status   • Glucose 05/06/2019 97  65 - 99 mg/dL Final   • BUN 05/06/2019 11  8 - 23 mg/dL Final   • Creatinine 05/06/2019 0.53* 0.57 - 1.00 mg/dL Final   • Sodium 05/06/2019 129* 136 - 145 mmol/L Final   • Potassium 05/06/2019 3.4* 3.5 - 5.2 mmol/L Final   • Chloride 05/06/2019 92* 98 - 107 mmol/L Final   • CO2 05/06/2019 27.0  22.0 - 29.0 mmol/L Final   • Calcium 05/06/2019 8.1* 8.6 - 10.5 mg/dL Final   • Total Protein 05/06/2019 8.9* 6.0 - 8.5 g/dL Final   • Albumin 05/06/2019 2.10* 3.50 - 5.20 g/dL Final   • ALT (SGPT) 05/06/2019 6  1 - 33 U/L Final   • AST (SGOT) 05/06/2019 19  1 - 32 U/L Final   • Alkaline Phosphatase 05/06/2019 72  39 - 117 U/L Final   • Total Bilirubin 05/06/2019 0.4  0.2 - 1.2 mg/dL Final   • eGFR Non African Amer 05/06/2019 112  >60 mL/min/1.73 Final   • Globulin 05/06/2019 6.8  gm/dL Final   • A/G Ratio 05/06/2019 0.3  g/dL Final   • BUN/Creatinine Ratio 05/06/2019 20.8  7.0 - 25.0 Final   • Anion Gap 05/06/2019 10.0  mmol/L Final     Assessment/Plan      1. Iron deficiency anemia due to chronic blood loss    2. Chronic idiopathic constipation    .       Orders placed during this encounter include:  No orders of the defined types were placed in this encounter.      * Surgery not found *    Review and/or summary of lab tests, radiology, procedures, medications. Review and summary of old records and obtaining of history. The risks and benefits of my recommendations, as well as other treatment options were discussed with the patient today. Questions were answered.    No orders of the defined types were placed in this encounter.      Follow-up: Return in about 4 weeks (around 6/5/2019).          This document has been electronically signed by DIOMEDES Morgan on May 8, 2019 3:59 PM             Results for orders placed or performed in visit on  05/06/19   Comprehensive Metabolic Panel   Result Value Ref Range    Glucose 97 65 - 99 mg/dL    BUN 11 8 - 23 mg/dL    Creatinine 0.53 (L) 0.57 - 1.00 mg/dL    Sodium 129 (L) 136 - 145 mmol/L    Potassium 3.4 (L) 3.5 - 5.2 mmol/L    Chloride 92 (L) 98 - 107 mmol/L    CO2 27.0 22.0 - 29.0 mmol/L    Calcium 8.1 (L) 8.6 - 10.5 mg/dL    Total Protein 8.9 (H) 6.0 - 8.5 g/dL    Albumin 2.10 (L) 3.50 - 5.20 g/dL    ALT (SGPT) 6 1 - 33 U/L    AST (SGOT) 19 1 - 32 U/L    Alkaline Phosphatase 72 39 - 117 U/L    Total Bilirubin 0.4 0.2 - 1.2 mg/dL    eGFR Non African Amer 112 >60 mL/min/1.73    Globulin 6.8 gm/dL    A/G Ratio 0.3 g/dL    BUN/Creatinine Ratio 20.8 7.0 - 25.0    Anion Gap 10.0 mmol/L   Results for orders placed or performed in visit on 05/02/19   CBC Auto Differential   Result Value Ref Range    WBC 11.66 (H) 3.40 - 10.80 10*3/mm3    RBC 3.12 (L) 3.77 - 5.28 10*6/mm3    Hemoglobin 8.8 (L) 12.0 - 15.9 g/dL    Hematocrit 27.7 (L) 34.0 - 46.6 %    MCV 88.8 79.0 - 97.0 fL    MCH 28.2 26.6 - 33.0 pg    MCHC 31.8 31.5 - 35.7 g/dL    RDW 18.2 (H) 12.3 - 15.4 %    RDW-SD 57.0 (H) 37.0 - 54.0 fl    MPV 9.8 6.0 - 12.0 fL    Platelets 322 140 - 450 10*3/mm3   Manual Differential   Result Value Ref Range    Neutrophil % 88.0 (H) 42.7 - 76.0 %    Lymphocyte % 5.0 (L) 19.6 - 45.3 %    Monocyte % 7.0 5.0 - 12.0 %    Neutrophils Absolute 10.26 (H) 1.70 - 7.00 10*3/mm3    Lymphocytes Absolute 0.58 (L) 0.70 - 3.10 10*3/mm3    Monocytes Absolute 0.82 0.10 - 0.90 10*3/mm3    Anisocytosis Slight/1+ None Seen    WBC Morphology Normal Normal    Platelet Estimate Adequate Normal   Hemoglobin A1c   Result Value Ref Range    Hemoglobin A1C 6.07 (H) 4.80 - 5.60 %   Comprehensive Metabolic Panel   Result Value Ref Range    Glucose 100 (H) 70 - 99 mg/dL    BUN 15 7 - 23 mg/dL    Creatinine 0.51 (L) 0.52 - 1.04 mg/dL    Sodium 131 (L) 137 - 145 mmol/L    Potassium 3.1 (L) 3.4 - 5.0 mmol/L    Chloride 95 (L) 101 - 112 mmol/L    CO2 26.0 22.0  - 30.0 mmol/L    Calcium 7.9 (L) 8.4 - 10.2 mg/dL    Total Protein 8.2 6.3 - 8.6 g/dL    Albumin 2.80 (L) 3.50 - 5.00 g/dL    ALT (SGPT) 12 <=35 U/L    AST (SGOT) 26 14 - 36 U/L    Alkaline Phosphatase 90 38 - 126 U/L    Total Bilirubin 0.7 0.2 - 1.3 mg/dL    eGFR Non  Amer 117 (H) 39 - 90 mL/min/1.73    Globulin 5.4 (H) 2.3 - 3.5 gm/dL    A/G Ratio 0.5 (L) 1.1 - 1.8 g/dL    BUN/Creatinine Ratio 29.4 (H) 7.0 - 25.0    Anion Gap 10.0 5.0 - 15.0 mmol/L   Results for orders placed or performed in visit on 04/26/19   CBC Auto Differential   Result Value Ref Range    WBC 10.16 3.40 - 10.80 10*3/mm3    RBC 3.10 (L) 3.77 - 5.28 10*6/mm3    Hemoglobin 8.3 (L) 12.0 - 15.9 g/dL    Hematocrit 26.7 (L) 34.0 - 46.6 %    MCV 86.1 79.0 - 97.0 fL    MCH 26.8 26.6 - 33.0 pg    MCHC 31.1 (L) 31.5 - 35.7 g/dL    RDW 17.9 (H) 12.3 - 15.4 %    RDW-SD 56.7 (H) 37.0 - 54.0 fl    MPV 9.2 6.0 - 12.0 fL    Platelets 305 140 - 450 10*3/mm3    Neutrophil % 81.7 (H) 42.7 - 76.0 %    Lymphocyte % 8.8 (L) 19.6 - 45.3 %    Monocyte % 7.7 5.0 - 12.0 %    Eosinophil % 0.7 0.3 - 6.2 %    Basophil % 0.2 0.0 - 1.5 %    Immature Grans % 0.9 (H) 0.0 - 0.5 %    Neutrophils, Absolute 8.31 (H) 1.70 - 7.00 10*3/mm3    Lymphocytes, Absolute 0.89 0.70 - 3.10 10*3/mm3    Monocytes, Absolute 0.78 0.10 - 0.90 10*3/mm3    Eosinophils, Absolute 0.07 0.00 - 0.40 10*3/mm3    Basophils, Absolute 0.02 0.00 - 0.20 10*3/mm3    Immature Grans, Absolute 0.09 (H) 0.00 - 0.05 10*3/mm3    nRBC 0.0 0.0 - 0.2 /100 WBC   Comprehensive Metabolic Panel   Result Value Ref Range    Glucose 107 (H) 65 - 99 mg/dL    BUN 14 8 - 23 mg/dL    Creatinine 0.59 0.57 - 1.00 mg/dL    Sodium 128 (L) 136 - 145 mmol/L    Potassium 3.5 3.5 - 5.2 mmol/L    Chloride 93 (L) 98 - 107 mmol/L    CO2 26.0 22.0 - 29.0 mmol/L    Calcium 8.1 (L) 8.6 - 10.5 mg/dL    Total Protein 8.6 (H) 6.0 - 8.5 g/dL    Albumin 2.00 (L) 3.50 - 5.20 g/dL    ALT (SGPT) 8 1 - 33 U/L    AST (SGOT) 20 1 - 32 U/L     Alkaline Phosphatase 66 39 - 117 U/L    Total Bilirubin 0.3 0.2 - 1.2 mg/dL    eGFR Non African Amer 99 >60 mL/min/1.73    Globulin 6.6 gm/dL    A/G Ratio 0.3 g/dL    BUN/Creatinine Ratio 23.7 7.0 - 25.0    Anion Gap 9.0 mmol/L     *Note: Due to a large number of results and/or encounters for the requested time period, some results have not been displayed. A complete set of results can be found in Results Review.

## 2019-05-08 NOTE — PATIENT INSTRUCTIONS
Anemia  Anemia is a condition in which you do not have enough red blood cells or hemoglobin. Hemoglobin is a substance in red blood cells that carries oxygen. When you do not have enough red blood cells or hemoglobin (are anemic), your body cannot get enough oxygen and your organs may not work properly. As a result, you may feel very tired or have other problems.  What are the causes?  Common causes of anemia include:  · Excessive bleeding. Anemia can be caused by excessive bleeding inside or outside the body, including bleeding from the intestine or from periods in women.  · Poor nutrition.  · Long-lasting (chronic) kidney, thyroid, and liver disease.  · Bone marrow disorders.  · Cancer and treatments for cancer.  · HIV (human immunodeficiency virus) and AIDS (acquired immunodeficiency syndrome).  · Treatments for HIV and AIDS.  · Spleen problems.  · Blood disorders.  · Infections, medicines, and autoimmune disorders that destroy red blood cells.    What are the signs or symptoms?  Symptoms of this condition include:  · Minor weakness.  · Dizziness.  · Headache.  · Feeling heartbeats that are irregular or faster than normal (palpitations).  · Shortness of breath, especially with exercise.  · Paleness.  · Cold sensitivity.  · Indigestion.  · Nausea.  · Difficulty sleeping.  · Difficulty concentrating.    Symptoms may occur suddenly or develop slowly. If your anemia is mild, you may not have symptoms.  How is this diagnosed?  This condition is diagnosed based on:  · Blood tests.  · Your medical history.  · A physical exam.  · Bone marrow biopsy.    Your health care provider may also check your stool (feces) for blood and may do additional testing to look for the cause of your bleeding.  You may also have other tests, including:  · Imaging tests, such as a CT scan or MRI.  · Endoscopy.  · Colonoscopy.    How is this treated?  Treatment for this condition depends on the cause. If you continue to lose a lot of blood,  you may need to be treated at a hospital. Treatment may include:  · Taking supplements of iron, vitamin B12, or folic acid.  · Taking a hormone medicine (erythropoietin) that can help to stimulate red blood cell growth.  · Having a blood transfusion. This may be needed if you lose a lot of blood.  · Making changes to your diet.  · Having surgery to remove your spleen.    Follow these instructions at home:  · Take over-the-counter and prescription medicines only as told by your health care provider.  · Take supplements only as told by your health care provider.  · Follow any diet instructions that you were given.  · Keep all follow-up visits as told by your health care provider. This is important.  Contact a health care provider if:  · You develop new bleeding anywhere in the body.  Get help right away if:  · You are very weak.  · You are short of breath.  · You have pain in your abdomen or chest.  · You are dizzy or feel faint.  · You have trouble concentrating.  · You have bloody or black, tarry stools.  · You vomit repeatedly or you vomit up blood.  Summary  · Anemia is a condition in which you do not have enough red blood cells or enough of a substance in your red blood cells that carries oxygen (hemoglobin).  · Symptoms may occur suddenly or develop slowly.  · If your anemia is mild, you may not have symptoms.  · This condition is diagnosed with blood tests as well as a medical history and physical exam. Other tests may be needed.  · Treatment for this condition depends on the cause of the anemia.  This information is not intended to replace advice given to you by your health care provider. Make sure you discuss any questions you have with your health care provider.  Document Released: 01/25/2006 Document Revised: 01/19/2018 Document Reviewed: 01/19/2018  [employer] Patient Education © 2019 [employer]

## 2019-05-09 NOTE — PROGRESS NOTES
Sodium stable at 130, potassium continues to increase and is within normal limits at 3.6.  Her white count continues to rise.  Asked her if she has any signs or symptoms of infection such as cough shortness of breath fever chills nausea vomiting diarrhea dysuria.  If she denies any and all symptoms have home health get a urine and do a UA with culture please thank you

## 2019-05-10 PROBLEM — R53.83 FATIGUE: Status: ACTIVE | Noted: 2019-01-01

## 2019-05-10 PROBLEM — R63.4 WEIGHT LOSS, UNINTENTIONAL: Status: ACTIVE | Noted: 2019-01-01

## 2019-05-10 NOTE — PROGRESS NOTES
Subjective   Yaneli Bates is a 78 y.o. female.     Ms. Bates is a 78-year-old female who presents today for follow-up related to weight loss, fatigue and dizziness.  She was also noted to have an increased white blood cell count all with her last labs.  She denies any symptoms of infection.  As instructed, she did bring a urine sample from home.  She has tickborne illness labs pending since she has a reported history of multiple tick bites.  She denies any chest pain, shortness of breath or palpitations.  She does have nausea and poor appetite.  She states most of her symptoms have been going on since she was discharged from the hospital.  She denies any signs of bleeding including dark stools or coffee-ground emesis.         The following portions of the patient's history were reviewed and updated as appropriate: allergies, current medications, past family history, past medical history, past social history, past surgical history and problem list.    Review of Systems   Constitutional: Positive for fatigue and unexpected weight loss. Negative for activity change, appetite change and unexpected weight gain.   HENT: Negative for congestion, sore throat, trouble swallowing and voice change.    Eyes: Negative.    Respiratory: Negative for cough, chest tightness, shortness of breath and wheezing.    Cardiovascular: Negative for chest pain, palpitations and leg swelling.   Gastrointestinal: Negative for abdominal pain, constipation, diarrhea, nausea and vomiting.   Endocrine: Negative.    Genitourinary: Negative for decreased urine volume, difficulty urinating, dysuria, flank pain, frequency, hematuria and urgency.   Musculoskeletal: Negative for arthralgias and myalgias.   Skin: Negative for rash.   Allergic/Immunologic: Negative.    Neurological: Positive for dizziness. Negative for tremors, facial asymmetry, weakness and confusion.   Hematological: Negative.    Psychiatric/Behavioral: Negative.        Objective    Physical Exam   Constitutional: She is oriented to person, place, and time. She appears well-developed and well-nourished. No distress.   HENT:   Head: Normocephalic and atraumatic.   Eyes: Conjunctivae are normal.   Neck: Normal range of motion.   Cardiovascular: Normal rate, regular rhythm and normal heart sounds.   Pulmonary/Chest: Effort normal and breath sounds normal. No stridor. No respiratory distress. She has no wheezes. She has no rales.   Abdominal: Soft. Bowel sounds are normal. She exhibits no distension and no mass. There is no tenderness. There is no rebound and no guarding. No hernia.   Musculoskeletal: Normal range of motion. She exhibits no edema.        Lumbar back: She exhibits tenderness and pain. She exhibits no bony tenderness.   Neurological: She is alert and oriented to person, place, and time.   Skin: Skin is warm and dry. No rash noted. She is not diaphoretic. No erythema. No pallor.   Psychiatric: She has a normal mood and affect. Her behavior is normal. Judgment and thought content normal.   Nursing note and vitals reviewed.        Assessment/Plan   Yaneli was seen today for weight loss, fatigue and dizziness.    Diagnoses and all orders for this visit:    Nausea  -     POCT urinalysis dipstick, manual  -     Comprehensive Metabolic Panel; Future  -     CBC & Differential; Future    Urinary tract infection with hematuria, site unspecified  -     Comprehensive Metabolic Panel; Future  -     CBC & Differential; Future    Fatigue, unspecified type    Weight loss, unintentional    Other orders  -     doxycycline (VIBRAMYCIN) 100 MG capsule; Take 1 capsule by mouth 2 (Two) Times a Day.    1.  UTI with hematuria- doxycycline 100 mg 1 capsule twice a day for 10 days.  Increase water intake.    2.  Nausea- continue Zofran as needed.  Instructed to eat smaller but more frequent meals.  Follow-up with GI as scheduled.  CBC, CMP.  Will call with results.  Tickborne illness titers pending.  Will  call with results.    3.  Fatigue and weight loss-CBC and CMP.  Will call with results.  Tickborne illness titers pending.  Will call with results.  Follow-up with GI as scheduled.    4.  Follow-up and plan of care pending lab results.  Follow-up with all other specialist as scheduled.            This document has been electronically signed by DIOMEDES Lemon on May 10, 2019 4:18 PM

## 2019-05-10 NOTE — PROGRESS NOTES
Potassium and sodium was stable.  Hemoglobin slight decrease, 7.9.  I am increasing her iron to twice a day with meals.  I sent a new prescription into her pharmacy.  Follow-up with me on the 29th as scheduled or sooner if needed.  Please notify home health I need a CBC in 1 week.  Thank you

## 2019-05-13 PROBLEM — N30.91 HEMORRHAGIC CYSTITIS: Status: ACTIVE | Noted: 2019-01-01

## 2019-05-13 PROBLEM — R53.1 GENERALIZED WEAKNESS: Status: ACTIVE | Noted: 2019-01-01

## 2019-05-13 PROBLEM — D50.0 IRON DEFICIENCY ANEMIA DUE TO CHRONIC BLOOD LOSS: Status: ACTIVE | Noted: 2019-01-01

## 2019-05-13 NOTE — ED PROVIDER NOTES
"Subjective   Pt, hx of CAD, hyponatremia, and Fe deficiency anemia, c/o generalized weakness for several weeks (admitted on 4/11), dx with UTI and iron deficiency anemia 3 days ago by Dr. Pacheco and prescribed Doxycycline (taken 3 doses) and advised to double iron supplements. Pt is now in the ED due to worsening weakness. In addition, pt c/o occasional visual hallucinations of \"flashes of people\" when she quickly moves her head. Denies SI, HI, or auditory hallucinations. Denies dysuria, CP, or shortness of breath.         History provided by:  Patient   used: No    Weakness - Generalized   Severity:  Moderate  Onset quality:  Gradual  Timing:  Constant  Progression:  Worsening  Context: not alcohol use and not drug use    Relieved by:  Nothing  Worsened by:  Nothing  Associated symptoms: no cough, no shortness of breath and no vomiting    Risk factors: anemia    Hallucinations   Presenting symptoms: hallucinations    Presenting symptoms: no agitation    Associated symptoms: no fatigue        Review of Systems   Constitutional: Negative for fatigue.   HENT: Negative for congestion.    Respiratory: Negative for cough and shortness of breath.    Gastrointestinal: Negative for vomiting.   Endocrine: Negative for polyuria.   Skin: Negative for color change.   Neurological: Negative for syncope.   Psychiatric/Behavioral: Positive for hallucinations. Negative for agitation.   All other systems reviewed and are negative.      Past Medical History:   Diagnosis Date   • Allergic    • Anemia    • Arthritis    • Coronary artery disease 2014    valave replacement   • Fibromyalgia, primary    • Headache    • Infectious viral hepatitis    • Low back pain    • Osteoporosis    • Visual impairment        Allergies   Allergen Reactions   • Clindamycin/Lincomycin Other (See Comments)     Migraine headaches   • Corn-Containing Products Other (See Comments)     migraines   • Tomato Flavor [Flavoring Agent] Other (See " Comments)     migraines   • Vinegar [Acetic Acid] Other (See Comments)     migraines   • Amoxicillin Rash   • Codeine GI Intolerance   • Penicillins Hives   • Sulfa Antibiotics Hives   • Vicodin [Hydrocodone-Acetaminophen] GI Intolerance     vomiting       Past Surgical History:   Procedure Laterality Date   • AORTIC VALVE REPAIR/REPLACEMENT  2014,may   • CARDIAC SURGERY     • COLONOSCOPY     • COLONOSCOPY N/A 4/22/2019    Procedure: COLONOSCOPY;  Surgeon: Carlos Schaeffer MD;  Location: [facility] ENDOSCOPY;  Service: Gastroenterology   • ENDOSCOPY N/A 3/13/2019    Procedure: ESOPHAGOGASTRODUODENOSCOPY;  Surgeon: Carlos Schaeffer MD;  Location: [facility] ENDOSCOPY;  Service: Gastroenterology   • ENDOSCOPY N/A 4/22/2019    Procedure: ESOPHAGOGASTRODUODENOSCOPY;  Surgeon: Carlos Schaeffer MD;  Location: [facility] ENDOSCOPY;  Service: Gastroenterology   • KNEE SURGERY Right 1967   • TONSILLECTOMY         Family History   Problem Relation Age of Onset   • Arthritis Mother    • Cancer Mother    • Heart disease Mother    • Hyperlipidemia Mother    • Hypertension Mother    • Vision loss Mother    • Arthritis Father    • Hearing loss Father    • Heart disease Father    • Hyperlipidemia Father    • Hypertension Father    • Vision loss Father    • Hypertension Sister    • Vision loss Sister    • Osteoporosis Sister    • Cataracts Sister    • Hyperlipidemia Brother    • Vision loss Brother    • Heart disease Brother    • Cancer Daughter    • Early death Daughter    • Osteoporosis Sister    • Parkinsonism Brother    • Hyperlipidemia Brother    • Thyroid disease Daughter        Social History     Socioeconomic History   • Marital status:      Spouse name: Not on file   • Number of children: Not on file   • Years of education: Not on file   • Highest education level: Not on file   Tobacco Use   • Smoking status: Never Smoker   • Smokeless tobacco: Never Used   Substance and Sexual Activity   • Alcohol use: No   • Drug use: No   •  Sexual activity: No           Objective   Physical Exam   Constitutional: She is oriented to person, place, and time. She appears well-developed and well-nourished.   HENT:   Head: Normocephalic.   Right Ear: Hearing normal.   Left Ear: Hearing normal.   Nose: Nose normal.   Eyes: Conjunctivae, EOM and lids are normal.   Neck: Trachea normal and full passive range of motion without pain.   Cardiovascular: Regular rhythm, S1 normal, S2 normal, normal heart sounds and normal pulses.   Pulmonary/Chest: Effort normal and breath sounds normal.   Abdominal: Normal appearance and bowel sounds are normal.   Neurological: She is alert and oriented to person, place, and time. She is not disoriented.   Skin: Skin is warm and dry. She is not diaphoretic.   Psychiatric: She has a normal mood and affect. Her speech is normal and behavior is normal. Thought content normal.   Nursing note and vitals reviewed.      ECG 12 Lead    Date/Time: 5/13/2019 4:45 PM  Performed by: Cristina Main PA-C  Authorized by: Cristina Main PA-C   Interpreted by physician  Rate: normal  BPM: 80  Conduction: conduction normal  ST Segments: ST segments normal  T Waves: T waves normal  Other: no other findings  Clinical impression: normal ECG               Labs Reviewed   COMPREHENSIVE METABOLIC PANEL - Abnormal; Notable for the following components:       Result Value    Glucose 109 (*)     Sodium 131 (*)     Potassium 3.4 (*)     Chloride 94 (*)     Calcium 7.9 (*)     Albumin 1.80 (*)     All other components within normal limits    Narrative:     GFR Normal >60  Chronic Kidney Disease <60  Kidney Failure <15   URINALYSIS W/ CULTURE IF INDICATED - Abnormal; Notable for the following components:    Appearance, UA Cloudy (*)     Glucose,  mg/dL (Trace) (*)     Blood, UA Large (3+) (*)     Protein,  mg/dL (2+) (*)     All other components within normal limits   MAGNESIUM - Abnormal; Notable for the following components:    Magnesium 1.3  (*)     All other components within normal limits   CBC WITH AUTO DIFFERENTIAL - Abnormal; Notable for the following components:    WBC 14.04 (*)     RBC 2.88 (*)     Hemoglobin 7.7 (*)     Hematocrit 25.0 (*)     MCHC 30.8 (*)     RDW 18.6 (*)     RDW-SD 59.3 (*)     Neutrophil % 85.4 (*)     Lymphocyte % 6.6 (*)     Eosinophil % 0.1 (*)     Immature Grans % 1.0 (*)     Neutrophils, Absolute 11.98 (*)     Monocytes, Absolute 0.94 (*)     Immature Grans, Absolute 0.14 (*)     All other components within normal limits   URINALYSIS, MICROSCOPIC ONLY - Abnormal; Notable for the following components:    RBC, UA Too Numerous to Count (*)     All other components within normal limits   LIPASE - Normal   TYPE AND SCREEN   PREVIOUS HISTORY   CBC AND DIFFERENTIAL    Narrative:     The following orders were created for panel order CBC & Differential.  Procedure                               Abnormality         Status                     ---------                               -----------         ------                     CBC Auto Differential[[id number]]        Abnormal            Final result                 Please view results for these tests on the individual orders.       XR Chest PA & Lateral   Final Result   CONCLUSION:          1. No evidence of an active cardiopulmonary process.                                                       Electronically signed by:  LEXA Almonte MD  5/13/2019 12:24   PM CDT Workstation: [id number]              ED Course        4:45P  Discussed with Dr. Carrizales about pt's condition via phone. Agrees to admit pt and all questions addressed at this time.           MDM      Final diagnoses:   Generalized weakness   Symptomatic anemia   Hypomagnesemia            Cristina Main, ARMEN  05/13/19 2026

## 2019-05-13 NOTE — H&P
HISTORY AND PHYSICAL  NAME: Yaneli Bates  : 1940  MRN: [id number]    DATE OF ADMISSION: 19    DATE & TIME SEEN: 19 6:46 PM    PCP: Elian Pacheco APRN    CODE STATUS: Full code    CHIEF COMPLAINT weakness    HPI:  Yaneli Bates is a 78 y.o. female with a CMH of MYLES with chronic blood loss, HTN, DM II, CAD w/ hx of AVR, fibromyalgia, DDD, who presents complaining of generalized weakness.     Patient presents to the ED today with generalized weakness, unable to keep food down, nausea/vomiting, fatigue, weight loss, generalized body pain, lower extremity swelling. Patient states she has noticed all symptoms starting since 2018. Has had 2 inpatient admissions since then for weakness. For the last 2 weeks patient states her symptoms have gotten worse. She is more fatigued and has more difficulty keeping food down. She has lost about 8 pounds during this time. Her previous admission was in 2019 where she was evaluated for possible GI bleed. Endoscopy and Colonoscopy showed no active bleed or malignancy. FOBT was negative during that visit. She followed up with Cyn[facility] outpatient. Recommendations were for endoscopic capsule to find source for chronic blood loss. Her hemoglobin has been stable between 8-8.5.  Her ferrous sulfate was adjusted to 324 twice daily. She also had some microscopic hematuria during her inpatient admission for which she was seen by urology who recommended cystoscopy outpatient. Patient was scheduled to see Dr. Damico this Wednesday.  Patient also was seen by Dr. Mckenzie nephrology since her last admission for hyponatremia due to possible SIADH. She currently takes 1g sodium tablets daily, fluid restrictions at 1.6 liters. She was recently seen by her PCP who started her on doxycyline for UTI. States it does take her a longer time to empty bladder. Patient has taken 2 days of abx. Daughter who is in room today patient has a lot of food allergies,  specifically related to corn syrup. Side effects include migraines, abdominal pain.   In the ED patient was given a bolus of fluids. Hemoglobin was 7.7. WBC 14.04. Na 131, K 3.4. Mg 1.3 which was replaced in ED. U/A micro did show large amount of blood. Patient denied seeing any blood in her stool or urine. V/s have been stable. Patient to be admitted and observed for generalized weakness.         CONCURRENT MEDICAL HISTORY:  Past Medical History:   Diagnosis Date   • Allergic    • Anemia    • Arthritis    • Coronary artery disease 2014    valave replacement   • Fibromyalgia, primary    • Headache    • Infectious viral hepatitis    • Low back pain    • Osteoporosis    • Visual impairment        PAST SURGICAL HISTORY:  Past Surgical History:   Procedure Laterality Date   • AORTIC VALVE REPAIR/REPLACEMENT  2014,may   • CARDIAC SURGERY     • COLONOSCOPY     • COLONOSCOPY N/A 4/22/2019    Procedure: COLONOSCOPY;  Surgeon: Carlos Schaeffer MD;  Location: [facility] ENDOSCOPY;  Service: Gastroenterology   • ENDOSCOPY N/A 3/13/2019    Procedure: ESOPHAGOGASTRODUODENOSCOPY;  Surgeon: Carlos Schaeffer MD;  Location: [facility] ENDOSCOPY;  Service: Gastroenterology   • ENDOSCOPY N/A 4/22/2019    Procedure: ESOPHAGOGASTRODUODENOSCOPY;  Surgeon: Carlos Schaeffer MD;  Location: [facility] ENDOSCOPY;  Service: Gastroenterology   • KNEE SURGERY Right 1967   • TONSILLECTOMY         FAMILY HISTORY:  Family History   Problem Relation Age of Onset   • Arthritis Mother    • Cancer Mother    • Heart disease Mother    • Hyperlipidemia Mother    • Hypertension Mother    • Vision loss Mother    • Arthritis Father    • Hearing loss Father    • Heart disease Father    • Hyperlipidemia Father    • Hypertension Father    • Vision loss Father    • Hypertension Sister    • Vision loss Sister    • Osteoporosis Sister    • Cataracts Sister    • Hyperlipidemia Brother    • Vision loss Brother    • Heart disease Brother    • Cancer Daughter    • Early death  Daughter    • Osteoporosis Sister    • Parkinsonism Brother    • Hyperlipidemia Brother    • Thyroid disease Daughter         SOCIAL HISTORY:  Social History     Socioeconomic History   • Marital status:      Spouse name: Not on file   • Number of children: Not on file   • Years of education: Not on file   • Highest education level: Not on file   Tobacco Use   • Smoking status: Never Smoker   • Smokeless tobacco: Never Used   Substance and Sexual Activity   • Alcohol use: No   • Drug use: No   • Sexual activity: No       HOME MEDICATIONS:  Prior to Admission medications    Medication Sig Start Date End Date Taking? Authorizing Provider   traMADol (ULTRAM) 50 MG tablet Take 50 mg by mouth 2 (Two) Times a Day As Needed for Moderate Pain .   Yes ProviderKlarissa MD   alendronate (FOSAMAX) 70 MG tablet Take 1 tablet by mouth Every 7 (Seven) Days. 4/24/19   Elian Pacheco APRN   doxycycline (VIBRAMYCIN) 100 MG capsule Take 1 capsule by mouth 2 (Two) Times a Day. 5/10/19   Elian Pacheco APRN   ferrous sulfate 324 (65 Fe) MG tablet delayed-release EC tablet Take 1 tablet by mouth 2 (Two) Times a Day With Meals for 30 days. 5/10/19 6/9/19  Elian Pacheco APRN   lactulose (CHRONULAC) 10 GM/15ML solution Take 30 mL by mouth 2 (Two) Times a Day As Needed (constipation). 4/26/19   Elian Pacheco APRN   metoprolol succinate XL (TOPROL-XL) 50 MG 24 hr tablet Take 1 tablet by mouth Daily With Dinner. 2/15/19   Toro Pryor MD   ondansetron ODT (ZOFRAN ODT) 4 MG disintegrating tablet Take 1 tablet by mouth Every 8 (Eight) Hours As Needed for Nausea or Vomiting. 5/2/19   Elian Pacheco APRN   potassium chloride (K-DUR,KLOR-CON) 10 MEQ CR tablet Take 1 tablet by mouth Daily for 5 days. 5/7/19 5/12/19  Elian Pacheco APRN   raNITIdine (ZANTAC) 75 MG tablet Take 75 mg by mouth 2 (Two) Times a Day.    Provider, MD Klarissa   sodium chloride 1 g tablet Take 1 tablet by mouth 3 (Three)  Times a Day With Meals. 4/23/19   Lewis Tsang MD   metFORMIN ER (GLUCOPHAGE-XR) 500 MG 24 hr tablet Take 1 tablet by mouth Daily With Breakfast. 4/4/19 5/13/19  Elian Pacheco APRN       ALLERGIES:  Clindamycin/lincomycin; Corn-containing products; Tomato flavor [flavoring agent]; Vinegar [acetic acid]; Amoxicillin; Codeine; Penicillins; Sulfa antibiotics; and Vicodin [hydrocodone-acetaminophen]    REVIEW OF SYSTEMS  Review of Systems   Constitutional: Positive for activity change, appetite change, fatigue and unexpected weight change. Negative for chills, diaphoresis and fever.   HENT: Negative for congestion, trouble swallowing and voice change.    Respiratory: Negative for cough, shortness of breath and wheezing.    Cardiovascular: Positive for leg swelling. Negative for chest pain and palpitations.   Gastrointestinal: Positive for constipation, nausea and vomiting. Negative for abdominal distention, abdominal pain, anal bleeding, blood in stool, diarrhea and rectal pain.   Genitourinary: Negative for difficulty urinating, dysuria, flank pain, frequency, hematuria and urgency.   Musculoskeletal: Positive for arthralgias, gait problem (uses walker ) and myalgias. Negative for neck pain and neck stiffness.   Skin: Negative for color change, pallor, rash and wound.   Allergic/Immunologic: Positive for food allergies.   Neurological: Positive for weakness and headaches. Negative for dizziness, seizures, syncope and speech difficulty.   Hematological: Negative for adenopathy. Does not bruise/bleed easily.   Psychiatric/Behavioral: Negative for agitation, behavioral problems, confusion and decreased concentration.       PHYSICAL EXAM:  Temp:  [96.5 °F (35.8 °C)-97.8 °F (36.6 °C)] 96.5 °F (35.8 °C)  Heart Rate:  [55-92] 55  Resp:  [16-19] 16  BP: (137-169)/(74-84) 137/76  Body mass index is 23.68 kg/m².  Physical Exam   Constitutional: She is oriented to person, place, and time. She appears well-developed and  well-nourished. No distress.   HENT:   Head: Normocephalic and atraumatic.   Right Ear: External ear normal.   Left Ear: External ear normal.   Nose: Nose normal.   Mouth/Throat: Oropharynx is clear and moist.   Eyes: Conjunctivae and EOM are normal. Pupils are equal, round, and reactive to light.   Neck: Normal range of motion. Neck supple. No thyromegaly present.   Cardiovascular: Normal rate, regular rhythm, normal heart sounds and intact distal pulses.   Pulmonary/Chest: Effort normal and breath sounds normal.   Abdominal: Soft. Bowel sounds are normal. She exhibits no distension and no mass. There is no tenderness. There is no guarding.   Musculoskeletal: Normal range of motion. She exhibits edema (+2 LE).   Swelling around knees     Neurological: She is alert and oriented to person, place, and time.   Skin: Skin is warm and dry. Capillary refill takes less than 2 seconds.   Psychiatric: She has a normal mood and affect. Her behavior is normal. Judgment and thought content normal.       DIAGNOSTIC DATA:   Lab Results (last 24 hours)     Procedure Component Value Units Date/Time    Lipase [[id number]]  (Normal) Collected:  05/13/19 1324    Specimen:  Blood Updated:  05/13/19 1855     Lipase 19 U/L     Urinalysis, Microscopic Only - Urine, Catheter In/Out [[id number]]  (Abnormal) Collected:  05/13/19 1551    Specimen:  Urine, Catheter In/Out Updated:  05/13/19 1606     RBC, UA Too Numerous to Count /HPF      WBC, UA 3-5 /HPF      Bacteria, UA None Seen /HPF      Squamous Epithelial Cells, UA None Seen /HPF      Hyaline Casts, UA 0-2 /LPF      Methodology Automated Microscopy    Urinalysis With Culture If Indicated - Urine, Catheter In/Out [[id number]]  (Abnormal) Collected:  05/13/19 1551    Specimen:  Urine, Catheter In/Out Updated:  05/13/19 1606     Color, UA Dark Yellow     Appearance, UA Cloudy     pH, UA 6.0     Specific Gravity, UA 1.016     Glucose,  mg/dL (Trace)     Ketones, UA Negative      Bilirubin, UA Negative     Blood, UA Large (3+)     Protein,  mg/dL (2+)     Leuk Esterase, UA Negative     Nitrite, UA Negative     Urobilinogen, UA 1.0 E.U./dL    Magnesium [[id number]]  (Abnormal) Collected:  05/13/19 1324    Specimen:  Blood Updated:  05/13/19 1403     Magnesium 1.3 mg/dL     Comprehensive Metabolic Panel [[id number]]  (Abnormal) Collected:  05/13/19 1324    Specimen:  Blood Updated:  05/13/19 1358     Glucose 109 mg/dL      BUN 14 mg/dL      Creatinine 0.57 mg/dL      Sodium 131 mmol/L      Potassium 3.4 mmol/L      Chloride 94 mmol/L      CO2 26.0 mmol/L      Calcium 7.9 mg/dL      Total Protein 8.2 g/dL      Albumin 1.80 g/dL      ALT (SGPT) 7 U/L      AST (SGOT) 23 U/L      Alkaline Phosphatase 74 U/L      Total Bilirubin 0.5 mg/dL      eGFR Non African Amer 103 mL/min/1.73      Globulin 6.4 gm/dL      A/G Ratio 0.3 g/dL      BUN/Creatinine Ratio 24.6     Anion Gap 11.0 mmol/L     Narrative:       GFR Normal >60  Chronic Kidney Disease <60  Kidney Failure <15    CBC & Differential [[id number]] Collected:  05/13/19 1324    Specimen:  Blood Updated:  05/13/19 1341    Narrative:       The following orders were created for panel order CBC & Differential.  Procedure                               Abnormality         Status                     ---------                               -----------         ------                     CBC Auto Differential[[id number]]        Abnormal            Final result                 Please view results for these tests on the individual orders.    CBC Auto Differential [[id number]]  (Abnormal) Collected:  05/13/19 1324    Specimen:  Blood Updated:  05/13/19 1341     WBC 14.04 10*3/mm3      RBC 2.88 10*6/mm3      Hemoglobin 7.7 g/dL      Hematocrit 25.0 %      MCV 86.8 fL      MCH 26.7 pg      MCHC 30.8 g/dL      RDW 18.6 %      RDW-SD 59.3 fl      MPV 10.1 fL      Platelets 261 10*3/mm3      Neutrophil % 85.4 %      Lymphocyte % 6.6 %      Monocyte % 6.7 %       Eosinophil % 0.1 %      Basophil % 0.2 %      Immature Grans % 1.0 %      Neutrophils, Absolute 11.98 10*3/mm3      Lymphocytes, Absolute 0.93 10*3/mm3      Monocytes, Absolute 0.94 10*3/mm3      Eosinophils, Absolute 0.02 10*3/mm3      Basophils, Absolute 0.03 10*3/mm3      Immature Grans, Absolute 0.14 10*3/mm3      nRBC 0.0 /100 WBC            Imaging Results (last 24 hours)     Procedure Component Value Units Date/Time    XR Chest PA & Lateral [[id number]] Collected:  05/13/19 1205     Updated:  05/13/19 1225    Narrative:         EXAM:          Radiograph(s), Chest   VIEWS:    PA / Lat ; 2       DATE/TIME:  5/13/2019 12:23 PM CDT                INDICATION:   generalized weakness    COMPARISON:  CXR: 4/11/19             FINDINGS:             - lines/tubes:    none     - cardiac:         size within normal limits; status post  valvular repair         - mediastinum: contour within normal limits         - lungs:         no focal air space process, pulmonary  interstitial edema, nodule(s)/mass             - pleura:         Small left pleural fluid collection.                   - osseous:         Status post median sternotomy                  - misc.:         Impression:       CONCLUSION:        1. No evidence of an active cardiopulmonary process.                                                Electronically signed by:  LEXA Almonte MD  5/13/2019 12:24  PM CDT Workstation: [id number]            I reviewed the patient's new clinical results.    ASSESSMENT AND PLAN: This is a 78 y.o. female with:    Active Hospital Problems    Diagnosis POA   • **Generalized weakness [R53.1] Yes     Possible malignancy?   -  Pancreatic source?    - lipase w/n/l, CT abdomen has shown no mass   - GI source?     -Colonoscopy, EGD no signs of malignant process     - will consult GI, plans outpatient was for endoscopic capsule.    - Bladder cx?    - U/A shows large blood. Will get urine cytology    - Urology consult: will follow  recommendations. Plan for outpatient during previous admission was for cystoscopy     - hemoglobin 7.7; will transfuse 2 units PRBC if less than 7.    - will continue to monitor     - Continue IVF, Dietician consult.      • Hemorrhagic cystitis [N30.91] Yes     Patient was seen by Dr. Gilmore, Urology previous admission. Was treated with Azactam and diflucan during that time. On discharge was scheduled for cystoscopy with Dr. Damico this Wednesday.     - patient was seen on 5/10/19 by PCP where she was diagnosed with cystitis. Started on doxy. Has taken 2 days.   - U/A on admission : large blood, 2+ protein   - will continue doxy   - Urology consult         • Iron deficiency anemia due to chronic blood loss [D50.0] Yes     H/H: 7.7/25.0 in the ED  - continue home ferrous sulfate BID  -continue to trend H/H  - currently asymptomatic will transfuse 2 units PRBC if <7     • Weight loss, unintentional [R63.4] Yes     Will get dietician consult  - patient has many food allergies     • Hyponatremia [E87.1] Yes     In the ED sodium level is 131. Patient sees Dr. Mckenzie, Nephrology outpatient. 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   - continue home sodium tablets 1 g TID  - 1.6 L fluid restriction daily recommended by nephro         • Diabetes mellitus (CMS/HCC) [E11.9] Yes     Will hold home metformin  - SSI       • Hypertension [I10] Yes     Will continue toprol-xl 50 daily   Continue to monitor per hospital protocol      • Coronary artery disease [I25.10] Yes     History of AVR 2014  Continue metoprolol 50 mg Daily          DVT prophylaxis: SCDs/TEDs     KASPERreviewed and consistent with patient reported medications.    Expected Length of Stay: Where: home and When:  3-4days    I discussed the patients findings and my recommendations with patient.     Dr. Tate is the attending on record at time of admission, She is aware of the patient's status and agrees with the above history and physical.        This document has been  electronically signed by Rodrigo Nazario MD on May 13, 2019 9:49 PM

## 2019-05-13 NOTE — ED TRIAGE NOTES
Pt seen by PCP on Friday and dx with UTI and low iron. Pt not better on Saturday; eating less and nauseous. Pt unable to take medication this morning. Pt has increased weakness and hallucinations of people. Pt denies receiving instruction from hallucinations.

## 2019-05-13 NOTE — ED NOTES
Pt refuses to have another IV and request the phlebotomist to drawl her type and screen.       Zully Adamson, RN  05/13/19 [id number]

## 2019-05-13 NOTE — PROGRESS NOTES
Tick titers are negative.  At the moment she is in the emergency room so you may not be able to contact her.

## 2019-05-14 NOTE — PLAN OF CARE
Problem: Patient Care Overview  Goal: Plan of Care Review  Outcome: Ongoing (interventions implemented as appropriate)   05/14/19 8049   Coping/Psychosocial   Plan of Care Reviewed With patient   OTHER   Outcome Summary OT eval completed; co-eval with PT. Pt reported fatigue, weakness, and nausea. Pt agreeable with therapists to assist pt to bathroom for assessment. Prior to admission, pt was receiving  OT/PT services. Pt was ambulating with rollator and was independent with ADLs prior to sickness. Pt currently requires mod A for supine<>sit and CGA for sit<>stand, toilet t/f (BSC over standard), toileting tasks, and ambulation to bathroom using RW. Pt assessment and overall participation limited by fatigue, weakness, nausea, and painful/tender BLE. Pt presents with decreased strength, endurance, balance, and mobility limiting independence in ADLs and functional t/fs. Pt would benefit from continued skilled OT services to address areas of deficit and promote return to PLOF. Anticipate home with assist and resume HH OT.

## 2019-05-14 NOTE — CONSULTS
SUBJECTIVE:   5/14/2019    Name: Yaneli Bates  DOD: 1940    REASON FOR CONSULT: Weight loss and nausea    Chief Complaint:     Chief Complaint   Patient presents with   • Weakness - Generalized   • Hallucinations       Subjective     Patient is 78 y.o. female presents with complaint of severe nausea and early satiety.  Patient has been trying to eat but states that she rapidly feels and is nauseous all the time.  Patient's family has been trying small frequent meals with no success..      ROS/HISTORY/ CURRENT MEDICATIONS/OBJECTIVE/VS/PE:   Review of Systems:   Review of Systems   Constitutional: Negative for activity change, appetite change, chills, diaphoresis, fatigue, fever and unexpected weight change.   HENT: Negative for sore throat and trouble swallowing.    Respiratory: Negative for shortness of breath.    Gastrointestinal: Positive for abdominal pain and nausea. Negative for abdominal distention, anal bleeding, blood in stool, constipation, diarrhea, rectal pain and vomiting.   Endocrine: Negative for polydipsia, polyphagia and polyuria.   Genitourinary: Negative for difficulty urinating.   Musculoskeletal: Negative for arthralgias.   Skin: Negative for pallor.   Allergic/Immunologic: Negative for food allergies.   Neurological: Negative for weakness and light-headedness.   Psychiatric/Behavioral: Negative for behavioral problems.       History:     Past Medical History:   Diagnosis Date   • Allergic    • Anemia    • Arthritis    • Coronary artery disease 2014    valave replacement   • Fibromyalgia, primary    • Headache    • Infectious viral hepatitis    • Low back pain    • Osteoporosis    • Visual impairment      Past Surgical History:   Procedure Laterality Date   • AORTIC VALVE REPAIR/REPLACEMENT  2014,may   • CARDIAC SURGERY     • COLONOSCOPY     • COLONOSCOPY N/A 4/22/2019    Procedure: COLONOSCOPY;  Surgeon: Carlos Schaeffer MD;  Location: [facility] ENDOSCOPY;  Service: Gastroenterology   •  ENDOSCOPY N/A 3/13/2019    Procedure: ESOPHAGOGASTRODUODENOSCOPY;  Surgeon: Carlos Schaeffer MD;  Location: [facility] ENDOSCOPY;  Service: Gastroenterology   • ENDOSCOPY N/A 4/22/2019    Procedure: ESOPHAGOGASTRODUODENOSCOPY;  Surgeon: Carlos Schaeffer MD;  Location: [facility] ENDOSCOPY;  Service: Gastroenterology   • KNEE SURGERY Right 1967   • TONSILLECTOMY       Family History   Problem Relation Age of Onset   • Arthritis Mother    • Cancer Mother    • Heart disease Mother    • Hyperlipidemia Mother    • Hypertension Mother    • Vision loss Mother    • Arthritis Father    • Hearing loss Father    • Heart disease Father    • Hyperlipidemia Father    • Hypertension Father    • Vision loss Father    • Hypertension Sister    • Vision loss Sister    • Osteoporosis Sister    • Cataracts Sister    • Hyperlipidemia Brother    • Vision loss Brother    • Heart disease Brother    • Cancer Daughter    • Early death Daughter    • Osteoporosis Sister    • Parkinsonism Brother    • Hyperlipidemia Brother    • Thyroid disease Daughter      Social History     Tobacco Use   • Smoking status: Never Smoker   • Smokeless tobacco: Never Used   Substance Use Topics   • Alcohol use: No   • Drug use: No     Medications Prior to Admission   Medication Sig Dispense Refill Last Dose   • traMADol (ULTRAM) 50 MG tablet Take 50 mg by mouth 2 (Two) Times a Day As Needed for Moderate Pain .      • alendronate (FOSAMAX) 70 MG tablet Take 1 tablet by mouth Every 7 (Seven) Days. 12 tablet 1 Taking   • doxycycline (VIBRAMYCIN) 100 MG capsule Take 1 capsule by mouth 2 (Two) Times a Day. 20 capsule 0    • ferrous sulfate 324 (65 Fe) MG tablet delayed-release EC tablet Take 1 tablet by mouth 2 (Two) Times a Day With Meals for 30 days. 60 tablet 3    • lactulose (CHRONULAC) 10 GM/15ML solution Take 30 mL by mouth 2 (Two) Times a Day As Needed (constipation). 236 mL 1 Taking   • metoprolol succinate XL (TOPROL-XL) 50 MG 24 hr tablet Take 1 tablet by mouth  Daily With Dinner. 90 tablet 5 Taking   • ondansetron ODT (ZOFRAN ODT) 4 MG disintegrating tablet Take 1 tablet by mouth Every 8 (Eight) Hours As Needed for Nausea or Vomiting. 20 tablet 0 Taking   • [] potassium chloride (K-DUR,KLOR-CON) 10 MEQ CR tablet Take 1 tablet by mouth Daily for 5 days. 5 tablet 0 Taking   • raNITIdine (ZANTAC) 75 MG tablet Take 75 mg by mouth 2 (Two) Times a Day.   Taking   • sodium chloride 1 g tablet Take 1 tablet by mouth 3 (Three) Times a Day With Meals. 90 tablet 0 Taking     Allergies:  Clindamycin/lincomycin; Corn-containing products; Tomato flavor [flavoring agent]; Vinegar [acetic acid]; Amoxicillin; Codeine; Penicillins; Sulfa antibiotics; and Vicodin [hydrocodone-acetaminophen]    I have reviewed the patient's medical history, surgical history and family history in the available medical record system.     Current Medications:     Current Facility-Administered Medications   Medication Dose Route Frequency Provider Last Rate Last Dose   • acetaminophen (TYLENOL) tablet 650 mg  650 mg Oral Q4H PRN Rodrigo Nazario MD       • doxycycline (MONODOX) capsule 100 mg  100 mg Oral Q12H Rodrigo Nazario MD   100 mg at 19 0846   • famotidine (PEPCID) tablet 20 mg  20 mg Oral Daily Rodrigo Nazario MD   20 mg at 19 0847   • ferrous sulfate EC tablet 324 mg  324 mg Oral BID With Meals Rodrigo Nazario MD   324 mg at 19 0846   • furosemide (LASIX) injection 20 mg  20 mg Intravenous PRN Ashley Carrizales MD   20 mg at 19 1314   • metoprolol succinate XL (TOPROL-XL) 24 hr tablet 50 mg  50 mg Oral Daily With Dinner Rodriog Nazario MD   50 mg at 19 202   • ondansetron (ZOFRAN) tablet 4 mg  4 mg Oral Q6H PRN Ashley Carrizales MD        Or   • ondansetron (ZOFRAN) injection 4 mg  4 mg Intravenous Q6H PRN Ashley Carrizales MD   4 mg at 19 1305   • potassium chloride (KLOR-CON) packet 40 mEq  40 mEq Oral Once Rodrigo Nazario MD       • potassium  chloride (MICRO-K) CR capsule 10 mEq  10 mEq Oral Daily Rodrigo Nazario MD   10 mEq at 05/14/19 0847   • promethazine (PHENERGAN) tablet 12.5 mg  12.5 mg Oral Q6H PRN Ashley Carrizales MD        Or   • promethazine (PHENERGAN) injection 12.5 mg  12.5 mg Intramuscular Q6H PRN Ashley Carrizales MD        Or   • promethazine (PHENERGAN) suppository 12.5 mg  12.5 mg Rectal Q6H PRN Ashley Carrizales MD       • sodium chloride 0.9 % flush 3 mL  3 mL Intravenous Q12H Rodrigo Nazario MD       • sodium chloride 0.9 % flush 3-10 mL  3-10 mL Intravenous PRN Rodrigo Nazario MD       • sodium chloride 0.9 % infusion  100 mL/hr Intravenous Continuous Rodrigo Nazario  mL/hr at 05/14/19 0557 100 mL/hr at 05/14/19 0557   • sodium chloride tablet 1 g  1 g Oral TID With Meals Rodrigo Nazario MD   1 g at 05/14/19 1141   • traMADol (ULTRAM) tablet 50 mg  50 mg Oral BID PRN Rodrigo Nazario MD   50 mg at 05/14/19 0854       Objective     Physical Exam:   Temp:  [96.5 °F (35.8 °C)-97.8 °F (36.6 °C)] 97 °F (36.1 °C)  Heart Rate:  [55-91] 75  Resp:  [16-19] 18  BP: (118-169)/(58-84) 151/82    Physical Exam:  General Appearance:    Alert, cooperative, in no acute distress   Head:    Normocephalic, without obvious abnormality, atraumatic   Eyes:            Lids and lashes normal, conjunctivae and sclerae normal, no   icterus, no pallor, corneas clear, PERRLA   Ears:    Ears appear intact with no abnormalities noted   Throat:   No oral lesions, no thrush, oral mucosa moist   Neck:   No adenopathy, supple, trachea midline, no thyromegaly, no     carotid bruit, no JVD   Back:     No kyphosis present, no scoliosis present, no skin lesions,       erythema or scars, no tenderness to percussion or                   palpation,   range of motion normal   Lungs:     Clear to auscultation,respirations regular, even and                   unlabored    Heart:    Regular rhythm and normal rate, normal S1 and S2, no            murmur, no  gallop, no rub, no click   Breast Exam:    Deferred   Abdomen:     Normal bowel sounds, no masses, no organomegaly, soft        non-tender, non-distended, no guarding, no rebound                 tenderness   Genitalia:    Deferred   Extremities:   Moves all extremities well, no edema, no cyanosis, no              redness   Pulses:   Pulses palpable and equal bilaterally   Skin:   No bleeding, bruising or rash   Lymph nodes:   No palpable adenopathy   Neurologic:   Cranial nerves 2 - 12 grossly intact, sensation intact, DTR        present and equal bilaterally      Results Review:     Lab Results   Component Value Date    WBC 12.84 (H) 05/14/2019    WBC 14.04 (H) 05/13/2019    WBC 11.97 (H) 05/10/2019    HGB 6.5 (C) 05/14/2019    HGB 7.7 (L) 05/13/2019    HGB 7.9 (L) 05/10/2019    HCT 20.7 (C) 05/14/2019    HCT 25.0 (L) 05/13/2019    HCT 25.4 (L) 05/10/2019     05/14/2019     05/13/2019     05/10/2019     Results from last 7 days   Lab Units 05/14/19  0659 05/13/19  1324 05/10/19  1425   ALK PHOS U/L 61 74 71   ALT (SGPT) U/L 6 7 5   AST (SGOT) U/L 16 23 20     Results from last 7 days   Lab Units 05/14/19  0659 05/13/19  1324 05/10/19  1425   BILIRUBIN mg/dL 0.3 0.5 0.3   ALK PHOS U/L 61 74 71     Lipase   Date Value Ref Range Status   05/13/2019 19 13 - 60 U/L Final     Lab Results   Component Value Date    INR 1.25 (H) 03/13/2019         Radiology Review:  Imaging Results (last 72 hours)     Procedure Component Value Units Date/Time    XR Chest PA & Lateral [[id number]] Collected:  05/13/19 1205     Updated:  05/13/19 1225    Narrative:         EXAM:          Radiograph(s), Chest   VIEWS:    PA / Lat ; 2       DATE/TIME:  5/13/2019 12:23 PM CDT                INDICATION:   generalized weakness    COMPARISON:  CXR: 4/11/19             FINDINGS:             - lines/tubes:    none     - cardiac:         size within normal limits; status post  valvular repair         - mediastinum: contour within  normal limits         - lungs:         no focal air space process, pulmonary  interstitial edema, nodule(s)/mass             - pleura:         Small left pleural fluid collection.                   - osseous:         Status post median sternotomy                  - misc.:         Impression:       CONCLUSION:        1. No evidence of an active cardiopulmonary process.                                                Electronically signed by:  LEXA Almonte MD  5/13/2019 12:24  PM CDT Workstation: [id number]          I reviewed the patient's new clinical results.    I reviewed the patient's new imaging results and agree with the interpretation.     ASSESSMENT/PLAN:   ASSESSMENT: Patient with anemia and weakness over the past few months.  Patient is continuing to lose weight.  Patient has loss of appetite and feeling of early satiety.  Multiple episodes of been tried to feed the patient without success.  Patient states she feels as though her food is getting caught to her esophagus.    PLAN: #1 we will schedule patient for gastric emptying study  #2 patient will need blood transfusion as hemoglobin is less than 7.  Continue to monitor patient's hemoglobin and transfuse for hemoglobin less than 7.  The risks, benefits, and alternatives of this procedure have been discussed with the patient or the responsible party. The patient understands and agrees to proceed.         Carlos Schaeffer MD  05/14/19  3:45 PM         This document has been electronically signed by Carlos Schaeffer MD on May 14, 2019 3:45 PM

## 2019-05-14 NOTE — THERAPY EVALUATION
Acute Care - Occupational Therapy Initial Evaluation  [facility]     Patient Name: Yaneli Bates  : 1940  MRN: [id number]  Today's Date: 2019  Onset of Illness/Injury or Date of Surgery: 19  Date of Referral to OT: 19  Referring Physician: Dr. ISABELLA Hi    Admit Date: 2019       ICD-10-CM ICD-9-CM   1. Generalized weakness R53.1 780.79   2. Symptomatic anemia D64.9 285.9   3. Hypomagnesemia E83.42 275.2   4. Impaired mobility and activities of daily living Z74.09 799.89     Patient Active Problem List   Diagnosis   • S/P AVR (aortic valve replacement)   • Hypertension   • Chest pain, rule out acute myocardial infarction   • Anemia   • Symptomatic anemia   • Diabetes mellitus (CMS/HCC)   • Hematuria   • Hyponatremia   • Coronary artery disease   • Weight loss, unintentional   • Fatigue   • Generalized weakness   • Hemorrhagic cystitis   • Iron deficiency anemia due to chronic blood loss     Past Medical History:   Diagnosis Date   • Allergic    • Anemia    • Arthritis    • Coronary artery disease     valave replacement   • Fibromyalgia, primary    • Headache    • Infectious viral hepatitis    • Low back pain    • Osteoporosis    • Visual impairment      Past Surgical History:   Procedure Laterality Date   • AORTIC VALVE REPAIR/REPLACEMENT  2014,may   • CARDIAC SURGERY     • COLONOSCOPY     • COLONOSCOPY N/A 2019    Procedure: COLONOSCOPY;  Surgeon: Carlos Schaeffer MD;  Location: [facility] ENDOSCOPY;  Service: Gastroenterology   • ENDOSCOPY N/A 3/13/2019    Procedure: ESOPHAGOGASTRODUODENOSCOPY;  Surgeon: Carlos Schaeffer MD;  Location: [facility] ENDOSCOPY;  Service: Gastroenterology   • ENDOSCOPY N/A 2019    Procedure: ESOPHAGOGASTRODUODENOSCOPY;  Surgeon: Carlos Schaeffer MD;  Location: [facility] ENDOSCOPY;  Service: Gastroenterology   • KNEE SURGERY Right 1967   • TONSILLECTOMY            OT ASSESSMENT FLOWSHEET (last 12 hours)      Occupational Therapy Evaluation      Row Name 05/14/19 1355                   OT Evaluation Time/Intention    Subjective Information  complains of;weakness;fatigue;pain;nausea/vomiting  -AS        Document Type  evaluation  -AS        Mode of Treatment  concurrent therapy;occupational therapy;physical therapy  -AS        Patient Effort  adequate  -AS        Symptoms Noted During/After Treatment  increased pain;fatigue nausea  -AS        Comment  RN notified of nausea; pt reported need for toileting  -AS           General Information    Patient Profile Reviewed?  yes  -AS        Onset of Illness/Injury or Date of Surgery  05/13/19  -AS        Referring Physician  Rodrigo Nazario MD  -AS        Patient Observations  alert;cooperative;poorly cooperative  -AS        Patient/Family Observations  dght at bedside;  joined at end of session  -AS        General Observations of Patient  supine in bed, Bilat SCDs, IV, RA, tele  -AS        Prior Level of Function  independent:;ADL's;transfer;all household mobility  -AS        Equipment Currently Used at Home  rollator;grab bar;raised toilet lift chair  -AS        Pertinent History of Current Functional Problem  Pt is a 79 yo F admitted for general weakness, n/v, and LE swelling/weakness  -AS        Existing Precautions/Restrictions  fall  -AS        Risks Reviewed  patient and family:;LOB;nausea/vomiting;dizziness;increased discomfort;change in vital signs;increased drainage;lines disloged  -AS        Benefits Reviewed  patient and family:;improve function;increase independence;increase strength;increase balance;decrease pain;decrease risk of DVT;improve skin integrity;increase knowledge  -AS           Relationship/Environment    Primary Source of Support/Comfort  spouse  -AS        Lives With  other (see comments)  -AS        Concerns About Impact on Relationships  pt was receiving HH OT/PT prior to admission  -AS           Resource/Environmental Concerns    Current Living Arrangements  home/apartment/condo   -AS           Home Main Entrance    Number of Stairs, Main Entrance  two  -AS        Stair Railings, Main Entrance  railings on both sides of stairs  -AS           Cognitive Assessment/Intervention- PT/OT    Orientation Status (Cognition)  oriented x 4  -AS        Follows Commands (Cognition)  WFL  -AS           Safety Issues, Functional Mobility    Impairments Affecting Function (Mobility)  endurance/activity tolerance;strength;pain;balance  -AS           Bed Mobility Assessment/Treatment    Bed Mobility Assessment/Treatment  supine-sit;sit-supine  -AS        Supine-Sit Taylor (Bed Mobility)  moderate assist (50% patient effort)  -AS        Sit-Supine Taylor (Bed Mobility)  moderate assist (50% patient effort)  -AS        Bed Mobility, Safety Issues  decreased use of legs for bridging/pushing  -AS        Assistive Device (Bed Mobility)  bed rails;head of bed elevated  -AS        Comment (Bed Mobility)  requires assist lifting BLE simutaneously 2/2 increased pain  -AS           Functional Mobility    Functional Mobility- Ind. Level  contact guard assist  -AS        Functional Mobility- Device  rolling walker  -AS        Functional Mobility- Comment  amb to/from bathroom  -AS           ADL Assessment/Intervention    BADL Assessment/Intervention  toileting  -AS           Toileting Assessment/Training    Taylor Level (Toileting)  toileting skills;contact guard assist not required to manage pants; hospital gown only  -AS        Toileting Position  unsupported sitting;supported standing  -AS           BADL Safety/Performance    Impairments, BADL Safety/Performance  balance;endurance/activity tolerance;pain;strength  -AS           General ROM    GENERAL ROM COMMENTS  not formally tested 2/2 nausea/fatigue and pt only wanting assist to bathroom. Appears WFL through observation of functional task  -AS           MMT (Manual Muscle Testing)    General MMT Comments  not formally tested 2/2 fatigue and nausea.  Pt at least 3/5; able to move BUE against gravity  -AS           Positioning and Restraints    Pre-Treatment Position  in bed  -AS        Post Treatment Position  bed  -AS        In Bed  exit alarm on;encouraged to call for assist;call light within reach;supine;with family/caregiver  -AS           Pain Assessment    Additional Documentation  Pain Scale: Numbers Pre/Post-Treatment (Group)  -AS           Pain Scale: Numbers Pre/Post-Treatment    Pain Scale: Numbers, Pretreatment  5/10  -AS        Pain Scale: Numbers, Post-Treatment  5/10  -AS        Pain Location  other (see comments) bilat LE  -AS        Pain Intervention(s)  Medication (See MAR)  -AS           Plan of Care Review    Plan of Care Reviewed With  patient;spouse;daughter  -AS           Clinical Impression (OT)    Date of Referral to OT  05/13/19  -AS        OT Diagnosis  impaired mobility and ADLs  -AS        Patient/Family Goals Statement (OT Eval)  return home and resume  services  -AS        Criteria for Skilled Therapeutic Interventions Met (OT Eval)  yes;treatment indicated  -AS        Rehab Potential (OT Eval)  good, to achieve stated therapy goals  -AS        Therapy Frequency (OT Eval)  other (see comments) 5-7x/wk  -AS        Predicted Duration of Therapy Intervention (Therapy Eval)  until goals met or d/c from facility  -AS        Care Plan Review (OT)  evaluation/treatment results reviewed;care plan/treatment goals reviewed;risks/benefits reviewed;current/potential barriers reviewed;patient/other agree to care plan  -AS        Care Plan Review, Other Participant (OT Eval)  daughter;spouse  -AS        Anticipated Discharge Disposition (OT)  home with home health;home with assist  -AS           Vital Signs    Activity Duration  -- no taken 2/2 urgency for toileting  -AS           Planned OT Interventions    Planned Therapy Interventions (OT Eval)  activity tolerance training;adaptive equipment training;BADL retraining;functional balance  retraining;strengthening exercise;transfer/mobility retraining;patient/caregiver education/training;occupation/activity based interventions;ROM/therapeutic exercise  -AS           OT Goals    Transfer Goal Selection (OT)  transfer, OT goal 1  -AS        Bathing Goal Selection (OT)  --  -AS        Dressing Goal Selection (OT)  dressing, OT goal 1  -AS        Toileting Goal Selection (OT)  toileting, OT goal 1  -AS        Strength Goal Selection (OT)  strength, OT goal 1  -AS        Activity Tolerance Goal Selection (OT)  activity tolerance, OT goal 1  -AS        Additional Documentation  Activity Tolerance Goal Selection (OT) (Row);Strength Goal Selection (OT) (Row)  -AS           Transfer Goal 1 (OT)    Activity/Assistive Device (Transfer Goal 1, OT)  sit-to-stand/stand-to-sit;bed-to-chair/chair-to-bed;toilet  -AS        [place] Level/Cues Needed (Transfer Goal 1, OT)  supervision required  -AS        Time Frame (Transfer Goal 1, OT)  long term goal (LTG);by discharge  -AS        Progress/Outcome (Transfer Goal 1, OT)  goal not met  -AS           Dressing Goal 1 (OT)    Activity/Assistive Device (Dressing Goal 1, OT)  dressing skills, all  -AS        [place]/Cues Needed (Dressing Goal 1, OT)  supervision required;set-up required  -AS        Time Frame (Dressing Goal 1, OT)  long term goal (LTG);by discharge  -AS        Progress/Outcome (Dressing Goal 1, OT)  goal not met  -AS           Toileting Goal 1 (OT)    Activity/Device (Toileting Goal 1, OT)  toileting skills, all  -AS        [place] Level/Cues Needed (Toileting Goal 1, OT)  conditional independence  -AS        Time Frame (Toileting Goal 1, OT)  long term goal (LTG);by discharge  -AS        Progress/Outcome (Toileting Goal 1, OT)  goal not met  -AS           Strength Goal 1 (OT)    Strength Goal 1 (OT)  Pt will be independent in BUE strengthening HEP  -AS        Time Frame (Strength Goal 1, OT)  long term goal (LTG);by discharge  -AS         Progress/Outcome (Strength Goal 1, OT)  goal not met  -AS            Activity Tolerance Goal 1 (OT)    Activity Level (Endurance Goal 1, OT)  15 min activity functiona/therapeutic activities  -AS        Time Frame (Activity Tolerance Goal 1, OT)  long term goal (LTG);by discharge  -AS        Progress/Outcome (Activity Tolerance Goal 1, OT)  goal not met  -AS           Living Environment    Home Accessibility  stairs to enter home  -AS          User Key  (r) = Recorded By, (t) = Taken By, (c) = Cosigned By    Initials Name Effective Dates    AS Ana Robles OT 03/25/19 -          Occupational Therapy Education     Title: PT OT SLP Therapies (In Progress)     Topic: Occupational Therapy (In Progress)     Point: ADL training (Done)     Description: Instruct learner(s) on proper safety adaptation and remediation techniques during self care or transfers.   Instruct in proper use of assistive devices.    Learning Progress Summary           Patient Acceptance, E, VU by AS at 5/14/2019  2:54 PM    Comment:  role of OT, OT POC, ADL training, transfer training   Family Acceptance, E, VU by AS at 5/14/2019  2:54 PM    Comment:  role of OT, OT POC, ADL training, transfer training                   Point: Precautions (Done)     Description: Instruct learner(s) on prescribed precautions during self-care and functional transfers.    Learning Progress Summary           Patient Acceptance, E, VU by AS at 5/14/2019  2:54 PM    Comment:  role of OT, OT POC, ADL training, transfer training   Family Acceptance, E, VU by AS at 5/14/2019  2:54 PM    Comment:  role of OT, OT POC, ADL training, transfer training                               User Key     Initials Effective Dates Name Provider Type Discipline    AS 03/25/19 -  Ana Robles OT Occupational Therapist OT                  OT Recommendation and Plan  Outcome Summary/Treatment Plan (OT)  Anticipated Discharge Disposition (OT): home with home health, home with  assist  Planned Therapy Interventions (OT Eval): activity tolerance training, adaptive equipment training, BADL retraining, functional balance retraining, strengthening exercise, transfer/mobility retraining, patient/caregiver education/training, occupation/activity based interventions, ROM/therapeutic exercise  Therapy Frequency (OT Eval): other (see comments)(5-7x/wk)  Plan of Care Review  Plan of Care Reviewed With: patient  Plan of Care Reviewed With: patient  Outcome Summary: OT eval completed; co-eval with PT. Pt reported fatigue, weakness, and nausea. Pt agreeable with therapists to assist to bathroom for assessment. Prior to admission, pt was receiving  OT/PT services. Pt was ambulating with rollator and was independent with ADLs prior to sickness. Pt currently requires mod A for supine<>sit and CGA for sit<>stand, toilet t/f (BSC over standard), toileting tasks, and ambulation to bathroom using RW. Pt assessment and overall participation limited by fatigue, weakness, nausea, and painful/tender BLE. Pt presents with decreased strength, endurance, balance, and mobility limiting independence in ADLs and functional t/fs. Pt would benefit from continued skilled OT services to address areas of deficit and promote return to PLOF. Anticipate home with assist and resume  OT.     Outcome Measures     Row Name 05/14/19 8690             How much help from another is currently needed...    Putting on and taking off regular lower body clothing?  3  -AS      Bathing (including washing, rinsing, and drying)  3  -AS      Toileting (which includes using toilet bed pan or urinal)  3  -AS      Putting on and taking off regular upper body clothing  4  -AS      Taking care of personal grooming (such as brushing teeth)  4  -AS      Eating meals  4  -AS      Score  21  -AS         Functional Assessment    Outcome Measure Options  AM-PAC 6 Clicks Daily Activity (OT)  -AS        User Key  (r) = Recorded By, (t) = Taken By, (c) =  Cosigned By    Initials Name Provider Type    AS Ana Robles OT Occupational Therapist          Time Calculation:   Time Calculation- OT     Row Name 05/14/19 1500             Time Calculation- OT    OT Start Time  1355  -AS      OT Stop Time  1433  -AS      OT Time Calculation (min)  38 min  -AS      OT Received On  05/14/19  -AS      OT Goal Re-Cert Due Date  05/27/19  -AS        User Key  (r) = Recorded By, (t) = Taken By, (c) = Cosigned By    Initials Name Provider Type    AS Ana Robles OT Occupational Therapist        Therapy Charges for Today     Code Description Service Date Service Provider Modifiers Qty    77052023295 HC OT EVAL MOD COMPLEXITY 3 5/14/2019 Ana Robles OT GO 1               Ana Robles OT  5/14/2019

## 2019-05-14 NOTE — CONSULTS
Date of Admission: 5/13/2019  Date of Consult: 05/14/19    Primary Care Physician: Elian Pacheco APRN  Referring Physician:  Arcadio Schulz MD      Chief Complaint/Reason for Consultation: hematuria     HPI: Pt stated she is having nausea and weight loss.  She has darker urine, but has not noted any gross bleeding or rectal bleeding.  She denies urinary sx: dyuria, incontinence, frequency, nocturia, weak stream, urgency or history of UTI or stones.  Three to 6 months ago she did have some freq, urge and urge incontinence.  She does not know what changed.  She also states she has LE swelling and pain and she has severe migraines that worsen with diet - especially fruits.     Past Medical History:  has a past medical history of Allergic, Anemia, Arthritis, Coronary artery disease (2014), Fibromyalgia, primary, Headache, Infectious viral hepatitis, Low back pain, Osteoporosis, and Visual impairment.    Past Surgical History:  has a past surgical history that includes Tonsillectomy; Knee surgery (Right, 1967); Aortic valve replacement (2014,may); Colonoscopy; Cardiac surgery; Esophagogastroduodenoscopy (N/A, 3/13/2019); Esophagogastroduodenoscopy (N/A, 4/22/2019); and Colonoscopy (N/A, 4/22/2019).    Family History: family history includes Arthritis in her father and mother; Cancer in her daughter and mother; Cataracts in her sister; Early death in her daughter; Hearing loss in her father; Heart disease in her brother, father, and mother; Hyperlipidemia in her brother, brother, father, and mother; Hypertension in her father, mother, and sister; Osteoporosis in her sister and sister; Parkinsonism in her brother; Thyroid disease in her daughter; Vision loss in her brother, father, mother, and sister.    Social History:  reports that she has never smoked. She has never used smokeless tobacco. She reports that she does not drink alcohol or use drugs.    Allergies:   Allergies   Allergen Reactions   •  Clindamycin/Lincomycin Other (See Comments)     Migraine headaches   • Corn-Containing Products Other (See Comments)     migraines   • Tomato Flavor [Flavoring Agent] Other (See Comments)     migraines   • Vinegar [Acetic Acid] Other (See Comments)     migraines   • Amoxicillin Rash   • Codeine GI Intolerance   • Penicillins Hives   • Sulfa Antibiotics Hives   • Vicodin [Hydrocodone-Acetaminophen] GI Intolerance     vomiting       Medications: Scheduled Meds:  doxycycline 100 mg Oral Q12H   famotidine 20 mg Oral Daily   ferrous sulfate 324 mg Oral BID With Meals   metoprolol succinate XL 50 mg Oral Daily With Dinner   potassium chloride 40 mEq Oral Once   potassium chloride 10 mEq Oral Daily   sodium chloride 3 mL Intravenous Q12H   sodium chloride 1 g Oral TID With Meals     Continuous Infusions:  sodium chloride 100 mL/hr Last Rate: 100 mL/hr (05/14/19 1729)     PRN Meds:  .•  acetaminophen  •  furosemide  •  ondansetron **OR** ondansetron  •  promethazine **OR** promethazine **OR** promethazine  •  sodium chloride  •  traMADol    Review of Systems:  Review of Systems   Constitutional: Positive for fatigue ( over the last number of months she is extremely tired ) and unexpected weight change (significant weight loss cachetic looking ). Negative for appetite change (cannot eat but just a few bites and she gets nauseated ).   HENT: Positive for dental problem.    Eyes: Negative for visual disturbance (auras with her migraines).   Respiratory: Negative.    Cardiovascular: Positive for leg swelling (significant LE pain and swelling ).   Gastrointestinal: Positive for blood in stool (doctors told her she had bleeding at last admission).   Endocrine: Negative.    Genitourinary: Positive for decreased urine volume (she has had a significant change in voiding pattens over the last few months ) and hematuria (microscopic bleeding at last hospital visit ).   Musculoskeletal: Positive for arthralgias, back pain (she has  osteoporosis ) and neck pain.   Skin: Positive for pallor (pale in nature ).   Allergic/Immunologic: Positive for food allergies (fruit and veggies trigger migraines).   Neurological: Positive for weakness (lethargic ), numbness (feet and legs are numb and she has pain ) and headaches (severe HA/migraines ).   Hematological: Bruises/bleeds easily (She has thin skin and has been told she is quite anemic ).   Psychiatric/Behavioral: Negative for decreased concentration (seems fatigued).          Physical Exam:   Temp:  [96 °F (35.6 °C)-97.8 °F (36.6 °C)] 97.5 °F (36.4 °C)  Heart Rate:  [55-91] 72  Resp:  [16-18] 16  BP: (118-162)/(58-84) 142/72  Physical Exam   Constitutional: She is oriented to person, place, and time. She appears well-developed. She is cooperative. She appears ill.   Cachetic      Eyes: EOM are normal.   Neck: Normal range of motion. Neck supple.   Cardiovascular: Normal rate, regular rhythm and normal heart sounds.   Pulmonary/Chest: Effort normal and breath sounds normal.   Abdominal: Soft. Bowel sounds are normal. There is tenderness (mild tendernss in the RUQ).   Genitourinary:   Genitourinary Comments: Exam was deferred    Musculoskeletal: Normal range of motion. She exhibits edema.   Neurological: She is alert and oriented to person, place, and time.   Skin: Skin is warm and dry. There is pallor.   Psychiatric: Her behavior is normal. Thought content normal.       Results Reviewed:  I reviewed the patient's new clinical results.  I reviewed the patient's new imaging results.  I reviewed the patient's other test results.     Lab Results   Lab 05/08/19  1007 05/10/19  1425 05/13/19  1324 05/14/19  0659   SODIUM 130* 131* 131* 131*   POTASSIUM 3.6 3.7 3.4* 3.3*   CHLORIDE 94* 95* 94* 95*   CO2 27.4 28.0 26.0 24.0   BUN 13 15 14 12   CREATININE 0.57 0.61 0.57 0.59   GLUCOSE 112* 119* 109* 97   CALCIUM 8.1* 8.2* 7.9* 7.2*   ALK PHOS 66 71 74 61   ALT (SGPT) 6 5 7 6   AST (SGOT) 17 20 23 16   TOTAL  PROTEIN 8.1 8.0 8.2 6.8   ALBUMIN 2.00* 1.80* 1.80* 1.60*     Lab Results   Lab 05/08/19  1007 05/10/19  1425 05/13/19  1324 05/14/19  0741   WBC 12.82* 11.97* 14.04* 12.84*   HEMOGLOBIN 8.0* 7.9* 7.7* 6.5*   HEMATOCRIT 26.2* 25.4* 25.0* 20.7*   PLATELETS 306 291 261 233       Brief Urine Lab Results  (Last result in the past 365 days)      Color   Clarity   Blood   Leuk Est   Nitrite   Protein   CREAT   Urine HCG        05/13/19 1551 Dark Yellow Cloudy Large (3+) Negative Negative 100 mg/dL (2+)                   Assessment:  Active Hospital Problems    Diagnosis POA   • **Generalized weakness [R53.1] Yes     Possible malignancy?   -  Pancreatic source?    - lipase w/n/l, CT abdomen has shown no mass   - GI source?     -Colonoscopy, EGD no signs of malignant process     - will consult GI, plans outpatient was for endoscopic capsule.    - Bladder cx?    - U/A shows large blood. Will get urine cytology    - Urology consult: will follow recommendations.  Patient was initially scheduled for cystoscopy today.    - hemoglobin 7.7; will transfuse 2 units PRBC if less than 7.    - will continue to monitor     - Continue IVF, Dietician consult.      • Hemorrhagic cystitis [N30.91] Yes     Patient was seen by Dr. Gilmore, Urology previous admission. Was treated with Azactam and diflucan during that time. On discharge was scheduled for cystoscopy with Dr. Damico this Wednesday.     - patient was seen on 5/10/19 by PCP where she was diagnosed with cystitis. Started on doxy. Has taken 2 days.   - U/A on admission : large blood, 2+ protein   - will continue doxy   - urine cytology    - Urology consult         • Iron deficiency anemia due to chronic blood loss [D50.0] Yes     H/H: 7.7/25.0 in the ED  -continue home ferrous sulfate BID  -continue to trend H/H  -currently asymptomatic will transfuse 2 units PRBC if <7  -Repeat anemia studies including reticulocyte count     • Weight loss, unintentional [R63.4] Yes     Will get  dietician consult  - patient has many food allergies     • Hyponatremia [E87.1] Yes     In the ED sodium level is 131. Patient sees Dr. Mckenzie, Nephrology outpatient. 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   - continue home sodium tablets 1 g TID  - 1.6 L fluid restriction daily recommended by nephro         • Diabetes mellitus (CMS/[facility]) [E11.9] Yes     Will hold home metformin  - SSI       • Hypertension [I10] Yes     Will continue toprol-xl 50 daily   Continue to monitor per hospital protocol      • Coronary artery disease [I25.10] Yes     History of AVR 2014  Continue metoprolol 50 mg Daily                Recommendations:  1.  Microscopic hematuria - will need to review prior UA, but will need to check on number of RBC's noted in urine, not just dipstick .  Patient will need a out patient pelvic exam. I will also check cytology   2.  Change in urinary function - I would like to check some post void residuals to ensure that patient is not retaining urine.   3.  Nausea/ + hemoccult  - workup underway by GI  4.  Anemia/fatigue - blood transfusion underway along with work up   5. LE edema weakness and pain- cardiology/ hospitalist undertaking work up   6.  Migraines - may need to assess why foods trigger this and if hormones play a role.   7.  DM - may be the root of her change in voiding and neuropathy issues. Hospitalist investigating and regulating

## 2019-05-14 NOTE — CONSULTS
Adult Nutrition  Assessment    Patient Name:  Yaneli Bates  YOB: 1940  MRN: [id number]  Admit Date:  5/13/2019    Assessment Date:  5/14/2019    Comments: Pt admitted due to generalized weakness and hemorrhagic Cystitis along with anemia.  She has been eating very poorly over the last 2 weeks and then started having N&V.  Vomiting has resolved but she is still having some nausea.  Pt has lost ~8# in the last 2 weeks due to minimal intake.  H/H and Fe+ depressed and pt is to receive transfusion today.  Pt has multiple food incoherences that causes migraines.  RD noted and CA worked with pt's daughter to see what she could eat.  She cannot tolerate ice cream or commercial supplements which makes it difficult to optimize her protein and calorie intake.       Reason for Assessment     Row Name 05/14/19 1216          Reason for Assessment    Reason For Assessment  identified at risk by screening criteria     Diagnosis  gastrointestinal disease;other (see comments) Weakness     Identified At Risk by Screening Criteria  MST SCORE 2+         Nutrition/Diet History     Row Name 05/14/19 1218          Nutrition/Diet History    Typical Food/Fluid Intake  Pt reports that she is still having Nausea but no emesis.  SHe has not been eating very well for the last courple of weeks.  She has lost about 8#.  She has multiple food allergies/intolerances that causes migraines.  The biggest being corn products which elminates all commercial supplements.             Labs/Tests/Procedures/Meds     Row Name 05/14/19 1226          Labs/Procedures/Meds    Lab Results Reviewed  reviewed, pertinent     Lab Results Comments  Na 131; K+ 3.3; Alb 1.6; H/H 6.5/20.7; WBC 12.84; Iron 19 (L)        Diagnostic Tests/Procedures    Diagnostic Test/Procedure Reviewed  reviewed, pertinent     Diagnostic Test/Procedures Comments  To receive blood transfusion        Medications    Pertinent Medications Reviewed  reviewed, pertinent      Pertinent Medications Comments  Ferrous Sulfate; Pepcid; NACl tablet         Physical Findings     Row Name 05/14/19 1233          Physical Findings    Overall Physical Appearance  on oxygen therapy     Gastrointestinal  nausea         Estimated/Assessed Needs     Row Name 05/14/19 1233          Estimated/Assessed Needs    Additional Documentation  Additional Requirements (Group);Fluid Requirements (Group);[place]-St. Jeor Equation (Group);Protein Requirements (Group);Calorie Requirements (Group);KCAL/KG (Group)         Nutrition Prescription Ordered     Row Name 05/14/19 1233          Nutrition Prescription PO    Current PO Diet  Regular     Fluid Consistency  Thin                 Electronically signed by:  Tanya Harkins RD  05/14/19 12:58 PM

## 2019-05-14 NOTE — PROGRESS NOTES
FAMILY MEDICINE DAILY PROGRESS NOTE    NAME: Yaneli Bates  : 1940  MRN: [id number]      LOS: 0 days     PROVIDER OF SERVICE: Ashley Carrizales MD    Chief Complaint: Generalized weakness    Subjective:     Interval History:  History taken from: patient  Patient notes that she is feeling significantly better since she received fluids overnight.  She reports that she was scheduled with Dr. Damico for a cystoscopy today.  She denies noticing any blood in her urine.  She denies noticing any black or bloody stools.  She denies any emesis, and no bloody emesis.    Review of Systems:   Review of Systems   Constitutional: Positive for fatigue. Negative for activity change, appetite change and fever.   HENT: Negative for ear pain and sore throat.    Eyes: Negative for pain and visual disturbance.   Respiratory: Negative for cough and shortness of breath.    Cardiovascular: Negative for chest pain and palpitations.   Gastrointestinal: Negative for abdominal pain, blood in stool, constipation, diarrhea, nausea and vomiting.   Endocrine: Negative for cold intolerance and heat intolerance.   Genitourinary: Negative for difficulty urinating and dysuria.   Musculoskeletal: Negative for arthralgias and gait problem.   Skin: Negative for color change and rash.   Neurological: Positive for weakness. Negative for dizziness and headaches.   Hematological: Negative for adenopathy. Does not bruise/bleed easily.   Psychiatric/Behavioral: Negative for agitation, confusion and sleep disturbance.       Objective:     Vital Signs  Temp:  [96.5 °F (35.8 °C)-97.8 °F (36.6 °C)] 97 °F (36.1 °C)  Heart Rate:  [55-92] 70  Resp:  [16-19] 18  BP: (118-169)/(58-84) 118/58  Body mass index is 23.68 kg/m².    Physical Exam  Physical Exam   Constitutional: She is oriented to person, place, and time. She appears well-developed and well-nourished.   HENT:   Head: Normocephalic and atraumatic.   Eyes: Conjunctivae, EOM and lids are  normal. Pupils are equal, round, and reactive to light.   Neck: Normal range of motion. Neck supple.   Cardiovascular: Normal rate, regular rhythm and normal heart sounds. Exam reveals no gallop and no friction rub.   No murmur heard.  Pulmonary/Chest: Effort normal and breath sounds normal.   Abdominal: Soft. Normal appearance and bowel sounds are normal. There is no tenderness.   Musculoskeletal: Normal range of motion. She exhibits edema.   Neurological: She is alert and oriented to person, place, and time.   Skin: Skin is warm, dry and intact.   Psychiatric: She has a normal mood and affect. Her speech is normal and behavior is normal. Judgment and thought content normal. Cognition and memory are normal.       Medication Review    Current Facility-Administered Medications:   •  acetaminophen (TYLENOL) tablet 650 mg, 650 mg, Oral, Q4H PRN, Rodrigo Nazario MD  •  doxycycline (MONODOX) capsule 100 mg, 100 mg, Oral, Q12H, Rodrigo Nazario MD, 100 mg at 05/13/19 2024  •  famotidine (PEPCID) tablet 20 mg, 20 mg, Oral, Daily, Rodrigo Nazario MD, 20 mg at 05/13/19 2024  •  ferrous sulfate EC tablet 324 mg, 324 mg, Oral, BID With Meals, Rodrigo Nzaario MD, 324 mg at 05/13/19 2024  •  metoprolol succinate XL (TOPROL-XL) 24 hr tablet 50 mg, 50 mg, Oral, Daily With Dinner, Rodrigo Nazario MD, 50 mg at 05/13/19 2024  •  ondansetron (ZOFRAN) injection 4 mg, 4 mg, Intravenous, Q6H PRN, Rodrigo Nazario MD  •  potassium chloride (KLOR-CON) packet 40 mEq, 40 mEq, Oral, Once, Rodrigo Nazario MD  •  potassium chloride (MICRO-K) CR capsule 10 mEq, 10 mEq, Oral, Daily, Rodrigo Nazario MD, 10 mEq at 05/13/19 2024  •  sodium chloride 0.9 % flush 3 mL, 3 mL, Intravenous, Q12H, Rodrigo Nazario MD  •  sodium chloride 0.9 % flush 3-10 mL, 3-10 mL, Intravenous, PRN, Rodrigo Nazario MD  •  sodium chloride 0.9 % infusion, 100 mL/hr, Intravenous, Continuous, Rodrigo Nazario MD, Last Rate: 100 mL/hr at 05/14/19 0557, 100 mL/hr at 05/14/19 0557  •  sodium chloride tablet 1 g, 1  g, Oral, TID With Meals, Rodrigo Nazario MD, 1 g at 05/13/19 2024  •  traMADol (ULTRAM) tablet 50 mg, 50 mg, Oral, BID PRN, Rodrigo Nazario MD     Diagnostic Data    Lab Results (last 24 hours)     Procedure Component Value Units Date/Time    Comprehensive Metabolic Panel [[id number]] Collected:  05/14/19 0659    Specimen:  Blood Updated:  05/14/19 0702    CBC & Differential [[id number]] Collected:  05/14/19 0659    Specimen:  Blood Updated:  05/14/19 0702    Narrative:       The following orders were created for panel order CBC & Differential.  Procedure                               Abnormality         Status                     ---------                               -----------         ------                     CBC Auto Differential[[id number]]                            In process                   Please view results for these tests on the individual orders.    CBC Auto Differential [[id number]] Collected:  05/14/19 0659    Specimen:  Blood Updated:  05/14/19 0702    Lipase [[id number]]  (Normal) Collected:  05/13/19 1324    Specimen:  Blood Updated:  05/13/19 1855     Lipase 19 U/L     Urinalysis, Microscopic Only - Urine, Catheter In/Out [[id number]]  (Abnormal) Collected:  05/13/19 1551    Specimen:  Urine, Catheter In/Out Updated:  05/13/19 1606     RBC, UA Too Numerous to Count /HPF      WBC, UA 3-5 /HPF      Bacteria, UA None Seen /HPF      Squamous Epithelial Cells, UA None Seen /HPF      Hyaline Casts, UA 0-2 /LPF      Methodology Automated Microscopy    Urinalysis With Culture If Indicated - Urine, Catheter In/Out [[id number]]  (Abnormal) Collected:  05/13/19 1551    Specimen:  Urine, Catheter In/Out Updated:  05/13/19 1606     Color, UA Dark Yellow     Appearance, UA Cloudy     pH, UA 6.0     Specific Gravity, UA 1.016     Glucose,  mg/dL (Trace)     Ketones, UA Negative     Bilirubin, UA Negative     Blood, UA Large (3+)     Protein,  mg/dL (2+)     Leuk Esterase, UA Negative     Nitrite, UA  Negative     Urobilinogen, UA 1.0 E.U./dL    Magnesium [[id number]]  (Abnormal) Collected:  05/13/19 1324    Specimen:  Blood Updated:  05/13/19 1403     Magnesium 1.3 mg/dL     Comprehensive Metabolic Panel [[id number]]  (Abnormal) Collected:  05/13/19 1324    Specimen:  Blood Updated:  05/13/19 1358     Glucose 109 mg/dL      BUN 14 mg/dL      Creatinine 0.57 mg/dL      Sodium 131 mmol/L      Potassium 3.4 mmol/L      Chloride 94 mmol/L      CO2 26.0 mmol/L      Calcium 7.9 mg/dL      Total Protein 8.2 g/dL      Albumin 1.80 g/dL      ALT (SGPT) 7 U/L      AST (SGOT) 23 U/L      Alkaline Phosphatase 74 U/L      Total Bilirubin 0.5 mg/dL      eGFR Non African Amer 103 mL/min/1.73      Globulin 6.4 gm/dL      A/G Ratio 0.3 g/dL      BUN/Creatinine Ratio 24.6     Anion Gap 11.0 mmol/L     Narrative:       GFR Normal >60  Chronic Kidney Disease <60  Kidney Failure <15    CBC & Differential [[id number]] Collected:  05/13/19 1324    Specimen:  Blood Updated:  05/13/19 1341    Narrative:       The following orders were created for panel order CBC & Differential.  Procedure                               Abnormality         Status                     ---------                               -----------         ------                     CBC Auto Differential[[id number]]        Abnormal            Final result                 Please view results for these tests on the individual orders.    CBC Auto Differential [[id number]]  (Abnormal) Collected:  05/13/19 1324    Specimen:  Blood Updated:  05/13/19 1341     WBC 14.04 10*3/mm3      RBC 2.88 10*6/mm3      Hemoglobin 7.7 g/dL      Hematocrit 25.0 %      MCV 86.8 fL      MCH 26.7 pg      MCHC 30.8 g/dL      RDW 18.6 %      RDW-SD 59.3 fl      MPV 10.1 fL      Platelets 261 10*3/mm3      Neutrophil % 85.4 %      Lymphocyte % 6.6 %      Monocyte % 6.7 %      Eosinophil % 0.1 %      Basophil % 0.2 %      Immature Grans % 1.0 %      Neutrophils, Absolute 11.98 10*3/mm3      Lymphocytes,  Absolute 0.93 10*3/mm3      Monocytes, Absolute 0.94 10*3/mm3      Eosinophils, Absolute 0.02 10*3/mm3      Basophils, Absolute 0.03 10*3/mm3      Immature Grans, Absolute 0.14 10*3/mm3      nRBC 0.0 /100 WBC             I reviewed the patient's new clinical results.    Assessment/Plan:     Active Hospital Problems    Diagnosis POA   • **Generalized weakness [R53.1] Yes     Possible malignancy?   -  Pancreatic source?    - lipase w/n/l, CT abdomen has shown no mass   - GI source?     -Colonoscopy, EGD no signs of malignant process     - will consult GI, plans outpatient was for endoscopic capsule.    - Bladder cx?    - U/A shows large blood. Will get urine cytology    - Urology consult: will follow recommendations. Plan for outpatient during previous admission was for cystoscopy     - hemoglobin 7.7; will transfuse 2 units PRBC if less than 7.    - will continue to monitor     - Continue IVF, Dietician consult.      • Hemorrhagic cystitis [N30.91] Yes     Patient was seen by Dr. Gilmore, Urology previous admission. Was treated with Azactam and diflucan during that time. On discharge was scheduled for cystoscopy with Dr. Damico this Wednesday.     - patient was seen on 5/10/19 by PCP where she was diagnosed with cystitis. Started on doxy. Has taken 2 days.   - U/A on admission : large blood, 2+ protein   - will continue doxy   - urine cytology    - Urology consult         • Iron deficiency anemia due to chronic blood loss [D50.0] Yes     H/H: 7.7/25.0 in the ED  - continue home ferrous sulfate BID  -continue to trend H/H  - currently asymptomatic will transfuse 2 units PRBC if <7     • Weight loss, unintentional [R63.4] Yes     Will get dietician consult  - patient has many food allergies     • Hyponatremia [E87.1] Yes     In the ED sodium level is 131. Patient sees Dr. Mckenzie, Nephrology outpatient. 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   - continue home sodium tablets 1 g TID  - 1.6 L fluid restriction daily recommended by  nephro         • Diabetes mellitus (CMS/HCC) [E11.9] Yes     Will hold home metformin  - SSI       • Hypertension [I10] Yes     Will continue toprol-xl 50 daily   Continue to monitor per hospital protocol      • Coronary artery disease [I25.10] Yes     History of AVR 2014  Continue metoprolol 50 mg Daily          DVT prophylaxis: SCDs/TEDs  Code status is   Code Status and Medical Interventions:   Ordered at: 05/13/19 1811     Level Of Support Discussed With:    Patient     Code Status:    CPR     Medical Interventions (Level of Support Prior to Arrest):    Full       Plan for disposition:Where: home and When:  2-3days      Time: 30 minutes     Ashley Carrizales MD PGY3  [place] [address]  Office: [phone]          This document has been electronically signed by Ashley Carrizales MD on May 14, 2019 7:12 AM

## 2019-05-14 NOTE — PLAN OF CARE
Problem: Patient Care Overview  Goal: Plan of Care Review  Outcome: Ongoing (interventions implemented as appropriate)   05/14/19 1400   Coping/Psychosocial   Plan of Care Reviewed With patient;daughter;spouse   Plan of Care Review   Progress no change   OTHER   Outcome Summary PT evaluation completed as co-eval with OT. Pt presented with fatigue and nausea at the onset of the assessment. Required modA with supine <> sit and scooting tasks and CGA for functional transfers (sit to stand with RW). Limited to short distances with ambulation (gait training 7' x 2 CGA with RW) due to urgency issues with urination (on Lasix). Limited by B LE pain and nausea throughout assessment. Would benefit from skilled PT to address limitations with bed mobility, transfer and ambulation tasks. Anticipate home with  to assist prn and to resume home health PT/OT to maximize her functional potential.

## 2019-05-14 NOTE — THERAPY EVALUATION
Acute Care - Physical Therapy Initial Evaluation  [facility]     Patient Name: Yaneli Bates  : 1940  MRN: [id number]  Today's Date: 2019   Onset of Illness/Injury or Date of Surgery: 19  Date of Referral to PT: 19  Referring Physician: Dr. ISABELLA Hi      Admit Date: 2019    Visit Dx:     ICD-10-CM ICD-9-CM   1. Generalized weakness R53.1 780.79   2. Symptomatic anemia D64.9 285.9   3. Hypomagnesemia E83.42 275.2   4. Impaired mobility and activities of daily living Z74.09 799.89   5. Impaired functional mobility, balance, gait, and endurance Z74.09 V49.89     Patient Active Problem List   Diagnosis   • S/P AVR (aortic valve replacement)   • Hypertension   • Chest pain, rule out acute myocardial infarction   • Anemia   • Symptomatic anemia   • Diabetes mellitus (CMS/HCC)   • Hematuria   • Hyponatremia   • Coronary artery disease   • Weight loss, unintentional   • Fatigue   • Generalized weakness   • Hemorrhagic cystitis   • Iron deficiency anemia due to chronic blood loss     Past Medical History:   Diagnosis Date   • Allergic    • Anemia    • Arthritis    • Coronary artery disease 2014    valave replacement   • Fibromyalgia, primary    • Headache    • Infectious viral hepatitis    • Low back pain    • Osteoporosis    • Visual impairment      Past Surgical History:   Procedure Laterality Date   • AORTIC VALVE REPAIR/REPLACEMENT  2014,may   • CARDIAC SURGERY     • COLONOSCOPY     • COLONOSCOPY N/A 2019    Procedure: COLONOSCOPY;  Surgeon: Carlos Schaeffer MD;  Location: [facility] ENDOSCOPY;  Service: Gastroenterology   • ENDOSCOPY N/A 3/13/2019    Procedure: ESOPHAGOGASTRODUODENOSCOPY;  Surgeon: Carlos Schaeffer MD;  Location: [facility] ENDOSCOPY;  Service: Gastroenterology   • ENDOSCOPY N/A 2019    Procedure: ESOPHAGOGASTRODUODENOSCOPY;  Surgeon: Carlos Schaeffer MD;  Location: [facility] ENDOSCOPY;  Service: Gastroenterology   • KNEE SURGERY Right    • TONSILLECTOMY           PT ASSESSMENT (last 12 hours)      Physical Therapy Evaluation     Row Name 05/14/19 1400          PT Evaluation Time/Intention    Subjective Information  complains of;pain;nausea/vomiting;weakness  -BS     Document Type  evaluation  -BS     Mode of Treatment  individual therapy;physical therapy  -BS     Patient Effort  adequate  -BS     Symptoms Noted During/After Treatment  increased pain;fatigue;other (see comments) NAUSEA  -BS     Row Name 05/14/19 1400          General Information    Patient Profile Reviewed?  yes  -BS     Referring Physician  Dr. ISABELLA Hi  -BS     Patient Observations  alert;cooperative;agree to therapy  -BS     Patient/Family Observations  pt received with OT sitting at EOB  -BS     Equipment Currently Used at Home  shower chair;commode, bedside;rollator;grab bar;other (see comments) lift chair  -BS     Row Name 05/14/19 1400          Relationship/Environment    Primary Source of Support/Comfort  spouse  -BS     Lives With  spouse  -BS     Row Name 05/14/19 1400          Resource/Environmental Concerns    Current Living Arrangements  home/apartment/condo  -BS     Row Name 05/14/19 1400          Home Main Entrance    Number of Stairs, Main Entrance  two  -BS     Stair Railings, Main Entrance  railings on both sides of stairs  -BS     Row Name 05/14/19 1400          Cognitive Assessment/Intervention- PT/OT    Orientation Status (Cognition)  oriented x 4  -BS     Follows Commands (Cognition)  follows one step commands  -BS     Row Name 05/14/19 1400          Safety Issues, Functional Mobility    Impairments Affecting Function (Mobility)  endurance/activity tolerance;pain  -BS     Row Name 05/14/19 1400          Bed Mobility Assessment/Treatment    Bed Mobility Assessment/Treatment  supine-sit;sit-supine;scooting/bridging  -BS     Scooting/Bridging [place] (Bed Mobility)  moderate assist (50% patient effort)  -BS     Supine-Sit [place] (Bed Mobility)  moderate assist (50%  patient effort)  -BS     Sit-Supine [place] (Bed Mobility)  moderate assist (50% patient effort)  -BS     Bed Mobility, Safety Issues  decreased use of legs for bridging/pushing;impaired trunk control for bed mobility  -BS     Assistive Device (Bed Mobility)  bed rails;draw sheet;head of bed elevated  -BS     Comment (Bed Mobility)  B LE management w/ sit to supine task  -BS     Row Name 05/14/19 1400          Transfer Assessment/Treatment    Transfer Assessment/Treatment  sit-stand transfer;stand-sit transfer  -BS     Sit-Stand [place] (Transfers)  contact guard;verbal cues  -BS     Stand-Sit [place] (Transfers)  contact guard;verbal cues  -BS     Row Name 05/14/19 1400          Sit-Stand Transfer    Assistive Device (Sit-Stand Transfers)  walker, front-wheeled  -BS     Row Name 05/14/19 1400          Stand-Sit Transfer    Assistive Device (Stand-Sit Transfers)  walker, front-wheeled  -BS     Row Name 05/14/19 1400          Gait/Stairs Assessment/Training    Gait/Stairs Assessment/Training  gait/ambulation independence  -     [place] Level (Gait)  contact guard  -BS     Assistive Device (Gait)  walker, front-wheeled  -BS     Distance in Feet (Gait)  7' x 2  -BS     Pattern (Gait)  step-to  -BS     Deviations/Abnormal Patterns (Gait)  willi decreased  -BS     Comment (Gait/Stairs)  no LOB episode noted, slow willi. Pt insisted on navigating RW by herself through narrow spaces to/from the bed to the bathroom.  -     Row Name 05/14/19 1400          General ROM    GENERAL ROM COMMENTS  B LE's grossly WFL for AROM, assessed with mobility tasks  -     Row Name 05/14/19 1400          MMT (Manual Muscle Testing)    General MMT Comments  NT due to nausea/pain  -     Row Name 05/14/19 1400          Pain Assessment    Additional Documentation  Pain Scale: Numbers Pre/Post-Treatment (Group)  -BS     Row Name 05/14/19 1400          Pain Scale: Numbers Pre/Post-Treatment    Pain Scale: Numbers,  Pretreatment  5/10  -BS     Pain Scale: Numbers, Post-Treatment  5/10  -BS     Pain Location  other (see comments) B LE's  -BS     Pain Intervention(s)  Medication (See MAR)  -BS     Row Name 05/14/19 1400          Physical Therapy Clinical Impression    Date of Referral to PT  05/13/19  -BS     PT Diagnosis (PT Clinical Impression)  impaired mobility and function  -BS     Criteria for Skilled Interventions Met (PT Clinical Impression)  yes;treatment indicated  -BS     Pathology/Pathophysiology Noted (Describe Specifically for Each System)  musculoskeletal  -BS     Impairments Found (describe specific impairments)  aerobic capacity/endurance;gait, locomotion, and balance  -BS     Rehab Potential (PT Clinical Summary)  fair, will monitor progress closely  -BS     Predicted Duration of Therapy (PT)  until goals are met  -BS     Row Name 05/14/19 1400          Vital Signs    Pre Systolic BP Rehab  -- vitals not taken due to urgency with toileting issues..  -BS     Pre Patient Position  Sitting  -BS     Intra Patient Position  Sitting  -BS     Post Patient Position  Supine  -BS     Row Name 05/14/19 1400          Physical Therapy Goals    Bed Mobility Goal Selection (PT)  bed mobility, PT goal 1  -BS     Transfer Goal Selection (PT)  transfer, PT goal 1  -BS     Gait Training Goal Selection (PT)  gait training, PT goal 1  -BS     Stairs Goal Selection (PT)  stairs, PT goal 1  -BS     Additional Documentation  Stairs Goal Selection (PT) (Row)  -BS     Row Name 05/14/19 1400          Bed Mobility Goal 1 (PT)    Activity/Assistive Device (Bed Mobility Goal 1, PT)  sit to supine;supine to sit;scooting  -BS     [place] Level/Cues Needed (Bed Mobility Goal 1, PT)  standby assist  -BS     Time Frame (Bed Mobility Goal 1, PT)  by discharge  -BS     Barriers (Bed Mobility Goal 1, PT)  leg pain, nausea  -BS     Progress/Outcomes (Bed Mobility Goal 1, PT)  goal not met  -BS     Row Name 05/14/19 1400          Transfer Goal 1  (PT)    Activity/Assistive Device (Transfer Goal 1, PT)  sit-to-stand/stand-to-sit;walker, rolling  -BS     [place] Level/Cues Needed (Transfer Goal 1, PT)  standby assist  -BS     Time Frame (Transfer Goal 1, PT)  by discharge  -BS     Progress/Outcome (Transfer Goal 1, PT)  goal not met  -BS     Row Name 05/14/19 1400          Gait Training Goal 1 (PT)    Activity/Assistive Device (Gait Training Goal 1, PT)  gait (walking locomotion);walker, rolling  -BS     [place] Level (Gait Training Goal 1, PT)  standby assist  -BS     Distance (Gait Goal 1, PT)  75' x 2  -BS     Time Frame (Gait Training Goal 1, PT)  by discharge  -BS     Barriers (Gait Training Goal 1, PT)  urgency to urinate-on lasix  -BS     Progress/Outcome (Gait Training Goal 1, PT)  goal not met  -BS     Row Name 05/14/19 1400          Stairs Goal 1 (PT)    Activity/Assistive Device (Stairs Goal 1, PT)  stairs, all skills;using handrail, right;using handrail, left;ascending stairs;descending stairs  -BS     [place] Level/Cues Needed (Stairs Goal 1, PT)  standby assist  -BS     Number of Stairs (Stairs Goal 1, PT)  2  -BS     Time Frame (Stairs Goal 1, PT)  by discharge  -BS     Barriers (Stairs Goal 1, PT)  pain, fatigue  -BS     Progress/Outcome (Stairs Goal 1, PT)  goal not met  -BS     Row Name 05/14/19 1400          Positioning and Restraints    Pre-Treatment Position  in bed  -BS     Post Treatment Position  bed  -BS     In Bed  exit alarm on;encouraged to call for assist;call light within reach;with family/caregiver  -BS     Row Name 05/14/19 1400          Living Environment    Home Accessibility  stairs to enter home  -BS       User Key  (r) = Recorded By, (t) = Taken By, (c) = Cosigned By    Initials Name Provider Type    Malcom De Los Santos PT Physical Therapist        Physical Therapy Education     Title: PT OT SLP Therapies (In Progress)     Topic: Physical Therapy (Done)     Point: Mobility training (Done)     Learning Progress  Summary           Patient Acceptance, D, DU,NR,VU by BS at 5/14/2019  3:30 PM    Comment:  vc's for hand and LE placement/positioning with sit to stand transfers and toilet transfers with PT and OT.   Family Acceptance, D, DU,NR,VU by BS at 5/14/2019  3:30 PM    Comment:  vc's for hand and LE placement/positioning with sit to stand transfers and toilet transfers with PT and OT.                   Point: Home exercise program (Done)     Learning Progress Summary           Patient Acceptance, D, DU,NR,VU by BS at 5/14/2019  3:30 PM    Comment:  vc's for hand and LE placement/positioning with sit to stand transfers and toilet transfers with PT and OT.   Family Acceptance, D, DU,NR,VU by BS at 5/14/2019  3:30 PM    Comment:  vc's for hand and LE placement/positioning with sit to stand transfers and toilet transfers with PT and OT.                   Point: Body mechanics (Done)     Learning Progress Summary           Patient Acceptance, D, DU,NR,VU by BS at 5/14/2019  3:30 PM    Comment:  vc's for hand and LE placement/positioning with sit to stand transfers and toilet transfers with PT and OT.   Family Acceptance, D, DU,NR,VU by BS at 5/14/2019  3:30 PM    Comment:  vc's for hand and LE placement/positioning with sit to stand transfers and toilet transfers with PT and OT.                   Point: Precautions (Done)     Learning Progress Summary           Patient Acceptance, D, DU,NR,VU by BS at 5/14/2019  3:30 PM    Comment:  vc's for hand and LE placement/positioning with sit to stand transfers and toilet transfers with PT and OT.   Family Acceptance, D, DU,NR,VU by BS at 5/14/2019  3:30 PM    Comment:  vc's for hand and LE placement/positioning with sit to stand transfers and toilet transfers with PT and OT.                               User Key     Initials Effective Dates Name Provider Type Discipline    BS 04/08/18 -  Malcom Kaiser, PT Physical Therapist PT              PT Recommendation and Plan  Anticipated  Discharge Disposition (PT): home with assist, home, home with home health  Planned Therapy Interventions (PT Eval): balance training, bed mobility training, gait training, neuromuscular re-education, postural re-education, ROM (range of motion), stair training, strengthening, stretching, transfer training  Therapy Frequency (PT Clinical Impression): daily  Outcome Summary/Treatment Plan (PT)  Anticipated Equipment Needs at Discharge (PT): other (see comments)(none)  Anticipated Discharge Disposition (PT): home with assist, home, home with home health  Plan of Care Reviewed With: patient, daughter, spouse  Progress: no change  Outcome Summary: PT evaluation completed as co-eval with OT. Pt presented with fatigue and nausea at the onset of the assessment. Required modA with supine <> sit and scooting tasks and CGA for functional transfers (sit to stand with RW). Limited to short distances with ambulation (gait training 7' x 2 CGA with RW) due to urgency issues with urination (on Lasix). Limited by B LE pain and nausea throughout assessment. Would benefit from skilled PT to address limitations with bed mobility, transfer and ambulation tasks. Anticipate home with  to assist prn and to resume home health PT/OT to maximize her functional potential.   Outcome Measures     Row Name 05/14/19 1400 05/14/19 1975          How much help from another person do you currently need...    Turning from your back to your side while in flat bed without using bedrails?  3  -BS  --     Moving from lying on back to sitting on the side of a flat bed without bedrails?  2  -BS  --     Moving to and from a bed to a chair (including a wheelchair)?  3  -BS  --     Standing up from a chair using your arms (e.g., wheelchair, bedside chair)?  3  -BS  --     Climbing 3-5 steps with a railing?  2  -BS  --     To walk in hospital room?  3  -BS  --     AM-PAC 6 Clicks Score  16  -BS  --        How much help from another is currently needed...     Putting on and taking off regular lower body clothing?  --  3  -AS     Bathing (including washing, rinsing, and drying)  --  3  -AS     Toileting (which includes using toilet bed pan or urinal)  --  3  -AS     Putting on and taking off regular upper body clothing  --  4  -AS     Taking care of personal grooming (such as brushing teeth)  --  4  -AS     Eating meals  --  4  -AS     Score  --  21  -AS        Functional Assessment    Outcome Measure Options  AM-PAC 6 Clicks Basic Mobility (PT)  -BS  AM-PAC 6 Clicks Daily Activity (OT)  -AS       User Key  (r) = Recorded By, (t) = Taken By, (c) = Cosigned By    Initials Name Provider Type    Malcom De Los Santos, PT Physical Therapist    AS Ana Robles, OT Occupational Therapist         Time Calculation:   PT Charges     Row Name 05/14/19 1531             Time Calculation    Start Time  1400  -BS      Stop Time  1433  -BS      Time Calculation (min)  33 min  -BS      PT Received On  05/14/19  -BS      PT Goal Re-Cert Due Date  05/27/19  -BS        User Key  (r) = Recorded By, (t) = Taken By, (c) = Cosigned By    Initials Name Provider Type    Malcom De Los Santos, PT Physical Therapist        Therapy Charges for Today     Code Description Service Date Service Provider Modifiers Qty    91124735928 HC PT EVAL MOD COMPLEXITY 2 5/14/2019 Malcom Kaiser, PT GP 1          PT G-Codes  Outcome Measure Options: AM-PAC 6 Clicks Basic Mobility (PT)  AM-PAC 6 Clicks Score: 16  Score: 21      Malcom Kaiser PT  5/14/2019

## 2019-05-14 NOTE — PLAN OF CARE
Problem: Patient Care Overview  Goal: Plan of Care Review  Outcome: Ongoing (interventions implemented as appropriate)   05/14/19 4779   Coping/Psychosocial   Plan of Care Reviewed With caregiver;daughter;spouse;patient   Plan of Care Review   Progress no change   OTHER   Outcome Summary New Assessment. Pt with a hx of oral intake with wt loss over the last several weeks. Multiple food allergies and intolerances. Cannot take supplements due to intolerances.

## 2019-05-14 NOTE — PLAN OF CARE
Problem: Patient Care Overview  Goal: Plan of Care Review  Outcome: Ongoing (interventions implemented as appropriate)   05/13/19 2056   Coping/Psychosocial   Plan of Care Reviewed With patient   Plan of Care Review   Progress no change   OTHER   Outcome Summary Patient denies pain but c/o increased weakness.      Goal: Individualization and Mutuality  Outcome: Ongoing (interventions implemented as appropriate)    Goal: Discharge Needs Assessment  Outcome: Ongoing (interventions implemented as appropriate)      Problem: Fall Risk (Adult)  Goal: Identify Related Risk Factors and Signs and Symptoms  Outcome: Ongoing (interventions implemented as appropriate)    Goal: Absence of Fall  Outcome: Outcome(s) achieved Date Met: 05/13/19      Problem: Activity Intolerance (Adult)  Goal: Identify Related Risk Factors and Signs and Symptoms  Outcome: Ongoing (interventions implemented as appropriate)    Goal: Activity Tolerance  Outcome: Ongoing (interventions implemented as appropriate)    Goal: Effective Energy Conservation Techniques  Outcome: Ongoing (interventions implemented as appropriate)

## 2019-05-14 NOTE — H&P
HISTORY AND PHYSICAL  NAME: Ynaeli Bates  : 1940  MRN: [id number]    DATE OF ADMISSION: 19    DATE & TIME SEEN: 19 6:46 PM    PCP: Elian Pacheco APRN    CODE STATUS: Full code    CHIEF COMPLAINT weakness    HPI:  Yaneli Bates is a 78 y.o. female with a CMH of MYLES with chronic blood loss, HTN, DM II, CAD w/ hx of AVR, fibromyalgia, DDD, who presents complaining of generalized weakness.     Patient presents to the ED today with generalized weakness, unable to keep food down, nausea/vomiting, fatigue, weight loss, generalized body pain, lower extremity swelling. Patient states she has noticed all symptoms starting since 2018. Has had 2 inpatient admissions since then for weakness. For the last 2 weeks patient states her symptoms have gotten worse. She is more fatigued and has more difficulty keeping food down. She has lost about 8 pounds during this time. Her previous admission was in 2019 where she was evaluated for possible GI bleed. Endoscopy and Colonoscopy showed no active bleed or malignancy. FOBT was negative during that visit. She followed up with [facility] outpatient. Recommendations were for endoscopic capsule to find source for chronic blood loss. Her hemoglobin has been stable between 8-8.5.  Her ferrous sulfate was adjusted to 324 twice daily. She also had some microscopic hematuria during her inpatient admission for which she was seen by urology who recommended cystoscopy outpatient. Patient was scheduled to see Dr. Damico this Wednesday.  Patient also was seen by Dr. Mckenzie nephrology since her last admission for hyponatremia due to possible SIADH. She currently takes 1g sodium tablets daily, fluid restrictions at 1.6 liters. She was recently seen by her PCP who started her on doxycyline for UTI. States it does take her a longer time to empty bladder. Patient has taken 2 days of abx. Daughter who is in room today patient has a lot of food allergies,  specifically related to corn syrup. Side effects include migraines, abdominal pain.   In the ED patient was given a bolus of fluids. Hemoglobin was 7.7. WBC 14.04. Na 131, K 3.4. Mg 1.3 which was replaced in ED. U/A micro did show large amount of blood. Patient denied seeing any blood in her stool or urine. V/s have been stable. Patient to be admitted and observed for generalized weakness.         CONCURRENT MEDICAL HISTORY:  Past Medical History:   Diagnosis Date   • Allergic    • Anemia    • Arthritis    • Coronary artery disease 2014    valave replacement   • Fibromyalgia, primary    • Headache    • Infectious viral hepatitis    • Low back pain    • Osteoporosis    • Visual impairment        PAST SURGICAL HISTORY:  Past Surgical History:   Procedure Laterality Date   • AORTIC VALVE REPAIR/REPLACEMENT  2014,may   • CARDIAC SURGERY     • COLONOSCOPY     • COLONOSCOPY N/A 4/22/2019    Procedure: COLONOSCOPY;  Surgeon: Carlos Schaeffer MD;  Location: [facility] ENDOSCOPY;  Service: Gastroenterology   • ENDOSCOPY N/A 3/13/2019    Procedure: ESOPHAGOGASTRODUODENOSCOPY;  Surgeon: Carlos Schaeffer MD;  Location: [facility] ENDOSCOPY;  Service: Gastroenterology   • ENDOSCOPY N/A 4/22/2019    Procedure: ESOPHAGOGASTRODUODENOSCOPY;  Surgeon: Carlos Schaeffer MD;  Location: [facility] ENDOSCOPY;  Service: Gastroenterology   • KNEE SURGERY Right 1967   • TONSILLECTOMY         FAMILY HISTORY:  Family History   Problem Relation Age of Onset   • Arthritis Mother    • Cancer Mother    • Heart disease Mother    • Hyperlipidemia Mother    • Hypertension Mother    • Vision loss Mother    • Arthritis Father    • Hearing loss Father    • Heart disease Father    • Hyperlipidemia Father    • Hypertension Father    • Vision loss Father    • Hypertension Sister    • Vision loss Sister    • Osteoporosis Sister    • Cataracts Sister    • Hyperlipidemia Brother    • Vision loss Brother    • Heart disease Brother    • Cancer Daughter    • Early death  Daughter    • Osteoporosis Sister    • Parkinsonism Brother    • Hyperlipidemia Brother    • Thyroid disease Daughter         SOCIAL HISTORY:  Social History     Socioeconomic History   • Marital status:      Spouse name: Not on file   • Number of children: Not on file   • Years of education: Not on file   • Highest education level: Not on file   Tobacco Use   • Smoking status: Never Smoker   • Smokeless tobacco: Never Used   Substance and Sexual Activity   • Alcohol use: No   • Drug use: No   • Sexual activity: No       HOME MEDICATIONS:  Prior to Admission medications    Medication Sig Start Date End Date Taking? Authorizing Provider   traMADol (ULTRAM) 50 MG tablet Take 50 mg by mouth 2 (Two) Times a Day As Needed for Moderate Pain .   Yes ProviderKlarissa MD   alendronate (FOSAMAX) 70 MG tablet Take 1 tablet by mouth Every 7 (Seven) Days. 4/24/19   Elian Pacheco APRN   doxycycline (VIBRAMYCIN) 100 MG capsule Take 1 capsule by mouth 2 (Two) Times a Day. 5/10/19   Elian Pacheco APRN   ferrous sulfate 324 (65 Fe) MG tablet delayed-release EC tablet Take 1 tablet by mouth 2 (Two) Times a Day With Meals for 30 days. 5/10/19 6/9/19  Elian Pacheco APRN   lactulose (CHRONULAC) 10 GM/15ML solution Take 30 mL by mouth 2 (Two) Times a Day As Needed (constipation). 4/26/19   Elian Pacheco APRN   metoprolol succinate XL (TOPROL-XL) 50 MG 24 hr tablet Take 1 tablet by mouth Daily With Dinner. 2/15/19   Toro Pryor MD   ondansetron ODT (ZOFRAN ODT) 4 MG disintegrating tablet Take 1 tablet by mouth Every 8 (Eight) Hours As Needed for Nausea or Vomiting. 5/2/19   Elian Pacheco APRN   potassium chloride (K-DUR,KLOR-CON) 10 MEQ CR tablet Take 1 tablet by mouth Daily for 5 days. 5/7/19 5/12/19  Elian Pacheco APRN   raNITIdine (ZANTAC) 75 MG tablet Take 75 mg by mouth 2 (Two) Times a Day.    Provider, MD Klarissa   sodium chloride 1 g tablet Take 1 tablet by mouth 3 (Three)  Times a Day With Meals. 4/23/19   Lewis Tsang MD   metFORMIN ER (GLUCOPHAGE-XR) 500 MG 24 hr tablet Take 1 tablet by mouth Daily With Breakfast. 4/4/19 5/13/19  Elian Pacheco APRN       ALLERGIES:  Clindamycin/lincomycin; Corn-containing products; Tomato flavor [flavoring agent]; Vinegar [acetic acid]; Amoxicillin; Codeine; Penicillins; Sulfa antibiotics; and Vicodin [hydrocodone-acetaminophen]    REVIEW OF SYSTEMS  Review of Systems   Constitutional: Positive for activity change, appetite change, fatigue and unexpected weight change. Negative for chills, diaphoresis and fever.   HENT: Negative for congestion, trouble swallowing and voice change.    Respiratory: Negative for cough, shortness of breath and wheezing.    Cardiovascular: Positive for leg swelling. Negative for chest pain and palpitations.   Gastrointestinal: Positive for constipation, nausea and vomiting. Negative for abdominal distention, abdominal pain, anal bleeding, blood in stool, diarrhea and rectal pain.   Genitourinary: Negative for difficulty urinating, dysuria, flank pain, frequency, hematuria and urgency.   Musculoskeletal: Positive for arthralgias, gait problem (uses walker ) and myalgias. Negative for neck pain and neck stiffness.   Skin: Negative for color change, pallor, rash and wound.   Allergic/Immunologic: Positive for food allergies.   Neurological: Positive for weakness and headaches. Negative for dizziness, seizures, syncope and speech difficulty.   Hematological: Negative for adenopathy. Does not bruise/bleed easily.   Psychiatric/Behavioral: Negative for agitation, behavioral problems, confusion and decreased concentration.       PHYSICAL EXAM:  Temp:  [96.5 °F (35.8 °C)-97.8 °F (36.6 °C)] 96.5 °F (35.8 °C)  Heart Rate:  [55-92] 55  Resp:  [16-19] 16  BP: (137-169)/(74-84) 137/76  Body mass index is 23.68 kg/m².  Physical Exam   Constitutional: She is oriented to person, place, and time. She appears well-developed and  well-nourished. No distress.   HENT:   Head: Normocephalic and atraumatic.   Right Ear: External ear normal.   Left Ear: External ear normal.   Nose: Nose normal.   Mouth/Throat: Oropharynx is clear and moist.   Eyes: Conjunctivae and EOM are normal. Pupils are equal, round, and reactive to light.   Neck: Normal range of motion. Neck supple. No thyromegaly present.   Cardiovascular: Normal rate, regular rhythm, normal heart sounds and intact distal pulses.   Pulmonary/Chest: Effort normal and breath sounds normal.   Abdominal: Soft. Bowel sounds are normal. She exhibits no distension and no mass. There is no tenderness. There is no guarding.   Musculoskeletal: Normal range of motion. She exhibits edema (+2 LE).   Swelling around knees     Neurological: She is alert and oriented to person, place, and time.   Skin: Skin is warm and dry. Capillary refill takes less than 2 seconds.   Psychiatric: She has a normal mood and affect. Her behavior is normal. Judgment and thought content normal.       DIAGNOSTIC DATA:   Lab Results (last 24 hours)     Procedure Component Value Units Date/Time    Lipase [[id number]]  (Normal) Collected:  05/13/19 1324    Specimen:  Blood Updated:  05/13/19 1855     Lipase 19 U/L     Urinalysis, Microscopic Only - Urine, Catheter In/Out [[id number]]  (Abnormal) Collected:  05/13/19 1551    Specimen:  Urine, Catheter In/Out Updated:  05/13/19 1606     RBC, UA Too Numerous to Count /HPF      WBC, UA 3-5 /HPF      Bacteria, UA None Seen /HPF      Squamous Epithelial Cells, UA None Seen /HPF      Hyaline Casts, UA 0-2 /LPF      Methodology Automated Microscopy    Urinalysis With Culture If Indicated - Urine, Catheter In/Out [[id number]]  (Abnormal) Collected:  05/13/19 1551    Specimen:  Urine, Catheter In/Out Updated:  05/13/19 1606     Color, UA Dark Yellow     Appearance, UA Cloudy     pH, UA 6.0     Specific Gravity, UA 1.016     Glucose,  mg/dL (Trace)     Ketones, UA Negative      Bilirubin, UA Negative     Blood, UA Large (3+)     Protein,  mg/dL (2+)     Leuk Esterase, UA Negative     Nitrite, UA Negative     Urobilinogen, UA 1.0 E.U./dL    Magnesium [[id number]]  (Abnormal) Collected:  05/13/19 1324    Specimen:  Blood Updated:  05/13/19 1403     Magnesium 1.3 mg/dL     Comprehensive Metabolic Panel [[id number]]  (Abnormal) Collected:  05/13/19 1324    Specimen:  Blood Updated:  05/13/19 1358     Glucose 109 mg/dL      BUN 14 mg/dL      Creatinine 0.57 mg/dL      Sodium 131 mmol/L      Potassium 3.4 mmol/L      Chloride 94 mmol/L      CO2 26.0 mmol/L      Calcium 7.9 mg/dL      Total Protein 8.2 g/dL      Albumin 1.80 g/dL      ALT (SGPT) 7 U/L      AST (SGOT) 23 U/L      Alkaline Phosphatase 74 U/L      Total Bilirubin 0.5 mg/dL      eGFR Non African Amer 103 mL/min/1.73      Globulin 6.4 gm/dL      A/G Ratio 0.3 g/dL      BUN/Creatinine Ratio 24.6     Anion Gap 11.0 mmol/L     Narrative:       GFR Normal >60  Chronic Kidney Disease <60  Kidney Failure <15    CBC & Differential [[id number]] Collected:  05/13/19 1324    Specimen:  Blood Updated:  05/13/19 1341    Narrative:       The following orders were created for panel order CBC & Differential.  Procedure                               Abnormality         Status                     ---------                               -----------         ------                     CBC Auto Differential[[id number]]        Abnormal            Final result                 Please view results for these tests on the individual orders.    CBC Auto Differential [[id number]]  (Abnormal) Collected:  05/13/19 1324    Specimen:  Blood Updated:  05/13/19 1341     WBC 14.04 10*3/mm3      RBC 2.88 10*6/mm3      Hemoglobin 7.7 g/dL      Hematocrit 25.0 %      MCV 86.8 fL      MCH 26.7 pg      MCHC 30.8 g/dL      RDW 18.6 %      RDW-SD 59.3 fl      MPV 10.1 fL      Platelets 261 10*3/mm3      Neutrophil % 85.4 %      Lymphocyte % 6.6 %      Monocyte % 6.7 %       Eosinophil % 0.1 %      Basophil % 0.2 %      Immature Grans % 1.0 %      Neutrophils, Absolute 11.98 10*3/mm3      Lymphocytes, Absolute 0.93 10*3/mm3      Monocytes, Absolute 0.94 10*3/mm3      Eosinophils, Absolute 0.02 10*3/mm3      Basophils, Absolute 0.03 10*3/mm3      Immature Grans, Absolute 0.14 10*3/mm3      nRBC 0.0 /100 WBC            Imaging Results (last 24 hours)     Procedure Component Value Units Date/Time    XR Chest PA & Lateral [[id number]] Collected:  05/13/19 1205     Updated:  05/13/19 1225    Narrative:         EXAM:          Radiograph(s), Chest   VIEWS:    PA / Lat ; 2       DATE/TIME:  5/13/2019 12:23 PM CDT                INDICATION:   generalized weakness    COMPARISON:  CXR: 4/11/19             FINDINGS:             - lines/tubes:    none     - cardiac:         size within normal limits; status post  valvular repair         - mediastinum: contour within normal limits         - lungs:         no focal air space process, pulmonary  interstitial edema, nodule(s)/mass             - pleura:         Small left pleural fluid collection.                   - osseous:         Status post median sternotomy                  - misc.:         Impression:       CONCLUSION:        1. No evidence of an active cardiopulmonary process.                                                Electronically signed by:  LEXA Almonte MD  5/13/2019 12:24  PM CDT Workstation: [id number]            I reviewed the patient's new clinical results.    ASSESSMENT AND PLAN: This is a 78 y.o. female with:    Active Hospital Problems    Diagnosis POA   • **Generalized weakness [R53.1] Yes     Possible malignancy?   -  Pancreatic source?    - lipase w/n/l, CT abdomen has shown no mass   - GI source?     -Colonoscopy, EGD no signs of malignant process     - will consult GI, plans outpatient was for endoscopic capsule.    - Bladder cx?    - U/A shows large blood. Will get urine cytology    - Urology consult: will follow  recommendations. Plan for outpatient during previous admission was for cystoscopy     - hemoglobin 7.7; will transfuse 2 units PRBC if less than 7.    - will continue to monitor     - Continue IVF, Dietician consult.      • Hemorrhagic cystitis [N30.91] Yes     Patient was seen by Dr. Gilmore, Urology previous admission. Was treated with Azactam and diflucan during that time. On discharge was scheduled for cystoscopy with Dr. Damico this Wednesday.     - patient was seen on 5/10/19 by PCP where she was diagnosed with cystitis. Started on doxy. Has taken 2 days.   - U/A on admission : large blood, 2+ protein   - will continue doxy   - urine cytology    - Urology consult         • Iron deficiency anemia due to chronic blood loss [D50.0] Yes     H/H: 7.7/25.0 in the ED  - continue home ferrous sulfate BID  -continue to trend H/H  - currently asymptomatic will transfuse 2 units PRBC if <7     • Weight loss, unintentional [R63.4] Yes     Will get dietician consult  - patient has many food allergies     • Hyponatremia [E87.1] Yes     In the ED sodium level is 131. Patient sees Dr. Mckenzie, Nephrology outpatient. 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   - continue home sodium tablets 1 g TID  - 1.6 L fluid restriction daily recommended by nephro         • Diabetes mellitus (CMS/HCC) [E11.9] Yes     Will hold home metformin  - SSI       • Hypertension [I10] Yes     Will continue toprol-xl 50 daily   Continue to monitor per hospital protocol      • Coronary artery disease [I25.10] Yes     History of AVR 2014  Continue metoprolol 50 mg Daily          DVT prophylaxis: SCDs/TEDs     KASPERreviewed and consistent with patient reported medications.    Expected Length of Stay: Where: home and When:  3-4days    I discussed the patients findings and my recommendations with patient.     Dr. Tate is the attending on record at time of admission, She is aware of the patient's status and agrees with the above history and physical.        This  document has been electronically signed by Rodrigo Nazario MD on May 13, 2019 9:56 PM

## 2019-05-14 NOTE — OUTREACH NOTE
Medical Week 3 Survey      Responses   Facility patient discharged from?  [facility]   Does the patient have one of the following disease processes/diagnoses(primary or secondary)?  Other   Week 3 attempt successful?  No   Revoke  Readmitted          Silvia Leonard RN

## 2019-05-14 NOTE — SIGNIFICANT NOTE
FOBT was performed on patient and was negative for blood in stool.    Ashley Carrizales MD PGY3  [place] [address]  Office: [phone]          This document has been electronically signed by Ashley Carrizales MD on May 14, 2019 4:47 PM

## 2019-05-15 NOTE — PLAN OF CARE
Problem: Patient Care Overview  Goal: Plan of Care Review  Outcome: Ongoing (interventions implemented as appropriate)   05/15/19 0228   Coping/Psychosocial   Plan of Care Reviewed With patient;daughter   Plan of Care Review   Progress no change   OTHER   Outcome Summary 0 cc's post residual urine noted per bladder scan. Patient deniespain at this time.     Goal: Individualization and Mutuality  Outcome: Ongoing (interventions implemented as appropriate)    Goal: Discharge Needs Assessment  Outcome: Ongoing (interventions implemented as appropriate)      Problem: Fall Risk (Adult)  Goal: Identify Related Risk Factors and Signs and Symptoms  Outcome: Ongoing (interventions implemented as appropriate)      Problem: Activity Intolerance (Adult)  Goal: Identify Related Risk Factors and Signs and Symptoms  Outcome: Ongoing (interventions implemented as appropriate)    Goal: Activity Tolerance  Outcome: Ongoing (interventions implemented as appropriate)    Goal: Effective Energy Conservation Techniques  Outcome: Ongoing (interventions implemented as appropriate)

## 2019-05-15 NOTE — PROGRESS NOTES
LOS: 0 days   Patient Care Team:  Elian Pacheco APRN as PCP - General (Nurse Practitioner)  Carlos Lindsey OD as PCP - Claims Attributed    Chief Complaint:  Chief Complaint   Patient presents with   • Weakness - Generalized   • Hallucinations       Subjective     Interval History:     Patient Complaints: The patient has no new complaints.  She is going down for her gastric study.  She voided to completion per bladder scan last evening   Patient Denies:  No new complaints     Objective     Vital Signs  Temp:  [96 °F (35.6 °C)-97.8 °F (36.6 °C)] 97 °F (36.1 °C)  Heart Rate:  [66-81] 66  Resp:  [16-18] 18  BP: (130-158)/(65-85) 143/70    Intake & Output (last day)       05/14 0701 - 05/15 0700 05/15 0701 - 05/16 0700    P.O. 840     Blood 887.5     Total Intake(mL/kg) 1727.5 (30.4)     Urine (mL/kg/hr) 1850 (1.4)     Total Output 1850     Net -122.5                  Physical Exam:   No exam performed today,     Results Review:    Lab Results (last 24 hours)     Procedure Component Value Units Date/Time    Cytology, Urine [[id number]] Collected:  05/14/19 1436    Specimen:  Urine, Clean Catch Updated:  05/15/19 0715    Comprehensive Metabolic Panel [[id number]]  (Abnormal) Collected:  05/15/19 0516    Specimen:  Blood Updated:  05/15/19 0623     Glucose 101 mg/dL      BUN 14 mg/dL      Creatinine 0.63 mg/dL      Sodium 131 mmol/L      Potassium 3.3 mmol/L      Chloride 98 mmol/L      CO2 25.0 mmol/L      Calcium 7.2 mg/dL      Total Protein 6.9 g/dL      Albumin 1.60 g/dL      ALT (SGPT) 7 U/L      AST (SGOT) 23 U/L      Alkaline Phosphatase 97 U/L      Total Bilirubin 0.5 mg/dL      eGFR Non African Amer 91 mL/min/1.73      Globulin 5.3 gm/dL      A/G Ratio 0.3 g/dL      BUN/Creatinine Ratio 22.2     Anion Gap 8.0 mmol/L     Narrative:       GFR Normal >60  Chronic Kidney Disease <60  Kidney Failure <15    CBC & Differential [[id number]] Collected:  05/15/19 0516    Specimen:  Blood Updated:   05/15/19 0551    Narrative:       The following orders were created for panel order CBC & Differential.  Procedure                               Abnormality         Status                     ---------                               -----------         ------                     CBC Auto Differential[[id number]]        Abnormal            Final result                 Please view results for these tests on the individual orders.    CBC Auto Differential [[id number]]  (Abnormal) Collected:  05/15/19 0516    Specimen:  Blood Updated:  05/15/19 0551     WBC 11.99 10*3/mm3      RBC 3.22 10*6/mm3      Hemoglobin 9.0 g/dL      Comment: Patient received blood        Hematocrit 27.6 %      MCV 85.7 fL      MCH 28.0 pg      MCHC 32.6 g/dL      RDW 17.5 %      RDW-SD 55.1 fl      MPV 9.5 fL      Platelets 206 10*3/mm3      Neutrophil % 83.2 %      Lymphocyte % 7.3 %      Monocyte % 8.1 %      Eosinophil % 0.3 %      Basophil % 0.3 %      Immature Grans % 0.8 %      Neutrophils, Absolute 9.98 10*3/mm3      Lymphocytes, Absolute 0.88 10*3/mm3      Monocytes, Absolute 0.97 10*3/mm3      Eosinophils, Absolute 0.04 10*3/mm3      Basophils, Absolute 0.03 10*3/mm3      Immature Grans, Absolute 0.09 10*3/mm3      nRBC 0.0 /100 WBC     Vitamin B12 [[id number]]  (Normal) Collected:  05/14/19 0741    Specimen:  Blood Updated:  05/14/19 1238     Vitamin B-12 789 pg/mL     Folate [[id number]]  (Normal) Collected:  05/14/19 0741    Specimen:  Blood Updated:  05/14/19 1238     Folate 5.04 ng/mL     Manual Differential [[id number]]  (Abnormal) Collected:  05/14/19 0741    Specimen:  Blood Updated:  05/14/19 0944     Neutrophil % 89.0 %      Lymphocyte % 7.0 %      Monocyte % 2.0 %      Basophil % 2.0 %      Neutrophils Absolute 11.43 10*3/mm3      Lymphocytes Absolute 0.90 10*3/mm3      Monocytes Absolute 0.26 10*3/mm3      Basophils Absolute 0.26 10*3/mm3      Anisocytosis Slight/1+     Hypochromia Large/3+     Ovalocytes Slight/1+     Target  Cells Slight/1+     WBC Morphology Normal     Platelet Estimate Adequate    Sedimentation Rate [[id number]]  (Abnormal) Collected:  05/14/19 0749    Specimen:  Blood Updated:  05/14/19 0901     Sed Rate 116 mm/hr     Ferritin [[id number]]  (Abnormal) Collected:  05/14/19 0659    Specimen:  Blood Updated:  05/14/19 0820     Ferritin 739.90 ng/mL     Iron Profile [[id number]]  (Abnormal) Collected:  05/14/19 0659    Specimen:  Blood Updated:  05/14/19 0814     Iron 19 mcg/dL      Iron Saturation 20 %      Transferrin 63 mg/dL      TIBC 94 mcg/dL     CBC & Differential [[id number]] Collected:  05/14/19 0659    Specimen:  Blood Updated:  05/14/19 0802    Narrative:       The following orders were created for panel order CBC & Differential.  Procedure                               Abnormality         Status                     ---------                               -----------         ------                     Scan Slide[[id number]]                                                                  CBC Auto Differential[[id number]]        Abnormal            Final result                 Please view results for these tests on the individual orders.    CBC Auto Differential [[id number]]  (Abnormal) Collected:  05/14/19 0741    Specimen:  Blood Updated:  05/14/19 0802     WBC 12.84 10*3/mm3      RBC 2.37 10*6/mm3      Hemoglobin 6.5 g/dL      Hematocrit 20.7 %      MCV 87.3 fL      MCH 27.4 pg      MCHC 31.4 g/dL      RDW 18.6 %      RDW-SD 59.6 fl      MPV 9.8 fL      Platelets 233 10*3/mm3          Imaging Results (last 72 hours)     Procedure Component Value Units Date/Time    XR Chest PA & Lateral [[id number]] Collected:  05/13/19 1205     Updated:  05/13/19 1225    Narrative:         EXAM:          Radiograph(s), Chest   VIEWS:    PA / Lat ; 2       DATE/TIME:  5/13/2019 12:23 PM CDT                INDICATION:   generalized weakness    COMPARISON:  CXR: 4/11/19             FINDINGS:             - lines/tubes:    none     -  cardiac:         size within normal limits; status post  valvular repair         - mediastinum: contour within normal limits         - lungs:         no focal air space process, pulmonary  interstitial edema, nodule(s)/mass             - pleura:         Small left pleural fluid collection.                   - osseous:         Status post median sternotomy                  - misc.:         Impression:       CONCLUSION:        1. No evidence of an active cardiopulmonary process.                                                Electronically signed by:  LEXA Almonte MD  5/13/2019 12:24  PM CDT Workstation: [id number]          Ct Abdomen Pelvis With & Without Contrast    Result Date: 4/18/2019  1. Small bilateral effusions, right greater than left. 2. Subcutaneous stranding in bilateral flanks, right greater than left, consistent with asymmetric edema there are clinical correlation needed 3. Atherosclerosis 4. Gallbladder not well visualized. It may be contracted or absent 5. Moderate amount stool throughout colon. Clinical correlation for constipation suggested. 6. Trace free fluid in the pelvis. 7. Please see findings section above for further details. Electronically signed by:  Gaye Alexis MD  4/18/2019 4:18 PM CDT Workstation: [id number]    Ct Chest With Contrast    Result Date: 4/18/2019  1. Small bilateral effusions, right greater than left. 2. Subcutaneous stranding in bilateral flanks, right greater than left, consistent with asymmetric edema there are clinical correlation needed 3. Atherosclerosis 4. Gallbladder not well visualized. It may be contracted or absent 5. Moderate amount stool throughout colon. Clinical correlation for constipation suggested. 6. Trace free fluid in the pelvis. 7. Please see findings section above for further details. Electronically signed by:  Gaye Alexis MD  4/18/2019 4:18 PM CDT Workstation: [id number]    Us Renal Bilateral    Result Date: 4/15/2019  1. Unremarkable appearance of  kidneys 2. Bladder contains a large amount of urine but is otherwise unremarkable in appearance. Clinical correlation needed. Electronically signed by:  Gaye Alexis MD  4/15/2019 5:20 PM CDT Workstation: [id number]    Us Venous Doppler Upper Extremity Right (duplex)    Result Date: 4/19/2019  CONCLUSION: No evidence of DVT right    upper extremity. Electronically signed by:  Agustín Winters MD  4/19/2019 12:30 PM CDT Workstation: [id number]    Xr Chest Pa & Lateral    Result Date: 5/13/2019  CONCLUSION:    1. No evidence of an active cardiopulmonary process.                                  Electronically signed by:  LEXA Almonte MD  5/13/2019 12:24 PM CDT Workstation: [id number]      I reviewed the patient's new clinical results.  I reviewed the patient's new imaging results and agree with the interpretation.  I reviewed the patient's other test results and agree with the interpretation    Medication Review: completed     Assessment/Plan       Generalized weakness    Hypertension    Diabetes mellitus (CMS/HCC)    Hyponatremia    Coronary artery disease    Weight loss, unintentional    Hemorrhagic cystitis    Iron deficiency anemia due to chronic blood loss    Microscopic hematuria  No evidence documented of UTI this month, will attempt to obtain prior cultures. Cytology pending.   Will continue to follow will need a pelvic exam (will be done as an outpt)      AMAM Anna M. D'Amico, MD  05/15/19  7:37 AM

## 2019-05-15 NOTE — PLAN OF CARE
Problem: Patient Care Overview  Goal: Plan of Care Review  Outcome: Ongoing (interventions implemented as appropriate)   05/15/19 1155   Coping/Psychosocial   Plan of Care Reviewed With patient   Plan of Care Review   Progress improving   OTHER   Outcome Summary Pt amb with RW and CGA/min of 1 for 28ft with VSS. Pt t/f in/out of bed well but insists on needing help with B LE's. Pt will need assistance at home when DC'd to progress with strengthening.

## 2019-05-15 NOTE — PROGRESS NOTES
FAMILY MEDICINE DAILY PROGRESS NOTE    NAME: Yaneli Bates  : 1940  MRN: [id number]      LOS: 0 days     PROVIDER OF SERVICE: Janice Nazario MD    Chief Complaint: Generalized weakness    Subjective:     Interval History:  History taken from: patient  Patient notes that she is feeling significantly better since she received fluids overnight. She denies noticing any blood in her urine.  She denies noticing any black or bloody stools.  She denies any emesis, and no bloody emesis.    Review of Systems:   Review of Systems   Constitutional: Positive for fatigue. Negative for activity change, appetite change and fever.   HENT: Negative for ear pain and sore throat.    Eyes: Negative for pain and visual disturbance.   Respiratory: Negative for cough and shortness of breath.    Cardiovascular: Negative for chest pain and palpitations.   Gastrointestinal: Negative for abdominal pain, blood in stool, constipation, diarrhea, nausea and vomiting.   Endocrine: Negative for cold intolerance and heat intolerance.   Genitourinary: Negative for difficulty urinating and dysuria.   Musculoskeletal: Negative for arthralgias and gait problem.   Skin: Negative for color change and rash.   Neurological: Positive for weakness. Negative for dizziness and headaches.   Hematological: Negative for adenopathy. Does not bruise/bleed easily.   Psychiatric/Behavioral: Negative for agitation, confusion and sleep disturbance.       Objective:     Vital Signs  Temp:  [96 °F (35.6 °C)-97.8 °F (36.6 °C)] 97 °F (36.1 °C)  Heart Rate:  [66-81] 66  Resp:  [16-18] 18  BP: (130-158)/(65-85) 143/70  Body mass index is 23.68 kg/m².    Physical Exam  Physical Exam   Constitutional: She is oriented to person, place, and time. She appears well-developed and well-nourished.   HENT:   Head: Normocephalic and atraumatic.   Eyes: Conjunctivae, EOM and lids are normal. Pupils are equal, round, and reactive to light.   Neck: Normal range of motion.  Neck supple.   Cardiovascular: Normal rate, regular rhythm and normal heart sounds. Exam reveals no gallop and no friction rub.   No murmur heard.  Pulmonary/Chest: Effort normal and breath sounds normal.   Abdominal: Soft. Normal appearance and bowel sounds are normal. There is no tenderness.   Musculoskeletal: Normal range of motion. She exhibits edema.   Neurological: She is alert and oriented to person, place, and time.   Skin: Skin is warm, dry and intact.   Psychiatric: She has a normal mood and affect. Her speech is normal and behavior is normal. Judgment and thought content normal. Cognition and memory are normal.       Medication Review    Current Facility-Administered Medications:   •  acetaminophen (TYLENOL) tablet 650 mg, 650 mg, Oral, Q4H PRN, Rodrigo Nazario MD  •  famotidine (PEPCID) tablet 20 mg, 20 mg, Oral, Daily, Rodrigo Nazario MD, 20 mg at 05/14/19 0847  •  ferrous sulfate EC tablet 324 mg, 324 mg, Oral, BID With Meals, Rodrigo Nazario MD, 324 mg at 05/14/19 1725  •  furosemide (LASIX) injection 20 mg, 20 mg, Intravenous, PRN, Ashley Carrizales MD, 20 mg at 05/14/19 1757  •  metoprolol succinate XL (TOPROL-XL) 24 hr tablet 50 mg, 50 mg, Oral, Daily With Dinner, Rodrigo Nazario MD, 50 mg at 05/14/19 1725  •  ondansetron (ZOFRAN) tablet 4 mg, 4 mg, Oral, Q6H PRN **OR** ondansetron (ZOFRAN) injection 4 mg, 4 mg, Intravenous, Q6H PRN, Ashley Carrizales MD, 4 mg at 05/14/19 1305  •  potassium chloride (KLOR-CON) packet 40 mEq, 40 mEq, Oral, Once, Rodrigo Nazario MD  •  potassium chloride (MICRO-K) CR capsule 10 mEq, 10 mEq, Oral, Daily, Rodrigo Nazario MD, 10 mEq at 05/14/19 0847  •  promethazine (PHENERGAN) tablet 12.5 mg, 12.5 mg, Oral, Q6H PRN **OR** promethazine (PHENERGAN) injection 12.5 mg, 12.5 mg, Intramuscular, Q6H PRN **OR** promethazine (PHENERGAN) suppository 12.5 mg, 12.5 mg, Rectal, Q6H PRN, Ashley Carrizales MD  •  sodium chloride 0.9 % flush 3 mL, 3 mL, Intravenous, Q12H,  Rodrigo Nazario MD  •  sodium chloride 0.9 % flush 3-10 mL, 3-10 mL, Intravenous, PRN, Rodrigo Nazario MD  •  sodium chloride 0.9 % infusion, 100 mL/hr, Intravenous, Continuous, Rodrigo Nazario MD, Last Rate: 100 mL/hr at 05/15/19 0714, 100 mL/hr at 05/15/19 0714  •  sodium chloride tablet 1 g, 1 g, Oral, TID With Meals, Rodrigo Nazario MD, 1 g at 05/14/19 1725  •  traMADol (ULTRAM) tablet 50 mg, 50 mg, Oral, BID PRN, Rodrigo Nazario MD, 50 mg at 05/14/19 2139     Diagnostic Data    Lab Results (last 24 hours)     Procedure Component Value Units Date/Time    Cytology, Urine [[id number]] Collected:  05/14/19 1436    Specimen:  Urine, Clean Catch Updated:  05/15/19 0715    Comprehensive Metabolic Panel [[id number]]  (Abnormal) Collected:  05/15/19 0516    Specimen:  Blood Updated:  05/15/19 0623     Glucose 101 mg/dL      BUN 14 mg/dL      Creatinine 0.63 mg/dL      Sodium 131 mmol/L      Potassium 3.3 mmol/L      Chloride 98 mmol/L      CO2 25.0 mmol/L      Calcium 7.2 mg/dL      Total Protein 6.9 g/dL      Albumin 1.60 g/dL      ALT (SGPT) 7 U/L      AST (SGOT) 23 U/L      Alkaline Phosphatase 97 U/L      Total Bilirubin 0.5 mg/dL      eGFR Non African Amer 91 mL/min/1.73      Globulin 5.3 gm/dL      A/G Ratio 0.3 g/dL      BUN/Creatinine Ratio 22.2     Anion Gap 8.0 mmol/L     Narrative:       GFR Normal >60  Chronic Kidney Disease <60  Kidney Failure <15    CBC & Differential [[id number]] Collected:  05/15/19 0516    Specimen:  Blood Updated:  05/15/19 0551    Narrative:       The following orders were created for panel order CBC & Differential.  Procedure                               Abnormality         Status                     ---------                               -----------         ------                     CBC Auto Differential[[id number]]        Abnormal            Final result                 Please view results for these tests on the individual orders.    CBC Auto Differential [[id number]]  (Abnormal) Collected:   05/15/19 0516    Specimen:  Blood Updated:  05/15/19 0551     WBC 11.99 10*3/mm3      RBC 3.22 10*6/mm3      Hemoglobin 9.0 g/dL      Comment: Patient received blood        Hematocrit 27.6 %      MCV 85.7 fL      MCH 28.0 pg      MCHC 32.6 g/dL      RDW 17.5 %      RDW-SD 55.1 fl      MPV 9.5 fL      Platelets 206 10*3/mm3      Neutrophil % 83.2 %      Lymphocyte % 7.3 %      Monocyte % 8.1 %      Eosinophil % 0.3 %      Basophil % 0.3 %      Immature Grans % 0.8 %      Neutrophils, Absolute 9.98 10*3/mm3      Lymphocytes, Absolute 0.88 10*3/mm3      Monocytes, Absolute 0.97 10*3/mm3      Eosinophils, Absolute 0.04 10*3/mm3      Basophils, Absolute 0.03 10*3/mm3      Immature Grans, Absolute 0.09 10*3/mm3      nRBC 0.0 /100 WBC     Vitamin B12 [[id number]]  (Normal) Collected:  05/14/19 0741    Specimen:  Blood Updated:  05/14/19 1238     Vitamin B-12 789 pg/mL     Folate [[id number]]  (Normal) Collected:  05/14/19 0741    Specimen:  Blood Updated:  05/14/19 1238     Folate 5.04 ng/mL     Manual Differential [[id number]]  (Abnormal) Collected:  05/14/19 0741    Specimen:  Blood Updated:  05/14/19 0944     Neutrophil % 89.0 %      Lymphocyte % 7.0 %      Monocyte % 2.0 %      Basophil % 2.0 %      Neutrophils Absolute 11.43 10*3/mm3      Lymphocytes Absolute 0.90 10*3/mm3      Monocytes Absolute 0.26 10*3/mm3      Basophils Absolute 0.26 10*3/mm3      Anisocytosis Slight/1+     Hypochromia Large/3+     Ovalocytes Slight/1+     Target Cells Slight/1+     WBC Morphology Normal     Platelet Estimate Adequate    Sedimentation Rate [[id number]]  (Abnormal) Collected:  05/14/19 0749    Specimen:  Blood Updated:  05/14/19 0901     Sed Rate 116 mm/hr     Ferritin [[id number]]  (Abnormal) Collected:  05/14/19 0659    Specimen:  Blood Updated:  05/14/19 0820     Ferritin 739.90 ng/mL     Iron Profile [[id number]]  (Abnormal) Collected:  05/14/19 0659    Specimen:  Blood Updated:  05/14/19 0814     Iron 19 mcg/dL      Iron Saturation  20 %      Transferrin 63 mg/dL      TIBC 94 mcg/dL     CBC & Differential [[id number]] Collected:  05/14/19 0659    Specimen:  Blood Updated:  05/14/19 0802    Narrative:       The following orders were created for panel order CBC & Differential.  Procedure                               Abnormality         Status                     ---------                               -----------         ------                     Scan Slide[[id number]]                                                                  CBC Auto Differential[[id number]]        Abnormal            Final result                 Please view results for these tests on the individual orders.    CBC Auto Differential [[id number]]  (Abnormal) Collected:  05/14/19 0741    Specimen:  Blood Updated:  05/14/19 0802     WBC 12.84 10*3/mm3      RBC 2.37 10*6/mm3      Hemoglobin 6.5 g/dL      Hematocrit 20.7 %      MCV 87.3 fL      MCH 27.4 pg      MCHC 31.4 g/dL      RDW 18.6 %      RDW-SD 59.6 fl      MPV 9.8 fL      Platelets 233 10*3/mm3             I reviewed the patient's new clinical results.    Assessment/Plan:     Active Hospital Problems    Diagnosis POA   • **Generalized weakness [R53.1] Yes     Possible malignancy?   -  Pancreatic source?    - lipase w/n/l, CT abdomen has shown no mass   - GI source?     -Colonoscopy, EGD no signs of malignant process     - will consult GI, plans outpatient was for endoscopic capsule.    - Bladder cx?    - U/A shows large blood. Will get urine cytology    - Urology consult: will follow recommendations.  Patient was initially scheduled for cystoscopy today.    - hemoglobin 7.7; will transfuse 2 units PRBC if less than 7.    - will continue to monitor     - Continue IVF, Dietician consult.      • Hemorrhagic cystitis [N30.91] Yes     Patient was seen by Dr. Gilmore, Urology previous admission. Was treated with Azactam and diflucan during that time. On discharge was scheduled for cystoscopy with Dr. Damico this Wednesday.      - patient was seen on 5/10/19 by PCP where she was diagnosed with cystitis. Started on doxy. Has taken 2 days.   - U/A on admission : large blood, 2+ protein   - will continue doxy   - urine cytology pending   - Urology, Dr. D'amico recommends outpatient pelvic exam         • Iron deficiency anemia due to chronic blood loss [D50.0] Yes     H/H: 7.7/25.0 in the ED  -continue home ferrous sulfate BID  -continue to trend H/H  -received 2 units of blood on 5/14; Hb today is 9.0        • Weight loss, unintentional [R63.4] Yes     Will get dietician consult  - patient has many food allergies     • Hyponatremia [E87.1] Yes     In the ED sodium level is 131. Patient sees Dr. Mckenzie, Nephrology outpatient. 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   - continue home sodium tablets 1 g TID  - 1.6 L fluid restriction daily recommended by nephro         • Diabetes mellitus (CMS/HCC) [E11.9] Yes     Will hold home metformin  - SSI       • Hypertension [I10] Yes     Will continue toprol-xl 50 daily   Continue to monitor per hospital protocol      • Coronary artery disease [I25.10] Yes     History of AVR 2014  Continue metoprolol 50 mg Daily          DVT prophylaxis: SCDs/TEDs  Code status is   Code Status and Medical Interventions:   Ordered at: 05/13/19 1811     Level Of Support Discussed With:    Patient     Code Status:    CPR     Medical Interventions (Level of Support Prior to Arrest):    Full       Plan for disposition:Where: home and When:  2-3days      Time: 30 minutes        Janice Nazario M.D. PGY1  [facility]  [address] [place] [address]  Office: [phone]    This document has been electronically signed by Janice Nazario MD on May 15, 2019 7:52 AM          This document has been electronically signed by Janice Nazario MD on May 15, 2019 7:52 AM

## 2019-05-15 NOTE — SIGNIFICANT NOTE
"   05/15/19 1008   Rehab Treatment   Discipline occupational therapy assistant   Reason Treatment Not Performed patient/family declined treatment  (Pt declined tx this date. Pt stated \"not today\". OT will check back tomorrow.)     "

## 2019-05-15 NOTE — CONSULTS
Yaneli Bates  [id number]  1940      REASON FOR CONSULTATION: anemia   Provide an opinion on any further workup or treatment                             REQUESTING PHYSICIAN:  Janice Nazario MD     RECORDS OBTAINED:  Records of the patients history including those obtained from the referring provider were reviewed and summarized in detail.      History of Present Illness     This is a pleasant 78-year-old female who was seen in consultation at the request of Dr Nazario for evaluation of anemia.  Patient lives in [place] with her .  She has past medical history of coronary artery disease, aortic valve replacement, fibromyalgia, osteoarthritis.  Patient has been in and out of hospital over last 4 to 5 months for a variety of issues.  Patient tells me that she was doing well until last November December however subsequently has developed worsening lower extremity swelling and pain.  She is also been reporting feeling very weak and tired.  She was evaluated by her primary care provider as well as had few hospitalizations to figure out the etiology of her symptoms however all the work-up has been unremarkable.  On laboratory testing patient was noticed to have anemia with hemoglobin ranging between 6.5 and 10 throughout last few months.  She is received 2 units of blood transfusion so far.  Patient denies any bright red blood per rectum or melena.  She reportedly had microscopic hematuria but denies any bright red blood in urine.  She is currently taking oral iron supplements without improvement in her hemoglobin.  I have reviewed patient's old medical records, which are somewhat limited.  However, she did have near normal hemoglobin of 11.9 in September 2018.  So clearly her anemia is somewhat acute in nature and is progressively getting worse.  I been asked to assist with evaluation/management of her anemia.    Past Medical History:   Diagnosis Date   • Allergic    • Anemia    • Arthritis    • Coronary  artery disease 2014    valave replacement   • Fibromyalgia, primary    • Headache    • Infectious viral hepatitis    • Low back pain    • Osteoporosis    • Visual impairment         Past Surgical History:   Procedure Laterality Date   • AORTIC VALVE REPAIR/REPLACEMENT  2014,may   • CARDIAC SURGERY     • COLONOSCOPY     • COLONOSCOPY N/A 4/22/2019    Procedure: COLONOSCOPY;  Surgeon: Carlos Schaeffer MD;  Location: [facility] ENDOSCOPY;  Service: Gastroenterology   • ENDOSCOPY N/A 3/13/2019    Procedure: ESOPHAGOGASTRODUODENOSCOPY;  Surgeon: Carlos Schaeffer MD;  Location: [facility] ENDOSCOPY;  Service: Gastroenterology   • ENDOSCOPY N/A 4/22/2019    Procedure: ESOPHAGOGASTRODUODENOSCOPY;  Surgeon: Carlos Schaeffer MD;  Location: [facility] ENDOSCOPY;  Service: Gastroenterology   • KNEE SURGERY Right 1967   • TONSILLECTOMY          No current facility-administered medications on file prior to encounter.      Current Outpatient Medications on File Prior to Encounter   Medication Sig Dispense Refill   • traMADol (ULTRAM) 50 MG tablet Take 50 mg by mouth 2 (Two) Times a Day As Needed for Moderate Pain .     • alendronate (FOSAMAX) 70 MG tablet Take 1 tablet by mouth Every 7 (Seven) Days. 12 tablet 1   • doxycycline (VIBRAMYCIN) 100 MG capsule Take 1 capsule by mouth 2 (Two) Times a Day. 20 capsule 0   • ferrous sulfate 324 (65 Fe) MG tablet delayed-release EC tablet Take 1 tablet by mouth 2 (Two) Times a Day With Meals for 30 days. 60 tablet 3   • lactulose (CHRONULAC) 10 GM/15ML solution Take 30 mL by mouth 2 (Two) Times a Day As Needed (constipation). 236 mL 1   • metoprolol succinate XL (TOPROL-XL) 50 MG 24 hr tablet Take 1 tablet by mouth Daily With Dinner. 90 tablet 5   • ondansetron ODT (ZOFRAN ODT) 4 MG disintegrating tablet Take 1 tablet by mouth Every 8 (Eight) Hours As Needed for Nausea or Vomiting. 20 tablet 0   • raNITIdine (ZANTAC) 75 MG tablet Take 75 mg by mouth 2 (Two) Times a Day.     • sodium chloride 1 g tablet  Take 1 tablet by mouth 3 (Three) Times a Day With Meals. 90 tablet 0        ALLERGIES:    Allergies   Allergen Reactions   • Clindamycin/Lincomycin Other (See Comments)     Migraine headaches   • Corn-Containing Products Other (See Comments)     migraines   • Tomato Flavor [Flavoring Agent] Other (See Comments)     migraines   • Vinegar [Acetic Acid] Other (See Comments)     migraines   • Amoxicillin Rash   • Codeine GI Intolerance   • Penicillins Hives   • Sulfa Antibiotics Hives   • Vicodin [Hydrocodone-Acetaminophen] GI Intolerance     vomiting        Social History     Socioeconomic History   • Marital status:      Spouse name: Not on file   • Number of children: Not on file   • Years of education: Not on file   • Highest education level: Not on file   Tobacco Use   • Smoking status: Never Smoker   • Smokeless tobacco: Never Used   Substance and Sexual Activity   • Alcohol use: No   • Drug use: No   • Sexual activity: No        Family History   Problem Relation Age of Onset   • Arthritis Mother    • Cancer Mother    • Heart disease Mother    • Hyperlipidemia Mother    • Hypertension Mother    • Vision loss Mother    • Arthritis Father    • Hearing loss Father    • Heart disease Father    • Hyperlipidemia Father    • Hypertension Father    • Vision loss Father    • Hypertension Sister    • Vision loss Sister    • Osteoporosis Sister    • Cataracts Sister    • Hyperlipidemia Brother    • Vision loss Brother    • Heart disease Brother    • Cancer Daughter    • Early death Daughter    • Osteoporosis Sister    • Parkinsonism Brother    • Hyperlipidemia Brother    • Thyroid disease Daughter         Review of Systems       CONSTITUTIONAL: fatigue + weakness + No weight loss, fever, chills.  HEENT: Eyes: No visual loss, blurred vision, double vision or yellow sclerae. Ears, Nose, Throat: No hearing loss, sneezing, congestion, runny nose or sore throat.  SKIN: No rash or itching.  CARDIOVASCULAR: No chest pain,  chest pressure or chest discomfort. No palpitations. Edema +   RESPIRATORY: No shortness of breath, cough or sputum.  GASTROINTESTINAL: anorexia + nausea + No  vomiting or diarrhea. No abdominal pain or blood.  GENITOURINARY: Negative for urgency, frequency or dysuria.   NEUROLOGICAL: numbness or tingling in the extremities + migraine headaches + No dizziness, syncope, paralysis, ataxia. No change in bowel or bladder control.  MUSCULOSKELETAL: leg pain + back pain +   HEMATOLOGIC: anemia + No bleeding or bruising.  LYMPHATICS: No enlarged nodes. No history of splenectomy.  PSYCHIATRIC: No history of depression or anxiety.  ENDOCRINOLOGIC: No reports of sweating, cold or heat intolerance. No polyuria or polydipsia.  ALLERGIES: No history of asthma, hives, eczema or rhinitis.      Objective     Vitals:    05/14/19 2134 05/14/19 2349 05/15/19 0419 05/15/19 0851   BP: 145/84 158/74 143/70 158/84   BP Location: Left arm   Left arm   Patient Position: Lying   Lying   Pulse: 73 72 66 72   Resp: 16 18 18 16   Temp: 97.4 °F (36.3 °C) 97.8 °F (36.6 °C) 97 °F (36.1 °C) 97 °F (36.1 °C)   TempSrc: Oral Axillary  Axillary   SpO2: 98% 92% 93% 97%   Weight:       Height:         No flowsheet data found.    Physical Exam      General: Alert, awake, oriented.  .  Not in apparent distress. Vitals as above.   HEAD: normocephalic, atraumatic.   EYES: PERRL, EOMI. Conjunctival pallor +   Neck: Supple, no adenopathy or thyromegaly.   Throat: normal oral cavity and pharynx. No inflammation, swelling, exudate, or lesions.  CARDIAC: Normal S1 and S2. No S3, S4 or murmurs. Rhythm is regular.Extremities are warm and well perfused.   LUNGS: Clear to auscultation and percussion without rales, rhonchi, wheezing or diminished breath sounds.  ABDOMEN: Positive bowel sounds. Soft, nondistended, nontender  . No guarding or rebound. No masses.  Back: Hunched back. No bony tenderness.   EXTREMITIES: No significant deformity or joint abnormality.  Bilateral pitting lower extremity swelling +  Peripheral pulses intact. No varicosities.  Skin: No rash or bruising.  Neurological: Grossly non-focal exam. No focal weakness. Gait: Not assessed due to clinical condition.   Psych: Mood and affect normal. No hallucination or suicidal thoughts.   Lymphatics: No palpable adenopathy.     RECENT LABS:Independently reviewed and summarized.  Hematology WBC   Date Value Ref Range Status   05/15/2019 11.99 (H) 3.40 - 10.80 10*3/mm3 Final     RBC   Date Value Ref Range Status   05/15/2019 3.22 (L) 3.77 - 5.28 10*6/mm3 Final     Hemoglobin   Date Value Ref Range Status   05/15/2019 9.0 (L) 12.0 - 15.9 g/dL Final     Comment:     Patient received blood     Hematocrit   Date Value Ref Range Status   05/15/2019 27.6 (L) 34.0 - 46.6 % Final     Platelets   Date Value Ref Range Status   05/15/2019 206 140 - 450 10*3/mm3 Final        Imaging (independently reviewed and summarized):   CT C/A/P reviewed which showed small bilateral pleural effusions. No lymphadenopathy or HSM. No prior comparison available.       Upper GI endoscopy from 4/22/19 reviewed. Showed - diffuse moderate inflammation in the stomach. No bleeding source identified.     Colonoscopy result from 4/22/19 reviewed. Showed -  No bleeding source. Normal exam.     Echo result reviewed (3/11/19)  · Mild mitral valve regurgitation is present  · Mild tricuspid valve regurgitation is present.  · Left atrial cavity size is mild-to-moderately dilated.  · Estimated EF = 60%.  · Left ventricular systolic function is normal.         Pathology (result reviewed):   Final Diagnosis   1.  GASTRIC ANTRUM, MUCOSAL BIOPSY:  MILD CHRONIC GASTRITIS.  NO EVIDENCE OF HELICOBACTER PYLORI.     2.  COLONIC MUCOSAL BIOPSY, RANDOM:  NO SIGNIFICANT PATHOLOGIC DIAGNOSIS.         I have reviewed old records and summarized them in HPI as well as assessment and plan section of this note.       Diagnosis  (1) Normocytic anemia   (2) Unintentional  weight loss  (3) Hyponatremia  (4) Hematuria  (5) GI bleeding      All are new diagnosis/problems for me.     Assessment/Plan     (1) Normocytic anemia:     Etiology unclear. Her total protein and globulin is elevated. We will check SPEP, immunofixation and free light chains to rule out myeloma. Her renal function is normal. Her corrected calcium is normal as well. CT C/A/P without any lytic lesions.     She is undergoing evaluation with GI for possible GIB as well as urology for possible hematuria.Her iron studies have been intermittently low, indicating MYLES/ACD.     Her CT C/A/P did not show any lymphadenopathy or hepatosplenomegaly.     We will also check LDH, haptoglobin to rule out hemolysis. Recommend checking peripheral smear as well.       (2) Protein caloric malnutrition: Etiology unclear. She had gastric emptying study performed which was normal. Recommend nutrition consult.     (3) Hyponatremia: Nephrology following. On 1 salt tab TID.     (4) Hematuria: Urology following.Urine cytology pending.     (5) GI bleeding: Possible GIB. Endoscopy result reviewed. No bleeding  source identified. Recommend heme occult stool testing.    Discussed with family at bedside at length.     Thank you for involving me in Ms Bates' care.     Please call us if any questions/concerns.     Judah Sher MD   Hematology Oncology

## 2019-05-15 NOTE — PLAN OF CARE
Problem: Patient Care Overview  Goal: Plan of Care Review  Outcome: Ongoing (interventions implemented as appropriate)   05/15/19 5428   Coping/Psychosocial   Plan of Care Reviewed With caregiver;patient   Plan of Care Review   Progress improving   OTHER   Outcome Summary Overall improved po intake with GI distress improved. Po intake ~44% for the last 4 meals. Diet preferences and food intolerances being honored

## 2019-05-15 NOTE — PROGRESS NOTES
SUBJECTIVE:   5/15/2019  Chief Complaint:     Subjective      Patient is 78 y.o. female who complains of weight loss and nausea.  Patient had a gastric emptying study done this morning which showed normal gastric emptying.  There is currently no evidence of gastroparesis.  History:  Past Medical History:   Diagnosis Date   • Allergic    • Anemia    • Arthritis    • Coronary artery disease 2014    valave replacement   • Fibromyalgia, primary    • Headache    • Infectious viral hepatitis    • Low back pain    • Osteoporosis    • Visual impairment      Past Surgical History:   Procedure Laterality Date   • AORTIC VALVE REPAIR/REPLACEMENT  2014,may   • CARDIAC SURGERY     • COLONOSCOPY     • COLONOSCOPY N/A 4/22/2019    Procedure: COLONOSCOPY;  Surgeon: Carlos Schaeffer MD;  Location: [facility] ENDOSCOPY;  Service: Gastroenterology   • ENDOSCOPY N/A 3/13/2019    Procedure: ESOPHAGOGASTRODUODENOSCOPY;  Surgeon: Carlos Schaeffer MD;  Location: [facility] ENDOSCOPY;  Service: Gastroenterology   • ENDOSCOPY N/A 4/22/2019    Procedure: ESOPHAGOGASTRODUODENOSCOPY;  Surgeon: Carlos Schaeffer MD;  Location: [facility] ENDOSCOPY;  Service: Gastroenterology   • KNEE SURGERY Right 1967   • TONSILLECTOMY       Family History   Problem Relation Age of Onset   • Arthritis Mother    • Cancer Mother    • Heart disease Mother    • Hyperlipidemia Mother    • Hypertension Mother    • Vision loss Mother    • Arthritis Father    • Hearing loss Father    • Heart disease Father    • Hyperlipidemia Father    • Hypertension Father    • Vision loss Father    • Hypertension Sister    • Vision loss Sister    • Osteoporosis Sister    • Cataracts Sister    • Hyperlipidemia Brother    • Vision loss Brother    • Heart disease Brother    • Cancer Daughter    • Early death Daughter    • Osteoporosis Sister    • Parkinsonism Brother    • Hyperlipidemia Brother    • Thyroid disease Daughter      Social History     Tobacco Use   • Smoking status: Never Smoker    • Smokeless tobacco: Never Used   Substance Use Topics   • Alcohol use: No   • Drug use: No     Medications Prior to Admission   Medication Sig Dispense Refill Last Dose   • traMADol (ULTRAM) 50 MG tablet Take 50 mg by mouth 2 (Two) Times a Day As Needed for Moderate Pain .      • alendronate (FOSAMAX) 70 MG tablet Take 1 tablet by mouth Every 7 (Seven) Days. 12 tablet 1 Taking   • doxycycline (VIBRAMYCIN) 100 MG capsule Take 1 capsule by mouth 2 (Two) Times a Day. 20 capsule 0    • ferrous sulfate 324 (65 Fe) MG tablet delayed-release EC tablet Take 1 tablet by mouth 2 (Two) Times a Day With Meals for 30 days. 60 tablet 3    • lactulose (CHRONULAC) 10 GM/15ML solution Take 30 mL by mouth 2 (Two) Times a Day As Needed (constipation). 236 mL 1 Taking   • metoprolol succinate XL (TOPROL-XL) 50 MG 24 hr tablet Take 1 tablet by mouth Daily With Dinner. 90 tablet 5 Taking   • ondansetron ODT (ZOFRAN ODT) 4 MG disintegrating tablet Take 1 tablet by mouth Every 8 (Eight) Hours As Needed for Nausea or Vomiting. 20 tablet 0 Taking   • [] potassium chloride (K-DUR,KLOR-CON) 10 MEQ CR tablet Take 1 tablet by mouth Daily for 5 days. 5 tablet 0 Taking   • raNITIdine (ZANTAC) 75 MG tablet Take 75 mg by mouth 2 (Two) Times a Day.   Taking   • sodium chloride 1 g tablet Take 1 tablet by mouth 3 (Three) Times a Day With Meals. 90 tablet 0 Taking     Allergies:  Clindamycin/lincomycin; Corn-containing products; Tomato flavor [flavoring agent]; Vinegar [acetic acid]; Amoxicillin; Codeine; Penicillins; Sulfa antibiotics; and Vicodin [hydrocodone-acetaminophen]     CURRENT MEDICATIONS/OBJECTIVE/VS/PE:     Current Medications:     Current Facility-Administered Medications   Medication Dose Route Frequency Provider Last Rate Last Dose   • acetaminophen (TYLENOL) tablet 650 mg  650 mg Oral Q4H PRN Rodrigo Nazario MD       • famotidine (PEPCID) tablet 20 mg  20 mg Oral Daily Rodrigo Nazario MD   20 mg at 05/15/19 0857   • ferrous sulfate  EC tablet 324 mg  324 mg Oral BID With Meals Rodrigo Nazario MD   324 mg at 05/15/19 0857   • metoprolol succinate XL (TOPROL-XL) 24 hr tablet 50 mg  50 mg Oral Daily With Dinner Rodrigo Nazario MD   50 mg at 05/14/19 1725   • ondansetron (ZOFRAN) tablet 4 mg  4 mg Oral Q6H PRN Ashley Carrizales MD        Or   • ondansetron (ZOFRAN) injection 4 mg  4 mg Intravenous Q6H PRN Ashley Carrizales MD   4 mg at 05/14/19 1305   • potassium chloride (KLOR-CON) packet 40 mEq  40 mEq Oral Once Rodrigo Nazario MD       • potassium chloride (MICRO-K) CR capsule 40 mEq  40 mEq Oral Daily Ashley Carrizales MD   40 mEq at 05/15/19 0857   • promethazine (PHENERGAN) tablet 12.5 mg  12.5 mg Oral Q6H PRN Ashley Carrizales MD        Or   • promethazine (PHENERGAN) injection 12.5 mg  12.5 mg Intramuscular Q6H PRN Ashley Carrizales MD        Or   • promethazine (PHENERGAN) suppository 12.5 mg  12.5 mg Rectal Q6H PRN Ashley Carrizales MD       • sodium chloride 0.9 % flush 3 mL  3 mL Intravenous Q12H Rodrigo Nazario MD       • sodium chloride 0.9 % flush 3-10 mL  3-10 mL Intravenous PRN Rodrigo Nazario MD       • sodium chloride 0.9 % infusion  100 mL/hr Intravenous Continuous Rodrigo Nazario  mL/hr at 05/15/19 0714 100 mL/hr at 05/15/19 0714   • sodium chloride tablet 1 g  1 g Oral TID With Meals Rodrigo Nazario MD   1 g at 05/15/19 1234   • traMADol (ULTRAM) tablet 50 mg  50 mg Oral BID PRN Rodrigo Nazario MD   50 mg at 05/15/19 1026       Objective     Review of Systems:   Review of Systems    Physical Exam:   Temp:  [96 °F (35.6 °C)-97.9 °F (36.6 °C)] 97.9 °F (36.6 °C)  Heart Rate:  [66-81] 70  Resp:  [16-18] 18  BP: (142-158)/(70-88) 158/88     Physical Exam:  General Appearance:    Alert, cooperative, in no acute distress   Head:    Normocephalic, without obvious abnormality, atraumatic   Eyes:            Lids and lashes normal, conjunctivae and sclerae normal, no   icterus, no pallor, corneas  clear, PERRLA   Ears:    Ears appear intact with no abnormalities noted   Throat:   No oral lesions, no thrush, oral mucosa moist   Neck:   No adenopathy, supple, trachea midline, no thyromegaly, no     carotid bruit, no JVD   Back:     No kyphosis present, no scoliosis present, no skin lesions,       erythema or scars, no tenderness to percussion or                   palpation,   range of motion normal   Lungs:     Clear to auscultation,respirations regular, even and                   unlabored    Heart:    Regular rhythm and normal rate, normal S1 and S2, no            murmur, no gallop, no rub, no click   Breast Exam:    Deferred   Abdomen:     Normal bowel sounds, no masses, no organomegaly, soft        non-tender, non-distended, no guarding, no rebound                 tenderness   Genitalia:    Deferred   Extremities:   Moves all extremities well, no edema, no cyanosis, no              redness   Pulses:   Pulses palpable and equal bilaterally   Skin:   No bleeding, bruising or rash   Lymph nodes:   No palpable adenopathy   Neurologic:   Cranial nerves 2 - 12 grossly intact, sensation intact, DTR        present and equal bilaterally      Results Review:     Lab Results (last 24 hours)     Procedure Component Value Units Date/Time    Peripheral Blood Smear [[id number]] Collected:  05/15/19 0516    Specimen:  Blood Updated:  05/15/19 0928    Immunofixation, Serum [[id number]] Collected:  05/15/19 0918    Specimen:  Blood Updated:  05/15/19 0921    Immunoglobulin Free LT Chains Blood [[id number]] Collected:  05/15/19 0918    Specimen:  Blood Updated:  05/15/19 0921    Cytology, Urine [[id number]] Collected:  05/14/19 1436    Specimen:  Urine, Clean Catch Updated:  05/15/19 0715    Comprehensive Metabolic Panel [[id number]]  (Abnormal) Collected:  05/15/19 0516    Specimen:  Blood Updated:  05/15/19 0623     Glucose 101 mg/dL      BUN 14 mg/dL      Creatinine 0.63 mg/dL      Sodium 131 mmol/L      Potassium 3.3 mmol/L       Chloride 98 mmol/L      CO2 25.0 mmol/L      Calcium 7.2 mg/dL      Total Protein 6.9 g/dL      Albumin 1.60 g/dL      ALT (SGPT) 7 U/L      AST (SGOT) 23 U/L      Alkaline Phosphatase 97 U/L      Total Bilirubin 0.5 mg/dL      eGFR Non African Amer 91 mL/min/1.73      Globulin 5.3 gm/dL      A/G Ratio 0.3 g/dL      BUN/Creatinine Ratio 22.2     Anion Gap 8.0 mmol/L     Narrative:       GFR Normal >60  Chronic Kidney Disease <60  Kidney Failure <15    CBC & Differential [[id number]] Collected:  05/15/19 0516    Specimen:  Blood Updated:  05/15/19 0551    Narrative:       The following orders were created for panel order CBC & Differential.  Procedure                               Abnormality         Status                     ---------                               -----------         ------                     CBC Auto Differential[[id number]]        Abnormal            Final result                 Please view results for these tests on the individual orders.    CBC Auto Differential [[id number]]  (Abnormal) Collected:  05/15/19 0516    Specimen:  Blood Updated:  05/15/19 0551     WBC 11.99 10*3/mm3      RBC 3.22 10*6/mm3      Hemoglobin 9.0 g/dL      Comment: Patient received blood        Hematocrit 27.6 %      MCV 85.7 fL      MCH 28.0 pg      MCHC 32.6 g/dL      RDW 17.5 %      RDW-SD 55.1 fl      MPV 9.5 fL      Platelets 206 10*3/mm3      Neutrophil % 83.2 %      Lymphocyte % 7.3 %      Monocyte % 8.1 %      Eosinophil % 0.3 %      Basophil % 0.3 %      Immature Grans % 0.8 %      Neutrophils, Absolute 9.98 10*3/mm3      Lymphocytes, Absolute 0.88 10*3/mm3      Monocytes, Absolute 0.97 10*3/mm3      Eosinophils, Absolute 0.04 10*3/mm3      Basophils, Absolute 0.03 10*3/mm3      Immature Grans, Absolute 0.09 10*3/mm3      nRBC 0.0 /100 WBC            I reviewed the patient's new clinical results.  I reviewed the patient's new imaging results and agree with the interpretation.     ASSESSMENT/PLAN:   ASSESSMENT:  Patient with early satiety.  Patient has a normal gastric emptying study.  Patient with anemia hemoglobin now is 9.    PLAN: 1 continue to follow patient's hemoglobin if hemoglobin less than 7 please transfuse 2 units packed RBCs.  #2 from a GI standpoint patient can be discharged home to follow-up as an outpatient  The risks, benefits, and alternatives of this procedure have been discussed with the patient or the responsible party- the patient understands and agrees to proceed.         Carlos Schaeffer MD  05/15/19  3:30 PM           This document has been electronically signed by Carlos Schaeffer MD on May 15, 2019 3:30 PM

## 2019-05-15 NOTE — THERAPY TREATMENT NOTE
Acute Care - Physical Therapy Treatment Note  [facility]     Patient Name: Yaneli Bates  : 1940  MRN: [id number]  Today's Date: 5/15/2019  Onset of Illness/Injury or Date of Surgery: 19  Date of Referral to PT: 19  Referring Physician: Dr. ISABELLA Hi    Admit Date: 2019    Visit Dx:    ICD-10-CM ICD-9-CM   1. Generalized weakness R53.1 780.79   2. Symptomatic anemia D64.9 285.9   3. Hypomagnesemia E83.42 275.2   4. Impaired mobility and activities of daily living Z74.09 799.89   5. Impaired functional mobility, balance, gait, and endurance Z74.09 V49.89     Patient Active Problem List   Diagnosis   • S/P AVR (aortic valve replacement)   • Hypertension   • Chest pain, rule out acute myocardial infarction   • Anemia   • Symptomatic anemia   • Diabetes mellitus (CMS/HCC)   • Hematuria   • Hyponatremia   • Coronary artery disease   • Weight loss, unintentional   • Fatigue   • Generalized weakness   • Hemorrhagic cystitis   • Iron deficiency anemia due to chronic blood loss       Therapy Treatment    Rehabilitation Treatment Summary     Row Name 05/15/19 1506             Treatment Time/Intention    Discipline  physical therapy assistant  -TW      Document Type  therapy note (daily note)  -TW      Subjective Information  complains of;pain;weakness  -TW      Mode of Treatment  physical therapy;individual therapy  -TW      Patient/Family Observations  Pt agreeable to tx this pm. Daughter present throughout tx.  -TW      Patient Effort  good  -TW      Existing Precautions/Restrictions  fall  -TW      Recorded by [TW] Carlitos Land, PTA 05/15/19 1721      Row Name 05/15/19 1506             Vital Signs    Pre Systolic BP Rehab  149  -TW      Pre Treatment Diastolic BP  81  -TW      Pretreatment Heart Rate (beats/min)  76  -TW      Posttreatment Heart Rate (beats/min)  80  -TW      Pre SpO2 (%)  91 Pt was able to increase O2 to 95% with cues to PLB.  -TW      O2 Delivery Pre Treatment   room air  -TW      Post SpO2 (%)  96  -TW      Pre Patient Position  Supine  -TW      Intra Patient Position  Standing  -TW      Post Patient Position  Supine  -TW      Recorded by [TW] Carlitos Land, PTA 05/15/19 1721      Row Name 05/15/19 1506             Cognitive Assessment/Intervention- PT/OT    Orientation Status (Cognition)  oriented x 4  -TW      Follows Commands (Cognition)  follows one step commands  -TW      Recorded by [TW] Carlitos Land, PTA 05/15/19 1721      Row Name 05/15/19 1506             Bed Mobility Assessment/Treatment    Supine-Sit Century (Bed Mobility)  minimum assist (75% patient effort) for B LE's  -TW      Sit-Supine Century (Bed Mobility)  minimum assist (75% patient effort) for B LE's.  -TW      Bed Mobility, Safety Issues  decreased use of legs for bridging/pushing  -TW      Assistive Device (Bed Mobility)  bed rails;head of bed elevated  -TW      Recorded by [TW] Carlitos Land, MANUELITO 05/15/19 1721      Row Name 05/15/19 1506             Sit-Stand Transfer    Sit-Stand Century (Transfers)  contact guard  -TW      Assistive Device (Sit-Stand Transfers)  walker, front-wheeled  -TW      Recorded by [TW] Carlitos Land, MANUELITO 05/15/19 1721      Row Name 05/15/19 1506             Stand-Sit Transfer    Stand-Sit Century (Transfers)  contact guard  -TW      Assistive Device (Stand-Sit Transfers)  walker, front-wheeled  -TW      Recorded by [TW] Carlitos Land, PTA 05/15/19 1721      Row Name 05/15/19 1506             Gait/Stairs Assessment/Training    Gait/Stairs Assessment/Training  gait/ambulation assistive device  -TW      Century Level (Gait)  contact guard  -TW      Assistive Device (Gait)  walker, front-wheeled  -TW      Distance in Feet (Gait)  28ft  -TW      Pattern (Gait)  step-to  -TW      Deviations/Abnormal Patterns (Gait)  base of support, narrow;willi decreased;stride length decreased  -TW      Recorded by [TW] Emery  Carlitos RICK, PTA 05/15/19 1721      Row Name 05/15/19 1506             Positioning and Restraints    Pre-Treatment Position  in bed  -TW      Post Treatment Position  bed  -TW      In Bed  supine;call light within reach;encouraged to call for assist;exit alarm on;with family/caregiver  -TW      Recorded by [TW] Carlitos Land, PTA 05/15/19 1721      Row Name 05/15/19 1506             Pain Scale: Numbers Pre/Post-Treatment    Pain Scale: Numbers, Pretreatment  3/10  -TW      Pain Scale: Numbers, Post-Treatment  3/10  -TW      Pain Location  -- B LE's  -TW      Recorded by [TW] Carlitos Land, PTA 05/15/19 1721      Row Name 05/15/19 1506             Outcome Summary/Treatment Plan (PT)    Daily Summary of Progress (PT)  progress towards functional goals is fair  -TW      Barriers to Overall Progress (PT)  pain in B LE's  -TW      Plan for Continued Treatment (PT)  Cont to progress  -TW      Anticipated Discharge Disposition (PT)  home with assist;home with home health  -TW      Recorded by [TW] Carlitos Land, [facility] 05/15/19 1721        User Key  (r) = Recorded By, (t) = Taken By, (c) = Cosigned By    Initials Name Effective Dates Discipline    TW Carlitos Land, [facility] 03/07/18 -  PT               Rehab Goal Summary     Row Name 05/15/19 1506             Physical Therapy Goals    Bed Mobility Goal Selection (PT)  bed mobility, PT goal 1  -TW      Transfer Goal Selection (PT)  transfer, PT goal 1  -TW      Gait Training Goal Selection (PT)  gait training, PT goal 1  -TW      Stairs Goal Selection (PT)  stairs, PT goal 1  -TW         Bed Mobility Goal 1 (PT)    Activity/Assistive Device (Bed Mobility Goal 1, PT)  sit to supine;supine to sit;scooting  -TW      [place] Level/Cues Needed (Bed Mobility Goal 1, PT)  standby assist  -TW      Time Frame (Bed Mobility Goal 1, PT)  by discharge  -TW      Barriers (Bed Mobility Goal 1, PT)  leg pain, nausea  -TW      Progress/Outcomes (Bed Mobility Goal 1, PT)   goal not met  -TW         Transfer Goal 1 (PT)    Activity/Assistive Device (Transfer Goal 1, PT)  sit-to-stand/stand-to-sit;walker, rolling  -TW      Wheeler Level/Cues Needed (Transfer Goal 1, PT)  standby assist  -TW      Time Frame (Transfer Goal 1, PT)  by discharge  -TW      Progress/Outcome (Transfer Goal 1, PT)  goal not met  -TW         Gait Training Goal 1 (PT)    Activity/Assistive Device (Gait Training Goal 1, PT)  gait (walking locomotion);walker, rolling  -TW      Wheeler Level (Gait Training Goal 1, PT)  standby assist  -TW      Distance (Gait Goal 1, PT)  75' x 2  -TW      Time Frame (Gait Training Goal 1, PT)  by discharge  -TW      Barriers (Gait Training Goal 1, PT)  urgency to urinate-on lasix  -TW      Progress/Outcome (Gait Training Goal 1, PT)  goal not met  -TW         Stairs Goal 1 (PT)    Activity/Assistive Device (Stairs Goal 1, PT)  stairs, all skills;using handrail, right;using handrail, left;ascending stairs;descending stairs  -TW      Wheeler Level/Cues Needed (Stairs Goal 1, PT)  standby assist  -TW      Number of Stairs (Stairs Goal 1, PT)  2  -TW      Time Frame (Stairs Goal 1, PT)  by discharge  -TW      Barriers (Stairs Goal 1, PT)  pain, fatigue  -TW      Progress/Outcome (Stairs Goal 1, PT)  goal not met  -TW        User Key  (r) = Recorded By, (t) = Taken By, (c) = Cosigned By    Initials Name Provider Type Discipline    TW Carlitos Land PTA Physical Therapy Assistant PT          Physical Therapy Education     Title: PT OT SLP Therapies (In Progress)     Topic: Physical Therapy (Done)     Point: Mobility training (Done)     Learning Progress Summary           Patient Acceptance, EVELINE JONES,SAGRARIO,KIRAN by BS at 5/14/2019  3:30 PM    Comment:  vc's for hand and LE placement/positioning with sit to stand transfers and toilet transfers with PT and OT.   Family AcceptanceKAREN DU,SAGRARIO,KIRAN by BS at 5/14/2019  3:30 PM    Comment:  vc's for hand and LE placement/positioning with  sit to stand transfers and toilet transfers with PT and OT.                   Point: Home exercise program (Done)     Learning Progress Summary           Patient Acceptance, D, DU,NR,VU by BS at 5/14/2019  3:30 PM    Comment:  vc's for hand and LE placement/positioning with sit to stand transfers and toilet transfers with PT and OT.   Family Acceptance, D, DU,NR,VU by BS at 5/14/2019  3:30 PM    Comment:  vc's for hand and LE placement/positioning with sit to stand transfers and toilet transfers with PT and OT.                   Point: Body mechanics (Done)     Learning Progress Summary           Patient Acceptance, D, DU,NR,VU by BS at 5/14/2019  3:30 PM    Comment:  vc's for hand and LE placement/positioning with sit to stand transfers and toilet transfers with PT and OT.   Family Acceptance, D, DU,NR,VU by BS at 5/14/2019  3:30 PM    Comment:  vc's for hand and LE placement/positioning with sit to stand transfers and toilet transfers with PT and OT.                   Point: Precautions (Done)     Learning Progress Summary           Patient Acceptance, D, DU,NR,VU by BS at 5/14/2019  3:30 PM    Comment:  vc's for hand and LE placement/positioning with sit to stand transfers and toilet transfers with PT and OT.   Family Acceptance, D, DU,NR,VU by BS at 5/14/2019  3:30 PM    Comment:  vc's for hand and LE placement/positioning with sit to stand transfers and toilet transfers with PT and OT.                               User Key     Initials Effective Dates Name Provider Type Discipline     04/08/18 -  Malcom Kaiser, PT Physical Therapist PT                PT Recommendation and Plan  Anticipated Discharge Disposition (PT): home with assist, home with home health  Outcome Summary/Treatment Plan (PT)  Daily Summary of Progress (PT): progress towards functional goals is fair  Barriers to Overall Progress (PT): pain in B LE's  Plan for Continued Treatment (PT): Cont to progress  Anticipated Discharge Disposition  (PT): home with assist, home with home health  Plan of Care Reviewed With: patient  Progress: improving  Outcome Summary: Pt amb with RW and CGA/min of 1 for 28ft with VSS. Pt t/f in/out of bed well but insists on needing help with B LE's. Pt will need assistance at home when DC'd to progress with strengthening.   Outcome Measures     Row Name 05/15/19 1506 05/14/19 1400 05/14/19 1355       How much help from another person do you currently need...    Turning from your back to your side while in flat bed without using bedrails?  3  -TW  3  -BS  --    Moving from lying on back to sitting on the side of a flat bed without bedrails?  3  -TW  2  -BS  --    Moving to and from a bed to a chair (including a wheelchair)?  3  -TW  3  -BS  --    Standing up from a chair using your arms (e.g., wheelchair, bedside chair)?  3  -TW  3  -BS  --    Climbing 3-5 steps with a railing?  2  -TW  2  -BS  --    To walk in hospital room?  3  -TW  3  -BS  --    AM-PAC 6 Clicks Score  17  -TW  16  -BS  --       How much help from another is currently needed...    Putting on and taking off regular lower body clothing?  --  --  3  -AS    Bathing (including washing, rinsing, and drying)  --  --  3  -AS    Toileting (which includes using toilet bed pan or urinal)  --  --  3  -AS    Putting on and taking off regular upper body clothing  --  --  4  -AS    Taking care of personal grooming (such as brushing teeth)  --  --  4  -AS    Eating meals  --  --  4  -AS    Score  --  --  21  -AS       Functional Assessment    Outcome Measure Options  AM-PAC 6 Clicks Basic Mobility (PT)  -TW  AM-PAC 6 Clicks Basic Mobility (PT)  -BS  AM-PAC 6 Clicks Daily Activity (OT)  -AS      User Key  (r) = Recorded By, (t) = Taken By, (c) = Cosigned By    Initials Name Provider Type    TW Carlitos Land, PTA Physical Therapy Assistant    BS Malcom Kaiser, PT Physical Therapist    AS Ana Robles, OT Occupational Therapist         Time Calculation:   PT Charges      Row Name 05/15/19 1723             Time Calculation    Start Time  1506  -TW      Stop Time  1540  -TW      Time Calculation (min)  34 min  -TW      PT Received On  05/15/19  -TW      PT Goal Re-Cert Due Date  05/27/19  -TW         Time Calculation- PT    Total Timed Code Minutes- PT  34 minute(s)  -TW        User Key  (r) = Recorded By, (t) = Taken By, (c) = Cosigned By    Initials Name Provider Type    TW Carlitos Land PTA Physical Therapy Assistant        Therapy Charges for Today     Code Description Service Date Service Provider Modifiers Qty    21012861840 HC GAIT TRAINING EA 15 MIN 5/15/2019 Carlitos Land PTA GP 1    00954731448 HC PT THERAPEUTIC ACT EA 15 MIN 5/15/2019 Carlitos Land PTA GP 1          PT G-Codes  Outcome Measure Options: AM-PAC 6 Clicks Basic Mobility (PT)  AM-PAC 6 Clicks Score: 17  Score: 21    Carlitos Land PTA  5/15/2019

## 2019-05-15 NOTE — PLAN OF CARE
Problem: Patient Care Overview  Goal: Plan of Care Review  Outcome: Ongoing (interventions implemented as appropriate)   05/15/19 9811   Coping/Psychosocial   Plan of Care Reviewed With patient   Plan of Care Review   Progress no change   OTHER   Outcome Summary vss, post void residual done, some pain but controlled with PO meds        Problem: Fall Risk (Adult)  Goal: Identify Related Risk Factors and Signs and Symptoms  Outcome: Ongoing (interventions implemented as appropriate)      Problem: Activity Intolerance (Adult)  Goal: Identify Related Risk Factors and Signs and Symptoms  Outcome: Outcome(s) achieved Date Met: 05/15/19    Goal: Activity Tolerance  Outcome: Ongoing (interventions implemented as appropriate)    Goal: Effective Energy Conservation Techniques  Outcome: Ongoing (interventions implemented as appropriate)

## 2019-05-15 NOTE — SIGNIFICANT NOTE
05/15/19 0842   Rehab Treatment   Discipline occupational therapy assistant   Reason Treatment Not Performed patient/family declined treatment  (Pt's family wants OT to check back later. )

## 2019-05-15 NOTE — CONSULTS
SUBJECTIVE:   5/15/2019    Name: Yaneli Bates  DOD: 1940    REASON FOR CONSULT: Weight loss and nausea    Chief Complaint:     Chief Complaint   Patient presents with   • Weakness - Generalized   • Hallucinations       Subjective   77 yo female complaining of nausea & early satiety.  She states that her nausea & abdominal pain have improved significantly since yesterday.  She still does not have much of an appetite.  Gastric emptying studies this AM were wnl.       ROS/HISTORY/ CURRENT MEDICATIONS/OBJECTIVE/VS/PE:   Review of Systems:   Review of Systems   Constitutional: Negative for activity change, appetite change, chills, diaphoresis, fatigue, fever and unexpected weight change.   HENT: Negative for sore throat and trouble swallowing.    Respiratory: Negative for shortness of breath.    Gastrointestinal: Positive for nausea (improving). Negative for abdominal distention, abdominal pain, anal bleeding, blood in stool, constipation, diarrhea, rectal pain and vomiting.   Endocrine: Negative for polydipsia, polyphagia and polyuria.   Genitourinary: Negative for difficulty urinating.   Musculoskeletal: Negative for arthralgias.   Skin: Negative for pallor.   Allergic/Immunologic: Negative for food allergies.   Neurological: Negative for weakness and light-headedness.   Psychiatric/Behavioral: Negative for behavioral problems.       History:     Past Medical History:   Diagnosis Date   • Allergic    • Anemia    • Arthritis    • Coronary artery disease 2014    valave replacement   • Fibromyalgia, primary    • Headache    • Infectious viral hepatitis    • Low back pain    • Osteoporosis    • Visual impairment      Past Surgical History:   Procedure Laterality Date   • AORTIC VALVE REPAIR/REPLACEMENT  2014,may   • CARDIAC SURGERY     • COLONOSCOPY     • COLONOSCOPY N/A 4/22/2019    Procedure: COLONOSCOPY;  Surgeon: Carlos Schaeffer MD;  Location: [facility] ENDOSCOPY;  Service: Gastroenterology   • ENDOSCOPY N/A  3/13/2019    Procedure: ESOPHAGOGASTRODUODENOSCOPY;  Surgeon: Carlos Schaeffer MD;  Location: [facility] ENDOSCOPY;  Service: Gastroenterology   • ENDOSCOPY N/A 4/22/2019    Procedure: ESOPHAGOGASTRODUODENOSCOPY;  Surgeon: Carlos Schaeffer MD;  Location: [facility] ENDOSCOPY;  Service: Gastroenterology   • KNEE SURGERY Right 1967   • TONSILLECTOMY       Family History   Problem Relation Age of Onset   • Arthritis Mother    • Cancer Mother    • Heart disease Mother    • Hyperlipidemia Mother    • Hypertension Mother    • Vision loss Mother    • Arthritis Father    • Hearing loss Father    • Heart disease Father    • Hyperlipidemia Father    • Hypertension Father    • Vision loss Father    • Hypertension Sister    • Vision loss Sister    • Osteoporosis Sister    • Cataracts Sister    • Hyperlipidemia Brother    • Vision loss Brother    • Heart disease Brother    • Cancer Daughter    • Early death Daughter    • Osteoporosis Sister    • Parkinsonism Brother    • Hyperlipidemia Brother    • Thyroid disease Daughter      Social History     Tobacco Use   • Smoking status: Never Smoker   • Smokeless tobacco: Never Used   Substance Use Topics   • Alcohol use: No   • Drug use: No     Medications Prior to Admission   Medication Sig Dispense Refill Last Dose   • traMADol (ULTRAM) 50 MG tablet Take 50 mg by mouth 2 (Two) Times a Day As Needed for Moderate Pain .      • alendronate (FOSAMAX) 70 MG tablet Take 1 tablet by mouth Every 7 (Seven) Days. 12 tablet 1 Taking   • doxycycline (VIBRAMYCIN) 100 MG capsule Take 1 capsule by mouth 2 (Two) Times a Day. 20 capsule 0    • ferrous sulfate 324 (65 Fe) MG tablet delayed-release EC tablet Take 1 tablet by mouth 2 (Two) Times a Day With Meals for 30 days. 60 tablet 3    • lactulose (CHRONULAC) 10 GM/15ML solution Take 30 mL by mouth 2 (Two) Times a Day As Needed (constipation). 236 mL 1 Taking   • metoprolol succinate XL (TOPROL-XL) 50 MG 24 hr tablet Take 1 tablet by mouth Daily With Dinner.  90 tablet 5 Taking   • ondansetron ODT (ZOFRAN ODT) 4 MG disintegrating tablet Take 1 tablet by mouth Every 8 (Eight) Hours As Needed for Nausea or Vomiting. 20 tablet 0 Taking   • [] potassium chloride (K-DUR,KLOR-CON) 10 MEQ CR tablet Take 1 tablet by mouth Daily for 5 days. 5 tablet 0 Taking   • raNITIdine (ZANTAC) 75 MG tablet Take 75 mg by mouth 2 (Two) Times a Day.   Taking   • sodium chloride 1 g tablet Take 1 tablet by mouth 3 (Three) Times a Day With Meals. 90 tablet 0 Taking     Allergies:  Clindamycin/lincomycin; Corn-containing products; Tomato flavor [flavoring agent]; Vinegar [acetic acid]; Amoxicillin; Codeine; Penicillins; Sulfa antibiotics; and Vicodin [hydrocodone-acetaminophen]    I have reviewed the patient's medical history, surgical history and family history in the available medical record system.     Current Medications:     Current Facility-Administered Medications   Medication Dose Route Frequency Provider Last Rate Last Dose   • acetaminophen (TYLENOL) tablet 650 mg  650 mg Oral Q4H PRN Rodrigo Nazario MD       • famotidine (PEPCID) tablet 20 mg  20 mg Oral Daily Rodrigo Nazario MD   20 mg at 05/15/19 0857   • ferrous sulfate EC tablet 324 mg  324 mg Oral BID With Meals Rodrigo Nazario MD   324 mg at 05/15/19 0857   • metoprolol succinate XL (TOPROL-XL) 24 hr tablet 50 mg  50 mg Oral Daily With Dinner Rodrigo Nazario MD   50 mg at 19 1725   • ondansetron (ZOFRAN) tablet 4 mg  4 mg Oral Q6H PRN Ashley Carrizales MD        Or   • ondansetron (ZOFRAN) injection 4 mg  4 mg Intravenous Q6H PRN Ashley Carrizales MD   4 mg at 19 1305   • potassium chloride (KLOR-CON) packet 40 mEq  40 mEq Oral Once Rodrigo Nazario MD       • potassium chloride (MICRO-K) CR capsule 40 mEq  40 mEq Oral Daily Ashley Carrizales MD   40 mEq at 05/15/19 0857   • promethazine (PHENERGAN) tablet 12.5 mg  12.5 mg Oral Q6H PRN Ashley Carrizales MD        Or   • promethazine  (PHENERGAN) injection 12.5 mg  12.5 mg Intramuscular Q6H PRN Ashley Carrizales MD        Or   • promethazine (PHENERGAN) suppository 12.5 mg  12.5 mg Rectal Q6H PRN Ashley Carrizales MD       • sodium chloride 0.9 % flush 3 mL  3 mL Intravenous Q12H Rodrigo Nazario MD       • sodium chloride 0.9 % flush 3-10 mL  3-10 mL Intravenous PRN Rodrigo Nazario MD       • sodium chloride 0.9 % infusion  100 mL/hr Intravenous Continuous Rodrigo Nazario  mL/hr at 05/15/19 0714 100 mL/hr at 05/15/19 0714   • sodium chloride tablet 1 g  1 g Oral TID With Meals Rodrigo Nazario MD   1 g at 05/15/19 1234   • traMADol (ULTRAM) tablet 50 mg  50 mg Oral BID PRN Rodrigo Nazario MD   50 mg at 05/15/19 1026       Objective     Physical Exam:   Temp:  [96 °F (35.6 °C)-97.9 °F (36.6 °C)] 97.9 °F (36.6 °C)  Heart Rate:  [66-81] 70  Resp:  [16-18] 18  BP: (142-158)/(70-88) 158/88    Physical Exam:  General Appearance:    Alert, cooperative, in no acute distress   Head:    Normocephalic, without obvious abnormality, atraumatic   Eyes:            Lids and lashes normal, conjunctivae and sclerae normal, no   icterus, no pallor, corneas clear, PERRLA   Ears:    Ears appear intact with no abnormalities noted   Throat:   No oral lesions, no thrush, oral mucosa moist   Neck:   No adenopathy, supple, trachea midline, no thyromegaly, no     carotid bruit, no JVD   Back:     No kyphosis present, no scoliosis present, no skin lesions,       erythema or scars, no tenderness to percussion or                   palpation,   range of motion normal   Lungs:     Clear to auscultation,respirations regular, even and                   unlabored    Heart:    Regular rhythm and normal rate, normal S1 and S2, no            murmur, no gallop, no rub, no click   Breast Exam:    Deferred   Abdomen:     Normal bowel sounds, no masses, no organomegaly, soft        non-tender, non-distended, no guarding, no rebound                 tenderness   Genitalia:     Deferred   Extremities:   Moves all extremities well, no edema, no cyanosis, no              redness   Pulses:   Pulses palpable and equal bilaterally   Skin:   No bleeding, bruising or rash   Lymph nodes:   No palpable adenopathy   Neurologic:   Cranial nerves 2 - 12 grossly intact, sensation intact, DTR        present and equal bilaterally      Results Review:     Lab Results   Component Value Date    WBC 11.99 (H) 05/15/2019    WBC 12.84 (H) 05/14/2019    WBC 14.04 (H) 05/13/2019    HGB 9.0 (L) 05/15/2019    HGB 6.5 (C) 05/14/2019    HGB 7.7 (L) 05/13/2019    HCT 27.6 (L) 05/15/2019    HCT 20.7 (C) 05/14/2019    HCT 25.0 (L) 05/13/2019     05/15/2019     05/14/2019     05/13/2019     Results from last 7 days   Lab Units 05/15/19  0516 05/14/19  0659 05/13/19  1324   ALK PHOS U/L 97 61 74   ALT (SGPT) U/L 7 6 7   AST (SGOT) U/L 23 16 23     Results from last 7 days   Lab Units 05/15/19  0516 05/14/19  0659 05/13/19  1324   BILIRUBIN mg/dL 0.5 0.3 0.5   ALK PHOS U/L 97 61 74     Lipase   Date Value Ref Range Status   05/13/2019 19 13 - 60 U/L Final     Lab Results   Component Value Date    INR 1.25 (H) 03/13/2019         Radiology Review:  Imaging Results (last 72 hours)     Procedure Component Value Units Date/Time    XR Chest PA & Lateral [[id number]] Collected:  05/13/19 1205     Updated:  05/13/19 1225    Narrative:         EXAM:          Radiograph(s), Chest   VIEWS:    PA / Lat ; 2       DATE/TIME:  5/13/2019 12:23 PM CDT                INDICATION:   generalized weakness    COMPARISON:  CXR: 4/11/19             FINDINGS:             - lines/tubes:    none     - cardiac:         size within normal limits; status post  valvular repair         - mediastinum: contour within normal limits         - lungs:         no focal air space process, pulmonary  interstitial edema, nodule(s)/mass             - pleura:         Small left pleural fluid collection.                   - osseous:         Status  post median sternotomy                  - misc.:         Impression:       CONCLUSION:        1. No evidence of an active cardiopulmonary process.                                                Electronically signed by:  LEXA Almonte MD  5/13/2019 12:24  PM CDT Workstation: [id number]          I reviewed the patient's new clinical results.    I reviewed the patient's new imaging results and agree with the interpretation.     ASSESSMENT/PLAN:   ASSESSMENT: Patient with anemia and weakness over the past few months.  Patient is continuing to lose weight.  Patient has loss of appetite and feeling of early satiety.      PLAN:   #1 Small frequent meals  #2 patient will need blood transfusion as hemoglobin is less than 7.  Continue to monitor patient's hemoglobin and transfuse for hemoglobin less than 7.    The risks, benefits, and alternatives of this procedure have been discussed with the patient or the responsible party. The patient understands and agrees to proceed.         Carlos Renteria Jr, MD  05/15/19  1:21 PM     This document has been electronically signed by Carlos Renteria Jr, MD on May 15, 2019 1:21 PM

## 2019-05-15 NOTE — CONSULTS
Adult Nutrition  Assessment    Patient Name:  Yaneli Bates  YOB: 1940  MRN: [id number]  Admit Date:  5/13/2019    Assessment Date:  5/15/2019    Comments:  Pt seems to be feeling better.  She reports that her GI distress is better.  Overall intake for the last 4 meals is 44% average, but she continues ot report early satiety and a decreased appetite.  GI is following.  Gastric emptying study was wnl.  H/H improved post transfusion.  Diet preferences and food intolerances are being honored and going well with no problems per pt and family.  RD will continue to monitor tx plans.      Reason for Assessment     Row Name 05/15/19 1451          Reason for Assessment    Reason For Assessment  follow-up protocol         Nutrition/Diet History     Row Name 05/15/19 1458          Nutrition/Diet History    Typical Food/Fluid Intake  Pt smilsing.  she reports that her appetite is still not very good but she is feeling better.  Nausea better. Her daughter states that the meals have been going well.  The CA and kitchen have been very accomodating.             Labs/Tests/Procedures/Meds     Row Name 05/15/19 2689          Labs/Procedures/Meds    Lab Results Reviewed  reviewed, pertinent     Lab Results Comments  Na 131; Glucose 101; K+ 3.3; Alb 1.60        Diagnostic Tests/Procedures    Diagnostic Test/Procedure Reviewed  reviewed, pertinent        Medications    Pertinent Medications Reviewed  reviewed, pertinent     Pertinent Medications Comments  Ferrous Sulfate; Pepcid; naCl tablet         Physical Findings     Row Name 05/15/19 1067          Physical Findings    Overall Physical Appearance  on oxygen therapy;loss of subcutaneous fat;loss of muscle mass     Gastrointestinal  -- Nausea improved           Nutrition Prescription Ordered     Row Name 05/15/19 6252          Nutrition Prescription PO    Current PO Diet  Regular     Fluid Consistency  Thin         Evaluation of Received Nutrient/Fluid Intake      Row Name 05/15/19 1500          PO Evaluation    Number of Days PO Intake Evaluated  2 days     Number of Meals  3     % PO Intake  75/25/50/25               Electronically signed by:  Tanya Harkins RD  05/15/19 3:05 PM

## 2019-05-16 PROBLEM — D64.9 NORMOCYTIC ANEMIA: Status: ACTIVE | Noted: 2019-01-01

## 2019-05-16 NOTE — THERAPY TREATMENT NOTE
Acute Care - Physical Therapy Treatment Note  [facility]     Patient Name: Yaneli Bates  : 1940  MRN: [id number]  Today's Date: 2019  Onset of Illness/Injury or Date of Surgery: 19  Date of Referral to PT: 19  Referring Physician: Dr. ISABELLA Hi    Admit Date: 2019    Visit Dx:    ICD-10-CM ICD-9-CM   1. Generalized weakness R53.1 780.79   2. Symptomatic anemia D64.9 285.9   3. Hypomagnesemia E83.42 275.2   4. Impaired mobility and activities of daily living Z74.09 799.89   5. Impaired functional mobility, balance, gait, and endurance Z74.09 V49.89     Patient Active Problem List   Diagnosis   • S/P AVR (aortic valve replacement)   • Hypertension   • Chest pain, rule out acute myocardial infarction   • Anemia   • Symptomatic anemia   • Diabetes mellitus (CMS/HCC)   • Hematuria   • Hyponatremia   • Coronary artery disease   • Weight loss, unintentional   • Fatigue   • Generalized weakness   • Hemorrhagic cystitis   • Iron deficiency anemia due to chronic blood loss   • Normocytic anemia       Therapy Treatment    Rehabilitation Treatment Summary     Row Name 19 1415 19 0955          Treatment Time/Intention    Discipline  physical therapy assistant  -LN  occupational therapy assistant  -BB     Document Type  therapy note (daily note)  -LN  therapy note (daily note)  -BB     Subjective Information  complains of;nausea/vomiting;weakness  -LN  complains of;pain  -BB     Mode of Treatment  physical therapy  -LN  individual therapy;occupational therapy  -BB     Total Minutes, Occupational Therapy Treatment  --  39  -BB     Therapy Frequency (OT Eval)  --  other (see comments) 5-7 days/wk  -BB     Patient Effort  good  -LN  good  -BB     Comment  nsg states pt needs to be oob to chair  -LN  --     Existing Precautions/Restrictions  fall  -LN  fall  -BB     Recorded by [LN] Kiara Milligan, MANUELITO 19 1534 [BB] Luz Elena Feliciano COTA/L 19 1346     Row Name  05/16/19 1415 05/16/19 0955          Vital Signs    Post Systolic BP Rehab  166  -LN  --     Post Treatment Diastolic BP  90  -LN  --     Pretreatment Heart Rate (beats/min)  --  73  -BB     Intratreatment Heart Rate (beats/min)  88  -LN  76  -BB     Posttreatment Heart Rate (beats/min)  85  -LN  83  -BB     Pre SpO2 (%)  --  94  -BB     O2 Delivery Pre Treatment  room air  -LN  room air  -BB     Intra SpO2 (%)  92  -LN  95  -BB     O2 Delivery Intra Treatment  --  room air  -BB     Post SpO2 (%)  93  -LN  96  -BB     O2 Delivery Post Treatment  --  room air  -BB     Pre Patient Position  Supine  -LN  Supine  -BB     Intra Patient Position  Standing  -LN  -- lng sitting  -BB     Post Patient Position  Sitting  -LN  Supine  -BB     Recorded by [LN] Kiara Milligan, PTA 05/16/19 1534 [BB] Luz Elena Feliciano COTA/L 05/16/19 1346     Row Name 05/16/19 1415 05/16/19 0955          Cognitive Assessment/Intervention- PT/OT    Orientation Status (Cognition)  oriented x 4  -LN  oriented x 4  -BB     Follows Commands (Cognition)  follows one step commands  -LN  follows one step commands  -BB     Recorded by [LN] Kiara Milligan, PTA 05/16/19 1534 [BB] Luz Elena Feliciano COTA/L 05/16/19 1346     Row Name 05/16/19 1415             Bed Mobility Assessment/Treatment    Supine-Sit [place] (Bed Mobility)  minimum assist (75% patient effort) for B LE's  -LN      Sit-Supine [place] (Bed Mobility)  not tested  -LN      Bed Mobility, Safety Issues  decreased use of legs for bridging/pushing  -LN      Assistive Device (Bed Mobility)  bed rails;head of bed elevated  -LN      Recorded by [LN] Kiara Milligan, PTA 05/16/19 1534      Row Name 05/16/19 1415             Sit-Stand Transfer    Sit-Stand [place] (Transfers)  minimum assist (75% patient effort);verbal cues  -LN      Assistive Device (Sit-Stand Transfers)  walker, front-wheeled  -LN      Recorded by [LN] Kiara Milligan, PTA 05/16/19 1534      Row Name 05/16/19 1413              Stand-Sit Transfer    Stand-Sit [place] (Transfers)  contact guard  -LN      Assistive Device (Stand-Sit Transfers)  walker, front-wheeled  -LN      Recorded by [LN] Kiara Milligan, [facility] 05/16/19 1534      Row Name 05/16/19 1415             Gait/Stairs Assessment/Training    Gait/Stairs Assessment/Training  gait/ambulation assistive device  -LN      [place] Level (Gait)  contact guard  -LN      Assistive Device (Gait)  walker, front-wheeled  -LN      Distance in Feet (Gait)  14  -LN      Pattern (Gait)  step-to  -LN      Deviations/Abnormal Patterns (Gait)  base of support, narrow;willi decreased;stride length decreased  -LN      Recorded by [LN] Kiara Milligan PTA 05/16/19 1534      Row Name 05/16/19 0955             Grooming Assessment/Training    [place] Level (Grooming)  grooming skills;hair care, combing/brushing;oral care regimen;wash face, hands;set up;supervision  -BB      Grooming Position  long sitting  -BB      Recorded by [BB] Luz Elena Feliciano COTA/L 05/16/19 1346      Row Name 05/16/19 1415             Lower Extremity Seated Therapeutic Exercise    Comment, Seated Lower Extremity (Therapeutic Exercise)  declined ex due to nausea-pt received iv meds for nausea  -LN      Recorded by [LN] Kiara Milligan, [facility] 05/16/19 1534      Row Name 05/16/19 0955             Therapeutic Exercise    Upper Extremity Range of Motion (Therapeutic Exercise)  shoulder flexion/extension, left;shoulder internal/external rotation, bilateral;elbow flexion/extension, bilateral;forearm supination/pronation, bilateral;wrist flexion/extension, bilateral chest press  -BB      Hand (Therapeutic Exercise)  finger flexion/extension, bilateral  -BB      Weight/Resistance (Therapeutic Exercise)  1 pound  -BB      Exercise Type (Therapeutic Exercise)  AROM (active range of motion)  -BB      Position (Therapeutic Exercise)  seated  -BB      Sets/Reps (Therapeutic Exercise)  2x10  -BB      Equipment (Therapeutic  Exercise)  free weight, barbell  -BB      Expected Outcome (Therapeutic Exercise)  improve functional tolerance, self-care activity;improve functional stability;improve performance, transfer skills  -BB      Recorded by [BB] Luz Elena Feliciano COTA/L 05/16/19 1349      Row Name 05/16/19 1415 05/16/19 0955          Positioning and Restraints    Pre-Treatment Position  --  in bed  -BB     Post Treatment Position  chair  -LN  bed  -BB     In Bed  --  supine;call light within reach;encouraged to call for assist;exit alarm on  -BB     In Chair  notified nsg;reclined;call light within reach;encouraged to call for assist;exit alarm on;legs elevated;with family/caregiver  -LN  --     Recorded by [LN] Kiara Milligan, PTA 05/16/19 1534 [BB] Luz Elena Feliciano COTA/L 05/16/19 1349     Row Name 05/16/19 1415 05/16/19 0955          Pain Scale: Numbers Pre/Post-Treatment    Pain Scale: Numbers, Pretreatment  8/10  -LN  3/10  -BB     Pain Scale: Numbers, Post-Treatment  8/10  -LN  2/10  -BB     Pain Location  abdomen B LE's  -LN  -- B LEs  -BB     Pain Intervention(s)  Medication (See MAR) for nausea  -LN  Medication (See MAR)  -BB     Recorded by [LN] Kiara Milligan, PTA 05/16/19 1534 [BB] Luz Elena Feliciano COTA/L 05/16/19 1349     Row Name 05/16/19 1415 05/16/19 0955          Plan of Care Review    Plan of Care Reviewed With  patient  -LN  patient  -BB     Recorded by [LN] Kiara Milligan, PTA 05/16/19 1534 [BB] Luz Elena Feliciano AMIN/L 05/16/19 1349     Row Name 05/16/19 0955             Outcome Summary/Treatment Plan (OT)    Daily Summary of Progress (OT)  progress towards functional goals is fair  -BB      Plan for Continued Treatment (OT)  continue POC  -BB      Anticipated Discharge Disposition (OT)  skilled nursing facility;home with assist;home with home health  -BB      Recorded by [BB] Luz Elena Feliciano COTA/L 05/16/19 1349      Row Name 05/16/19 1415             Outcome Summary/Treatment Plan (PT)    Plan for  Continued Treatment (PT)  cont  -LN      Anticipated Discharge Disposition (PT)  home with assist;home with home health  -LN      Recorded by [LN] Poonam Milligani S, PTA 05/16/19 1534        User Key  (r) = Recorded By, (t) = Taken By, (c) = Cosigned By    Initials Name Effective Dates Discipline    LN Poonam Milligani S, PTA 03/07/18 -  PT    BB Luz Elena Feliciano, AMIN/L 03/07/18 -  OT               Rehab Goal Summary     Row Name 05/16/19 1415 05/16/19 0955          Physical Therapy Goals    Bed Mobility Goal Selection (PT)  bed mobility, PT goal 1  -LN  --     Transfer Goal Selection (PT)  transfer, PT goal 1  -LN  --     Gait Training Goal Selection (PT)  gait training, PT goal 1  -LN  --     Stairs Goal Selection (PT)  stairs, PT goal 1  -LN  --        Bed Mobility Goal 1 (PT)    Activity/Assistive Device (Bed Mobility Goal 1, PT)  sit to supine;supine to sit;scooting  -LN  --     [place] Level/Cues Needed (Bed Mobility Goal 1, PT)  standby assist  -LN  --     Time Frame (Bed Mobility Goal 1, PT)  by discharge  -LN  --     Barriers (Bed Mobility Goal 1, PT)  leg pain, nausea  -LN  --     Progress/Outcomes (Bed Mobility Goal 1, PT)  goal not met  -LN  --        Transfer Goal 1 (PT)    Activity/Assistive Device (Transfer Goal 1, PT)  sit-to-stand/stand-to-sit;walker, rolling  -LN  --     [place] Level/Cues Needed (Transfer Goal 1, PT)  standby assist  -LN  --     Time Frame (Transfer Goal 1, PT)  by discharge  -LN  --     Progress/Outcome (Transfer Goal 1, PT)  goal not met  -LN  --        Gait Training Goal 1 (PT)    Activity/Assistive Device (Gait Training Goal 1, PT)  gait (walking locomotion);walker, rolling  -LN  --     [place] Level (Gait Training Goal 1, PT)  standby assist  -LN  --     Distance (Gait Goal 1, PT)  75' x 2  -LN  --     Time Frame (Gait Training Goal 1, PT)  by discharge  -LN  --     Barriers (Gait Training Goal 1, PT)  urgency to urinate-on lasix  -LN  --     Progress/Outcome (Gait  Training Goal 1, PT)  goal not met  -LN  --        Stairs Goal 1 (PT)    Activity/Assistive Device (Stairs Goal 1, PT)  stairs, all skills;using handrail, right;using handrail, left;ascending stairs;descending stairs  -LN  --     [place] Level/Cues Needed (Stairs Goal 1, PT)  standby assist  -LN  --     Number of Stairs (Stairs Goal 1, PT)  2  -LN  --     Time Frame (Stairs Goal 1, PT)  by discharge  -LN  --     Barriers (Stairs Goal 1, PT)  pain, fatigue  -LN  --     Progress/Outcome (Stairs Goal 1, PT)  goal not met  -LN  --        Occupational Therapy Goals    Transfer Goal Selection (OT)  --  transfer, OT goal 1  -BB     Dressing Goal Selection (OT)  --  dressing, OT goal 1  -BB     Toileting Goal Selection (OT)  --  toileting, OT goal 1  -BB     Strength Goal Selection (OT)  --  strength, OT goal 1  -BB     Activity Tolerance Goal Selection (OT)  --  activity tolerance, OT goal 1  -BB        Transfer Goal 1 (OT)    Activity/Assistive Device (Transfer Goal 1, OT)  --  sit-to-stand/stand-to-sit;bed-to-chair/chair-to-bed;toilet  -BB     [place] Level/Cues Needed (Transfer Goal 1, OT)  --  supervision required  -BB     Time Frame (Transfer Goal 1, OT)  --  long term goal (LTG);by discharge  -BB     Progress/Outcome (Transfer Goal 1, OT)  --  goal not met  -BB        Dressing Goal 1 (OT)    Activity/Assistive Device (Dressing Goal 1, OT)  --  dressing skills, all  -BB     [place]/Cues Needed (Dressing Goal 1, OT)  --  supervision required;set-up required  -BB     Time Frame (Dressing Goal 1, OT)  --  long term goal (LTG);by discharge  -BB     Progress/Outcome (Dressing Goal 1, OT)  --  goal not met  -BB        Toileting Goal 1 (OT)    Activity/Device (Toileting Goal 1, OT)  --  toileting skills, all  -BB     [place] Level/Cues Needed (Toileting Goal 1, OT)  --  conditional independence  -BB     Time Frame (Toileting Goal 1, OT)  --  long term goal (LTG);by discharge  -BB     Progress/Outcome  (Toileting Goal 1, OT)  --  goal not met  -BB        Strength Goal 1 (OT)    Strength Goal 1 (OT)  --  Pt will be independent in BUE strengthening HEP  -BB     Time Frame (Strength Goal 1, OT)  --  long term goal (LTG);by discharge  -BB     Progress/Outcome (Strength Goal 1, OT)  --  goal not met  -BB         Activity Tolerance Goal 1 (OT)    Activity Level (Endurance Goal 1, OT)  --  15 min activity functiona/therapeutic activities  -BB     Time Frame (Activity Tolerance Goal 1, OT)  --  long term goal (LTG);by discharge  -BB     Progress/Outcome (Activity Tolerance Goal 1, OT)  --  goal met  -BB       User Key  (r) = Recorded By, (t) = Taken By, (c) = Cosigned By    Initials Name Provider Type Discipline    LN Kiara Milligan, PTA Physical Therapy Assistant PT    BB Luz Elena Feliciano COTA/L Occupational Therapy Assistant OT          Physical Therapy Education     Title: PT OT SLP Therapies (In Progress)     Topic: Physical Therapy (Done)     Point: Mobility training (Done)     Learning Progress Summary           Patient Acceptance, E,TB, VU,NR by LN at 5/16/2019  3:35 PM    Acceptance, D, DU,NR,VU by BS at 5/14/2019  3:30 PM    Comment:  vc's for hand and LE placement/positioning with sit to stand transfers and toilet transfers with PT and OT.   Family Acceptance, D, DU,NR,VU by BS at 5/14/2019  3:30 PM    Comment:  vc's for hand and LE placement/positioning with sit to stand transfers and toilet transfers with PT and OT.                   Point: Home exercise program (Done)     Learning Progress Summary           Patient Acceptance, E,TB, VU,NR by LN at 5/16/2019  3:35 PM    Acceptance, D, DU,NR,VU by BS at 5/14/2019  3:30 PM    Comment:  vc's for hand and LE placement/positioning with sit to stand transfers and toilet transfers with PT and OT.   Family Acceptance, D, DU,NR,VU by BS at 5/14/2019  3:30 PM    Comment:  vc's for hand and LE placement/positioning with sit to stand transfers and toilet transfers with  PT and OT.                   Point: Body mechanics (Done)     Learning Progress Summary           Patient Acceptance, E,TB, VU,NR by LN at 5/16/2019  3:35 PM    Acceptance, D, DU,NR,VU by BS at 5/14/2019  3:30 PM    Comment:  vc's for hand and LE placement/positioning with sit to stand transfers and toilet transfers with PT and OT.   Family Acceptance, D, DU,NR,VU by BS at 5/14/2019  3:30 PM    Comment:  vc's for hand and LE placement/positioning with sit to stand transfers and toilet transfers with PT and OT.                   Point: Precautions (Done)     Learning Progress Summary           Patient Acceptance, E,TB, VU,NR by LN at 5/16/2019  3:35 PM    Acceptance, D, DU,NR,VU by BS at 5/14/2019  3:30 PM    Comment:  vc's for hand and LE placement/positioning with sit to stand transfers and toilet transfers with PT and OT.   Family Acceptance, D, DU,NR,VU by BS at 5/14/2019  3:30 PM    Comment:  vc's for hand and LE placement/positioning with sit to stand transfers and toilet transfers with PT and OT.                               User Key     Initials Effective Dates Name Provider Type Discipline     03/07/18 -  Kiara Milligan, PTA Physical Therapy Assistant PT    BS 04/08/18 -  Malcom Kaiser, PT Physical Therapist PT                PT Recommendation and Plan  Anticipated Discharge Disposition (PT): home with assist, home with home health  Outcome Summary/Treatment Plan (PT)  Plan for Continued Treatment (PT): cont  Anticipated Discharge Disposition (PT): home with assist, home with home health  Plan of Care Reviewed With: patient  Outcome Summary: sup-sit min of 1,sit-stand-sit min/cga of 1,amb 14' with rw and cga of 1,frequent rest breaks due to nausea and soa-no goal met-if d/c would benefit from short stay snf with PT  Outcome Measures     Row Name 05/16/19 1415 05/16/19 0955 05/15/19 4334       How much help from another person do you currently need...    Turning from your back to your side while in flat  bed without using bedrails?  3  -LN  --  3  -TW    Moving from lying on back to sitting on the side of a flat bed without bedrails?  3  -LN  --  3  -TW    Moving to and from a bed to a chair (including a wheelchair)?  3  -LN  --  3  -TW    Standing up from a chair using your arms (e.g., wheelchair, bedside chair)?  3  -LN  --  3  -TW    Climbing 3-5 steps with a railing?  2  -LN  --  2  -TW    To walk in hospital room?  3  -LN  --  3  -TW    AM-PAC 6 Clicks Score  17  -LN  --  17  -TW       How much help from another is currently needed...    Putting on and taking off regular lower body clothing?  --  3  -BB  --    Bathing (including washing, rinsing, and drying)  --  3  -BB  --    Toileting (which includes using toilet bed pan or urinal)  --  3  -BB  --    Putting on and taking off regular upper body clothing  --  4  -BB  --    Taking care of personal grooming (such as brushing teeth)  --  4  -BB  --    Eating meals  --  4  -BB  --    Score  --  21  -BB  --       Functional Assessment    Outcome Measure Options  AM-PAC 6 Clicks Basic Mobility (PT)  -LN  --  AM-PAC 6 Clicks Basic Mobility (PT)  -TW    Row Name 05/14/19 1400 05/14/19 5516          How much help from another person do you currently need...    Turning from your back to your side while in flat bed without using bedrails?  3  -BS  --     Moving from lying on back to sitting on the side of a flat bed without bedrails?  2  -BS  --     Moving to and from a bed to a chair (including a wheelchair)?  3  -BS  --     Standing up from a chair using your arms (e.g., wheelchair, bedside chair)?  3  -BS  --     Climbing 3-5 steps with a railing?  2  -BS  --     To walk in hospital room?  3  -BS  --     AM-PAC 6 Clicks Score  16  -BS  --        How much help from another is currently needed...    Putting on and taking off regular lower body clothing?  --  3  -AS     Bathing (including washing, rinsing, and drying)  --  3  -AS     Toileting (which includes using  toilet bed pan or urinal)  --  3  -AS     Putting on and taking off regular upper body clothing  --  4  -AS     Taking care of personal grooming (such as brushing teeth)  --  4  -AS     Eating meals  --  4  -AS     Score  --  21  -AS        Functional Assessment    Outcome Measure Options  AM-PAC 6 Clicks Basic Mobility (PT)  -BS  AM-PAC 6 Clicks Daily Activity (OT)  -AS       User Key  (r) = Recorded By, (t) = Taken By, (c) = Cosigned By    Initials Name Provider Type    LN Kiara Milligan PTA Physical Therapy Assistant    TW Carlitos Land, PTA Physical Therapy Assistant    BB Luz Elena Feliciano, AMIN/L Occupational Therapy Assistant    BS Malcom Kaiser, PT Physical Therapist    AS Ana Robles, OT Occupational Therapist         Time Calculation:   PT Charges     Row Name 05/16/19 1537             Time Calculation    Start Time  1415  -LN      Stop Time  1500  -LN      Time Calculation (min)  45 min  -LN      PT Received On  05/16/19  -LN         Time Calculation- PT    Total Timed Code Minutes- PT  45 minute(s)  -LN        User Key  (r) = Recorded By, (t) = Taken By, (c) = Cosigned By    Initials Name Provider Type    LN Kiara Milligan, MANUELITO Physical Therapy Assistant        Therapy Charges for Today     Code Description Service Date Service Provider Modifiers Qty    29550883658 HC GAIT TRAINING EA 15 MIN 5/16/2019 Kiara Milligan PTA GP 1    03088238878 HC PT THERAPEUTIC ACT EA 15 MIN 5/16/2019 Kiara Milligan PTA GP 2          PT G-Codes  Outcome Measure Options: AM-PAC 6 Clicks Basic Mobility (PT)  AM-PAC 6 Clicks Score: 17  Score: 21    Kiara Milligan PTA  5/16/2019

## 2019-05-16 NOTE — PLAN OF CARE
Problem: Patient Care Overview  Goal: Plan of Care Review  Outcome: Ongoing (interventions implemented as appropriate)   05/16/19 7050   Coping/Psychosocial   Plan of Care Reviewed With patient   Plan of Care Review   Progress no change   OTHER   Outcome Summary vss, encouraing OOB and working with PT/OT, pain controlled       Problem: Fall Risk (Adult)  Goal: Identify Related Risk Factors and Signs and Symptoms  Outcome: Ongoing (interventions implemented as appropriate)      Problem: Activity Intolerance (Adult)  Goal: Activity Tolerance  Outcome: Ongoing (interventions implemented as appropriate)    Goal: Effective Energy Conservation Techniques  Outcome: Ongoing (interventions implemented as appropriate)

## 2019-05-16 NOTE — CONSULTS
Adult Nutrition  Assessment    Patient Name:  Yaneli Bates  YOB: 1940  MRN: [id number]  Admit Date:  5/13/2019    Assessment Date:  5/16/2019    Comments:   Pt continues to have suboptimal po intake to meet needs. She typically eats ~25%, 33% average for the last 3 meals.  Her Gi distress has improved and according to her daughter she seems motivated to eat better.  +Pitting edema most likely related to malnutrition.  RD did verify with pt and her daughter--she was weighing 118# several weeks ago and now she weighs 110#.  Will monitor.  Discussed my concerns with MD regarding my inability to optimize protein in the diet due to her intolerances.      Reason for Assessment     Row Name 05/16/19 1516          Reason for Assessment    Reason For Assessment  follow-up protocol         Nutrition/Diet History     Row Name 05/16/19 1516          Nutrition/Diet History    Typical Food/Fluid Intake  Pt's daughter reports that she is making attempts to eat better.  She asked for some of the ice cream that she can eat.  Meals are going well.  Her UBW is 118# but she came in weighing 110# pta           Labs/Tests/Procedures/Meds     Row Name 05/16/19 1517          Labs/Procedures/Meds    Lab Results Comments  Na+ 131; Alb 1.50        Medications    Pertinent Medications Comments  Ferrous Sulfate; Pepcid; NaCl tablet         Physical Findings     Row Name 05/16/19 1520          Physical Findings    Overall Physical Appearance  on oxygen therapy;loss of subcutaneous fat;loss of muscle mass;edematous           Nutrition Prescription Ordered     Row Name 05/16/19 1520          Nutrition Prescription PO    Current PO Diet  Regular     Fluid Consistency  Thin         Evaluation of Received Nutrient/Fluid Intake     Row Name 05/16/19 1520          PO Evaluation    Number of Days PO Intake Evaluated  1 day     Number of Meals  3     % PO Intake  50/25/25               Electronically signed by:  Tanya Harkins  RD  05/16/19 3:24 PM

## 2019-05-16 NOTE — THERAPY TREATMENT NOTE
Acute Care - Occupational Therapy Treatment Note  [facility]     Patient Name: Yaneli Bates  : 1940  MRN: [id number]  Today's Date: 2019  Onset of Illness/Injury or Date of Surgery: 19  Date of Referral to OT: 19  Referring Physician: Dr. ISABELLA Hi    Admit Date: 2019       ICD-10-CM ICD-9-CM   1. Generalized weakness R53.1 780.79   2. Symptomatic anemia D64.9 285.9   3. Hypomagnesemia E83.42 275.2   4. Impaired mobility and activities of daily living Z74.09 799.89   5. Impaired functional mobility, balance, gait, and endurance Z74.09 V49.89     Patient Active Problem List   Diagnosis   • S/P AVR (aortic valve replacement)   • Hypertension   • Chest pain, rule out acute myocardial infarction   • Anemia   • Symptomatic anemia   • Diabetes mellitus (CMS/HCC)   • Hematuria   • Hyponatremia   • Coronary artery disease   • Weight loss, unintentional   • Fatigue   • Generalized weakness   • Hemorrhagic cystitis   • Iron deficiency anemia due to chronic blood loss   • Normocytic anemia     Past Medical History:   Diagnosis Date   • Allergic    • Anemia    • Arthritis    • Coronary artery disease 2014    valave replacement   • Fibromyalgia, primary    • Headache    • Infectious viral hepatitis    • Low back pain    • Osteoporosis    • Visual impairment      Past Surgical History:   Procedure Laterality Date   • AORTIC VALVE REPAIR/REPLACEMENT  2014,may   • CARDIAC SURGERY     • COLONOSCOPY     • COLONOSCOPY N/A 2019    Procedure: COLONOSCOPY;  Surgeon: Carlos Schaeffer MD;  Location: [facility] ENDOSCOPY;  Service: Gastroenterology   • ENDOSCOPY N/A 3/13/2019    Procedure: ESOPHAGOGASTRODUODENOSCOPY;  Surgeon: Carlos Schaeffer MD;  Location: [facility] ENDOSCOPY;  Service: Gastroenterology   • ENDOSCOPY N/A 2019    Procedure: ESOPHAGOGASTRODUODENOSCOPY;  Surgeon: Carlos Schaeffer MD;  Location: [facility] ENDOSCOPY;  Service: Gastroenterology   • KNEE SURGERY Right    •  TONSILLECTOMY         Therapy Treatment    Rehabilitation Treatment Summary     Row Name 05/16/19 0955             Treatment Time/Intention    Discipline  occupational therapy assistant  -BB      Document Type  therapy note (daily note)  -BB      Subjective Information  complains of;pain  -BB      Mode of Treatment  individual therapy;occupational therapy  -BB      Total Minutes, Occupational Therapy Treatment  39  -BB      Therapy Frequency (OT Eval)  other (see comments) 5-7 days/wk  -BB      Patient Effort  good  -BB      Existing Precautions/Restrictions  fall  -BB      Recorded by [BB] Luz Elena Feliciano COTA/L 05/16/19 1346      Row Name 05/16/19 0955             Vital Signs    Pretreatment Heart Rate (beats/min)  73  -BB      Intratreatment Heart Rate (beats/min)  76  -BB      Posttreatment Heart Rate (beats/min)  83  -BB      Pre SpO2 (%)  94  -BB      O2 Delivery Pre Treatment  room air  -BB      Intra SpO2 (%)  95  -BB      O2 Delivery Intra Treatment  room air  -BB      Post SpO2 (%)  96  -BB      O2 Delivery Post Treatment  room air  -BB      Pre Patient Position  Supine  -BB      Intra Patient Position  -- lng sitting  -BB      Post Patient Position  Supine  -BB      Recorded by [BB] Luz Elena Feliciano COTA/L 05/16/19 1346      Row Name 05/16/19 0955             Cognitive Assessment/Intervention- PT/OT    Orientation Status (Cognition)  oriented x 4  -BB      Follows Commands (Cognition)  follows one step commands  -BB      Recorded by [BB] Luz Elena Feliciano COTA/L 05/16/19 1346      Row Name 05/16/19 0955             Grooming Assessment/Training    [place] Level (Grooming)  grooming skills;hair care, combing/brushing;oral care regimen;wash face, hands;set up;supervision  -BB      Grooming Position  long sitting  -BB      Recorded by [BB] Luz Elena Feliciano COTA/L 05/16/19 1346      Row Name 05/16/19 0955             Therapeutic Exercise    Upper Extremity Range of Motion (Therapeutic  Exercise)  shoulder flexion/extension, left;shoulder internal/external rotation, bilateral;elbow flexion/extension, bilateral;forearm supination/pronation, bilateral;wrist flexion/extension, bilateral chest press  -BB      Hand (Therapeutic Exercise)  finger flexion/extension, bilateral  -BB      Weight/Resistance (Therapeutic Exercise)  1 pound  -BB      Exercise Type (Therapeutic Exercise)  AROM (active range of motion)  -BB      Position (Therapeutic Exercise)  seated  -BB      Sets/Reps (Therapeutic Exercise)  2x10  -BB      Equipment (Therapeutic Exercise)  free weight, barbell  -BB      Expected Outcome (Therapeutic Exercise)  improve functional tolerance, self-care activity;improve functional stability;improve performance, transfer skills  -BB      Recorded by [BB] Luz Elena Feliciano COTA/L 05/16/19 1349      Row Name 05/16/19 0955             Positioning and Restraints    Pre-Treatment Position  in bed  -BB      Post Treatment Position  bed  -BB      In Bed  supine;call light within reach;encouraged to call for assist;exit alarm on  -BB      Recorded by [BB] Luz Elena Feliciano COTA/L 05/16/19 1349      Row Name 05/16/19 0955             Pain Scale: Numbers Pre/Post-Treatment    Pain Scale: Numbers, Pretreatment  3/10  -BB      Pain Scale: Numbers, Post-Treatment  2/10  -BB      Pain Location  -- B LEs  -BB      Pain Intervention(s)  Medication (See MAR)  -BB      Recorded by [BB] Luz Elena Feliciano COTA/L 05/16/19 1349      Row Name 05/16/19 0955             Plan of Care Review    Plan of Care Reviewed With  patient  -BB      Recorded by [BB] Luz Elena Feliciano COTA/L 05/16/19 1349      Row Name 05/16/19 0955             Outcome Summary/Treatment Plan (OT)    Daily Summary of Progress (OT)  progress towards functional goals is fair  -BB      Plan for Continued Treatment (OT)  continue POC  -BB      Anticipated Discharge Disposition (OT)  skilled nursing facility;home with assist;home with home health   -BB      Recorded by [BB] Luz Elena Feliciano COTA/MERLINE 05/16/19 1349        User Key  (r) = Recorded By, (t) = Taken By, (c) = Cosigned By    Initials Name Effective Dates Discipline    Luz Elena Pastor COTA/L 03/07/18 -  OT           Rehab Goal Summary     Row Name 05/16/19 0955             Occupational Therapy Goals    Transfer Goal Selection (OT)  transfer, OT goal 1  -BB      Dressing Goal Selection (OT)  dressing, OT goal 1  -BB      Toileting Goal Selection (OT)  toileting, OT goal 1  -BB      Strength Goal Selection (OT)  strength, OT goal 1  -BB      Activity Tolerance Goal Selection (OT)  activity tolerance, OT goal 1  -BB         Transfer Goal 1 (OT)    Activity/Assistive Device (Transfer Goal 1, OT)  sit-to-stand/stand-to-sit;bed-to-chair/chair-to-bed;toilet  -BB      [place] Level/Cues Needed (Transfer Goal 1, OT)  supervision required  -BB      Time Frame (Transfer Goal 1, OT)  long term goal (LTG);by discharge  -BB      Progress/Outcome (Transfer Goal 1, OT)  goal not met  -BB         Dressing Goal 1 (OT)    Activity/Assistive Device (Dressing Goal 1, OT)  dressing skills, all  -BB      [place]/Cues Needed (Dressing Goal 1, OT)  supervision required;set-up required  -BB      Time Frame (Dressing Goal 1, OT)  long term goal (LTG);by discharge  -BB      Progress/Outcome (Dressing Goal 1, OT)  goal not met  -BB         Toileting Goal 1 (OT)    Activity/Device (Toileting Goal 1, OT)  toileting skills, all  -BB      [place] Level/Cues Needed (Toileting Goal 1, OT)  conditional independence  -BB      Time Frame (Toileting Goal 1, OT)  long term goal (LTG);by discharge  -BB      Progress/Outcome (Toileting Goal 1, OT)  goal not met  -BB         Strength Goal 1 (OT)    Strength Goal 1 (OT)  Pt will be independent in BUE strengthening HEP  -BB      Time Frame (Strength Goal 1, OT)  long term goal (LTG);by discharge  -BB      Progress/Outcome (Strength Goal 1, OT)  goal not met  -BB           Activity Tolerance Goal 1 (OT)    Activity Level (Endurance Goal 1, OT)  15 min activity functiona/therapeutic activities  -BB      Time Frame (Activity Tolerance Goal 1, OT)  long term goal (LTG);by discharge  -BB      Progress/Outcome (Activity Tolerance Goal 1, OT)  goal met  -BB        User Key  (r) = Recorded By, (t) = Taken By, (c) = Cosigned By    Initials Name Provider Type Discipline    BB Luz Elena Feliciano COTA/L Occupational Therapy Assistant OT        Occupational Therapy Education     Title: PT OT SLP Therapies (In Progress)     Topic: Occupational Therapy (In Progress)     Point: ADL training (In Progress)     Description: Instruct learner(s) on proper safety adaptation and remediation techniques during self care or transfers.   Instruct in proper use of assistive devices.    Learning Progress Summary           Patient Acceptance, E, NR by BB at 5/16/2019  1:51 PM    Acceptance, E, VU by AS at 5/14/2019  2:54 PM    Comment:  role of OT, OT POC, ADL training, transfer training   Family Acceptance, E, VU by AS at 5/14/2019  2:54 PM    Comment:  role of OT, OT POC, ADL training, transfer training                   Point: Home exercise program (In Progress)     Description: Instruct learner(s) on appropriate technique for monitoring, assisting and/or progressing therapeutic exercises/activities.    Learning Progress Summary           Patient Acceptance, E, NR by BB at 5/16/2019  1:51 PM                   Point: Precautions (Done)     Description: Instruct learner(s) on prescribed precautions during self-care and functional transfers.    Learning Progress Summary           Patient Acceptance, E, VU by AS at 5/14/2019  2:54 PM    Comment:  role of OT, OT POC, ADL training, transfer training   Family Acceptance, E, VU by AS at 5/14/2019  2:54 PM    Comment:  role of OT, OT POC, ADL training, transfer training                               User Key     Initials Effective Dates Name Provider Type Discipline     BB 03/07/18 -  Luz Elena Feliciano COTA/L Occupational Therapy Assistant OT    AS 03/25/19 -  Ana Robles, OT Occupational Therapist OT              Non-skid socks and gait belt in place. Toileting offered. Call light and needs within reach. Pt advised to not get up alone and call the nurse for assistance.  Bed alarm on.     OT Recommendation and Plan  Outcome Summary/Treatment Plan (OT)  Daily Summary of Progress (OT): progress towards functional goals is fair  Plan for Continued Treatment (OT): continue POC  Anticipated Discharge Disposition (OT): skilled nursing facility, home with assist, home with home health  Therapy Frequency (OT Eval): other (see comments)(5-7 days/wk)  Daily Summary of Progress (OT): progress towards functional goals is fair  Plan of Care Review  Plan of Care Reviewed With: patient  Plan of Care Reviewed With: patient  Outcome Summary: Pt performed B UE exercises with fair tolerance. One goal met this tx.  Outcome Measures     Row Name 05/16/19 0955 05/15/19 1506 05/14/19 1400       How much help from another person do you currently need...    Turning from your back to your side while in flat bed without using bedrails?  --  3  -TW  3  -BS    Moving from lying on back to sitting on the side of a flat bed without bedrails?  --  3  -TW  2  -BS    Moving to and from a bed to a chair (including a wheelchair)?  --  3  -TW  3  -BS    Standing up from a chair using your arms (e.g., wheelchair, bedside chair)?  --  3  -TW  3  -BS    Climbing 3-5 steps with a railing?  --  2  -TW  2  -BS    To walk in hospital room?  --  3  -TW  3  -BS    AM-PAC 6 Clicks Score  --  17  -TW  16  -BS       How much help from another is currently needed...    Putting on and taking off regular lower body clothing?  3  -BB  --  --    Bathing (including washing, rinsing, and drying)  3  -BB  --  --    Toileting (which includes using toilet bed pan or urinal)  3  -BB  --  --    Putting on and taking off regular  upper body clothing  4  -BB  --  --    Taking care of personal grooming (such as brushing teeth)  4  -BB  --  --    Eating meals  4  -BB  --  --    Score  21  -BB  --  --       Functional Assessment    Outcome Measure Options  --  AM-PAC 6 Clicks Basic Mobility (PT)  -  AM-PAC 6 Clicks Basic Mobility (PT)  -BS    Row Name 05/14/19 1584             How much help from another is currently needed...    Putting on and taking off regular lower body clothing?  3  -AS      Bathing (including washing, rinsing, and drying)  3  -AS      Toileting (which includes using toilet bed pan or urinal)  3  -AS      Putting on and taking off regular upper body clothing  4  -AS      Taking care of personal grooming (such as brushing teeth)  4  -AS      Eating meals  4  -AS      Score  21  -AS         Functional Assessment    Outcome Measure Options  AM-PAC 6 Clicks Daily Activity (OT)  -AS        User Key  (r) = Recorded By, (t) = Taken By, (c) = Cosigned By    Initials Name Provider Type    TW Carlitos Land, PTA Physical Therapy Assistant    BB Luz Elena Feliciano COTA/L Occupational Therapy Assistant    BS Malcom Kaiser, PT Physical Therapist    AS Ana Robles, OT Occupational Therapist           Time Calculation:   Time Calculation- OT     Row Name 05/16/19 8323             Time Calculation- OT    OT Start Time  0955  -BB      OT Stop Time  1034  -BB      OT Time Calculation (min)  39 min  -BB      Total Timed Code Minutes- OT  39 minute(s)  -BB      OT Received On  05/16/19  -        User Key  (r) = Recorded By, (t) = Taken By, (c) = Cosigned By    Initials Name Provider Type    BB Luz Elena Feliciano COTA/L Occupational Therapy Assistant        Therapy Charges for Today     Code Description Service Date Service Provider Modifiers Qty    02669322600 HC OT SELF CARE/MGMT/TRAIN EA 15 MIN 5/16/2019 Luz Elena Feliciano COTA/L GO 1    00123307497 HC OT THER PROC EA 15 MIN 5/16/2019 Luz Elena Feliciano COTA/MERLINE  2                Luz Elena Feliciano, AMIN/MERLINE  5/16/2019

## 2019-05-16 NOTE — PLAN OF CARE
Problem: Patient Care Overview  Goal: Plan of Care Review   05/16/19 1522 05/16/19 1535   Coping/Psychosocial   Plan of Care Reviewed With --  patient   Plan of Care Review   Progress no change --    OTHER   Outcome Summary --  sup-sit min of 1,sit-stand-sit min/cga of 1,amb 14' with rw and cga of 1,frequent rest breaks due to nausea and soa-no goal met-if d/c would benefit from short stay snf with PT     Goal: Discharge Needs Assessment   05/13/19 1800 05/16/19 0936 05/16/19 1535   Discharge Needs Assessment   Concerns to be Addressed --  denies needs/concerns at this time --    Patient/Family Anticipates Transition to --  other (see comments)  (Possible home with spouse v's SNF) --    Patient/Family Anticipated Services at Transition --  skilled nursing;rehabilitation services --    Transportation Concerns car, none --  --    Transportation Anticipated --  family or friend will provide --    Outpatient/Agency/Support Group Needs --  skilled nursing facility --    Discharge Facility/Level of Care Needs --  --  nursing facility, skilled   Offered/Gave Vendor List --  yes --    Patient's Choice of Community Agency(s) --  List of SNF's provided to patient and family at bedside --    Current Discharge Risk --  chronically ill;physical impairment;dependent with mobility/activities of daily living --    Disability   Equipment Currently Used at Home --  rollator;commode;power chair,(recliner lift) --

## 2019-05-16 NOTE — PROGRESS NOTES
Malnutrition Severity Assessment    Patient Name:  Yaneli Bates  YOB: 1940  MRN: [id number]  Admit Date:  5/13/2019    Patient meets criteria for : Severe Malnutrition    Comments:  Pt presents with severe malnutrition as evidenced by her very poor oral intake especially over the last several weeks with a recent wt loss of 8#.  (Her recent UBW is 118# and she was weighing 110# upon admit per family.)  This is 7% of her stated UBW of 118#.  Nutrition Focused Physical Assessment showed physical signs of fat loss and muscle wasting present.  She also has 2+ pitting edema.  Pt admitted with generalized weakness.  She also has mult food intolerances that makes supplementing her diet very difficult if not impossible.    Malnutrition Severity Assessment  Malnutrition Type: Chronic Disease - Related Malnutrition     Malnutrition Type (last 8 hours)      Malnutrition Severity Assessment     Row Name 05/16/19 1526       Malnutrition Severity Assessment    Malnutrition Type  Chronic Disease - Related Malnutrition    Row Name 05/16/19 1526       Insufficient Energy Intake     Insufficient Energy Intake   < or equal to 50% of est. energy requirement for > or equal to 5d)    Row Name 05/16/19 1526       Unintentional Weight Loss     Unintentional Weight Loss   Weight loss greater than 2% in one week    Row Name 05/16/19 1526       Muscle Loss    Loss of Muscle Mass Findings  Severe    [place] Region  Severe - deep hollowing/scooping, lack of muscle to touch, facial bones well defined    Clavicle Bone Region  Severe - protruding prominent bone    Acromion Bone Region  Severe - squared shoulders, bones, and acromion process protrusion prominent    Patellar Region  Severe - prominent bone, square looking, very little muscle definition    Row Name 05/16/19 1526       Fat Loss    Subcutaneous Fat Loss Findings  Severe    Orbital Region   Severe - pronounced hollowness/depression, dark circles, loose saggy skin     Upper Arm Region  Severe - mostly skin, very little space between folds, fingers touch    Thoracic & Lumbar Region  Severe - ribs visible with prominent depressions, iliac crest very prominent    Row Name 05/16/19 1526       Fluid Accumulation (Edema)    Fluid Acumulation Findings  Moderate 2+    Fluid Accumulation   Moderate equals 2+ pitting edema    Row Name 05/16/19 1526       Criteria Met (Must meet criteria for severity in at least 2 of these categories: M Wasting, Fat Loss, Fluid, Secondary Signs, Wt. Status, Intake)    Patient meets criteria for   Severe Malnutrition          Electronically signed by:  Tanya Harkins RD  05/16/19 3:31 PM

## 2019-05-16 NOTE — DISCHARGE PLACEMENT REQUEST
"Yolanda Bates (78 y.o. Female)     Date of Birth Social Security Number Address Home Phone MRN    1940  [address] [facility] [address] [phone] [id number]    Christian Marital Status          Buddhism        Admission Date Admission Type Admitting Provider Attending Provider Department, Room/Bed    5/13/19 Emergency Bong Saxena MD Arora, Shannon, MD [address], 380/1    Discharge Date Discharge Disposition Discharge Destination                       Attending Provider:  Janice Nazario MD    Allergies:  Clindamycin/lincomycin, Corn-containing Products, Tomato Flavor [Flavoring Agent], Vinegar [Acetic Acid], Amoxicillin, Codeine, Penicillins, Sulfa Antibiotics, Vicodin [Hydrocodone-acetaminophen]    Isolation:  None   Infection:  None   Code Status:  CPR    Ht:  154.9 cm (61\")   Wt:  56.8 kg (125 lb 4.8 oz)    Admission Cmt:  None   Principal Problem:  Generalized weakness [R53.1] More...                 Active Insurance as of 5/13/2019     Primary Coverage     Payor Plan Insurance Group Employer/Plan Group    MEDICARE MEDICARE A & B      Payor Plan Address Payor Plan Phone Number Payor Plan Fax Number Effective Dates    [address] [phone]  6/1/2005 - None Entered    [facility] [address]       Subscriber Name Subscriber Birth Date Member ID       YOLANDA BATES 1940 [id number]           Secondary Coverage     Payor Plan Insurance Group Employer/Plan Group    AAR MED SUPP AAR HEALTH CARE OPTIONS [id number]     Payor Plan Address Payor Plan Phone Number Payor Plan Fax Number Effective Dates    [facility] [phone]  1/1/2018 - None Entered    [address]       [facility] [address]       Subscriber Name Subscriber Birth Date Member ID       YOLANDA BATES 1940 [id number]                 Emergency Contacts      (Rel.) Home Phone Work Phone Mobile Phone    PauloKenneth (Spouse) -- -- " [phone]    DHAVAL NICHOLSON (Daughter) [phone] -- [phone]        Referral for short term rehab. Pt. Would prefer private room. Pt. Also had inpatient admission -  Pt. Should be ready for discharge tomorrow.    Emergency Contact Information     Name Relation Home Work Mobile    Kenneth Bates Spouse   [phone]    DHAVAL NICOHLSON Daughter [phone]          Insurance Information                MEDICARE/MEDICARE A & B Phone: [phone]    Subscriber: Yaneli Bates Subscriber#: [id number]    Group#:  Precert#:         AARP MED SUPP/AARP HEALTH CARE OPTIONS Phone: [phone]    Subscriber: Yaneli Bates Montserrat Subscriber#: [id number]    Group#: [id number] Precert#:              History & Physical      Rodrigo Nazario MD at 2019  9:56 PM     Attestation signed by Millie Tate MD at 2019 11:38 AM    I have performed a history and physical examination of the patient on 2019 at 7:15pm. I have discussed the management of the patient with the resident.  I have reviewed the notes, assessment and plan, and/or procedures performed by the resident. I concur with the resident’s documentation.    Patient with 8 pound weight loss and weakness. She is having overwhelming nausea eating just a couple of bites of food.  Her legs are heavy and significantly swollen.  She feels she has difficulty emptying her bladder completely.    PE: NAD, thin appearing female, CVS:S1/S2 RRR, Abdomen soft NT/ND extremities 2+ pitting edema into thighs.    Plan: Consult GI and Urology.  Monitor H and H.      This document has been electronically signed by Millie Tate MD on May 14, 2019 11:38 AM                          HISTORY AND PHYSICAL  NAME: Yaneli Bates  : 1940  MRN: [id number]    DATE OF ADMISSION: 19    DATE & TIME SEEN: 19 6:46 PM    PCP: Elian Pacheco APRN    CODE STATUS: Full code    CHIEF COMPLAINT weakness    HPI:  Yaneli Bates is a  78 y.o. female with a CMH of MYLES with chronic blood loss, HTN, DM II, CAD w/ hx of AVR, fibromyalgia, DDD, who presents complaining of generalized weakness.     Patient presents to the ED today with generalized weakness, unable to keep food down, nausea/vomiting, fatigue, weight loss, generalized body pain, lower extremity swelling. Patient states she has noticed all symptoms starting since November 2018. Has had 2 inpatient admissions since then for weakness. For the last 2 weeks patient states her symptoms have gotten worse. She is more fatigued and has more difficulty keeping food down. She has lost about 8 pounds during this time. Her previous admission was in April 2019 where she was evaluated for possible GI bleed. Endoscopy and Colonoscopy showed no active bleed or malignancy. FOBT was negative during that visit. She followed up with Cynthia OATES outpatient. Recommendations were for endoscopic capsule to find source for chronic blood loss. Her hemoglobin has been stable between 8-8.5.  Her ferrous sulfate was adjusted to 324 twice daily. She also had some microscopic hematuria during her inpatient admission for which she was seen by urology who recommended cystoscopy outpatient. Patient was scheduled to see Dr. Damico this Wednesday.  Patient also was seen by Dr. Mckenzie nephrology since her last admission for hyponatremia due to possible SIADH. She currently takes 1g sodium tablets daily, fluid restrictions at 1.6 liters. She was recently seen by her PCP who started her on doxycyline for UTI. States it does take her a longer time to empty bladder. Patient has taken 2 days of abx. Daughter who is in room today patient has a lot of food allergies, specifically related to corn syrup. Side effects include migraines, abdominal pain.   In the ED patient was given a bolus of fluids. Hemoglobin was 7.7. WBC 14.04. Na 131, K 3.4. Mg 1.3 which was replaced in ED. U/A micro did show large amount of blood. Patient denied  seeing any blood in her stool or urine. V/s have been stable. Patient to be admitted and observed for generalized weakness.         CONCURRENT MEDICAL HISTORY:  Past Medical History:   Diagnosis Date   • Allergic    • Anemia    • Arthritis    • Coronary artery disease 2014    valave replacement   • Fibromyalgia, primary    • Headache    • Infectious viral hepatitis    • Low back pain    • Osteoporosis    • Visual impairment        PAST SURGICAL HISTORY:  Past Surgical History:   Procedure Laterality Date   • AORTIC VALVE REPAIR/REPLACEMENT  2014,may   • CARDIAC SURGERY     • COLONOSCOPY     • COLONOSCOPY N/A 4/22/2019    Procedure: COLONOSCOPY;  Surgeon: Carlos Schaeffer MD;  Location: [facility] ENDOSCOPY;  Service: Gastroenterology   • ENDOSCOPY N/A 3/13/2019    Procedure: ESOPHAGOGASTRODUODENOSCOPY;  Surgeon: Carlos Schaeffer MD;  Location: [facility] ENDOSCOPY;  Service: Gastroenterology   • ENDOSCOPY N/A 4/22/2019    Procedure: ESOPHAGOGASTRODUODENOSCOPY;  Surgeon: Carlos Schaeffer MD;  Location: [facility] ENDOSCOPY;  Service: Gastroenterology   • KNEE SURGERY Right 1967   • TONSILLECTOMY         FAMILY HISTORY:  Family History   Problem Relation Age of Onset   • Arthritis Mother    • Cancer Mother    • Heart disease Mother    • Hyperlipidemia Mother    • Hypertension Mother    • Vision loss Mother    • Arthritis Father    • Hearing loss Father    • Heart disease Father    • Hyperlipidemia Father    • Hypertension Father    • Vision loss Father    • Hypertension Sister    • Vision loss Sister    • Osteoporosis Sister    • Cataracts Sister    • Hyperlipidemia Brother    • Vision loss Brother    • Heart disease Brother    • Cancer Daughter    • Early death Daughter    • Osteoporosis Sister    • Parkinsonism Brother    • Hyperlipidemia Brother    • Thyroid disease Daughter         SOCIAL HISTORY:  Social History     Socioeconomic History   • Marital status:      Spouse name: Not on file   • Number of children: Not on  file   • Years of education: Not on file   • Highest education level: Not on file   Tobacco Use   • Smoking status: Never Smoker   • Smokeless tobacco: Never Used   Substance and Sexual Activity   • Alcohol use: No   • Drug use: No   • Sexual activity: No       HOME MEDICATIONS:  Prior to Admission medications    Medication Sig Start Date End Date Taking? Authorizing Provider   traMADol (ULTRAM) 50 MG tablet Take 50 mg by mouth 2 (Two) Times a Day As Needed for Moderate Pain .   Yes ProviderKlarissa MD   alendronate (FOSAMAX) 70 MG tablet Take 1 tablet by mouth Every 7 (Seven) Days. 4/24/19   Elian Pacheco APRN   doxycycline (VIBRAMYCIN) 100 MG capsule Take 1 capsule by mouth 2 (Two) Times a Day. 5/10/19   Elian Pacheco APRN   ferrous sulfate 324 (65 Fe) MG tablet delayed-release EC tablet Take 1 tablet by mouth 2 (Two) Times a Day With Meals for 30 days. 5/10/19 6/9/19  Elian Pacheco APRN   lactulose (CHRONULAC) 10 GM/15ML solution Take 30 mL by mouth 2 (Two) Times a Day As Needed (constipation). 4/26/19   Elian Pacheco APRN   metoprolol succinate XL (TOPROL-XL) 50 MG 24 hr tablet Take 1 tablet by mouth Daily With Dinner. 2/15/19   Toro Pryor MD   ondansetron ODT (ZOFRAN ODT) 4 MG disintegrating tablet Take 1 tablet by mouth Every 8 (Eight) Hours As Needed for Nausea or Vomiting. 5/2/19   Elian Pacheco APRN   potassium chloride (K-DUR,KLOR-CON) 10 MEQ CR tablet Take 1 tablet by mouth Daily for 5 days. 5/7/19 5/12/19  Elian Pacheco APRN   raNITIdine (ZANTAC) 75 MG tablet Take 75 mg by mouth 2 (Two) Times a Day.    ProviderKlarissa MD   sodium chloride 1 g tablet Take 1 tablet by mouth 3 (Three) Times a Day With Meals. 4/23/19   Lewis Tsang MD   metFORMIN ER (GLUCOPHAGE-XR) 500 MG 24 hr tablet Take 1 tablet by mouth Daily With Breakfast. 4/4/19 5/13/19  Elian Pacheco APRN       ALLERGIES:  Clindamycin/lincomycin; Corn-containing products; Tomato flavor  [flavoring agent]; Vinegar [acetic acid]; Amoxicillin; Codeine; Penicillins; Sulfa antibiotics; and Vicodin [hydrocodone-acetaminophen]    REVIEW OF SYSTEMS  Review of Systems   Constitutional: Positive for activity change, appetite change, fatigue and unexpected weight change. Negative for chills, diaphoresis and fever.   HENT: Negative for congestion, trouble swallowing and voice change.    Respiratory: Negative for cough, shortness of breath and wheezing.    Cardiovascular: Positive for leg swelling. Negative for chest pain and palpitations.   Gastrointestinal: Positive for constipation, nausea and vomiting. Negative for abdominal distention, abdominal pain, anal bleeding, blood in stool, diarrhea and rectal pain.   Genitourinary: Negative for difficulty urinating, dysuria, flank pain, frequency, hematuria and urgency.   Musculoskeletal: Positive for arthralgias, gait problem (uses walker ) and myalgias. Negative for neck pain and neck stiffness.   Skin: Negative for color change, pallor, rash and wound.   Allergic/Immunologic: Positive for food allergies.   Neurological: Positive for weakness and headaches. Negative for dizziness, seizures, syncope and speech difficulty.   Hematological: Negative for adenopathy. Does not bruise/bleed easily.   Psychiatric/Behavioral: Negative for agitation, behavioral problems, confusion and decreased concentration.       PHYSICAL EXAM:  Temp:  [96.5 °F (35.8 °C)-97.8 °F (36.6 °C)] 96.5 °F (35.8 °C)  Heart Rate:  [55-92] 55  Resp:  [16-19] 16  BP: (137-169)/(74-84) 137/76  Body mass index is 23.68 kg/m².  Physical Exam   Constitutional: She is oriented to person, place, and time. She appears well-developed and well-nourished. No distress.   HENT:   Head: Normocephalic and atraumatic.   Right Ear: External ear normal.   Left Ear: External ear normal.   Nose: Nose normal.   Mouth/Throat: Oropharynx is clear and moist.   Eyes: Conjunctivae and EOM are normal. Pupils are equal,  round, and reactive to light.   Neck: Normal range of motion. Neck supple. No thyromegaly present.   Cardiovascular: Normal rate, regular rhythm, normal heart sounds and intact distal pulses.   Pulmonary/Chest: Effort normal and breath sounds normal.   Abdominal: Soft. Bowel sounds are normal. She exhibits no distension and no mass. There is no tenderness. There is no guarding.   Musculoskeletal: Normal range of motion. She exhibits edema (+2 LE).   Swelling around knees     Neurological: She is alert and oriented to person, place, and time.   Skin: Skin is warm and dry. Capillary refill takes less than 2 seconds.   Psychiatric: She has a normal mood and affect. Her behavior is normal. Judgment and thought content normal.       DIAGNOSTIC DATA:   Lab Results (last 24 hours)     Procedure Component Value Units Date/Time    Lipase [[id number]]  (Normal) Collected:  05/13/19 1324    Specimen:  Blood Updated:  05/13/19 1855     Lipase 19 U/L     Urinalysis, Microscopic Only - Urine, Catheter In/Out [[id number]]  (Abnormal) Collected:  05/13/19 1551    Specimen:  Urine, Catheter In/Out Updated:  05/13/19 1606     RBC, UA Too Numerous to Count /HPF      WBC, UA 3-5 /HPF      Bacteria, UA None Seen /HPF      Squamous Epithelial Cells, UA None Seen /HPF      Hyaline Casts, UA 0-2 /LPF      Methodology Automated Microscopy    Urinalysis With Culture If Indicated - Urine, Catheter In/Out [[id number]]  (Abnormal) Collected:  05/13/19 1551    Specimen:  Urine, Catheter In/Out Updated:  05/13/19 1606     Color, UA Dark Yellow     Appearance, UA Cloudy     pH, UA 6.0     Specific Gravity, UA 1.016     Glucose,  mg/dL (Trace)     Ketones, UA Negative     Bilirubin, UA Negative     Blood, UA Large (3+)     Protein,  mg/dL (2+)     Leuk Esterase, UA Negative     Nitrite, UA Negative     Urobilinogen, UA 1.0 E.U./dL    Magnesium [[id number]]  (Abnormal) Collected:  05/13/19 1324    Specimen:  Blood Updated:  05/13/19 9158      Magnesium 1.3 mg/dL     Comprehensive Metabolic Panel [[id number]]  (Abnormal) Collected:  05/13/19 1324    Specimen:  Blood Updated:  05/13/19 1358     Glucose 109 mg/dL      BUN 14 mg/dL      Creatinine 0.57 mg/dL      Sodium 131 mmol/L      Potassium 3.4 mmol/L      Chloride 94 mmol/L      CO2 26.0 mmol/L      Calcium 7.9 mg/dL      Total Protein 8.2 g/dL      Albumin 1.80 g/dL      ALT (SGPT) 7 U/L      AST (SGOT) 23 U/L      Alkaline Phosphatase 74 U/L      Total Bilirubin 0.5 mg/dL      eGFR Non African Amer 103 mL/min/1.73      Globulin 6.4 gm/dL      A/G Ratio 0.3 g/dL      BUN/Creatinine Ratio 24.6     Anion Gap 11.0 mmol/L     Narrative:       GFR Normal >60  Chronic Kidney Disease <60  Kidney Failure <15    CBC & Differential [[id number]] Collected:  05/13/19 1324    Specimen:  Blood Updated:  05/13/19 1341    Narrative:       The following orders were created for panel order CBC & Differential.  Procedure                               Abnormality         Status                     ---------                               -----------         ------                     CBC Auto Differential[[id number]]        Abnormal            Final result                 Please view results for these tests on the individual orders.    CBC Auto Differential [[id number]]  (Abnormal) Collected:  05/13/19 1324    Specimen:  Blood Updated:  05/13/19 1341     WBC 14.04 10*3/mm3      RBC 2.88 10*6/mm3      Hemoglobin 7.7 g/dL      Hematocrit 25.0 %      MCV 86.8 fL      MCH 26.7 pg      MCHC 30.8 g/dL      RDW 18.6 %      RDW-SD 59.3 fl      MPV 10.1 fL      Platelets 261 10*3/mm3      Neutrophil % 85.4 %      Lymphocyte % 6.6 %      Monocyte % 6.7 %      Eosinophil % 0.1 %      Basophil % 0.2 %      Immature Grans % 1.0 %      Neutrophils, Absolute 11.98 10*3/mm3      Lymphocytes, Absolute 0.93 10*3/mm3      Monocytes, Absolute 0.94 10*3/mm3      Eosinophils, Absolute 0.02 10*3/mm3      Basophils, Absolute 0.03 10*3/mm3       Immature Grans, Absolute 0.14 10*3/mm3      nRBC 0.0 /100 WBC            Imaging Results (last 24 hours)     Procedure Component Value Units Date/Time    XR Chest PA & Lateral [[id number]] Collected:  05/13/19 1205     Updated:  05/13/19 1225    Narrative:         EXAM:          Radiograph(s), Chest   VIEWS:    PA / Lat ; 2       DATE/TIME:  5/13/2019 12:23 PM CDT                INDICATION:   generalized weakness    COMPARISON:  CXR: 4/11/19             FINDINGS:             - lines/tubes:    none     - cardiac:         size within normal limits; status post  valvular repair         - mediastinum: contour within normal limits         - lungs:         no focal air space process, pulmonary  interstitial edema, nodule(s)/mass             - pleura:         Small left pleural fluid collection.                   - osseous:         Status post median sternotomy                  - misc.:         Impression:       CONCLUSION:        1. No evidence of an active cardiopulmonary process.                                                Electronically signed by:  LEXA Almonte MD  5/13/2019 12:24  PM CDT Workstation: [id number]            I reviewed the patient's new clinical results.    ASSESSMENT AND PLAN: This is a 78 y.o. female with:    Active Hospital Problems    Diagnosis POA   • **Generalized weakness [R53.1] Yes     Possible malignancy?   -  Pancreatic source?    - lipase w/n/l, CT abdomen has shown no mass   - GI source?     -Colonoscopy, EGD no signs of malignant process     - will consult GI, plans outpatient was for endoscopic capsule.    - Bladder cx?    - U/A shows large blood. Will get urine cytology    - Urology consult: will follow recommendations. Plan for outpatient during previous admission was for cystoscopy     - hemoglobin 7.7; will transfuse 2 units PRBC if less than 7.    - will continue to monitor     - Continue IVF, Dietician consult.      • Hemorrhagic cystitis [N30.91] Yes     Patient was seen by   [place], Urology previous admission. Was treated with Azactam and diflucan during that time. On discharge was scheduled for cystoscopy with Dr. Damico this Wednesday.     - patient was seen on 5/10/19 by PCP where she was diagnosed with cystitis. Started on doxy. Has taken 2 days.   - U/A on admission : large blood, 2+ protein   - will continue doxy   - urine cytology    - Urology consult         • Iron deficiency anemia due to chronic blood loss [D50.0] Yes     H/H: 7.7/25.0 in the ED  - continue home ferrous sulfate BID  -continue to trend H/H  - currently asymptomatic will transfuse 2 units PRBC if <7     • Weight loss, unintentional [R63.4] Yes     Will get dietician consult  - patient has many food allergies     • Hyponatremia [E87.1] Yes     In the ED sodium level is 131. Patient sees Dr. Mckenzie, Nephrology outpatient. 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   - continue home sodium tablets 1 g TID  - 1.6 L fluid restriction daily recommended by nephro         • Diabetes mellitus (CMS/HCC) [E11.9] Yes     Will hold home metformin  - SSI       • Hypertension [I10] Yes     Will continue toprol-xl 50 daily   Continue to monitor per hospital protocol      • Coronary artery disease [I25.10] Yes     History of AVR   Continue metoprolol 50 mg Daily          DVT prophylaxis: SCDs/TEDs     KASPERreviewed and consistent with patient reported medications.    Expected Length of Stay: Where: home and When:  3-4days    I discussed the patients findings and my recommendations with patient.     Dr. Tate is the attending on record at time of admission, She is aware of the patient's status and agrees with the above history and physical.        This document has been electronically signed by Rodrigo Nazario MD on May 13, 2019 9:56 PM            Electronically signed by Millie Tate MD at 2019 11:38 AM     Rodrigo Nazario MD at 2019  6:46 PM              HISTORY AND PHYSICAL  NAME: Yaneli Bates  : 1940  MRN:  [id number]    DATE OF ADMISSION: 05/13/19    DATE & TIME SEEN: 05/13/19 6:46 PM    PCP: Elian Pacheco APRN    CODE STATUS: Full code    CHIEF COMPLAINT weakness    HPI:  Yaneli Bates is a 78 y.o. female with a CMH of MYLES with chronic blood loss, HTN, DM II, CAD w/ hx of AVR, fibromyalgia, DDD, who presents complaining of generalized weakness.     Patient presents to the ED today with generalized weakness, unable to keep food down, nausea/vomiting, fatigue, weight loss, generalized body pain, lower extremity swelling. Patient states she has noticed all symptoms starting since November 2018. Has had 2 inpatient admissions since then for weakness. For the last 2 weeks patient states her symptoms have gotten worse. She is more fatigued and has more difficulty keeping food down. She has lost about 8 pounds during this time. Her previous admission was in April 2019 where she was evaluated for possible GI bleed. Endoscopy and Colonoscopy showed no active bleed or malignancy. FOBT was negative during that visit. She followed up with Cynthia OATES outpatient. Recommendations were for endoscopic capsule to find source for chronic blood loss. Her hemoglobin has been stable between 8-8.5.  Her ferrous sulfate was adjusted to 324 twice daily. She also had some microscopic hematuria during her inpatient admission for which she was seen by urology who recommended cystoscopy outpatient. Patient was scheduled to see Dr. Damico this Wednesday.  Patient also was seen by Dr. Mckenzie nephrology since her last admission for hyponatremia due to possible SIADH. She currently takes 1g sodium tablets daily, fluid restrictions at 1.6 liters. She was recently seen by her PCP who started her on doxycyline for UTI. States it does take her a longer time to empty bladder. Patient has taken 2 days of abx. Daughter who is in room today patient has a lot of food allergies, specifically related to corn syrup. Side effects include migraines,  abdominal pain.   In the ED patient was given a bolus of fluids. Hemoglobin was 7.7. WBC 14.04. Na 131, K 3.4. Mg 1.3 which was replaced in ED. U/A micro did show large amount of blood. Patient denied seeing any blood in her stool or urine. V/s have been stable. Patient to be admitted and observed for generalized weakness.         CONCURRENT MEDICAL HISTORY:  Past Medical History:   Diagnosis Date   • Allergic    • Anemia    • Arthritis    • Coronary artery disease 2014    valave replacement   • Fibromyalgia, primary    • Headache    • Infectious viral hepatitis    • Low back pain    • Osteoporosis    • Visual impairment        PAST SURGICAL HISTORY:  Past Surgical History:   Procedure Laterality Date   • AORTIC VALVE REPAIR/REPLACEMENT  2014,may   • CARDIAC SURGERY     • COLONOSCOPY     • COLONOSCOPY N/A 4/22/2019    Procedure: COLONOSCOPY;  Surgeon: Carlos Schaeffer MD;  Location: [facility] ENDOSCOPY;  Service: Gastroenterology   • ENDOSCOPY N/A 3/13/2019    Procedure: ESOPHAGOGASTRODUODENOSCOPY;  Surgeon: Carlos Schaeffer MD;  Location: [facility] ENDOSCOPY;  Service: Gastroenterology   • ENDOSCOPY N/A 4/22/2019    Procedure: ESOPHAGOGASTRODUODENOSCOPY;  Surgeon: Carlos Schaeffer MD;  Location: [facility] ENDOSCOPY;  Service: Gastroenterology   • KNEE SURGERY Right 1967   • TONSILLECTOMY         FAMILY HISTORY:  Family History   Problem Relation Age of Onset   • Arthritis Mother    • Cancer Mother    • Heart disease Mother    • Hyperlipidemia Mother    • Hypertension Mother    • Vision loss Mother    • Arthritis Father    • Hearing loss Father    • Heart disease Father    • Hyperlipidemia Father    • Hypertension Father    • Vision loss Father    • Hypertension Sister    • Vision loss Sister    • Osteoporosis Sister    • Cataracts Sister    • Hyperlipidemia Brother    • Vision loss Brother    • Heart disease Brother    • Cancer Daughter    • Early death Daughter    • Osteoporosis Sister    • Parkinsonism Brother    •  Hyperlipidemia Brother    • Thyroid disease Daughter         SOCIAL HISTORY:  Social History     Socioeconomic History   • Marital status:      Spouse name: Not on file   • Number of children: Not on file   • Years of education: Not on file   • Highest education level: Not on file   Tobacco Use   • Smoking status: Never Smoker   • Smokeless tobacco: Never Used   Substance and Sexual Activity   • Alcohol use: No   • Drug use: No   • Sexual activity: No       HOME MEDICATIONS:  Prior to Admission medications    Medication Sig Start Date End Date Taking? Authorizing Provider   traMADol (ULTRAM) 50 MG tablet Take 50 mg by mouth 2 (Two) Times a Day As Needed for Moderate Pain .   Yes ProviderKlarissa MD   alendronate (FOSAMAX) 70 MG tablet Take 1 tablet by mouth Every 7 (Seven) Days. 4/24/19   Elian Pacheco APRN   doxycycline (VIBRAMYCIN) 100 MG capsule Take 1 capsule by mouth 2 (Two) Times a Day. 5/10/19   Elian Pacheco APRN   ferrous sulfate 324 (65 Fe) MG tablet delayed-release EC tablet Take 1 tablet by mouth 2 (Two) Times a Day With Meals for 30 days. 5/10/19 6/9/19  Elian Pacheco APRN   lactulose (CHRONULAC) 10 GM/15ML solution Take 30 mL by mouth 2 (Two) Times a Day As Needed (constipation). 4/26/19   Elian Pacheco APRN   metoprolol succinate XL (TOPROL-XL) 50 MG 24 hr tablet Take 1 tablet by mouth Daily With Dinner. 2/15/19   Toro Pryor MD   ondansetron ODT (ZOFRAN ODT) 4 MG disintegrating tablet Take 1 tablet by mouth Every 8 (Eight) Hours As Needed for Nausea or Vomiting. 5/2/19   Elian Pacheco APRN   potassium chloride (K-DUR,KLOR-CON) 10 MEQ CR tablet Take 1 tablet by mouth Daily for 5 days. 5/7/19 5/12/19  Elian Pacheco APRN   raNITIdine (ZANTAC) 75 MG tablet Take 75 mg by mouth 2 (Two) Times a Day.    Provider, MD Klarissa   sodium chloride 1 g tablet Take 1 tablet by mouth 3 (Three) Times a Day With Meals. 4/23/19   Lewis Tsang MD   metFORMIN ER  (GLUCOPHAGE-XR) 500 MG 24 hr tablet Take 1 tablet by mouth Daily With Breakfast. 4/4/19 5/13/19  Elian Pacheco APRN       ALLERGIES:  Clindamycin/lincomycin; Corn-containing products; Tomato flavor [flavoring agent]; Vinegar [acetic acid]; Amoxicillin; Codeine; Penicillins; Sulfa antibiotics; and Vicodin [hydrocodone-acetaminophen]    REVIEW OF SYSTEMS  Review of Systems   Constitutional: Positive for activity change, appetite change, fatigue and unexpected weight change. Negative for chills, diaphoresis and fever.   HENT: Negative for congestion, trouble swallowing and voice change.    Respiratory: Negative for cough, shortness of breath and wheezing.    Cardiovascular: Positive for leg swelling. Negative for chest pain and palpitations.   Gastrointestinal: Positive for constipation, nausea and vomiting. Negative for abdominal distention, abdominal pain, anal bleeding, blood in stool, diarrhea and rectal pain.   Genitourinary: Negative for difficulty urinating, dysuria, flank pain, frequency, hematuria and urgency.   Musculoskeletal: Positive for arthralgias, gait problem (uses walker ) and myalgias. Negative for neck pain and neck stiffness.   Skin: Negative for color change, pallor, rash and wound.   Allergic/Immunologic: Positive for food allergies.   Neurological: Positive for weakness and headaches. Negative for dizziness, seizures, syncope and speech difficulty.   Hematological: Negative for adenopathy. Does not bruise/bleed easily.   Psychiatric/Behavioral: Negative for agitation, behavioral problems, confusion and decreased concentration.       PHYSICAL EXAM:  Temp:  [96.5 °F (35.8 °C)-97.8 °F (36.6 °C)] 96.5 °F (35.8 °C)  Heart Rate:  [55-92] 55  Resp:  [16-19] 16  BP: (137-169)/(74-84) 137/76  Body mass index is 23.68 kg/m².  Physical Exam   Constitutional: She is oriented to person, place, and time. She appears well-developed and well-nourished. No distress.   HENT:   Head: Normocephalic and  atraumatic.   Right Ear: External ear normal.   Left Ear: External ear normal.   Nose: Nose normal.   Mouth/Throat: Oropharynx is clear and moist.   Eyes: Conjunctivae and EOM are normal. Pupils are equal, round, and reactive to light.   Neck: Normal range of motion. Neck supple. No thyromegaly present.   Cardiovascular: Normal rate, regular rhythm, normal heart sounds and intact distal pulses.   Pulmonary/Chest: Effort normal and breath sounds normal.   Abdominal: Soft. Bowel sounds are normal. She exhibits no distension and no mass. There is no tenderness. There is no guarding.   Musculoskeletal: Normal range of motion. She exhibits edema (+2 LE).   Swelling around knees     Neurological: She is alert and oriented to person, place, and time.   Skin: Skin is warm and dry. Capillary refill takes less than 2 seconds.   Psychiatric: She has a normal mood and affect. Her behavior is normal. Judgment and thought content normal.       DIAGNOSTIC DATA:   Lab Results (last 24 hours)     Procedure Component Value Units Date/Time    Lipase [[id number]]  (Normal) Collected:  05/13/19 1324    Specimen:  Blood Updated:  05/13/19 1855     Lipase 19 U/L     Urinalysis, Microscopic Only - Urine, Catheter In/Out [[id number]]  (Abnormal) Collected:  05/13/19 1551    Specimen:  Urine, Catheter In/Out Updated:  05/13/19 1606     RBC, UA Too Numerous to Count /HPF      WBC, UA 3-5 /HPF      Bacteria, UA None Seen /HPF      Squamous Epithelial Cells, UA None Seen /HPF      Hyaline Casts, UA 0-2 /LPF      Methodology Automated Microscopy    Urinalysis With Culture If Indicated - Urine, Catheter In/Out [[id number]]  (Abnormal) Collected:  05/13/19 1551    Specimen:  Urine, Catheter In/Out Updated:  05/13/19 1606     Color, UA Dark Yellow     Appearance, UA Cloudy     pH, UA 6.0     Specific Gravity, UA 1.016     Glucose,  mg/dL (Trace)     Ketones, UA Negative     Bilirubin, UA Negative     Blood, UA Large (3+)     Protein,   mg/dL (2+)     Leuk Esterase, UA Negative     Nitrite, UA Negative     Urobilinogen, UA 1.0 E.U./dL    Magnesium [[id number]]  (Abnormal) Collected:  05/13/19 1324    Specimen:  Blood Updated:  05/13/19 1403     Magnesium 1.3 mg/dL     Comprehensive Metabolic Panel [[id number]]  (Abnormal) Collected:  05/13/19 1324    Specimen:  Blood Updated:  05/13/19 1358     Glucose 109 mg/dL      BUN 14 mg/dL      Creatinine 0.57 mg/dL      Sodium 131 mmol/L      Potassium 3.4 mmol/L      Chloride 94 mmol/L      CO2 26.0 mmol/L      Calcium 7.9 mg/dL      Total Protein 8.2 g/dL      Albumin 1.80 g/dL      ALT (SGPT) 7 U/L      AST (SGOT) 23 U/L      Alkaline Phosphatase 74 U/L      Total Bilirubin 0.5 mg/dL      eGFR Non African Amer 103 mL/min/1.73      Globulin 6.4 gm/dL      A/G Ratio 0.3 g/dL      BUN/Creatinine Ratio 24.6     Anion Gap 11.0 mmol/L     Narrative:       GFR Normal >60  Chronic Kidney Disease <60  Kidney Failure <15    CBC & Differential [[id number]] Collected:  05/13/19 1324    Specimen:  Blood Updated:  05/13/19 1341    Narrative:       The following orders were created for panel order CBC & Differential.  Procedure                               Abnormality         Status                     ---------                               -----------         ------                     CBC Auto Differential[[id number]]        Abnormal            Final result                 Please view results for these tests on the individual orders.    CBC Auto Differential [[id number]]  (Abnormal) Collected:  05/13/19 1324    Specimen:  Blood Updated:  05/13/19 1341     WBC 14.04 10*3/mm3      RBC 2.88 10*6/mm3      Hemoglobin 7.7 g/dL      Hematocrit 25.0 %      MCV 86.8 fL      MCH 26.7 pg      MCHC 30.8 g/dL      RDW 18.6 %      RDW-SD 59.3 fl      MPV 10.1 fL      Platelets 261 10*3/mm3      Neutrophil % 85.4 %      Lymphocyte % 6.6 %      Monocyte % 6.7 %      Eosinophil % 0.1 %      Basophil % 0.2 %      Immature Grans % 1.0 %       Neutrophils, Absolute 11.98 10*3/mm3      Lymphocytes, Absolute 0.93 10*3/mm3      Monocytes, Absolute 0.94 10*3/mm3      Eosinophils, Absolute 0.02 10*3/mm3      Basophils, Absolute 0.03 10*3/mm3      Immature Grans, Absolute 0.14 10*3/mm3      nRBC 0.0 /100 WBC            Imaging Results (last 24 hours)     Procedure Component Value Units Date/Time    XR Chest PA & Lateral [[id number]] Collected:  05/13/19 1205     Updated:  05/13/19 1225    Narrative:         EXAM:          Radiograph(s), Chest   VIEWS:    PA / Lat ; 2       DATE/TIME:  5/13/2019 12:23 PM CDT                INDICATION:   generalized weakness    COMPARISON:  CXR: 4/11/19             FINDINGS:             - lines/tubes:    none     - cardiac:         size within normal limits; status post  valvular repair         - mediastinum: contour within normal limits         - lungs:         no focal air space process, pulmonary  interstitial edema, nodule(s)/mass             - pleura:         Small left pleural fluid collection.                   - osseous:         Status post median sternotomy                  - misc.:         Impression:       CONCLUSION:        1. No evidence of an active cardiopulmonary process.                                                Electronically signed by:  LEXA Almonte MD  5/13/2019 12:24  PM CDT Workstation: [id number]            I reviewed the patient's new clinical results.    ASSESSMENT AND PLAN: This is a 78 y.o. female with:    Active Hospital Problems    Diagnosis POA   • **Generalized weakness [R53.1] Yes     Possible malignancy?   -  Pancreatic source?    - lipase w/n/l, CT abdomen has shown no mass   - GI source?     -Colonoscopy, EGD no signs of malignant process     - will consult GI, plans outpatient was for endoscopic capsule.    - Bladder cx?    - U/A shows large blood. Will get urine cytology    - Urology consult: will follow recommendations. Plan for outpatient during previous admission was for  cystoscopy     - hemoglobin 7.7; will transfuse 2 units PRBC if less than 7.    - will continue to monitor     - Continue IVF, Dietician consult.      • Hemorrhagic cystitis [N30.91] Yes     Patient was seen by Dr. Gilmore, Urology previous admission. Was treated with Azactam and diflucan during that time. On discharge was scheduled for cystoscopy with Dr. Damico this Wednesday.     - patient was seen on 5/10/19 by PCP where she was diagnosed with cystitis. Started on doxy. Has taken 2 days.   - U/A on admission : large blood, 2+ protein   - will continue doxy   - Urology consult         • Iron deficiency anemia due to chronic blood loss [D50.0] Yes     H/H: 7.7/25.0 in the ED  - continue home ferrous sulfate BID  -continue to trend H/H  - currently asymptomatic will transfuse 2 units PRBC if <7     • Weight loss, unintentional [R63.4] Yes     Will get dietician consult  - patient has many food allergies     • Hyponatremia [E87.1] Yes     In the ED sodium level is 131. Patient sees Dr. Mckenzie, Nephrology outpatient. 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   - continue home sodium tablets 1 g TID  - 1.6 L fluid restriction daily recommended by nephro         • Diabetes mellitus (CMS/HCC) [E11.9] Yes     Will hold home metformin  - SSI       • Hypertension [I10] Yes     Will continue toprol-xl 50 daily   Continue to monitor per hospital protocol      • Coronary artery disease [I25.10] Yes     History of AVR 2014  Continue metoprolol 50 mg Daily          DVT prophylaxis: SCDs/TEDs     KASPERreviewed and consistent with patient reported medications.    Expected Length of Stay: Where: home and When:  3-4days    I discussed the patients findings and my recommendations with patient.     Dr. Tate is the attending on record at time of admission, She is aware of the patient's status and agrees with the above history and physical.        This document has been electronically signed by Rodrigo Nazario MD on May 13, 2019 9:49 PM             Electronically signed by Rodrigo Nazario MD at 5/13/2019  9:52 PM       ICU Vital Signs     Row Name 05/16/19 1100 05/16/19 0749 05/16/19 0334 05/15/19 2311 05/15/19 2100       Vitals    Temp  97.3 °F (36.3 °C)  96.6 °F (35.9 °C)  97 °F (36.1 °C)  98.3 °F (36.8 °C)  97.7 °F (36.5 °C)    Temp src  Oral  Oral  Oral  Oral  --    Pulse  77  76  78  77  82    Heart Rate Source  Monitor  Monitor  Monitor  Monitor  --    Resp  18  18  20  18  18    Resp Rate Source  Visual  Visual  Visual  --  --    BP  139/71  142/77  126/58  123/58  137/75    Noninvasive MAP (mmHg)  99  --  --  --  --    BP Location  Left arm  Left arm  Right arm  Right arm  --    BP Method  Automatic  Automatic  Automatic  Automatic  --    Patient Position  Lying  Lying  Lying  Lying  --       Oxygen Therapy    SpO2  92 %  --  90 %  90 %  91 %    Device (Oxygen Therapy)  room air  room air  --  --  room air    Row Name 05/15/19 2010 05/15/19 1500 05/15/19 1100 05/15/19 0851 05/15/19 0419       Vitals    Temp  --  97.5 °F (36.4 °C)  97.9 °F (36.6 °C)  97 °F (36.1 °C)  97 °F (36.1 °C)    Temp src  --  Oral  Oral  Axillary  --    Pulse  --  76  70  72  66    Heart Rate Source  --  Monitor  Monitor  Monitor  --    Resp  --  18  18  16  18    Resp Rate Source  --  Visual  Visual  Visual  --    BP  --  142/84  158/88  158/84  143/70    BP Location  --  Left arm  Left arm  Left arm  --    BP Method  --  Manual  Manual  Automatic  --    Patient Position  --  Lying  Lying  Lying  --       Oxygen Therapy    SpO2  --  92 %  93 %  97 %  93 %    Device (Oxygen Therapy)  room air  room air  room air  room air  --    Row Name 05/14/19 2349 05/14/19 2139 05/14/19 2134 05/14/19 1828 05/14/19 1557       Vitals    Temp  97.8 °F (36.6 °C)  --  97.4 °F (36.3 °C)  97.6 °F (36.4 °C)  97.5 °F (36.4 °C)    Temp src  Axillary  --  Oral  --  Oral    Pulse  72  --  73  81  72    Heart Rate Source  --  --  Monitor  --  --    Resp  18  --  16  --  16    Resp Rate Source  --   --  Visual  --  --    BP  158/74  --  145/84  143/85  142/72    BP Location  --  --  Left arm  --  --    BP Method  --  --  Automatic  --  --    Patient Position  --  --  Lying  --  --       Oxygen Therapy    SpO2  92 %  --  98 %  97 %  96 %    Pulse Oximetry Type  --  --  Intermittent  --  --    Device (Oxygen Therapy)  --  room air  room air  --  room air    Row Name 05/14/19 1545 05/14/19 1523 05/14/19 1247 05/14/19 1132 05/14/19 0957       Vitals    Temp  96 °F (35.6 °C)  97 °F (36.1 °C)  97 °F (36.1 °C)  97.6 °F (36.4 °C)  97 °F (36.1 °C)    Temp src  --  Oral  Oral  Oral  Oral    Pulse  74  75  73  72  72    Heart Rate Source  --  Monitor  Monitor  Monitor  --    Resp  16  18  18  16  16    Resp Rate Source  --  Visual  Visual  Visual  --    BP  144/73  151/82  149/84  140/72  130/70    BP Location  --  Left arm  Left arm  Left arm  --    BP Method  --  Automatic  Automatic  Automatic  --    Patient Position  --  Lying  Lying  Lying  --       Oxygen Therapy    SpO2  96 %  96 %  97 %  98 %  98 %    Device (Oxygen Therapy)  --  room air  room air  room air  room air    Row Name 05/14/19 0946 05/14/19 0717 05/14/19 0305 05/14/19 0030 05/13/19 2300       Vitals    Temp  97.2 °F (36.2 °C)  --  97 °F (36.1 °C)  --  96.6 °F (35.9 °C)    Temp src  Oral  --  Axillary  --  Oral    Pulse  74  70  70  74  62    Heart Rate Source  --  Monitor  Monitor  Monitor  Monitor    Resp  16  --  18  --  18    Resp Rate Source  --  --  Visual  --  Visual    BP  133/65  --  118/58  --  142/80    BP Location  --  --  Left arm  --  Left arm    BP Method  --  --  Automatic  --  Automatic    Patient Position  --  --  Lying  --  Lying       Oxygen Therapy    SpO2  97 %  --  94 %  --  92 %    Device (Oxygen Therapy)  room air  --  room air  --  room air    Row [facility] 05/13/19 2100 05/13/19 2014 05/13/19 1900 05/13/19 1829 05/13/19 1728       Vitals    Temp  --  96.5 °F (35.8 °C)  --  97.8 °F (36.6 °C)  --    Temp src  --  Oral  --  --  --     "Pulse  --  55  91  85  --    Heart Rate Source  --  Monitor  Monitor  Monitor  --    Resp  --  16  --  18  --    Resp Rate Source  --  Visual  --  Visual  --    BP  --  137/76  --  162/81  169/80    Noninvasive MAP (mmHg)  --  --  --  --  115    BP Location  --  Left arm  --  --  --    BP Method  --  Automatic  --  Automatic  --    Patient Position  --  Lying  --  Lying  --       Oxygen Therapy    SpO2  --  95 %  --  93 %  96 %    Pulse Oximetry Type  --  Intermittent  --  Intermittent  --    Device (Oxygen Therapy)  room air  room air  --  room air  --    Row Name 05/13/19 16:34:25 05/13/19 1551 05/13/19 14:56:36 05/13/19 1358          Height and Weight    Height  --  --  154.9 cm (61\")  --     Weight  --  --  56.8 kg (125 lb 4.8 oz)  --     Ideal Body Weight (IBW) (kg)  --  --  48.15  --     BSA (Calculated - sq m)  --  --  1.55 sq meters  --     BMI (Calculated)  --  --  23.7  --     Weight in (lb) to have BMI = 25  --  --  132  --        Vitals    Pulse  79  79  92  75     Heart Rate Source  --  Monitor  --  Monitor     Resp  --  19  --  19     Resp Rate Source  --  Visual  --  Visual     BP  160/83  165/83  167/84  168/77     BP Location  --  --  --  Left arm     BP Method  --  Automatic  --  Automatic     Patient Position  --  Lying  --  Sitting        Oxygen Therapy    SpO2  97 %  97 %  97 %  98 %     Pulse Oximetry Type  --  Intermittent  --  --     Device (Oxygen Therapy)  --  room air  --  --         Intake & Output (last 3 days)       05/13 0701 - 05/14 0700 05/14 0701 - 05/15 0700 05/15 0701 - 05/16 0700 05/16 0701 - 05/17 0700    P.O.  840 480     Blood  887.5      Total Intake(mL/kg)  1727.5 (30.4) 480 (8.5)     Urine (mL/kg/hr) 500 1850 (1.4) 700 (0.5) 250 (0.7)    Total Output 500 1850 700 250    Net -500 -122.5 -220 -250            Urine Unmeasured Occurrence   250 x          Lines, Drains & Airways    Active LDAs     Name:   Placement date:   Placement time:   Site:   Days:    Peripheral IV " 04/22/19 1057 Right Hand   04/22/19    1057    Hand   24    Peripheral IV 05/13/19 1201 Right Antecubital   05/13/19    1201    Antecubital   3                   Physician Progress Notes (last 24 hours) (Notes from 5/15/2019  1:20 PM through 5/16/2019  1:20 PM)      Suzy Sher MD at 5/16/2019  1:07 PM          Yaneli Bates  [id number]  1940    Subjective   Ms Bates was seen in follow up of anemia.   Denies any new issues.   Stable since yesterday.   ROS as below.     Past Medical History, Past Surgical History, Social History, Family History have been reviewed and are without significant changes except as mentioned.    Review of Systems   CONSTITUTIONAL: fatigue + weakness + No weight loss, fever, chills.  HEENT: Eyes: No visual loss, blurred vision, double vision or yellow sclerae. Ears, Nose, Throat: No hearing loss, sneezing, congestion, runny nose or sore throat.  SKIN: No rash or itching.  CARDIOVASCULAR: No chest pain, chest pressure or chest discomfort. No palpitations. Edema +   RESPIRATORY: No shortness of breath, cough or sputum.  GASTROINTESTINAL: anorexia + nausea + No  vomiting or diarrhea. No abdominal pain or blood.  GENITOURINARY: Negative for urgency, frequency or dysuria.   NEUROLOGICAL: numbness or tingling in the extremities + migraine headaches + No dizziness, syncope, paralysis, ataxia. No change in bowel or bladder control.  MUSCULOSKELETAL: leg pain + back pain +   HEMATOLOGIC: anemia + No bleeding or bruising.  LYMPHATICS: No enlarged nodes. No history of splenectomy.  PSYCHIATRIC: No history of depression or anxiety.  ENDOCRINOLOGIC: No reports of sweating, cold or heat intolerance. No polyuria or polydipsia.  ALLERGIES: No history of asthma, hives, eczema or rhinitis.             Medications:  The current medication list was reviewed in the EMR    ALLERGIES:    Allergies   Allergen Reactions   • Clindamycin/Lincomycin Other (See Comments)     Migraine headaches   •  Corn-Containing Products Other (See Comments)     migraines   • Tomato Flavor [Flavoring Agent] Other (See Comments)     migraines   • Vinegar [Acetic Acid] Other (See Comments)     migraines   • Amoxicillin Rash   • Codeine GI Intolerance   • Penicillins Hives   • Sulfa Antibiotics Hives   • Vicodin [Hydrocodone-Acetaminophen] GI Intolerance     vomiting       Objective      Vitals:    05/15/19 2311 05/16/19 0334 05/16/19 0749 05/16/19 1100   BP: 123/58 126/58 142/77 139/71   BP Location: Right arm Right arm Left arm Left arm   Patient Position: Lying Lying Lying Lying   Pulse: 77 78 76 77   Resp: 18 20 18 18   Temp: 98.3 °F (36.8 °C) 97 °F (36.1 °C) 96.6 °F (35.9 °C) 97.3 °F (36.3 °C)   TempSrc: Oral Oral Oral Oral   SpO2: 90% 90%  92%   Weight:       Height:         No flowsheet data found.    Physical Exam      General: Alert, awake, oriented.  .  Not in apparent distress. Vitals as above.   HEAD: normocephalic, atraumatic.   EYES: PERRL, EOMI. Conjunctival pallor +   Neck: Supple, no adenopathy or thyromegaly.   Throat: normal oral cavity and pharynx. No inflammation, swelling, exudate, or lesions.  CARDIAC: Normal S1 and S2. No S3, S4 or murmurs. Rhythm is regular.Extremities are warm and well perfused.   LUNGS: Clear to auscultation and percussion without rales, rhonchi, wheezing or diminished breath sounds.  ABDOMEN: Positive bowel sounds. Soft, nondistended, nontender  . No guarding or rebound. No masses.  Back: Hunched back. No bony tenderness.   EXTREMITIES: No significant deformity or joint abnormality. Bilateral pitting lower extremity swelling +  Peripheral pulses intact. No varicosities.  Skin: No rash or bruising.  Neurological: Grossly non-focal exam. No focal weakness. Gait: Not assessed due to clinical condition.   Psych: Mood and affect normal. No hallucination or suicidal thoughts.   Lymphatics: No palpable adenopathy.           RECENT LABS:Independently reviewed and summarized.  Hematology WBC    Date Value Ref Range Status   05/16/2019 12.59 (H) 3.40 - 10.80 10*3/mm3 Final     RBC   Date Value Ref Range Status   05/16/2019 3.01 (L) 3.77 - 5.28 10*6/mm3 Final     Hemoglobin   Date Value Ref Range Status   05/16/2019 8.4 (L) 12.0 - 15.9 g/dL Final     Hematocrit   Date Value Ref Range Status   05/16/2019 26.1 (L) 34.0 - 46.6 % Final     Platelets   Date Value Ref Range Status   05/16/2019 202 140 - 450 10*3/mm3 Final          Imaging (independently reviewed and summarized):   CT C/A/P reviewed which showed small bilateral pleural effusions. No lymphadenopathy or HSM. No prior comparison available.         Upper GI endoscopy from 4/22/19 reviewed. Showed - diffuse moderate inflammation in the stomach. No bleeding source identified.      Colonoscopy result from 4/22/19 reviewed. Showed -  No bleeding source. Normal exam.      Echo result reviewed (3/11/19)  · Mild mitral valve regurgitation is present  · Mild tricuspid valve regurgitation is present.  · Left atrial cavity size is mild-to-moderately dilated.  · Estimated EF = 60%.  · Left ventricular systolic function is normal.           Pathology (result reviewed):   Final Diagnosis   1.  GASTRIC ANTRUM, MUCOSAL BIOPSY:  MILD CHRONIC GASTRITIS.  NO EVIDENCE OF HELICOBACTER PYLORI.     2.  COLONIC MUCOSAL BIOPSY, RANDOM:  NO SIGNIFICANT PATHOLOGIC DIAGNOSIS.         Diagnosis  (1) Normocytic anemia   (2) Unintentional weight loss  (3) Hyponatremia  (4) Hematuria  (5) GI bleeding            Assessment/Plan     (1) Normocytic anemia:     Etiology unclear.     SPEP, immunofixation and free light chains pending.     Haptoglobin and LDH - normal. No evidence of hemolysis.     B12, folate normal.     Reticulocyte - inappropriately normal for the degree of anemia.     Iron studies - borderline.     If initial evaluation negative, we can consider bone marrow aspiration/biopsy as outpatient to rule out underlying bone marrow pathology.     Overall her clinical picture  appears to be consistent with hypoproliferative anemia/ anemia of chronic disease.         (2) Protein caloric malnutrition: Etiology unclear. She had gastric emptying study performed which was normal.  Nutrition following.      (3) Hyponatremia: Nephrology following. On 1 salt tab TID.      (4) Hematuria: Urology following.Urine cytology pending.  No hematuria today.      (5) GI bleeding: Possible GIB. Endoscopy result reviewed. No bleeding  source identified.  heme occult stool testing ordered.       We will continue to follow.     Judah Sher MD                  2019      CC:            Electronically signed by Suzy Sher MD at 2019  1:15 PM     Janice Nazario MD at 2019  7:49 AM              FAMILY MEDICINE DAILY PROGRESS NOTE    NAME: Yaneli Bates  : 1940  MRN: [id number]      LOS: 1 day     PROVIDER OF SERVICE: Janice Nazario MD    Chief Complaint: Generalized weakness    Subjective:     Interval History:  History taken from: patient  Patient notes that she is feeling significantly better since she received fluids overnight. She denies noticing any blood in her urine.  She denies noticing any black or bloody stools.  She denies any emesis, and no bloody emesis. Vitals stable.     Review of Systems:   Review of Systems   Constitutional: Positive for fatigue. Negative for activity change, appetite change and fever.   HENT: Negative for ear pain and sore throat.    Eyes: Negative for pain and visual disturbance.   Respiratory: Negative for cough and shortness of breath.    Cardiovascular: Negative for chest pain and palpitations.   Gastrointestinal: Negative for abdominal pain, blood in stool, constipation, diarrhea, nausea and vomiting.   Endocrine: Negative for cold intolerance and heat intolerance.   Genitourinary: Negative for difficulty urinating and dysuria.   Musculoskeletal: Negative for arthralgias and gait problem.   Skin: Negative for color change and rash.    Neurological: Positive for weakness. Negative for dizziness and headaches.   Hematological: Negative for adenopathy. Does not bruise/bleed easily.   Psychiatric/Behavioral: Negative for agitation, confusion and sleep disturbance.       Objective:     Vital Signs  Temp:  [96.6 °F (35.9 °C)-98.3 °F (36.8 °C)] 96.6 °F (35.9 °C)  Heart Rate:  [70-82] 76  Resp:  [16-20] 18  BP: (123-158)/(58-88) 142/77  Body mass index is 23.68 kg/m².    Physical Exam  Physical Exam   Constitutional: She is oriented to person, place, and time. She appears well-developed and well-nourished.   HENT:   Head: Normocephalic and atraumatic.   Eyes: Conjunctivae, EOM and lids are normal. Pupils are equal, round, and reactive to light.   Neck: Normal range of motion. Neck supple.   Cardiovascular: Normal rate, regular rhythm and normal heart sounds. Exam reveals no gallop and no friction rub.   No murmur heard.  Pulmonary/Chest: Effort normal and breath sounds normal.   Abdominal: Soft. Normal appearance and bowel sounds are normal. There is no tenderness.   Musculoskeletal: Normal range of motion. She exhibits edema.   Neurological: She is alert and oriented to person, place, and time.   Skin: Skin is warm, dry and intact.   Psychiatric: She has a normal mood and affect. Her speech is normal and behavior is normal. Judgment and thought content normal. Cognition and memory are normal.       Medication Review    Current Facility-Administered Medications:   •  acetaminophen (TYLENOL) tablet 650 mg, 650 mg, Oral, Q4H PRN, Rodrigo Nazario MD  •  famotidine (PEPCID) tablet 20 mg, 20 mg, Oral, Daily, Rodrigo Nazario MD, 20 mg at 05/16/19 7483  •  ferrous sulfate EC tablet 324 mg, 324 mg, Oral, BID With Meals, Rodrigo Nazario MD, 324 mg at 05/16/19 6212  •  metoprolol succinate XL (TOPROL-XL) 24 hr tablet 50 mg, 50 mg, Oral, Daily With Dinner, Rodrigo Nazario MD, 50 mg at 05/15/19 7891  •  ondansetron (ZOFRAN) tablet 4 mg, 4 mg, Oral, Q6H PRN **OR** ondansetron  (ZOFRAN) injection 4 mg, 4 mg, Intravenous, Q6H PRN, Ashley Carrizales MD, 4 mg at 05/14/19 1305  •  potassium chloride (KLOR-CON) packet 40 mEq, 40 mEq, Oral, Once, Rodrigo Nazario MD  •  potassium chloride (MICRO-K) CR capsule 40 mEq, 40 mEq, Oral, Daily, Ashley Carrizales MD, 40 mEq at 05/16/19 0753  •  promethazine (PHENERGAN) tablet 12.5 mg, 12.5 mg, Oral, Q6H PRN **OR** promethazine (PHENERGAN) injection 12.5 mg, 12.5 mg, Intramuscular, Q6H PRN **OR** promethazine (PHENERGAN) suppository 12.5 mg, 12.5 mg, Rectal, Q6H PRN, Ashley Carrizales MD  •  sodium chloride 0.9 % flush 3 mL, 3 mL, Intravenous, Q12H, Rodrigo Nazario MD, 3 mL at 05/15/19 2129  •  sodium chloride 0.9 % flush 3-10 mL, 3-10 mL, Intravenous, PRN, Rodrigo Nazario MD  •  sodium chloride 0.9 % infusion, 100 mL/hr, Intravenous, Continuous, Rodrigo Nazario MD, Last Rate: 100 mL/hr at 05/15/19 1742, 100 mL/hr at 05/15/19 1742  •  sodium chloride tablet 1 g, 1 g, Oral, TID With Meals, Rodrigo Nazario MD, 1 g at 05/16/19 0752  •  traMADol (ULTRAM) tablet 50 mg, 50 mg, Oral, BID PRN, Rodrigo Nazario MD, 50 mg at 05/16/19 0754     Diagnostic Data    Lab Results (last 24 hours)     Procedure Component Value Units Date/Time    CBC & Differential [[id number]] Collected:  05/16/19 0703    Specimen:  Blood Updated:  05/16/19 0802    Narrative:       The following orders were created for panel order CBC & Differential.  Procedure                               Abnormality         Status                     ---------                               -----------         ------                     CBC Auto Differential[[id number]]        Abnormal            Final result                 Please view results for these tests on the individual orders.    CBC Auto Differential [[id number]]  (Abnormal) Collected:  05/16/19 0703    Specimen:  Blood Updated:  05/16/19 0802     WBC 12.59 10*3/mm3      RBC 3.01 10*6/mm3      Hemoglobin 8.4 g/dL      Hematocrit 26.1 %       MCV 86.7 fL      MCH 27.9 pg      MCHC 32.2 g/dL      RDW 17.5 %      RDW-SD 55.8 fl      MPV 9.8 fL      Platelets 202 10*3/mm3      Neutrophil % 84.9 %      Lymphocyte % 7.1 %      Monocyte % 6.2 %      Eosinophil % 0.4 %      Basophil % 0.2 %      Immature Grans % 1.2 %      Neutrophils, Absolute 10.70 10*3/mm3      Lymphocytes, Absolute 0.89 10*3/mm3      Monocytes, Absolute 0.78 10*3/mm3      Eosinophils, Absolute 0.05 10*3/mm3      Basophils, Absolute 0.02 10*3/mm3      Immature Grans, Absolute 0.15 10*3/mm3      nRBC 0.0 /100 WBC     Retic With IRF & RET-He [[id number]]  (Normal) Collected:  05/16/19 0703    Specimen:  Blood Updated:  05/16/19 0740     Immature Reticulocyte Fraction 12.3 %      Reticulocyte % 1.44 %      Reticulocyte Hgb 32.2 pg     Haptoglobin [[id number]] Collected:  05/16/19 0703    Specimen:  Blood Updated:  05/16/19 0737    Protein Elec + Interp, Serum [[id number]] Collected:  05/16/19 0703    Specimen:  Blood Updated:  05/16/19 0737    Copper, Serum [[id number]] Collected:  05/16/19 0702    Specimen:  Blood Updated:  05/16/19 0737    Comprehensive Metabolic Panel [[id number]] Collected:  05/16/19 0703    Specimen:  Blood Updated:  05/16/19 0737    Lactate Dehydrogenase [[id number]] Collected:  05/16/19 0703    Specimen:  Blood Updated:  05/16/19 0737    Peripheral Blood Smear [[id number]] Collected:  05/15/19 0516    Specimen:  Blood Updated:  05/15/19 0928    Immunofixation, Serum [[id number]] Collected:  05/15/19 0918    Specimen:  Blood Updated:  05/15/19 0921    Immunoglobulin Free LT Chains Blood [[id number]] Collected:  05/15/19 0918    Specimen:  Blood Updated:  05/15/19 0921            I reviewed the patient's new clinical results.    Assessment/Plan:     Active Hospital Problems    Diagnosis POA   • **Generalized weakness [R53.1] Yes     Possible malignancy?   -  Pancreatic source?    - lipase w/n/l, CT abdomen has shown no mass   - GI source?     -Colonoscopy, EGD no signs of  malignant process     - will consult GI, plans outpatient was for endoscopic capsule.    - Bladder cx?    - U/A shows large blood. Will get urine cytology    - Urology consult: will follow recommendations.  Patient was initially scheduled for cystoscopy today.    - hemoglobin 7.7; will transfuse 2 units PRBC if less than 7.    - will continue to monitor     - Continue IVF, Dietician consult.      • Normocytic anemia [D64.9] Unknown     Heme/onc on board  Dr So recommends spep/immunofixation/free light chains pending  Haptoglobin and ldh to rule out hemolysis pending  Ct of chest abdomen pelvis was negative for lytic lesions or lad      • Hemorrhagic cystitis [N30.91] Yes     Patient was seen by Dr. Gilmore, Urology previous admission. Was treated with Azactam and diflucan during that time. On discharge was scheduled for cystoscopy with Dr. Damico this Wednesday.     - patient was seen on 5/10/19 by PCP where she was diagnosed with cystitis. Started on doxy. Has taken 2 days.   - U/A on admission : large blood, 2+ protein   - urine cytology pending   - Urology, Dr. D'amico recommends outpatient pelvic exam         • Iron deficiency anemia due to chronic blood loss [D50.0] Yes     H/H: 7.7/25.0 in the ED  -continue home ferrous sulfate BID  -continue to trend H/H  -received 2 units of blood on 5/14; Hb today pending        • Weight loss, unintentional [R63.4] Yes     Will get dietician consult  - patient has many food allergies     • Hyponatremia [E87.1] Yes     In the ED sodium level is 131. Patient sees Dr. Mckenzie, Nephrology outpatient. 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   - continue home sodium tablets 1 g TID  - 1.6 L fluid restriction daily recommended by nephro         • Diabetes mellitus (CMS/HCC) [E11.9] Yes     Will hold home metformin  - SSI       • Hypertension [I10] Yes     Will continue toprol-xl 50 daily   Continue to monitor per hospital protocol      • Coronary artery disease [I25.10] Yes     History of  AVR 2014  Continue metoprolol 50 mg Daily          DVT prophylaxis: SCDs/TEDs  Code status is   Code Status and Medical Interventions:   Ordered at: 05/13/19 1811     Level Of Support Discussed With:    Patient     Code Status:    CPR     Medical Interventions (Level of Support Prior to Arrest):    Full       Plan for disposition:Where: home and When:  2-3days      Time: 30 minutes        Janice Nazario M.D. PGY1  [facility]  [address] [place] [address]  Office: [phone]    This document has been electronically signed by Janice Nazario MD on May 16, 2019 8:03 AM          This document has been electronically signed by Janice Nazario MD on May 16, 2019 8:03 AM          Electronically signed by Janice Nazario MD at 5/16/2019  8:03 AM     Carlos Schaeffer MD at 5/15/2019  3:30 PM                  SUBJECTIVE:   5/15/2019  Chief Complaint:     Subjective      Patient is 78 y.o. female who complains of weight loss and nausea.  Patient had a gastric emptying study done this morning which showed normal gastric emptying.  There is currently no evidence of gastroparesis.  History:  Past Medical History:   Diagnosis Date   • Allergic    • Anemia    • Arthritis    • Coronary artery disease 2014    valave replacement   • Fibromyalgia, primary    • Headache    • Infectious viral hepatitis    • Low back pain    • Osteoporosis    • Visual impairment      Past Surgical History:   Procedure Laterality Date   • AORTIC VALVE REPAIR/REPLACEMENT  2014,may   • CARDIAC SURGERY     • COLONOSCOPY     • COLONOSCOPY N/A 4/22/2019    Procedure: COLONOSCOPY;  Surgeon: Carlos Schaeffer MD;  Location: [facility] ENDOSCOPY;  Service: Gastroenterology   • ENDOSCOPY N/A 3/13/2019    Procedure: ESOPHAGOGASTRODUODENOSCOPY;  Surgeon: Carlos Schaeffer MD;  Location: [facility] ENDOSCOPY;  Service: Gastroenterology   • ENDOSCOPY N/A 4/22/2019    Procedure: ESOPHAGOGASTRODUODENOSCOPY;  Surgeon: Laury  Carlos RUGGIERO MD;  Location: [facility] ENDOSCOPY;  Service: Gastroenterology   • KNEE SURGERY Right 1967   • TONSILLECTOMY       Family History   Problem Relation Age of Onset   • Arthritis Mother    • Cancer Mother    • Heart disease Mother    • Hyperlipidemia Mother    • Hypertension Mother    • Vision loss Mother    • Arthritis Father    • Hearing loss Father    • Heart disease Father    • Hyperlipidemia Father    • Hypertension Father    • Vision loss Father    • Hypertension Sister    • Vision loss Sister    • Osteoporosis Sister    • Cataracts Sister    • Hyperlipidemia Brother    • Vision loss Brother    • Heart disease Brother    • Cancer Daughter    • Early death Daughter    • Osteoporosis Sister    • Parkinsonism Brother    • Hyperlipidemia Brother    • Thyroid disease Daughter      Social History     Tobacco Use   • Smoking status: Never Smoker   • Smokeless tobacco: Never Used   Substance Use Topics   • Alcohol use: No   • Drug use: No     Medications Prior to Admission   Medication Sig Dispense Refill Last Dose   • traMADol (ULTRAM) 50 MG tablet Take 50 mg by mouth 2 (Two) Times a Day As Needed for Moderate Pain .      • alendronate (FOSAMAX) 70 MG tablet Take 1 tablet by mouth Every 7 (Seven) Days. 12 tablet 1 Taking   • doxycycline (VIBRAMYCIN) 100 MG capsule Take 1 capsule by mouth 2 (Two) Times a Day. 20 capsule 0    • ferrous sulfate 324 (65 Fe) MG tablet delayed-release EC tablet Take 1 tablet by mouth 2 (Two) Times a Day With Meals for 30 days. 60 tablet 3    • lactulose (CHRONULAC) 10 GM/15ML solution Take 30 mL by mouth 2 (Two) Times a Day As Needed (constipation). 236 mL 1 Taking   • metoprolol succinate XL (TOPROL-XL) 50 MG 24 hr tablet Take 1 tablet by mouth Daily With Dinner. 90 tablet 5 Taking   • ondansetron ODT (ZOFRAN ODT) 4 MG disintegrating tablet Take 1 tablet by mouth Every 8 (Eight) Hours As Needed for Nausea or Vomiting. 20 tablet 0 Taking   • [] potassium chloride (K-DUR,KLOR-CON)  10 MEQ CR tablet Take 1 tablet by mouth Daily for 5 days. 5 tablet 0 Taking   • raNITIdine (ZANTAC) 75 MG tablet Take 75 mg by mouth 2 (Two) Times a Day.   Taking   • sodium chloride 1 g tablet Take 1 tablet by mouth 3 (Three) Times a Day With Meals. 90 tablet 0 Taking     Allergies:  Clindamycin/lincomycin; Corn-containing products; Tomato flavor [flavoring agent]; Vinegar [acetic acid]; Amoxicillin; Codeine; Penicillins; Sulfa antibiotics; and Vicodin [hydrocodone-acetaminophen]     CURRENT MEDICATIONS/OBJECTIVE/VS/PE:     Current Medications:     Current Facility-Administered Medications   Medication Dose Route Frequency Provider Last Rate Last Dose   • acetaminophen (TYLENOL) tablet 650 mg  650 mg Oral Q4H PRN Rodrigo Nazario MD       • famotidine (PEPCID) tablet 20 mg  20 mg Oral Daily Rodrigo Nazario MD   20 mg at 05/15/19 0857   • ferrous sulfate EC tablet 324 mg  324 mg Oral BID With Meals Rodrigo Nazario MD   324 mg at 05/15/19 0857   • metoprolol succinate XL (TOPROL-XL) 24 hr tablet 50 mg  50 mg Oral Daily With Dinner Rodrigo Nazario MD   50 mg at 05/14/19 1725   • ondansetron (ZOFRAN) tablet 4 mg  4 mg Oral Q6H PRN Ashley Carrizales MD        Or   • ondansetron (ZOFRAN) injection 4 mg  4 mg Intravenous Q6H PRN Ashley Carrizales MD   4 mg at 05/14/19 1305   • potassium chloride (KLOR-CON) packet 40 mEq  40 mEq Oral Once Rodrigo Nazario MD       • potassium chloride (MICRO-K) CR capsule 40 mEq  40 mEq Oral Daily Ashley Carrizales MD   40 mEq at 05/15/19 0857   • promethazine (PHENERGAN) tablet 12.5 mg  12.5 mg Oral Q6H PRN Ashley Carrizales MD        Or   • promethazine (PHENERGAN) injection 12.5 mg  12.5 mg Intramuscular Q6H PRN Ashley Carrizales MD        Or   • promethazine (PHENERGAN) suppository 12.5 mg  12.5 mg Rectal Q6H PRN Ashley Carrizales MD       • sodium chloride 0.9 % flush 3 mL  3 mL Intravenous Q12H Rodrigo Nazario MD       • sodium chloride 0.9 %  flush 3-10 mL  3-10 mL Intravenous PRN Rodrigo Nazario MD       • sodium chloride 0.9 % infusion  100 mL/hr Intravenous Continuous Rodriog Nazario  mL/hr at 05/15/19 0714 100 mL/hr at 05/15/19 0714   • sodium chloride tablet 1 g  1 g Oral TID With Meals Rodrigo Nazario MD   1 g at 05/15/19 1234   • traMADol (ULTRAM) tablet 50 mg  50 mg Oral BID PRN Rodrigo Nazario MD   50 mg at 05/15/19 1026       Objective     Review of Systems:   Review of Systems    Physical Exam:   Temp:  [96 °F (35.6 °C)-97.9 °F (36.6 °C)] 97.9 °F (36.6 °C)  Heart Rate:  [66-81] 70  Resp:  [16-18] 18  BP: (142-158)/(70-88) 158/88     Physical Exam:  General Appearance:    Alert, cooperative, in no acute distress   Head:    Normocephalic, without obvious abnormality, atraumatic   Eyes:            Lids and lashes normal, conjunctivae and sclerae normal, no   icterus, no pallor, corneas clear, PERRLA   Ears:    Ears appear intact with no abnormalities noted   Throat:   No oral lesions, no thrush, oral mucosa moist   Neck:   No adenopathy, supple, trachea midline, no thyromegaly, no     carotid bruit, no JVD   Back:     No kyphosis present, no scoliosis present, no skin lesions,       erythema or scars, no tenderness to percussion or                   palpation,   range of motion normal   Lungs:     Clear to auscultation,respirations regular, even and                   unlabored    Heart:    Regular rhythm and normal rate, normal S1 and S2, no            murmur, no gallop, no rub, no click   Breast Exam:    Deferred   Abdomen:     Normal bowel sounds, no masses, no organomegaly, soft        non-tender, non-distended, no guarding, no rebound                 tenderness   Genitalia:    Deferred   Extremities:   Moves all extremities well, no edema, no cyanosis, no              redness   Pulses:   Pulses palpable and equal bilaterally   Skin:   No bleeding, bruising or rash   Lymph nodes:   No palpable adenopathy   Neurologic:   Cranial nerves 2 - 12 grossly  intact, sensation intact, DTR        present and equal bilaterally      Results Review:     Lab Results (last 24 hours)     Procedure Component Value Units Date/Time    Peripheral Blood Smear [[id number]] Collected:  05/15/19 0516    Specimen:  Blood Updated:  05/15/19 0928    Immunofixation, Serum [[id number]] Collected:  05/15/19 0918    Specimen:  Blood Updated:  05/15/19 0921    Immunoglobulin Free LT Chains Blood [[id number]] Collected:  05/15/19 0918    Specimen:  Blood Updated:  05/15/19 0921    Cytology, Urine [[id number]] Collected:  05/14/19 1436    Specimen:  Urine, Clean Catch Updated:  05/15/19 0715    Comprehensive Metabolic Panel [[id number]]  (Abnormal) Collected:  05/15/19 0516    Specimen:  Blood Updated:  05/15/19 0623     Glucose 101 mg/dL      BUN 14 mg/dL      Creatinine 0.63 mg/dL      Sodium 131 mmol/L      Potassium 3.3 mmol/L      Chloride 98 mmol/L      CO2 25.0 mmol/L      Calcium 7.2 mg/dL      Total Protein 6.9 g/dL      Albumin 1.60 g/dL      ALT (SGPT) 7 U/L      AST (SGOT) 23 U/L      Alkaline Phosphatase 97 U/L      Total Bilirubin 0.5 mg/dL      eGFR Non African Amer 91 mL/min/1.73      Globulin 5.3 gm/dL      A/G Ratio 0.3 g/dL      BUN/Creatinine Ratio 22.2     Anion Gap 8.0 mmol/L     Narrative:       GFR Normal >60  Chronic Kidney Disease <60  Kidney Failure <15    CBC & Differential [[id number]] Collected:  05/15/19 0516    Specimen:  Blood Updated:  05/15/19 0551    Narrative:       The following orders were created for panel order CBC & Differential.  Procedure                               Abnormality         Status                     ---------                               -----------         ------                     CBC Auto Differential[[id number]]        Abnormal            Final result                 Please view results for these tests on the individual orders.    CBC Auto Differential [[id number]]  (Abnormal) Collected:  05/15/19 0516    Specimen:  Blood Updated:   05/15/19 0551     WBC 11.99 10*3/mm3      RBC 3.22 10*6/mm3      Hemoglobin 9.0 g/dL      Comment: Patient received blood        Hematocrit 27.6 %      MCV 85.7 fL      MCH 28.0 pg      MCHC 32.6 g/dL      RDW 17.5 %      RDW-SD 55.1 fl      MPV 9.5 fL      Platelets 206 10*3/mm3      Neutrophil % 83.2 %      Lymphocyte % 7.3 %      Monocyte % 8.1 %      Eosinophil % 0.3 %      Basophil % 0.3 %      Immature Grans % 0.8 %      Neutrophils, Absolute 9.98 10*3/mm3      Lymphocytes, Absolute 0.88 10*3/mm3      Monocytes, Absolute 0.97 10*3/mm3      Eosinophils, Absolute 0.04 10*3/mm3      Basophils, Absolute 0.03 10*3/mm3      Immature Grans, Absolute 0.09 10*3/mm3      nRBC 0.0 /100 WBC            I reviewed the patient's new clinical results.  I reviewed the patient's new imaging results and agree with the interpretation.     ASSESSMENT/PLAN:   ASSESSMENT: Patient with early satiety.  Patient has a normal gastric emptying study.  Patient with anemia hemoglobin now is 9.    PLAN: 1 continue to follow patient's hemoglobin if hemoglobin less than 7 please transfuse 2 units packed RBCs.  #2 from a GI standpoint patient can be discharged home to follow-up as an outpatient  The risks, benefits, and alternatives of this procedure have been discussed with the patient or the responsible party- the patient understands and agrees to proceed.         Carlos Schaeffer MD  05/15/19  3:30 PM           This document has been electronically signed by Carlos Schaeffer MD on May 15, 2019 3:30 PM      Electronically signed by Carlos Schaeffer MD at 5/15/2019  3:32 PM          Consult Notes (last 24 hours) (Notes from 5/15/2019  1:20 PM through 5/16/2019  1:20 PM)      Carlos Renteria Jr., MD at 5/15/2019  1:21 PM          SUBJECTIVE:   5/15/2019    Name: Yaneli Bates  DOD: 1940    REASON FOR CONSULT: Weight loss and nausea    Chief Complaint:     Chief Complaint   Patient presents with   • Weakness - Generalized   •  Hallucinations       Subjective   79 yo female complaining of nausea & early satiety.  She states that her nausea & abdominal pain have improved significantly since yesterday.  She still does not have much of an appetite.  Gastric emptying studies this AM were wnl.       ROS/HISTORY/ CURRENT MEDICATIONS/OBJECTIVE/VS/PE:   Review of Systems:   Review of Systems   Constitutional: Negative for activity change, appetite change, chills, diaphoresis, fatigue, fever and unexpected weight change.   HENT: Negative for sore throat and trouble swallowing.    Respiratory: Negative for shortness of breath.    Gastrointestinal: Positive for nausea (improving). Negative for abdominal distention, abdominal pain, anal bleeding, blood in stool, constipation, diarrhea, rectal pain and vomiting.   Endocrine: Negative for polydipsia, polyphagia and polyuria.   Genitourinary: Negative for difficulty urinating.   Musculoskeletal: Negative for arthralgias.   Skin: Negative for pallor.   Allergic/Immunologic: Negative for food allergies.   Neurological: Negative for weakness and light-headedness.   Psychiatric/Behavioral: Negative for behavioral problems.       History:     Past Medical History:   Diagnosis Date   • Allergic    • Anemia    • Arthritis    • Coronary artery disease 2014    valave replacement   • Fibromyalgia, primary    • Headache    • Infectious viral hepatitis    • Low back pain    • Osteoporosis    • Visual impairment      Past Surgical History:   Procedure Laterality Date   • AORTIC VALVE REPAIR/REPLACEMENT  2014,may   • CARDIAC SURGERY     • COLONOSCOPY     • COLONOSCOPY N/A 4/22/2019    Procedure: COLONOSCOPY;  Surgeon: Carlos Schaeffer MD;  Location: [facility] ENDOSCOPY;  Service: Gastroenterology   • ENDOSCOPY N/A 3/13/2019    Procedure: ESOPHAGOGASTRODUODENOSCOPY;  Surgeon: Carlos Schaeffer MD;  Location: [facility] ENDOSCOPY;  Service: Gastroenterology   • ENDOSCOPY N/A 4/22/2019    Procedure: ESOPHAGOGASTRODUODENOSCOPY;   Surgeon: Carlos Schaeffer MD;  Location: [facility] ENDOSCOPY;  Service: Gastroenterology   • KNEE SURGERY Right 1967   • TONSILLECTOMY       Family History   Problem Relation Age of Onset   • Arthritis Mother    • Cancer Mother    • Heart disease Mother    • Hyperlipidemia Mother    • Hypertension Mother    • Vision loss Mother    • Arthritis Father    • Hearing loss Father    • Heart disease Father    • Hyperlipidemia Father    • Hypertension Father    • Vision loss Father    • Hypertension Sister    • Vision loss Sister    • Osteoporosis Sister    • Cataracts Sister    • Hyperlipidemia Brother    • Vision loss Brother    • Heart disease Brother    • Cancer Daughter    • Early death Daughter    • Osteoporosis Sister    • Parkinsonism Brother    • Hyperlipidemia Brother    • Thyroid disease Daughter      Social History     Tobacco Use   • Smoking status: Never Smoker   • Smokeless tobacco: Never Used   Substance Use Topics   • Alcohol use: No   • Drug use: No     Medications Prior to Admission   Medication Sig Dispense Refill Last Dose   • traMADol (ULTRAM) 50 MG tablet Take 50 mg by mouth 2 (Two) Times a Day As Needed for Moderate Pain .      • alendronate (FOSAMAX) 70 MG tablet Take 1 tablet by mouth Every 7 (Seven) Days. 12 tablet 1 Taking   • doxycycline (VIBRAMYCIN) 100 MG capsule Take 1 capsule by mouth 2 (Two) Times a Day. 20 capsule 0    • ferrous sulfate 324 (65 Fe) MG tablet delayed-release EC tablet Take 1 tablet by mouth 2 (Two) Times a Day With Meals for 30 days. 60 tablet 3    • lactulose (CHRONULAC) 10 GM/15ML solution Take 30 mL by mouth 2 (Two) Times a Day As Needed (constipation). 236 mL 1 Taking   • metoprolol succinate XL (TOPROL-XL) 50 MG 24 hr tablet Take 1 tablet by mouth Daily With Dinner. 90 tablet 5 Taking   • ondansetron ODT (ZOFRAN ODT) 4 MG disintegrating tablet Take 1 tablet by mouth Every 8 (Eight) Hours As Needed for Nausea or Vomiting. 20 tablet 0 Taking   • [] potassium chloride  (K-DUR,KLOR-CON) 10 MEQ CR tablet Take 1 tablet by mouth Daily for 5 days. 5 tablet 0 Taking   • raNITIdine (ZANTAC) 75 MG tablet Take 75 mg by mouth 2 (Two) Times a Day.   Taking   • sodium chloride 1 g tablet Take 1 tablet by mouth 3 (Three) Times a Day With Meals. 90 tablet 0 Taking     Allergies:  Clindamycin/lincomycin; Corn-containing products; Tomato flavor [flavoring agent]; Vinegar [acetic acid]; Amoxicillin; Codeine; Penicillins; Sulfa antibiotics; and Vicodin [hydrocodone-acetaminophen]    I have reviewed the patient's medical history, surgical history and family history in the available medical record system.     Current Medications:     Current Facility-Administered Medications   Medication Dose Route Frequency Provider Last Rate Last Dose   • acetaminophen (TYLENOL) tablet 650 mg  650 mg Oral Q4H PRN Rodrigo Nazario MD       • famotidine (PEPCID) tablet 20 mg  20 mg Oral Daily Rodrigo Nazario MD   20 mg at 05/15/19 0857   • ferrous sulfate EC tablet 324 mg  324 mg Oral BID With Meals Rodrigo Nazario MD   324 mg at 05/15/19 0857   • metoprolol succinate XL (TOPROL-XL) 24 hr tablet 50 mg  50 mg Oral Daily With Dinner Rodrigo Nazario MD   50 mg at 05/14/19 1725   • ondansetron (ZOFRAN) tablet 4 mg  4 mg Oral Q6H PRN Ashley Carrizales MD        Or   • ondansetron (ZOFRAN) injection 4 mg  4 mg Intravenous Q6H PRN Ashley Carrizales MD   4 mg at 05/14/19 1305   • potassium chloride (KLOR-CON) packet 40 mEq  40 mEq Oral Once Rodrigo Nazario MD       • potassium chloride (MICRO-K) CR capsule 40 mEq  40 mEq Oral Daily Ashley Carrizales MD   40 mEq at 05/15/19 0857   • promethazine (PHENERGAN) tablet 12.5 mg  12.5 mg Oral Q6H PRN Ashley Carrizales MD        Or   • promethazine (PHENERGAN) injection 12.5 mg  12.5 mg Intramuscular Q6H PRN Ashley Carrizales MD        Or   • promethazine (PHENERGAN) suppository 12.5 mg  12.5 mg Rectal Q6H PRN Ashley Carrizales MD       •  sodium chloride 0.9 % flush 3 mL  3 mL Intravenous Q12H Rodrigo Nazario MD       • sodium chloride 0.9 % flush 3-10 mL  3-10 mL Intravenous PRN Rodrigo Nazario MD       • sodium chloride 0.9 % infusion  100 mL/hr Intravenous Continuous Rodrigo Nazario  mL/hr at 05/15/19 0714 100 mL/hr at 05/15/19 0714   • sodium chloride tablet 1 g  1 g Oral TID With Meals Rodrigo Nazario MD   1 g at 05/15/19 1234   • traMADol (ULTRAM) tablet 50 mg  50 mg Oral BID PRN Rodrigo Nazario MD   50 mg at 05/15/19 1026       Objective     Physical Exam:   Temp:  [96 °F (35.6 °C)-97.9 °F (36.6 °C)] 97.9 °F (36.6 °C)  Heart Rate:  [66-81] 70  Resp:  [16-18] 18  BP: (142-158)/(70-88) 158/88    Physical Exam:  General Appearance:    Alert, cooperative, in no acute distress   Head:    Normocephalic, without obvious abnormality, atraumatic   Eyes:            Lids and lashes normal, conjunctivae and sclerae normal, no   icterus, no pallor, corneas clear, PERRLA   Ears:    Ears appear intact with no abnormalities noted   Throat:   No oral lesions, no thrush, oral mucosa moist   Neck:   No adenopathy, supple, trachea midline, no thyromegaly, no     carotid bruit, no JVD   Back:     No kyphosis present, no scoliosis present, no skin lesions,       erythema or scars, no tenderness to percussion or                   palpation,   range of motion normal   Lungs:     Clear to auscultation,respirations regular, even and                   unlabored    Heart:    Regular rhythm and normal rate, normal S1 and S2, no            murmur, no gallop, no rub, no click   Breast Exam:    Deferred   Abdomen:     Normal bowel sounds, no masses, no organomegaly, soft        non-tender, non-distended, no guarding, no rebound                 tenderness   Genitalia:    Deferred   Extremities:   Moves all extremities well, no edema, no cyanosis, no              redness   Pulses:   Pulses palpable and equal bilaterally   Skin:   No bleeding, bruising or rash   Lymph nodes:   No  palpable adenopathy   Neurologic:   Cranial nerves 2 - 12 grossly intact, sensation intact, DTR        present and equal bilaterally      Results Review:     Lab Results   Component Value Date    WBC 11.99 (H) 05/15/2019    WBC 12.84 (H) 05/14/2019    WBC 14.04 (H) 05/13/2019    HGB 9.0 (L) 05/15/2019    HGB 6.5 (C) 05/14/2019    HGB 7.7 (L) 05/13/2019    HCT 27.6 (L) 05/15/2019    HCT 20.7 (C) 05/14/2019    HCT 25.0 (L) 05/13/2019     05/15/2019     05/14/2019     05/13/2019     Results from last 7 days   Lab Units 05/15/19  0516 05/14/19  0659 05/13/19  1324   ALK PHOS U/L 97 61 74   ALT (SGPT) U/L 7 6 7   AST (SGOT) U/L 23 16 23     Results from last 7 days   Lab Units 05/15/19  0516 05/14/19  0659 05/13/19  1324   BILIRUBIN mg/dL 0.5 0.3 0.5   ALK PHOS U/L 97 61 74     Lipase   Date Value Ref Range Status   05/13/2019 19 13 - 60 U/L Final     Lab Results   Component Value Date    INR 1.25 (H) 03/13/2019         Radiology Review:  Imaging Results (last 72 hours)     Procedure Component Value Units Date/Time    XR Chest PA & Lateral [[id number]] Collected:  05/13/19 1205     Updated:  05/13/19 1225    Narrative:         EXAM:          Radiograph(s), Chest   VIEWS:    PA / Lat ; 2       DATE/TIME:  5/13/2019 12:23 PM CDT                INDICATION:   generalized weakness    COMPARISON:  CXR: 4/11/19             FINDINGS:             - lines/tubes:    none     - cardiac:         size within normal limits; status post  valvular repair         - mediastinum: contour within normal limits         - lungs:         no focal air space process, pulmonary  interstitial edema, nodule(s)/mass             - pleura:         Small left pleural fluid collection.                   - osseous:         Status post median sternotomy                  - misc.:         Impression:       CONCLUSION:        1. No evidence of an active cardiopulmonary process.                                                Electronically  signed by:  LEXA Almonte MD  5/13/2019 12:24  PM CDT Workstation: [id number]          I reviewed the patient's new clinical results.    I reviewed the patient's new imaging results and agree with the interpretation.     ASSESSMENT/PLAN:   ASSESSMENT: Patient with anemia and weakness over the past few months.  Patient is continuing to lose weight.  Patient has loss of appetite and feeling of early satiety.      PLAN:   #1 Small frequent meals  #2 patient will need blood transfusion as hemoglobin is less than 7.  Continue to monitor patient's hemoglobin and transfuse for hemoglobin less than 7.    The risks, benefits, and alternatives of this procedure have been discussed with the patient or the responsible party. The patient understands and agrees to proceed.         Carlos Renteria Jr, MD  05/15/19  1:21 PM     This document has been electronically signed by Carlos Renteria Jr, MD on May 15, 2019 1:21 PM      Electronically signed by Carlos Renteria Jr., MD at 5/15/2019  1:27 PM

## 2019-05-16 NOTE — PLAN OF CARE
Problem: Patient Care Overview  Goal: Plan of Care Review  Outcome: Ongoing (interventions implemented as appropriate)   05/16/19 0103   Coping/Psychosocial   Plan of Care Reviewed With patient   Plan of Care Review   Progress improving   OTHER   Outcome Summary VSS, pt stated she felt that she had a successful day working with physical therapy       Problem: Fall Risk (Adult)  Goal: Identify Related Risk Factors and Signs and Symptoms  Outcome: Ongoing (interventions implemented as appropriate)   05/15/19 1354   Fall Risk (Adult)   Related Risk Factors (Fall Risk) gait/mobility problems   Signs and Symptoms (Fall Risk) presence of risk factors       Problem: Activity Intolerance (Adult)  Goal: Activity Tolerance  Outcome: Ongoing (interventions implemented as appropriate)   05/15/19 1354   Activity Intolerance (Adult)   Activity Tolerance making progress toward outcome

## 2019-05-16 NOTE — PLAN OF CARE
Problem: Patient Care Overview  Goal: Plan of Care Review  Outcome: Ongoing (interventions implemented as appropriate)   05/16/19 5742   Coping/Psychosocial   Plan of Care Reviewed With caregiver;patient   Plan of Care Review   Progress no change   OTHER   Outcome Summary Pt with improved Gi distress. Still with suboptimal po intake and weakness. Will monitor and send milk with meals.

## 2019-05-16 NOTE — PROGRESS NOTES
Yaneli Bates  [id number]  1940    Subjective   Ms Bates was seen in follow up of anemia.   Denies any new issues.   Stable since yesterday.   ROS as below.     Past Medical History, Past Surgical History, Social History, Family History have been reviewed and are without significant changes except as mentioned.    Review of Systems   CONSTITUTIONAL: fatigue + weakness + No weight loss, fever, chills.  HEENT: Eyes: No visual loss, blurred vision, double vision or yellow sclerae. Ears, Nose, Throat: No hearing loss, sneezing, congestion, runny nose or sore throat.  SKIN: No rash or itching.  CARDIOVASCULAR: No chest pain, chest pressure or chest discomfort. No palpitations. Edema +   RESPIRATORY: No shortness of breath, cough or sputum.  GASTROINTESTINAL: anorexia + nausea + No  vomiting or diarrhea. No abdominal pain or blood.  GENITOURINARY: Negative for urgency, frequency or dysuria.   NEUROLOGICAL: numbness or tingling in the extremities + migraine headaches + No dizziness, syncope, paralysis, ataxia. No change in bowel or bladder control.  MUSCULOSKELETAL: leg pain + back pain +   HEMATOLOGIC: anemia + No bleeding or bruising.  LYMPHATICS: No enlarged nodes. No history of splenectomy.  PSYCHIATRIC: No history of depression or anxiety.  ENDOCRINOLOGIC: No reports of sweating, cold or heat intolerance. No polyuria or polydipsia.  ALLERGIES: No history of asthma, hives, eczema or rhinitis.             Medications:  The current medication list was reviewed in the EMR    ALLERGIES:    Allergies   Allergen Reactions   • Clindamycin/Lincomycin Other (See Comments)     Migraine headaches   • Corn-Containing Products Other (See Comments)     migraines   • Tomato Flavor [Flavoring Agent] Other (See Comments)     migraines   • Vinegar [Acetic Acid] Other (See Comments)     migraines   • Amoxicillin Rash   • Codeine GI Intolerance   • Penicillins Hives   • Sulfa Antibiotics Hives   • Vicodin  [Hydrocodone-Acetaminophen] GI Intolerance     vomiting       Objective      Vitals:    05/15/19 2311 05/16/19 0334 05/16/19 0749 05/16/19 1100   BP: 123/58 126/58 142/77 139/71   BP Location: Right arm Right arm Left arm Left arm   Patient Position: Lying Lying Lying Lying   Pulse: 77 78 76 77   Resp: 18 20 18 18   Temp: 98.3 °F (36.8 °C) 97 °F (36.1 °C) 96.6 °F (35.9 °C) 97.3 °F (36.3 °C)   TempSrc: Oral Oral Oral Oral   SpO2: 90% 90%  92%   Weight:       Height:         No flowsheet data found.    Physical Exam      General: Alert, awake, oriented.  .  Not in apparent distress. Vitals as above.   HEAD: normocephalic, atraumatic.   EYES: PERRL, EOMI. Conjunctival pallor +   Neck: Supple, no adenopathy or thyromegaly.   Throat: normal oral cavity and pharynx. No inflammation, swelling, exudate, or lesions.  CARDIAC: Normal S1 and S2. No S3, S4 or murmurs. Rhythm is regular.Extremities are warm and well perfused.   LUNGS: Clear to auscultation and percussion without rales, rhonchi, wheezing or diminished breath sounds.  ABDOMEN: Positive bowel sounds. Soft, nondistended, nontender  . No guarding or rebound. No masses.  Back: Hunched back. No bony tenderness.   EXTREMITIES: No significant deformity or joint abnormality. Bilateral pitting lower extremity swelling +  Peripheral pulses intact. No varicosities.  Skin: No rash or bruising.  Neurological: Grossly non-focal exam. No focal weakness. Gait: Not assessed due to clinical condition.   Psych: Mood and affect normal. No hallucination or suicidal thoughts.   Lymphatics: No palpable adenopathy.           RECENT LABS:Independently reviewed and summarized.  Hematology WBC   Date Value Ref Range Status   05/16/2019 12.59 (H) 3.40 - 10.80 10*3/mm3 Final     RBC   Date Value Ref Range Status   05/16/2019 3.01 (L) 3.77 - 5.28 10*6/mm3 Final     Hemoglobin   Date Value Ref Range Status   05/16/2019 8.4 (L) 12.0 - 15.9 g/dL Final     Hematocrit   Date Value Ref Range Status    05/16/2019 26.1 (L) 34.0 - 46.6 % Final     Platelets   Date Value Ref Range Status   05/16/2019 202 140 - 450 10*3/mm3 Final          Imaging (independently reviewed and summarized):   CT C/A/P reviewed which showed small bilateral pleural effusions. No lymphadenopathy or HSM. No prior comparison available.         Upper GI endoscopy from 4/22/19 reviewed. Showed - diffuse moderate inflammation in the stomach. No bleeding source identified.      Colonoscopy result from 4/22/19 reviewed. Showed -  No bleeding source. Normal exam.      Echo result reviewed (3/11/19)  · Mild mitral valve regurgitation is present  · Mild tricuspid valve regurgitation is present.  · Left atrial cavity size is mild-to-moderately dilated.  · Estimated EF = 60%.  · Left ventricular systolic function is normal.           Pathology (result reviewed):   Final Diagnosis   1.  GASTRIC ANTRUM, MUCOSAL BIOPSY:  MILD CHRONIC GASTRITIS.  NO EVIDENCE OF HELICOBACTER PYLORI.     2.  COLONIC MUCOSAL BIOPSY, RANDOM:  NO SIGNIFICANT PATHOLOGIC DIAGNOSIS.         Diagnosis  (1) Normocytic anemia   (2) Unintentional weight loss  (3) Hyponatremia  (4) Hematuria  (5) GI bleeding            Assessment/Plan     (1) Normocytic anemia:     Etiology unclear.     SPEP, immunofixation and free light chains pending.     Haptoglobin and LDH - normal. No evidence of hemolysis.     B12, folate normal.     Reticulocyte - inappropriately normal for the degree of anemia.     Iron studies - borderline.     If initial evaluation negative, we can consider bone marrow aspiration/biopsy as outpatient to rule out underlying bone marrow pathology.     Overall her clinical picture appears to be consistent with hypoproliferative anemia/ anemia of chronic disease.         (2) Protein caloric malnutrition: Etiology unclear. She had gastric emptying study performed which was normal. Nutrition following.      (3) Hyponatremia: Nephrology following. On 1 salt tab TID.      (4)  Hematuria: Urology following.Urine cytology pending. No hematuria today.      (5) GI bleeding: Possible GIB. Endoscopy result reviewed. No bleeding  source identified.  heme occult stool testing ordered.       We will continue to follow.     Judah Sher MD                   5/16/2019      CC:

## 2019-05-16 NOTE — PROGRESS NOTES
FAMILY MEDICINE DAILY PROGRESS NOTE    NAME: Yaneli Bates  : 1940  MRN: [id number]      LOS: 1 day     PROVIDER OF SERVICE: Janice Nazario MD    Chief Complaint: Generalized weakness    Subjective:     Interval History:  History taken from: patient  Patient notes that she is feeling significantly better since she received fluids overnight. She denies noticing any blood in her urine.  She denies noticing any black or bloody stools.  She denies any emesis, and no bloody emesis. Vitals stable.     Review of Systems:   Review of Systems   Constitutional: Positive for fatigue. Negative for activity change, appetite change and fever.   HENT: Negative for ear pain and sore throat.    Eyes: Negative for pain and visual disturbance.   Respiratory: Negative for cough and shortness of breath.    Cardiovascular: Negative for chest pain and palpitations.   Gastrointestinal: Negative for abdominal pain, blood in stool, constipation, diarrhea, nausea and vomiting.   Endocrine: Negative for cold intolerance and heat intolerance.   Genitourinary: Negative for difficulty urinating and dysuria.   Musculoskeletal: Negative for arthralgias and gait problem.   Skin: Negative for color change and rash.   Neurological: Positive for weakness. Negative for dizziness and headaches.   Hematological: Negative for adenopathy. Does not bruise/bleed easily.   Psychiatric/Behavioral: Negative for agitation, confusion and sleep disturbance.       Objective:     Vital Signs  Temp:  [96.6 °F (35.9 °C)-98.3 °F (36.8 °C)] 96.6 °F (35.9 °C)  Heart Rate:  [70-82] 76  Resp:  [16-20] 18  BP: (123-158)/(58-88) 142/77  Body mass index is 23.68 kg/m².    Physical Exam  Physical Exam   Constitutional: She is oriented to person, place, and time. She appears well-developed and well-nourished.   HENT:   Head: Normocephalic and atraumatic.   Eyes: Conjunctivae, EOM and lids are normal. Pupils are equal, round, and reactive to light.   Neck: Normal  range of motion. Neck supple.   Cardiovascular: Normal rate, regular rhythm and normal heart sounds. Exam reveals no gallop and no friction rub.   No murmur heard.  Pulmonary/Chest: Effort normal and breath sounds normal.   Abdominal: Soft. Normal appearance and bowel sounds are normal. There is no tenderness.   Musculoskeletal: Normal range of motion. She exhibits edema.   Neurological: She is alert and oriented to person, place, and time.   Skin: Skin is warm, dry and intact.   Psychiatric: She has a normal mood and affect. Her speech is normal and behavior is normal. Judgment and thought content normal. Cognition and memory are normal.       Medication Review    Current Facility-Administered Medications:   •  acetaminophen (TYLENOL) tablet 650 mg, 650 mg, Oral, Q4H PRN, Rodrigo Nazario MD  •  famotidine (PEPCID) tablet 20 mg, 20 mg, Oral, Daily, Rodrigo Nazario MD, 20 mg at 05/16/19 0753  •  ferrous sulfate EC tablet 324 mg, 324 mg, Oral, BID With Meals, Rodrigo Nazario MD, 324 mg at 05/16/19 0752  •  metoprolol succinate XL (TOPROL-XL) 24 hr tablet 50 mg, 50 mg, Oral, Daily With Dinner, Rodrigo Nazario MD, 50 mg at 05/15/19 1741  •  ondansetron (ZOFRAN) tablet 4 mg, 4 mg, Oral, Q6H PRN **OR** ondansetron (ZOFRAN) injection 4 mg, 4 mg, Intravenous, Q6H PRN, Ashley Carrizales MD, 4 mg at 05/14/19 1305  •  potassium chloride (KLOR-CON) packet 40 mEq, 40 mEq, Oral, Once, Rodrigo Nazario MD  •  potassium chloride (MICRO-K) CR capsule 40 mEq, 40 mEq, Oral, Daily, Ashley Carrizales MD, 40 mEq at 05/16/19 0753  •  promethazine (PHENERGAN) tablet 12.5 mg, 12.5 mg, Oral, Q6H PRN **OR** promethazine (PHENERGAN) injection 12.5 mg, 12.5 mg, Intramuscular, Q6H PRN **OR** promethazine (PHENERGAN) suppository 12.5 mg, 12.5 mg, Rectal, Q6H PRN, Ashley Carrizales MD  •  sodium chloride 0.9 % flush 3 mL, 3 mL, Intravenous, Q12H, Rodrigo Nazario MD, 3 mL at 05/15/19 2129  •  sodium chloride 0.9 % flush 3-10 mL, 3-10 mL,  Intravenous, PRN, Rodrigo Nazario MD  •  sodium chloride 0.9 % infusion, 100 mL/hr, Intravenous, Continuous, Rodrigo Nazario MD, Last Rate: 100 mL/hr at 05/15/19 1742, 100 mL/hr at 05/15/19 1742  •  sodium chloride tablet 1 g, 1 g, Oral, TID With Meals, Rodrigo Nazario MD, 1 g at 05/16/19 0752  •  traMADol (ULTRAM) tablet 50 mg, 50 mg, Oral, BID PRN, Rodrigo Nazario MD, 50 mg at 05/16/19 0754     Diagnostic Data    Lab Results (last 24 hours)     Procedure Component Value Units Date/Time    CBC & Differential [[id number]] Collected:  05/16/19 0703    Specimen:  Blood Updated:  05/16/19 0802    Narrative:       The following orders were created for panel order CBC & Differential.  Procedure                               Abnormality         Status                     ---------                               -----------         ------                     CBC Auto Differential[[id number]]        Abnormal            Final result                 Please view results for these tests on the individual orders.    CBC Auto Differential [[id number]]  (Abnormal) Collected:  05/16/19 0703    Specimen:  Blood Updated:  05/16/19 0802     WBC 12.59 10*3/mm3      RBC 3.01 10*6/mm3      Hemoglobin 8.4 g/dL      Hematocrit 26.1 %      MCV 86.7 fL      MCH 27.9 pg      MCHC 32.2 g/dL      RDW 17.5 %      RDW-SD 55.8 fl      MPV 9.8 fL      Platelets 202 10*3/mm3      Neutrophil % 84.9 %      Lymphocyte % 7.1 %      Monocyte % 6.2 %      Eosinophil % 0.4 %      Basophil % 0.2 %      Immature Grans % 1.2 %      Neutrophils, Absolute 10.70 10*3/mm3      Lymphocytes, Absolute 0.89 10*3/mm3      Monocytes, Absolute 0.78 10*3/mm3      Eosinophils, Absolute 0.05 10*3/mm3      Basophils, Absolute 0.02 10*3/mm3      Immature Grans, Absolute 0.15 10*3/mm3      nRBC 0.0 /100 WBC     Retic With IRF & RET-He [[id number]]  (Normal) Collected:  05/16/19 0703    Specimen:  Blood Updated:  05/16/19 0740     Immature Reticulocyte Fraction 12.3 %      Reticulocyte % 1.44  %      Reticulocyte Hgb 32.2 pg     Haptoglobin [[id number]] Collected:  05/16/19 0703    Specimen:  Blood Updated:  05/16/19 0737    Protein Elec + Interp, Serum [[id number]] Collected:  05/16/19 0703    Specimen:  Blood Updated:  05/16/19 0737    Copper, Serum [[id number]] Collected:  05/16/19 0702    Specimen:  Blood Updated:  05/16/19 0737    Comprehensive Metabolic Panel [[id number]] Collected:  05/16/19 0703    Specimen:  Blood Updated:  05/16/19 0737    Lactate Dehydrogenase [[id number]] Collected:  05/16/19 0703    Specimen:  Blood Updated:  05/16/19 0737    Peripheral Blood Smear [[id number]] Collected:  05/15/19 0516    Specimen:  Blood Updated:  05/15/19 0928    Immunofixation, Serum [[id number]] Collected:  05/15/19 0918    Specimen:  Blood Updated:  05/15/19 0921    Immunoglobulin Free LT Chains Blood [[id number]] Collected:  05/15/19 0918    Specimen:  Blood Updated:  05/15/19 0921            I reviewed the patient's new clinical results.    Assessment/Plan:     Active Hospital Problems    Diagnosis POA   • **Generalized weakness [R53.1] Yes     Possible malignancy?   -  Pancreatic source?    - lipase w/n/l, CT abdomen has shown no mass   - GI source?     -Colonoscopy, EGD no signs of malignant process     - will consult GI, plans outpatient was for endoscopic capsule.    - Bladder cx?    - U/A shows large blood. Will get urine cytology    - Urology consult: will follow recommendations.  Patient was initially scheduled for cystoscopy today.    - hemoglobin 7.7; will transfuse 2 units PRBC if less than 7.    - will continue to monitor     - Continue IVF, Dietician consult.      • Normocytic anemia [D64.9] Unknown     Heme/onc on board  Dr So recommends spep/immunofixation/free light chains pending  Haptoglobin and ldh to rule out hemolysis pending  Ct of chest abdomen pelvis was negative for lytic lesions or lad      • Hemorrhagic cystitis [N30.91] Yes     Patient was seen by Dr. Gilmore, Urology previous  admission. Was treated with Azactam and diflucan during that time. On discharge was scheduled for cystoscopy with Dr. Damico this Wednesday.     - patient was seen on 5/10/19 by PCP where she was diagnosed with cystitis. Started on doxy. Has taken 2 days.   - U/A on admission : large blood, 2+ protein   - urine cytology pending   - Urology, Dr. D'amico recommends outpatient pelvic exam         • Iron deficiency anemia due to chronic blood loss [D50.0] Yes     H/H: 7.7/25.0 in the ED  -continue home ferrous sulfate BID  -continue to trend H/H  -received 2 units of blood on 5/14; Hb today pending        • Weight loss, unintentional [R63.4] Yes     Will get dietician consult  - patient has many food allergies     • Hyponatremia [E87.1] Yes     In the ED sodium level is 131. Patient sees Dr. Mckenzie, Nephrology outpatient. 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   - continue home sodium tablets 1 g TID  - 1.6 L fluid restriction daily recommended by nephro         • Diabetes mellitus (CMS/HCC) [E11.9] Yes     Will hold home metformin  - SSI       • Hypertension [I10] Yes     Will continue toprol-xl 50 daily   Continue to monitor per hospital protocol      • Coronary artery disease [I25.10] Yes     History of AVR 2014  Continue metoprolol 50 mg Daily          DVT prophylaxis: SCDs/TEDs  Code status is   Code Status and Medical Interventions:   Ordered at: 05/13/19 1811     Level Of Support Discussed With:    Patient     Code Status:    CPR     Medical Interventions (Level of Support Prior to Arrest):    Full       Plan for disposition:Where: home and When:  2-3days      Time: 30 minutes        Janice Nazario M.D. PGY1  [facility]  [address] [place] [address]  Office: [phone]    This document has been electronically signed by Janice Nazario MD on May 16, 2019 8:03 AM          This document has been electronically signed by Janice Nazario MD on May 16, 2019 8:03 AM

## 2019-05-16 NOTE — PLAN OF CARE
Problem: Patient Care Overview  Goal: Plan of Care Review  Outcome: Ongoing (interventions implemented as appropriate)   05/16/19 4791   Coping/Psychosocial   Plan of Care Reviewed With patient   Plan of Care Review   Progress improving   OTHER   Outcome Summary Pt performed B UE exercises with fair tolerance. One goal met this tx.

## 2019-05-17 NOTE — PLAN OF CARE
Problem: Patient Care Overview  Goal: Plan of Care Review  Outcome: Ongoing (interventions implemented as appropriate)   05/17/19 0039   Coping/Psychosocial   Plan of Care Reviewed With patient   Plan of Care Review   Progress no change   OTHER   Outcome Summary VSS, pain controlled with PO medication, patient rested overnight       Problem: Fall Risk (Adult)  Goal: Absence of Fall  Outcome: Ongoing (interventions implemented as appropriate)   05/17/19 0039   Fall Risk (Adult)   Absence of Fall achieves outcome       Problem: Activity Intolerance (Adult)  Goal: Activity Tolerance  Outcome: Ongoing (interventions implemented as appropriate)   05/17/19 0039   Activity Intolerance (Adult)   Activity Tolerance making progress toward outcome

## 2019-05-17 NOTE — DISCHARGE PLACEMENT REQUEST
Physician Progress Notes (last 24 hours) (Notes from 5/16/2019  3:16 PM through 5/17/2019  3:16 PM)      Suzy Sher MD at 5/17/2019  2:57 PM          Yaneli Bates  [id number]  1940    Subjective   Ms Bates was seen in follow up of anemia.   I spent > 45 minutes with patient/family.   We discussed result of laboratory testing, which showed concern for plasma cell disorder.   Discussed potential differentials, work up, need for bone marrow aspiration/biopsy discussed at length.   Discussed potential treatment options.   Discussed prognosis at length.   Discussed risks and benefits of bone marrow aspiration/biopsy at length as well.     Past Medical History, Past Surgical History, Social History, Family History have been reviewed and are without significant changes except as mentioned.    Review of Systems   CONSTITUTIONAL: fatigue + weakness + No weight loss, fever, chills.  HEENT: Eyes: No visual loss, blurred vision, double vision or yellow sclerae. Ears, Nose, Throat: No hearing loss, sneezing, congestion, runny nose or sore throat.  SKIN: No rash or itching.  CARDIOVASCULAR: No chest pain, chest pressure or chest discomfort. No palpitations. Edema +   RESPIRATORY: No shortness of breath, cough or sputum.  GASTROINTESTINAL: anorexia + nausea + No  vomiting or diarrhea. No abdominal pain or blood.  GENITOURINARY: Negative for urgency, frequency or dysuria.   NEUROLOGICAL: numbness or tingling in the extremities + migraine headaches + No dizziness, syncope, paralysis, ataxia. No change in bowel or bladder control.  MUSCULOSKELETAL: leg pain + back pain +   HEMATOLOGIC: anemia + No bleeding or bruising.  LYMPHATICS: No enlarged nodes. No history of splenectomy.  PSYCHIATRIC: No history of depression or anxiety.  ENDOCRINOLOGIC: No reports of sweating, cold or heat intolerance. No polyuria or polydipsia.  ALLERGIES: No history of asthma, hives, eczema or rhinitis.             Medications:   The current medication list was reviewed in the EMR    ALLERGIES:    Allergies   Allergen Reactions   • Clindamycin/Lincomycin Other (See Comments)     Migraine headaches   • Corn-Containing Products Other (See Comments)     migraines   • Tomato Flavor [Flavoring Agent] Other (See Comments)     migraines   • Vinegar [Acetic Acid] Other (See Comments)     migraines   • Amoxicillin Rash   • Codeine GI Intolerance   • Penicillins Hives   • Sulfa Antibiotics Hives   • Vicodin [Hydrocodone-Acetaminophen] GI Intolerance     vomiting       Objective      Vitals:    05/16/19 2334 05/17/19 0323 05/17/19 1127 05/17/19 1147   BP: 137/69 135/77 162/80 150/74   BP Location: Left arm Left arm Left arm Left arm   Patient Position: Lying Lying Lying Lying   Pulse: 74 73 85    Resp: 18 18 18    Temp: 96 °F (35.6 °C) 96.9 °F (36.1 °C) 97.1 °F (36.2 °C)    TempSrc: Oral Oral Oral    SpO2: 90% 91% 92%    Weight:       Height:         No flowsheet data found.    Physical Exam      General: Alert, awake, oriented. Mild distress due to nausea. Vitals as above.   HEAD: normocephalic, atraumatic.   EYES: PERRL, EOMI. Conjunctival pallor +   Neck: Supple, no adenopathy or thyromegaly.   Throat: normal oral cavity and pharynx. No inflammation, swelling, exudate, or lesions.  CARDIAC: Normal S1 and S2. No S3, S4 or murmurs. Rhythm is regular.Extremities are warm and well perfused.   LUNGS: Clear to auscultation and percussion without rales, rhonchi, wheezing or diminished breath sounds.  ABDOMEN: Positive bowel sounds. Soft, nondistended, nontender  . No guarding or rebound. No masses.  Back: Hunched back. No bony tenderness.   EXTREMITIES: No significant deformity or joint abnormality. Bilateral pitting lower extremity swelling +  Peripheral pulses intact. No varicosities.  Skin: No rash or bruising.  Neurological: Grossly non-focal exam. No focal weakness. Gait: Not assessed due to clinical condition.   Psych: Mood and affect normal. No  hallucination or suicidal thoughts.   Lymphatics: No palpable adenopathy.           RECENT LABS:Independently reviewed and summarized.  Hematology WBC   Date Value Ref Range Status   05/17/2019 14.41 (H) 3.40 - 10.80 10*3/mm3 Final     RBC   Date Value Ref Range Status   05/17/2019 3.04 (L) 3.77 - 5.28 10*6/mm3 Final     Hemoglobin   Date Value Ref Range Status   05/17/2019 8.4 (L) 12.0 - 15.9 g/dL Final     Hematocrit   Date Value Ref Range Status   05/17/2019 26.4 (L) 34.0 - 46.6 % Final     Platelets   Date Value Ref Range Status   05/17/2019 189 140 - 450 10*3/mm3 Final          Imaging (independently reviewed and summarized):   CT C/A/P reviewed which showed small bilateral pleural effusions. No lymphadenopathy or HSM. No prior comparison available.         Upper GI endoscopy from 4/22/19 reviewed. Showed - diffuse moderate inflammation in the stomach. No bleeding source identified.      Colonoscopy result from 4/22/19 reviewed. Showed -  No bleeding source. Normal exam.      Echo result reviewed (3/11/19)  · Mild mitral valve regurgitation is present  · Mild tricuspid valve regurgitation is present.  · Left atrial cavity size is mild-to-moderately dilated.  · Estimated EF = 60%.  · Left ventricular systolic function is normal.           Pathology (result reviewed):   Final Diagnosis   1.  GASTRIC ANTRUM, MUCOSAL BIOPSY:  MILD CHRONIC GASTRITIS.  NO EVIDENCE OF HELICOBACTER PYLORI.     2.  COLONIC MUCOSAL BIOPSY, RANDOM:  NO SIGNIFICANT PATHOLOGIC DIAGNOSIS.         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 No hyercalcemia or renal failure. CT no bony lesions.     No lymphadenopathy or hepatosplenomegaly.     FLCR of involved/uninvolved light chain - 59.5.     M  protein 2.5.     Recommend checking 24 hour urine protein electrophoresis.     Discussed potential diagnosis/prognosis and treatment options at length.     Differentials include smoldering myeloma, MGUS, multiple myeloma, amyloidosis or lymphoplasmacytic lymphoma.     She will need bone marrow aspiration/biopsy to establish diagnosis.     I also discussed case with Dr Rooney in pathology. He will perform amyloid testing on colon/gastric biopsy.     Discussed bone marrow aspiration/biopsy procedure at length with patient/family.     Treatment would depend on the etiology. The result of testing will take at least 7-10 days to come back. Meanwhile, we will start patient on dexamethasone 8 mg bid.         (2) Protein caloric malnutrition: Etiology unclear. She had gastric emptying study performed which was normal. Nutrition following.      (3) Hyponatremia: Nephrology following. Likely this is due to volume overload- third spacing from hypoalbuminuria.      (4) Hematuria: Urology following.Urine cytology pending. No hematuria today.      (5) Intractable nausea: IV zofran. Recommend IV dexamethasone 4 mg once followed by dexamethasone 8 mg bid.     (6) Hypoalbuminemia: Could be due to plasma cell disorder involving kidneys. We will check 24 hour urine.     Case discussed with , medicine team and nursing staff.     I have spent > 45 minutes times face to face with the patient and more than 50% of time was spent on counseling/coordinating care.       Judah Sher MD                  5/17/2019      CC:            Electronically signed by Suzy Sher MD at 5/17/2019  3:12 PM

## 2019-05-17 NOTE — PROGRESS NOTES
FAMILY MEDICINE DAILY PROGRESS NOTE    NAME: Yaneli Bates  : 1940  MRN: [id number]      LOS: 2 days     PROVIDER OF SERVICE: Janice Nazario MD    Chief Complaint: Generalized weakness    Subjective:     Interval History:  History taken from: patient  Patient states she had a headache yesterday.  She denies noticing any blood in her urine.  She denies noticing any black or bloody stools.  She denies any emesis, and no bloody emesis. Vitals stable. Patient complaining of nausea. Patient accepted to SNF.     Review of Systems:   Review of Systems   Constitutional: Positive for fatigue. Negative for activity change, appetite change and fever.   HENT: Negative for ear pain and sore throat.    Eyes: Negative for pain and visual disturbance.   Respiratory: Negative for cough and shortness of breath.    Cardiovascular: Negative for chest pain and palpitations.   Gastrointestinal: Positive for abdominal pain. Negative for blood in stool, constipation, diarrhea, nausea and vomiting.   Endocrine: Negative for cold intolerance and heat intolerance.   Genitourinary: Negative for difficulty urinating and dysuria.   Musculoskeletal: Negative for arthralgias and gait problem.   Skin: Negative for color change and rash.   Neurological: Positive for weakness. Negative for dizziness and headaches.   Hematological: Negative for adenopathy. Does not bruise/bleed easily.   Psychiatric/Behavioral: Negative for agitation, confusion and sleep disturbance.       Objective:     Vital Signs  Temp:  [96 °F (35.6 °C)-98.7 °F (37.1 °C)] 96.9 °F (36.1 °C)  Heart Rate:  [73-83] 73  Resp:  [18] 18  BP: (135-158)/(69-84) 135/77  Body mass index is 23.68 kg/m².    Physical Exam  Physical Exam   Constitutional: She is oriented to person, place, and time. She appears well-developed and well-nourished.   HENT:   Head: Normocephalic and atraumatic.   Eyes: Conjunctivae, EOM and lids are normal. Pupils are equal, round, and reactive to  light.   Neck: Normal range of motion. Neck supple.   Cardiovascular: Normal rate, regular rhythm and normal heart sounds. Exam reveals no gallop and no friction rub.   No murmur heard.  Pulmonary/Chest: Effort normal and breath sounds normal.   Abdominal: Soft. Normal appearance and bowel sounds are normal. There is no tenderness.   Musculoskeletal: Normal range of motion. She exhibits edema.   Neurological: She is alert and oriented to person, place, and time.   Skin: Skin is warm, dry and intact.   Psychiatric: She has a normal mood and affect. Her speech is normal and behavior is normal. Judgment and thought content normal. Cognition and memory are normal.       Medication Review    Current Facility-Administered Medications:   •  acetaminophen (TYLENOL) tablet 650 mg, 650 mg, Oral, Q4H PRN, Rodrigo Nazario MD  •  famotidine (PEPCID) tablet 20 mg, 20 mg, Oral, Daily, Rodrigo Nazario MD, 20 mg at 05/16/19 0753  •  ferrous sulfate EC tablet 324 mg, 324 mg, Oral, BID With Meals, Rodrigo Nazario MD, 324 mg at 05/16/19 1720  •  metoprolol succinate XL (TOPROL-XL) 24 hr tablet 50 mg, 50 mg, Oral, Daily With Dinner, Rodrigo Nazario MD, 50 mg at 05/16/19 1720  •  ondansetron (ZOFRAN) tablet 4 mg, 4 mg, Oral, Q6H PRN **OR** ondansetron (ZOFRAN) injection 4 mg, 4 mg, Intravenous, Q6H PRN, Ashley Carrizales MD, 4 mg at 05/16/19 1452  •  ondansetron (ZOFRAN) injection 4 mg, 4 mg, Intravenous, Once, Janice Nazario MD  •  potassium chloride (KLOR-CON) packet 40 mEq, 40 mEq, Oral, Once, Rodrigo Nazario MD  •  potassium chloride (MICRO-K) CR capsule 40 mEq, 40 mEq, Oral, Daily, Ashley Carrizales MD, 40 mEq at 05/16/19 0753  •  promethazine (PHENERGAN) tablet 12.5 mg, 12.5 mg, Oral, Q6H PRN **OR** promethazine (PHENERGAN) injection 12.5 mg, 12.5 mg, Intramuscular, Q6H PRN **OR** promethazine (PHENERGAN) suppository 12.5 mg, 12.5 mg, Rectal, Q6H PRN, Ashley Carrizales MD  •  sodium chloride 0.9 % flush 3 mL, 3 mL,  Intravenous, Q12H, Rodrigo Nazario MD, 3 mL at 05/15/19 2129  •  sodium chloride 0.9 % flush 3-10 mL, 3-10 mL, Intravenous, PRN, Rodrigo Nazario MD  •  sodium chloride 0.9 % infusion, 100 mL/hr, Intravenous, Continuous, Rodrigo Nazario MD, Last Rate: 100 mL/hr at 05/15/19 1742, 100 mL/hr at 05/15/19 1742  •  sodium chloride tablet 1 g, 1 g, Oral, TID With Meals, Rodrigo Nazario MD, 1 g at 05/16/19 1720  •  traMADol (ULTRAM) tablet 50 mg, 50 mg, Oral, BID PRN, Rodrigo Nazario MD, 50 mg at 05/16/19 1959     Diagnostic Data    Lab Results (last 24 hours)     Procedure Component Value Units Date/Time    Comprehensive Metabolic Panel [[id number]]  (Abnormal) Collected:  05/17/19 0531    Specimen:  Blood Updated:  05/17/19 0613     Glucose 95 mg/dL      BUN 16 mg/dL      Creatinine 0.63 mg/dL      Sodium 130 mmol/L      Potassium 3.7 mmol/L      Chloride 100 mmol/L      CO2 21.0 mmol/L      Calcium 6.9 mg/dL      Total Protein 6.7 g/dL      Albumin 1.50 g/dL      ALT (SGPT) 6 U/L      AST (SGOT) 19 U/L      Alkaline Phosphatase 90 U/L      Total Bilirubin 0.5 mg/dL      eGFR Non African Amer 91 mL/min/1.73      Globulin 5.2 gm/dL      A/G Ratio 0.3 g/dL      BUN/Creatinine Ratio 25.4     Anion Gap 9.0 mmol/L     Narrative:       GFR Normal >60  Chronic Kidney Disease <60  Kidney Failure <15    CBC & Differential [[id number]] Collected:  05/17/19 0531    Specimen:  Blood Updated:  05/17/19 0606    Narrative:       The following orders were created for panel order CBC & Differential.  Procedure                               Abnormality         Status                     ---------                               -----------         ------                     CBC Auto Differential[[id number]]        Abnormal            Final result                 Please view results for these tests on the individual orders.    CBC Auto Differential [[id number]]  (Abnormal) Collected:  05/17/19 0531    Specimen:  Blood Updated:  05/17/19 0606     WBC 14.41  10*3/mm3      RBC 3.04 10*6/mm3      Hemoglobin 8.4 g/dL      Hematocrit 26.4 %      MCV 86.8 fL      MCH 27.6 pg      MCHC 31.8 g/dL      RDW 17.6 %      RDW-SD 55.6 fl      MPV 9.8 fL      Platelets 189 10*3/mm3      Neutrophil % 87.6 %      Lymphocyte % 5.1 %      Monocyte % 5.8 %      Eosinophil % 0.2 %      Basophil % 0.3 %      Immature Grans % 1.0 %      Neutrophils, Absolute 12.62 10*3/mm3      Lymphocytes, Absolute 0.74 10*3/mm3      Monocytes, Absolute 0.84 10*3/mm3      Eosinophils, Absolute 0.03 10*3/mm3      Basophils, Absolute 0.04 10*3/mm3      Immature Grans, Absolute 0.14 10*3/mm3      nRBC 0.0 /100 WBC     Hemoglobin & Hematocrit, Blood [[id number]]  (Abnormal) Collected:  05/16/19 1523    Specimen:  Blood Updated:  05/16/19 1547     Hemoglobin 9.7 g/dL      Hematocrit 31.0 %     Immunoglobulin Free LT Chains Blood [[id number]]  (Abnormal) Collected:  05/15/19 0918    Specimen:  Blood Updated:  05/16/19 1512     Free Light Chain, Kappa 13.3 mg/L      Free Lambda Light Chains 792.6 mg/L      Kappa/Lambda Ratio 0.02    Narrative:       Performed at:  [address] [place] [address]  [id number]  : Tyler Bhatia PhD, Phone:  [phone]    Immunofixation, Serum [[id number]]  (Abnormal) Collected:  05/15/19 0918    Specimen:  Blood Updated:  05/16/19 1311     Immunofixation Result, Serum Comment     Comment: Immunofixation shows IgG monoclonal protein with lambda light chain  specificity.        IgG 2919 mg/dL      IgA 39 mg/dL      Comment: Result confirmed on concentration.        IgM 15 mg/dL      Comment: Result confirmed on concentration.       Narrative:       Performed at:  [address] [place] [address]  [id number]  : Tyler Bhatia PhD, Phone:  [phone]    Haptoglobin [[id number]]  (Normal) Collected:  05/16/19 0703    Specimen:  Blood Updated:  05/16/19 1202     Haptoglobin 188 mg/dL     Copper, WB [[id number]] Collected:  05/16/19  0702    Specimen:  Blood Updated:  05/16/19 0817    Comprehensive Metabolic Panel [[id number]]  (Abnormal) Collected:  05/16/19 0703    Specimen:  Blood Updated:  05/16/19 0814     Glucose 104 mg/dL      BUN 17 mg/dL      Creatinine 0.69 mg/dL      Sodium 131 mmol/L      Potassium 3.6 mmol/L      Chloride 100 mmol/L      CO2 23.0 mmol/L      Calcium 7.1 mg/dL      Total Protein 6.8 g/dL      Albumin 1.50 g/dL      ALT (SGPT) 7 U/L      AST (SGOT) 21 U/L      Alkaline Phosphatase 93 U/L      Total Bilirubin 0.4 mg/dL      eGFR Non African Amer 82 mL/min/1.73      Globulin 5.3 gm/dL      A/G Ratio 0.3 g/dL      BUN/Creatinine Ratio 24.6     Anion Gap 8.0 mmol/L     Narrative:       GFR Normal >60  Chronic Kidney Disease <60  Kidney Failure <15    Lactate Dehydrogenase [[id number]]  (Normal) Collected:  05/16/19 0703    Specimen:  Blood Updated:  05/16/19 0814      U/L     CBC & Differential [[id number]] Collected:  05/16/19 0703    Specimen:  Blood Updated:  05/16/19 0802    Narrative:       The following orders were created for panel order CBC & Differential.  Procedure                               Abnormality         Status                     ---------                               -----------         ------                     CBC Auto Differential[[id number]]        Abnormal            Final result                 Please view results for these tests on the individual orders.    CBC Auto Differential [[id number]]  (Abnormal) Collected:  05/16/19 0703    Specimen:  Blood Updated:  05/16/19 0802     WBC 12.59 10*3/mm3      RBC 3.01 10*6/mm3      Hemoglobin 8.4 g/dL      Hematocrit 26.1 %      MCV 86.7 fL      MCH 27.9 pg      MCHC 32.2 g/dL      RDW 17.5 %      RDW-SD 55.8 fl      MPV 9.8 fL      Platelets 202 10*3/mm3      Neutrophil % 84.9 %      Lymphocyte % 7.1 %      Monocyte % 6.2 %      Eosinophil % 0.4 %      Basophil % 0.2 %      Immature Grans % 1.2 %      Neutrophils, Absolute 10.70 10*3/mm3       Lymphocytes, Absolute 0.89 10*3/mm3      Monocytes, Absolute 0.78 10*3/mm3      Eosinophils, Absolute 0.05 10*3/mm3      Basophils, Absolute 0.02 10*3/mm3      Immature Grans, Absolute 0.15 10*3/mm3      nRBC 0.0 /100 WBC             I reviewed the patient's new clinical results.    Assessment/Plan:     Active Hospital Problems    Diagnosis POA   • **Generalized weakness [R53.1] Yes     Possible malignancy?   -  Pancreatic source?    - lipase w/n/l, CT abdomen has shown no mass   - GI source?     -Colonoscopy, EGD no signs of malignant process     - will consult GI, plans outpatient was for endoscopic capsule.    - Bladder cx?    - U/A shows large blood. Will get urine cytology    - Urology consult: will follow recommendations.  Patient was initially scheduled for cystoscopy today.    - hemoglobin 7.7; will transfuse 2 units PRBC if less than 7.    - will continue to monitor     - Continue IVF, Dietician consult.      • Normocytic anemia [D64.9] Yes     Heme/onc on board  Dr So recommends spep/immunofixation/free light chains- iGG elevated, iga/igm decreased, light chains 792   Haptoglobin and ldh to rule out hemolysis pending  Ct of chest abdomen pelvis was negative for lytic lesions or lad      • Hemorrhagic cystitis [N30.91] Yes     Patient was seen by Dr. Gilmore, Urology previous admission. Was treated with Azactam and diflucan during that time. On discharge was scheduled for cystoscopy with Dr. Damico this Wednesday.     - patient was seen on 5/10/19 by PCP where she was diagnosed with cystitis. Started on doxy. Has taken 2 days.   - U/A on admission : large blood, 2+ protein   - urine cytology pending   - Urology, Dr. D'amico recommends outpatient pelvic exam         • Iron deficiency anemia due to chronic blood loss [D50.0] Yes     H/H: 7.7/25.0 in the ED  -continue home ferrous sulfate BID  -continue to trend H/H  -received 2 units of blood on 5/14; Hb today pending        • Weight loss, unintentional  [R63.4] Yes     Will get dietician consult  - patient has many food allergies     • Hyponatremia [E87.1] Yes     In the ED sodium level is 131. Patient sees Dr. Mckenzie, Nephrology outpatient. 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   - continue home sodium tablets 1 g TID  - 1.6 L fluid restriction daily recommended by nephro         • Diabetes mellitus (CMS/HCC) [E11.9] Yes     Will hold home metformin  - SSI       • Hypertension [I10] Yes     Will continue toprol-xl 50 daily   Continue to monitor per hospital protocol      • Coronary artery disease [I25.10] Yes     History of AVR 2014  Continue metoprolol 50 mg Daily          DVT prophylaxis: SCDs/TEDs  Code status is   Code Status and Medical Interventions:   Ordered at: 05/13/19 1811     Level Of Support Discussed With:    Patient     Code Status:    CPR     Medical Interventions (Level of Support Prior to Arrest):    Full       Plan for disposition:Where: home and When:  2-3days      Time: 30 minutes        Janice Nazario M.D. PGY1  [facility]  [address] [place] [address]  Office: [phone]    This document has been electronically signed by Janice Nazario MD on May 17, 2019 7:57 AM          This document has been electronically signed by Janice Nazario MD on May 17, 2019 7:57 AM

## 2019-05-17 NOTE — SIGNIFICANT NOTE
05/17/19 1450   Rehab Treatment   Discipline physical therapy assistant   Reason Treatment Not Performed patient/family declined treatment  (Pt declined tx stating she has too much on her mind right now. Pt okay with therapy checking back tomorrow.)

## 2019-05-17 NOTE — PLAN OF CARE
Problem: Patient Care Overview  Goal: Plan of Care Review  Outcome: Ongoing (interventions implemented as appropriate)   05/17/19 1315   Coping/Psychosocial   Plan of Care Reviewed With patient   Plan of Care Review   Progress improving   OTHER   Outcome Summary Pt tolerated tx fair this date. Pt c/o nausea. Pt was set up with grooming task. Pt gave fair effort with UE ther ex. Continue OT POC.

## 2019-05-17 NOTE — PROGRESS NOTES
Yaneli Bates  [id number]  1940    Subjective   Ms Bates was seen in follow up of anemia.   I spent > 45 minutes with patient/family.   We discussed result of laboratory testing, which showed concern for plasma cell disorder.   Discussed potential differentials, work up, need for bone marrow aspiration/biopsy discussed at length.   Discussed potential treatment options.   Discussed prognosis at length.   Discussed risks and benefits of bone marrow aspiration/biopsy at length as well.     Past Medical History, Past Surgical History, Social History, Family History have been reviewed and are without significant changes except as mentioned.    Review of Systems   CONSTITUTIONAL: fatigue + weakness + No weight loss, fever, chills.  HEENT: Eyes: No visual loss, blurred vision, double vision or yellow sclerae. Ears, Nose, Throat: No hearing loss, sneezing, congestion, runny nose or sore throat.  SKIN: No rash or itching.  CARDIOVASCULAR: No chest pain, chest pressure or chest discomfort. No palpitations. Edema +   RESPIRATORY: No shortness of breath, cough or sputum.  GASTROINTESTINAL: anorexia + nausea + No  vomiting or diarrhea. No abdominal pain or blood.  GENITOURINARY: Negative for urgency, frequency or dysuria.   NEUROLOGICAL: numbness or tingling in the extremities + migraine headaches + No dizziness, syncope, paralysis, ataxia. No change in bowel or bladder control.  MUSCULOSKELETAL: leg pain + back pain +   HEMATOLOGIC: anemia + No bleeding or bruising.  LYMPHATICS: No enlarged nodes. No history of splenectomy.  PSYCHIATRIC: No history of depression or anxiety.  ENDOCRINOLOGIC: No reports of sweating, cold or heat intolerance. No polyuria or polydipsia.  ALLERGIES: No history of asthma, hives, eczema or rhinitis.             Medications:  The current medication list was reviewed in the EMR    ALLERGIES:    Allergies   Allergen Reactions   • Clindamycin/Lincomycin Other (See Comments)     Migraine  headaches   • Corn-Containing Products Other (See Comments)     migraines   • Tomato Flavor [Flavoring Agent] Other (See Comments)     migraines   • Vinegar [Acetic Acid] Other (See Comments)     migraines   • Amoxicillin Rash   • Codeine GI Intolerance   • Penicillins Hives   • Sulfa Antibiotics Hives   • Vicodin [Hydrocodone-Acetaminophen] GI Intolerance     vomiting       Objective      Vitals:    05/16/19 2334 05/17/19 0323 05/17/19 1127 05/17/19 1147   BP: 137/69 135/77 162/80 150/74   BP Location: Left arm Left arm Left arm Left arm   Patient Position: Lying Lying Lying Lying   Pulse: 74 73 85    Resp: 18 18 18    Temp: 96 °F (35.6 °C) 96.9 °F (36.1 °C) 97.1 °F (36.2 °C)    TempSrc: Oral Oral Oral    SpO2: 90% 91% 92%    Weight:       Height:         No flowsheet data found.    Physical Exam      General: Alert, awake, oriented. Mild distress due to nausea. Vitals as above.   HEAD: normocephalic, atraumatic.   EYES: PERRL, EOMI. Conjunctival pallor +   Neck: Supple, no adenopathy or thyromegaly.   Throat: normal oral cavity and pharynx. No inflammation, swelling, exudate, or lesions.  CARDIAC: Normal S1 and S2. No S3, S4 or murmurs. Rhythm is regular.Extremities are warm and well perfused.   LUNGS: Clear to auscultation and percussion without rales, rhonchi, wheezing or diminished breath sounds.  ABDOMEN: Positive bowel sounds. Soft, nondistended, nontender  . No guarding or rebound. No masses.  Back: Hunched back. No bony tenderness.   EXTREMITIES: No significant deformity or joint abnormality. Bilateral pitting lower extremity swelling +  Peripheral pulses intact. No varicosities.  Skin: No rash or bruising.  Neurological: Grossly non-focal exam. No focal weakness. Gait: Not assessed due to clinical condition.   Psych: Mood and affect normal. No hallucination or suicidal thoughts.   Lymphatics: No palpable adenopathy.           RECENT LABS:Independently reviewed and summarized.  Hematology WBC   Date Value  Ref Range Status   05/17/2019 14.41 (H) 3.40 - 10.80 10*3/mm3 Final     RBC   Date Value Ref Range Status   05/17/2019 3.04 (L) 3.77 - 5.28 10*6/mm3 Final     Hemoglobin   Date Value Ref Range Status   05/17/2019 8.4 (L) 12.0 - 15.9 g/dL Final     Hematocrit   Date Value Ref Range Status   05/17/2019 26.4 (L) 34.0 - 46.6 % Final     Platelets   Date Value Ref Range Status   05/17/2019 189 140 - 450 10*3/mm3 Final          Imaging (independently reviewed and summarized):   CT C/A/P reviewed which showed small bilateral pleural effusions. No lymphadenopathy or HSM. No prior comparison available.         Upper GI endoscopy from 4/22/19 reviewed. Showed - diffuse moderate inflammation in the stomach. No bleeding source identified.      Colonoscopy result from 4/22/19 reviewed. Showed -  No bleeding source. Normal exam.      Echo result reviewed (3/11/19)  · Mild mitral valve regurgitation is present  · Mild tricuspid valve regurgitation is present.  · Left atrial cavity size is mild-to-moderately dilated.  · Estimated EF = 60%.  · Left ventricular systolic function is normal.           Pathology (result reviewed):   Final Diagnosis   1.  GASTRIC ANTRUM, MUCOSAL BIOPSY:  MILD CHRONIC GASTRITIS.  NO EVIDENCE OF HELICOBACTER PYLORI.     2.  COLONIC MUCOSAL BIOPSY, RANDOM:  NO SIGNIFICANT PATHOLOGIC DIAGNOSIS.         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 No hyercalcemia or renal failure. CT no bony lesions.     No lymphadenopathy or hepatosplenomegaly.     FLCR of involved/uninvolved light chain - 59.5.     M protein 2.5.     Recommend checking 24 hour urine protein electrophoresis.     Discussed potential diagnosis/prognosis and treatment options at length.     Differentials  include smoldering myeloma, MGUS, multiple myeloma, amyloidosis or lymphoplasmacytic lymphoma.     She will need bone marrow aspiration/biopsy to establish diagnosis.     I also discussed case with Dr Rooney in pathology. He will perform amyloid testing on colon/gastric biopsy.     Discussed bone marrow aspiration/biopsy procedure at length with patient/family.     Treatment would depend on the etiology. The result of testing will take at least 7-10 days to come back. Meanwhile, we will start patient on dexamethasone 8 mg bid.         (2) Protein caloric malnutrition: Etiology unclear. She had gastric emptying study performed which was normal. Nutrition following.      (3) Hyponatremia: Nephrology following. Likely this is due to volume overload- third spacing from hypoalbuminuria.      (4) Hematuria: Urology following.Urine cytology pending. No hematuria today.      (5) Intractable nausea: IV zofran. Recommend IV dexamethasone 4 mg once followed by dexamethasone 8 mg bid.     (6) Hypoalbuminemia: Could be due to plasma cell disorder involving kidneys. We will check 24 hour urine.     Case discussed with , medicine team and nursing staff.     I have spent > 45 minutes times face to face with the patient and more than 50% of time was spent on counseling/coordinating care.       Judah Sher MD                   5/17/2019      CC:

## 2019-05-17 NOTE — CONSULTS
Nutrition Services    Patient Name:  Yaneli Bates  YOB: 1940  MRN: [id number]  Admit Date:  5/13/2019  Lunch tray in front of pt.  She said that she is a little nausea and she will eat as soon as it passes.  She just took medication.  Her family is looking at rehab centers.  She said that the meals have gone well and they have worked with her on getting her foods that she can eat.  Encouraged po and offered encouragement    Electronically signed by:  Tanya Harkins RD  05/17/19 3:09 PM

## 2019-05-17 NOTE — THERAPY TREATMENT NOTE
Acute Care - Occupational Therapy Treatment Note  [facility]     Patient Name: Yaneli Bates  : 1940  MRN: [id number]  Today's Date: 2019  Onset of Illness/Injury or Date of Surgery: 19  Date of Referral to OT: 19  Referring Physician: Dr. ISABELLA Hi    Admit Date: 2019       ICD-10-CM ICD-9-CM   1. Generalized weakness R53.1 780.79   2. Symptomatic anemia D64.9 285.9   3. Hypomagnesemia E83.42 275.2   4. Impaired mobility and activities of daily living Z74.09 799.89   5. Impaired functional mobility, balance, gait, and endurance Z74.09 V49.89     Patient Active Problem List   Diagnosis   • S/P AVR (aortic valve replacement)   • Hypertension   • Chest pain, rule out acute myocardial infarction   • Anemia   • Symptomatic anemia   • Diabetes mellitus (CMS/HCC)   • Hematuria   • Hyponatremia   • Coronary artery disease   • Weight loss, unintentional   • Fatigue   • Generalized weakness   • Hemorrhagic cystitis   • Iron deficiency anemia due to chronic blood loss   • Normocytic anemia     Past Medical History:   Diagnosis Date   • Allergic    • Anemia    • Arthritis    • Coronary artery disease 2014    valave replacement   • Fibromyalgia, primary    • Headache    • Infectious viral hepatitis    • Low back pain    • Osteoporosis    • Visual impairment      Past Surgical History:   Procedure Laterality Date   • AORTIC VALVE REPAIR/REPLACEMENT  2014,may   • CARDIAC SURGERY     • COLONOSCOPY     • COLONOSCOPY N/A 2019    Procedure: COLONOSCOPY;  Surgeon: Carlos Schaeffer MD;  Location: [facility] ENDOSCOPY;  Service: Gastroenterology   • ENDOSCOPY N/A 3/13/2019    Procedure: ESOPHAGOGASTRODUODENOSCOPY;  Surgeon: Carlos Schaeffer MD;  Location: [facility] ENDOSCOPY;  Service: Gastroenterology   • ENDOSCOPY N/A 2019    Procedure: ESOPHAGOGASTRODUODENOSCOPY;  Surgeon: Carlos Schaeffer MD;  Location: [facility] ENDOSCOPY;  Service: Gastroenterology   • KNEE SURGERY Right    •  TONSILLECTOMY         Therapy Treatment    Rehabilitation Treatment Summary     Row Name 05/17/19 1029             Treatment Time/Intention    Discipline  occupational therapy assistant  -CS      Document Type  therapy note (daily note)  -CS      Subjective Information  complains of;nausea/vomiting;fatigue;weakness  -CS      Mode of Treatment  occupational therapy  -CS      Therapy Frequency (OT Eval)  other (see comments) 5-7x/wk  -CS      Patient Effort  good  -CS      Existing Precautions/Restrictions  fall  -CS      Recorded by [CS] Mónica Harrington COTA/L 05/17/19 1308      Row Name 05/17/19 1029             Vital Signs    Pretreatment Heart Rate (beats/min)  65  -CS      Posttreatment Heart Rate (beats/min)  79  -CS      Pre SpO2 (%)  93  -CS      O2 Delivery Pre Treatment  room air  -CS      Post SpO2 (%)  90  -CS      O2 Delivery Post Treatment  room air  -CS      Pre Patient Position  Sitting  -CS      Post Patient Position  Sitting  -CS      Recorded by [CS] Mónica Harrington COTA/L 05/17/19 1308      Row Name 05/17/19 1029             Cognitive Assessment/Intervention- PT/OT    Orientation Status (Cognition)  oriented x 4  -CS      Follows Commands (Cognition)  follows one step commands  -CS      Recorded by [CS] Mónica Harrington COTA/L 05/17/19 1308      Row Name 05/17/19 1029             ADL Assessment/Intervention    BADL Assessment/Intervention  grooming  -CS      Recorded by [CS] Mónica Harrington COTA/L 05/17/19 1308      Row Name 05/17/19 1029             Grooming Assessment/Training    [place] Level (Grooming)  grooming skills;hair care, combing/brushing;oral care regimen;wash face, hands;set up  -CS      Grooming Position  sitting up in bed  -CS      Recorded by [CS] Mónica Harrington COTA/L 05/17/19 1308      Row Name 05/17/19 1029             Therapeutic Exercise    Upper Extremity Range of Motion (Therapeutic Exercise)  shoulder flexion/extension, bilateral;elbow flexion/extension,  bilateral;forearm supination/pronation, bilateral;wrist flexion/extension, bilateral  -CS      Hand (Therapeutic Exercise)  finger flexion/extension, bilateral  -CS      Weight/Resistance (Therapeutic Exercise)  1 pound  -CS      Exercise Type (Therapeutic Exercise)  AROM (active range of motion)  -CS      Position (Therapeutic Exercise)  seated  -CS      Sets/Reps (Therapeutic Exercise)  1/20  -CS      Equipment (Therapeutic Exercise)  free weight, barbell  -CS      Expected Outcome (Therapeutic Exercise)  improve functional tolerance, self-care activity;improve performance, BADLs;improve performance, transfer skills;increase active range of motion  -CS      Recorded by [CS] Mónica Harrington COTA/L 05/17/19 1308      Row Name 05/17/19 1029             Pain Scale: Numbers Pre/Post-Treatment    Pain Scale: Numbers, Pretreatment  0/10 - no pain  -CS      Pain Scale: Numbers, Post-Treatment  0/10 - no pain  -CS      Recorded by [CS] Mónica Harrington COTA/L 05/17/19 1308      Row Name 05/17/19 1029             Outcome Summary/Treatment Plan (OT)    Daily Summary of Progress (OT)  progress toward functional goals is good  -CS      Plan for Continued Treatment (OT)  cont OT POC  -CS      Anticipated Discharge Disposition (OT)  skilled nursing facility;home with assist;home with home health  -CS      Recorded by [CS] Mónica Harrington COTA/MERLINE 05/17/19 1308        User Key  (r) = Recorded By, (t) = Taken By, (c) = Cosigned By    Initials Name Effective Dates Discipline    CS Mónica Harrington COTA/MERLINE 03/07/18 -  OT           Rehab Goal Summary     Row Name 05/17/19 1029             Transfer Goal 1 (OT)    Activity/Assistive Device (Transfer Goal 1, OT)  sit-to-stand/stand-to-sit;bed-to-chair/chair-to-bed;toilet  -CS      [place] Level/Cues Needed (Transfer Goal 1, OT)  supervision required  -CS      Time Frame (Transfer Goal 1, OT)  long term goal (LTG);by discharge  -CS      Progress/Outcome (Transfer Goal 1, OT)   goal not met  -CS         Dressing Goal 1 (OT)    Activity/Assistive Device (Dressing Goal 1, OT)  dressing skills, all  -CS      [place]/Cues Needed (Dressing Goal 1, OT)  supervision required;set-up required  -CS      Time Frame (Dressing Goal 1, OT)  long term goal (LTG);by discharge  -CS      Progress/Outcome (Dressing Goal 1, OT)  goal not met  -CS         Toileting Goal 1 (OT)    Activity/Device (Toileting Goal 1, OT)  toileting skills, all  -CS      [place] Level/Cues Needed (Toileting Goal 1, OT)  conditional independence  -CS      Time Frame (Toileting Goal 1, OT)  long term goal (LTG);by discharge  -CS      Progress/Outcome (Toileting Goal 1, OT)  goal not met  -CS         Strength Goal 1 (OT)    Strength Goal 1 (OT)  Pt will be independent in BUE strengthening HEP  -CS      Time Frame (Strength Goal 1, OT)  long term goal (LTG);by discharge  -CS      Progress/Outcome (Strength Goal 1, OT)  goal not met  -CS          Activity Tolerance Goal 1 (OT)    Activity Level (Endurance Goal 1, OT)  15 min activity functiona/therapeutic activities  -CS      Time Frame (Activity Tolerance Goal 1, OT)  long term goal (LTG);by discharge  -CS      Progress/Outcome (Activity Tolerance Goal 1, OT)  goal met  -CS        User Key  (r) = Recorded By, (t) = Taken By, (c) = Cosigned By    Initials Name Provider Type Discipline    Mónica Shipman COTA/MERLINE Occupational Therapy Assistant OT        Occupational Therapy Education     Title: PT OT SLP Therapies (In Progress)     Topic: Occupational Therapy (In Progress)     Point: ADL training (In Progress)     Description: Instruct learner(s) on proper safety adaptation and remediation techniques during self care or transfers.   Instruct in proper use of assistive devices.    Learning Progress Summary           Patient Acceptance, E,TB,D, NR by LARRY at 5/17/2019  1:15 PM    Acceptance, E, NR by BB at 5/16/2019  1:51 PM    Acceptance, E, VU by AS at 5/14/2019  2:54 PM     Comment:  role of OT, OT POC, ADL training, transfer training   Family Acceptance, E, VU by AS at 5/14/2019  2:54 PM    Comment:  role of OT, OT POC, ADL training, transfer training                   Point: Home exercise program (In Progress)     Description: Instruct learner(s) on appropriate technique for monitoring, assisting and/or progressing therapeutic exercises/activities.    Learning Progress Summary           Patient Acceptance, E,TB,D, NR by CS at 5/17/2019  1:15 PM    Acceptance, E, NR by BB at 5/16/2019  1:51 PM                   Point: Precautions (In Progress)     Description: Instruct learner(s) on prescribed precautions during self-care and functional transfers.    Learning Progress Summary           Patient Acceptance, E,TB,D, NR by CS at 5/17/2019  1:15 PM    Acceptance, E, VU by AS at 5/14/2019  2:54 PM    Comment:  role of OT, OT POC, ADL training, transfer training   Family Acceptance, E, VU by AS at 5/14/2019  2:54 PM    Comment:  role of OT, OT POC, ADL training, transfer training                   Point: Body mechanics (In Progress)     Description: Instruct learner(s) on proper positioning and spine alignment during self-care, functional mobility activities and/or exercises.    Learning Progress Summary           Patient Acceptance, E,TB,D, NR by CS at 5/17/2019  1:15 PM                               User Key     Initials Effective Dates Name Provider Type Discipline     03/07/18 -  Luz Elena Feliciano COTA/L Occupational Therapy Assistant OT     03/07/18 -  Mónica Harrington COTA/MERLINE Occupational Therapy Assistant OT    AS 03/25/19 -  Ana Robles, OT Occupational Therapist OT                OT Recommendation and Plan  Outcome Summary/Treatment Plan (OT)  Daily Summary of Progress (OT): progress toward functional goals is good  Plan for Continued Treatment (OT): cont OT POC  Anticipated Discharge Disposition (OT): skilled nursing facility, home with assist, home with home  health  Therapy Frequency (OT Eval): other (see comments)(5-7x/wk)  Daily Summary of Progress (OT): progress toward functional goals is good  Plan of Care Review  Plan of Care Reviewed With: patient  Plan of Care Reviewed With: patient  Outcome Summary: Pt tolerated tx fair this date. Pt c/o nausea. Pt was set up with grooming task. Pt gave fair effort with UE ther ex. Continue OT POC.  Outcome Measures     Row Name 05/17/19 1300 05/16/19 1415 05/16/19 0955       How much help from another person do you currently need...    Turning from your back to your side while in flat bed without using bedrails?  --  3  -LN  --    Moving from lying on back to sitting on the side of a flat bed without bedrails?  --  3  -LN  --    Moving to and from a bed to a chair (including a wheelchair)?  --  3  -LN  --    Standing up from a chair using your arms (e.g., wheelchair, bedside chair)?  --  3  -LN  --    Climbing 3-5 steps with a railing?  --  2  -LN  --    To walk in hospital room?  --  3  -LN  --    AM-PAC 6 Clicks Score  --  17  -LN  --       How much help from another is currently needed...    Putting on and taking off regular lower body clothing?  3  -CS  --  3  -BB    Bathing (including washing, rinsing, and drying)  3  -CS  --  3  -BB    Toileting (which includes using toilet bed pan or urinal)  3  -CS  --  3  -BB    Putting on and taking off regular upper body clothing  4  -CS  --  4  -BB    Taking care of personal grooming (such as brushing teeth)  4  -CS  --  4  -BB    Eating meals  4  -CS  --  4  -BB    Score  21  -CS  --  21  -BB       Functional Assessment    Outcome Measure Options  AM-PAC 6 Clicks Daily Activity (OT)  -CS  AM-PAC 6 Clicks Basic Mobility (PT)  -LN  --    Row Name 05/15/19 1506 05/14/19 1400 05/14/19 1355       How much help from another person do you currently need...    Turning from your back to your side while in flat bed without using bedrails?  3  -TW  3  -BS  --    Moving from lying on back to  sitting on the side of a flat bed without bedrails?  3  -TW  2  -BS  --    Moving to and from a bed to a chair (including a wheelchair)?  3  -TW  3  -BS  --    Standing up from a chair using your arms (e.g., wheelchair, bedside chair)?  3  -TW  3  -BS  --    Climbing 3-5 steps with a railing?  2  -TW  2  -BS  --    To walk in hospital room?  3  -TW  3  -BS  --    AM-PAC 6 Clicks Score  17  -TW  16  -BS  --       How much help from another is currently needed...    Putting on and taking off regular lower body clothing?  --  --  3  -AS    Bathing (including washing, rinsing, and drying)  --  --  3  -AS    Toileting (which includes using toilet bed pan or urinal)  --  --  3  -AS    Putting on and taking off regular upper body clothing  --  --  4  -AS    Taking care of personal grooming (such as brushing teeth)  --  --  4  -AS    Eating meals  --  --  4  -AS    Score  --  --  21  -AS       Functional Assessment    Outcome Measure Options  AM-PAC 6 Clicks Basic Mobility (PT)  -TW  AM-PAC 6 Clicks Basic Mobility (PT)  -BS  AM-PAC 6 Clicks Daily Activity (OT)  -AS      User Key  (r) = Recorded By, (t) = Taken By, (c) = Cosigned By    Initials Name Provider Type    LN Kiara Milligan, PTA Physical Therapy Assistant    TW Carlitos Land, PTA Physical Therapy Assistant    Luz Elena Pastor, AMIN/L Occupational Therapy Assistant    Mónica Shipman, ELOISA/MERLINE Occupational Therapy Assistant    BS Malcom Kaiser, PT Physical Therapist    AS Ana Robles, OT Occupational Therapist           Time Calculation:   Time Calculation- OT     Row Name 05/17/19 1321             Time Calculation- OT    OT Start Time  1029  -CS      OT Stop Time  1115  -      OT Time Calculation (min)  46 min  -CS      Total Timed Code Minutes- OT  46 minute(s)  -      OT Received On  05/17/19  -        User Key  (r) = Recorded By, (t) = Taken By, (c) = Cosigned By    Initials Name Provider Type    Mónica Shipman, ELOISA/MERLINE  Occupational Therapy Assistant        Therapy Charges for Today     Code Description Service Date Service Provider Modifiers Qty    [id number] HC OT SELF CARE/MGMT/TRAIN EA 15 MIN 5/17/2019 Mónica Harrington COTA/L GO 1    [id number] HC OT THER PROC EA 15 MIN 5/17/2019 Mónica Harrington COTA/L GO 2               LILLIAN Galo  5/17/2019

## 2019-05-17 NOTE — NURSING NOTE
Pt stated she was nauseated and did not think she would be able to take meds this AM. PO zofran given.  Will try to give AM meds later this morning.

## 2019-05-17 NOTE — DISCHARGE PLACEMENT REQUEST
"Yolanda Bates (78 y.o. Female)     Date of Birth Social Security Number Address Home Phone MRN    1940  [address] [facility] [address] [phone] [id number]    [employer] Marital Status          Yazidism        Admission Date Admission Type Admitting Provider Attending Provider Department, Room/Bed    5/13/19 Emergency Bong Saxena MD Arora, Shannon, MD [address], 380/1    Discharge Date Discharge Disposition Discharge Destination                       Attending Provider:  Janice Nazario MD    Allergies:  Clindamycin/lincomycin, Corn-containing Products, Tomato Flavor [Flavoring Agent], Vinegar [Acetic Acid], Amoxicillin, Codeine, Penicillins, Sulfa Antibiotics, Vicodin [Hydrocodone-acetaminophen]    Isolation:  None   Infection:  None   Code Status:  CPR    Ht:  154.9 cm (61\")   Wt:  56.8 kg (125 lb 4.8 oz)    Admission Cmt:  None   Principal Problem:  Generalized weakness [R53.1] More...                 Active Insurance as of 5/13/2019     Primary Coverage     Payor Plan Insurance Group Employer/Plan Group    MEDICARE MEDICARE A & B      Payor Plan Address Payor Plan Phone Number Payor Plan Fax Number Effective Dates    [address] [phone]  6/1/2005 - None Entered    [facility] [address]       Subscriber Name Subscriber Birth Date Member ID       YOLANDA BATES 1940 [id number]           Secondary Coverage     Payor Plan Insurance Group Employer/Plan Group    AAR MED SUPP AAR [employer] C[employer] OPTIONS [id number]     Payor Plan Address Payor Plan Phone Number Payor Plan Fax Number Effective Dates    [facility] [phone]  1/1/2018 - None Entered    [address]       [facility] [address]       Subscriber Name Subscriber Birth Date Member ID       YOLANDA BATES 1940 [id number]                 Emergency Contacts      (Rel.) Home Phone Work Phone Mobile Phone    PauloKenneth (Spouse) -- -- " [phone]    DHAVAL NICHOLSON (Daughter) [phone] -- [phone]               Physical Therapy Notes (last 24 hours) (Notes from 2019 11:10 AM through 2019 11:10 AM)      Kiara Milligan PTA at 2019  3:37 PM  Version 1 of          Problem: Patient Care Overview  Goal: Plan of Care Review   19 1522 19 1535   Coping/Psychosocial   Plan of Care Reviewed With --  patient   Plan of Care Review   Progress no change --    OTHER   Outcome Summary --  sup-sit min of 1,sit-stand-sit min/cga of 1,amb 14' with rw and cga of 1,frequent rest breaks due to nausea and soa-no goal met-if d/c would benefit from short stay snf with PT     Goal: Discharge Needs Assessment   19 1800 19 0936 19 1535   Discharge Needs Assessment   Concerns to be Addressed --  denies needs/concerns at this time --    Patient/Family Anticipates Transition to --  other (see comments)  (Possible home with spouse v's SNF) --    Patient/Family Anticipated Services at Transition --  skilled nursing;rehabilitation services --    Transportation Concerns car, none --  --    Transportation Anticipated --  family or friend will provide --    Outpatient/Agency/Support Group Needs --  skilled nursing facility --    Discharge Facility/Level of Care Needs --  --  nursing facility, skilled   Offered/Gave Vendor List --  yes --    Patient's Choice of Community Agency(s) --  List of SNF's provided to patient and family at bedside --    Current Discharge Risk --  chronically ill;physical impairment;dependent with mobility/activities of daily living --    Disability   Equipment Currently Used at Home --  rollator;commode;power chair,(recliner lift) --            Electronically signed by Kiara Milligan PTA at 2019  3:37 PM     Kiara Milligan PTA at 2019  3:38 PM  Version 1 of          Acute Care - Physical Therapy Treatment Note  [facility]     Patient Name: Yaneli Bates  : 1940  MRN:  [id number]  Today's Date: 5/16/2019  Onset of Illness/Injury or Date of Surgery: 05/13/19  Date of Referral to PT: 05/13/19  Referring Physician: Dr. ISABELLA Hi    Admit Date: 5/13/2019    Visit Dx:    ICD-10-CM ICD-9-CM   1. Generalized weakness R53.1 780.79   2. Symptomatic anemia D64.9 285.9   3. Hypomagnesemia E83.42 275.2   4. Impaired mobility and activities of daily living Z74.09 799.89   5. Impaired functional mobility, balance, gait, and endurance Z74.09 V49.89     Patient Active Problem List   Diagnosis   • S/P AVR (aortic valve replacement)   • Hypertension   • Chest pain, rule out acute myocardial infarction   • Anemia   • Symptomatic anemia   • Diabetes mellitus (CMS/HCC)   • Hematuria   • Hyponatremia   • Coronary artery disease   • Weight loss, unintentional   • Fatigue   • Generalized weakness   • Hemorrhagic cystitis   • Iron deficiency anemia due to chronic blood loss   • Normocytic anemia       Therapy Treatment    Rehabilitation Treatment Summary     Row Name 05/16/19 1415 05/16/19 0955          Treatment Time/Intention    Discipline  physical therapy assistant  -LN  occupational therapy assistant  -BB     Document Type  therapy note (daily note)  -LN  therapy note (daily note)  -BB     Subjective Information  complains of;nausea/vomiting;weakness  -LN  complains of;pain  -BB     Mode of Treatment  physical therapy  -LN  individual therapy;occupational therapy  -BB     Total Minutes, Occupational Therapy Treatment  --  39  -BB     Therapy Frequency (OT Eval)  --  other (see comments) 5-7 days/wk  -BB     Patient Effort  good  -LN  good  -BB     Comment  nsg states pt needs to be oob to chair  -LN  --     Existing Precautions/Restrictions  fall  -LN  fall  -BB     Recorded by [LN] Kiara Milligan, MANUELITO 05/16/19 1534 [BB] Luz Elena Feliciano, ELOISA/L 05/16/19 1346     Row Name 05/16/19 1415 05/16/19 0955          Vital Signs    Post Systolic BP Rehab  166  -LN  --     Post Treatment Diastolic BP  90   -LN  --     Pretreatment Heart Rate (beats/min)  --  73  -BB     Intratreatment Heart Rate (beats/min)  88  -LN  76  -BB     Posttreatment Heart Rate (beats/min)  85  -LN  83  -BB     Pre SpO2 (%)  --  94  -BB     O2 Delivery Pre Treatment  room air  -LN  room air  -BB     Intra SpO2 (%)  92  -LN  95  -BB     O2 Delivery Intra Treatment  --  room air  -BB     Post SpO2 (%)  93  -LN  96  -BB     O2 Delivery Post Treatment  --  room air  -BB     Pre Patient Position  Supine  -LN  Supine  -BB     Intra Patient Position  Standing  -LN  -- lng sitting  -BB     Post Patient Position  Sitting  -LN  Supine  -BB     Recorded by [LN] Kiara Milligan, PTA 05/16/19 1534 [BB] Luz Elena Feliciano COTA/L 05/16/19 1346     Row Name 05/16/19 1415 05/16/19 0955          Cognitive Assessment/Intervention- PT/OT    Orientation Status (Cognition)  oriented x 4  -LN  oriented x 4  -BB     Follows Commands (Cognition)  follows one step commands  -LN  follows one step commands  -BB     Recorded by [LN] Kiara Milligan, PTA 05/16/19 1534 [BB] Luz Elena Feliciano COTA/L 05/16/19 1346     Row Name 05/16/19 1415             Bed Mobility Assessment/Treatment    Supine-Sit [place] (Bed Mobility)  minimum assist (75% patient effort) for B LE's  -LN      Sit-Supine [place] (Bed Mobility)  not tested  -LN      Bed Mobility, Safety Issues  decreased use of legs for bridging/pushing  -LN      Assistive Device (Bed Mobility)  bed rails;head of bed elevated  -LN      Recorded by [LN] Kiara Milligan, PTA 05/16/19 1534      Row Name 05/16/19 1415             Sit-Stand Transfer    Sit-Stand [place] (Transfers)  minimum assist (75% patient effort);verbal cues  -LN      Assistive Device (Sit-Stand Transfers)  walker, front-wheeled  -LN      Recorded by [LN] Kiara Milligan, PTA 05/16/19 1534      Row Name 05/16/19 1415             Stand-Sit Transfer    Stand-Sit [place] (Transfers)  contact guard  -LN      Assistive Device (Stand-Sit  Transfers)  walker, front-wheeled  -LN      Recorded by [LN] Kiara Milligan, PTA 05/16/19 1534      Row Name 05/16/19 1415             Gait/Stairs Assessment/Training    Gait/Stairs Assessment/Training  gait/ambulation assistive device  -LN      [place] Level (Gait)  contact guard  -LN      Assistive Device (Gait)  walker, front-wheeled  -LN      Distance in Feet (Gait)  14  -LN      Pattern (Gait)  step-to  -LN      Deviations/Abnormal Patterns (Gait)  base of support, narrow;willi decreased;stride length decreased  -LN      Recorded by [LN] Kiara Milligan, PTA 05/16/19 1534      Row Name 05/16/19 0955             Grooming Assessment/Training    [place] Level (Grooming)  grooming skills;hair care, combing/brushing;oral care regimen;wash face, hands;set up;supervision  -BB      Grooming Position  long sitting  -BB      Recorded by [BB] Luz Elena Feliciano COTA/L 05/16/19 1346      Row Name 05/16/19 1415             Lower Extremity Seated Therapeutic Exercise    Comment, Seated Lower Extremity (Therapeutic Exercise)  declined ex due to nausea-pt received iv meds for nausea  -LN      Recorded by [LN] Kiara Milligan, PTA 05/16/19 1534      Row Name 05/16/19 0955             Therapeutic Exercise    Upper Extremity Range of Motion (Therapeutic Exercise)  shoulder flexion/extension, left;shoulder internal/external rotation, bilateral;elbow flexion/extension, bilateral;forearm supination/pronation, bilateral;wrist flexion/extension, bilateral chest press  -BB      Hand (Therapeutic Exercise)  finger flexion/extension, bilateral  -BB      Weight/Resistance (Therapeutic Exercise)  1 pound  -BB      Exercise Type (Therapeutic Exercise)  AROM (active range of motion)  -BB      Position (Therapeutic Exercise)  seated  -BB      Sets/Reps (Therapeutic Exercise)  2x10  -BB      Equipment (Therapeutic Exercise)  free weight, barbell  -BB      Expected Outcome (Therapeutic Exercise)  improve functional tolerance, self-care  activity;improve functional stability;improve performance, transfer skills  -BB      Recorded by [BB] Luz Elena Feliciano COTA/L 05/16/19 1349      Row Name 05/16/19 1415 05/16/19 0955          Positioning and Restraints    Pre-Treatment Position  --  in bed  -BB     Post Treatment Position  chair  -LN  bed  -BB     In Bed  --  supine;call light within reach;encouraged to call for assist;exit alarm on  -BB     In Chair  notified nsg;reclined;call light within reach;encouraged to call for assist;exit alarm on;legs elevated;with family/caregiver  -LN  --     Recorded by [LN] Kiara Milligan, PTA 05/16/19 1534 [BB] Luz Elena Feliciano COTA/L 05/16/19 1349     Row Name 05/16/19 1415 05/16/19 0955          Pain Scale: Numbers Pre/Post-Treatment    Pain Scale: Numbers, Pretreatment  8/10  -LN  3/10  -BB     Pain Scale: Numbers, Post-Treatment  8/10  -LN  2/10  -BB     Pain Location  abdomen B LE's  -LN  -- B LEs  -BB     Pain Intervention(s)  Medication (See MAR) for nausea  -LN  Medication (See MAR)  -BB     Recorded by [LN] Kiara Milligan, PTA 05/16/19 1534 [BB] Luz Elena Feliciano AMIN/L 05/16/19 1349     Row Name 05/16/19 1415 05/16/19 0955          Plan of Care Review    Plan of Care Reviewed With  patient  -LN  patient  -BB     Recorded by [LN] Kiara Milligan, PTA 05/16/19 1534 [BB] Luz Elena Feliciano AMIN/L 05/16/19 1349     Row Name 05/16/19 0955             Outcome Summary/Treatment Plan (OT)    Daily Summary of Progress (OT)  progress towards functional goals is fair  -BB      Plan for Continued Treatment (OT)  continue POC  -BB      Anticipated Discharge Disposition (OT)  skilled nursing facility;home with assist;home with home health  -BB      Recorded by [BB] Luz Elena Feliciano COTA/L 05/16/19 1349      Row Name 05/16/19 1415             Outcome Summary/Treatment Plan (PT)    Plan for Continued Treatment (PT)  cont  -LN      Anticipated Discharge Disposition (PT)  home with assist;home with home health   -LN      Recorded by [LN] Kiara Milligan, PTA 05/16/19 1534        User Key  (r) = Recorded By, (t) = Taken By, (c) = Cosigned By    Initials Name Effective Dates Discipline    LN Kiara Milligan, PTA 03/07/18 -  PT    BB Luz Elena Feliciano, AMIN/L 03/07/18 -  OT               Rehab Goal Summary     Row Name 05/16/19 1415 05/16/19 0955          Physical Therapy Goals    Bed Mobility Goal Selection (PT)  bed mobility, PT goal 1  -LN  --     Transfer Goal Selection (PT)  transfer, PT goal 1  -LN  --     Gait Training Goal Selection (PT)  gait training, PT goal 1  -LN  --     Stairs Goal Selection (PT)  stairs, PT goal 1  -LN  --        Bed Mobility Goal 1 (PT)    Activity/Assistive Device (Bed Mobility Goal 1, PT)  sit to supine;supine to sit;scooting  -LN  --     [place] Level/Cues Needed (Bed Mobility Goal 1, PT)  standby assist  -LN  --     Time Frame (Bed Mobility Goal 1, PT)  by discharge  -LN  --     Barriers (Bed Mobility Goal 1, PT)  leg pain, nausea  -LN  --     Progress/Outcomes (Bed Mobility Goal 1, PT)  goal not met  -LN  --        Transfer Goal 1 (PT)    Activity/Assistive Device (Transfer Goal 1, PT)  sit-to-stand/stand-to-sit;walker, rolling  -LN  --     [place] Level/Cues Needed (Transfer Goal 1, PT)  standby assist  -LN  --     Time Frame (Transfer Goal 1, PT)  by discharge  -LN  --     Progress/Outcome (Transfer Goal 1, PT)  goal not met  -LN  --        Gait Training Goal 1 (PT)    Activity/Assistive Device (Gait Training Goal 1, PT)  gait (walking locomotion);walker, rolling  -LN  --     [place] Level (Gait Training Goal 1, PT)  standby assist  -LN  --     Distance (Gait Goal 1, PT)  75' x 2  -LN  --     Time Frame (Gait Training Goal 1, PT)  by discharge  -LN  --     Barriers (Gait Training Goal 1, PT)  urgency to urinate-on lasix  -LN  --     Progress/Outcome (Gait Training Goal 1, PT)  goal not met  -LN  --        Stairs Goal 1 (PT)    Activity/Assistive Device (Stairs Goal 1, PT)   stairs, all skills;using handrail, right;using handrail, left;ascending stairs;descending stairs  -LN  --     [place] Level/Cues Needed (Stairs Goal 1, PT)  standby assist  -LN  --     Number of Stairs (Stairs Goal 1, PT)  2  -LN  --     Time Frame (Stairs Goal 1, PT)  by discharge  -LN  --     Barriers (Stairs Goal 1, PT)  pain, fatigue  -LN  --     Progress/Outcome (Stairs Goal 1, PT)  goal not met  -LN  --        Occupational Therapy Goals    Transfer Goal Selection (OT)  --  transfer, OT goal 1  -BB     Dressing Goal Selection (OT)  --  dressing, OT goal 1  -BB     Toileting Goal Selection (OT)  --  toileting, OT goal 1  -BB     Strength Goal Selection (OT)  --  strength, OT goal 1  -BB     Activity Tolerance Goal Selection (OT)  --  activity tolerance, OT goal 1  -BB        Transfer Goal 1 (OT)    Activity/Assistive Device (Transfer Goal 1, OT)  --  sit-to-stand/stand-to-sit;bed-to-chair/chair-to-bed;toilet  -BB     [place] Level/Cues Needed (Transfer Goal 1, OT)  --  supervision required  -BB     Time Frame (Transfer Goal 1, OT)  --  long term goal (LTG);by discharge  -BB     Progress/Outcome (Transfer Goal 1, OT)  --  goal not met  -BB        Dressing Goal 1 (OT)    Activity/Assistive Device (Dressing Goal 1, OT)  --  dressing skills, all  -BB     [place]/Cues Needed (Dressing Goal 1, OT)  --  supervision required;set-up required  -BB     Time Frame (Dressing Goal 1, OT)  --  long term goal (LTG);by discharge  -BB     Progress/Outcome (Dressing Goal 1, OT)  --  goal not met  -BB        Toileting Goal 1 (OT)    Activity/Device (Toileting Goal 1, OT)  --  toileting skills, all  -BB     [place] Level/Cues Needed (Toileting Goal 1, OT)  --  conditional independence  -BB     Time Frame (Toileting Goal 1, OT)  --  long term goal (LTG);by discharge  -BB     Progress/Outcome (Toileting Goal 1, OT)  --  goal not met  -BB        Strength Goal 1 (OT)    Strength Goal 1 (OT)  --  Pt will be  independent in BUE strengthening HEP  -BB     Time Frame (Strength Goal 1, OT)  --  long term goal (LTG);by discharge  -BB     Progress/Outcome (Strength Goal 1, OT)  --  goal not met  -BB         Activity Tolerance Goal 1 (OT)    Activity Level (Endurance Goal 1, OT)  --  15 min activity functiona/therapeutic activities  -BB     Time Frame (Activity Tolerance Goal 1, OT)  --  long term goal (LTG);by discharge  -BB     Progress/Outcome (Activity Tolerance Goal 1, OT)  --  goal met  -BB       User Key  (r) = Recorded By, (t) = Taken By, (c) = Cosigned By    Initials Name Provider Type Discipline    LN Kiara Milligan, PTA Physical Therapy Assistant PT    BB Luz Elena Feliciano, ELOISA/L Occupational Therapy Assistant OT          Physical Therapy Education     Title: PT OT SLP Therapies (In Progress)     Topic: Physical Therapy (Done)     Point: Mobility training (Done)     Learning Progress Summary           Patient Acceptance, E,TB, VU,NR by LN at 5/16/2019  3:35 PM    Acceptance, D, DU,NR,VU by BS at 5/14/2019  3:30 PM    Comment:  vc's for hand and LE placement/positioning with sit to stand transfers and toilet transfers with PT and OT.   Family Acceptance, D, DU,NR,VU by BS at 5/14/2019  3:30 PM    Comment:  vc's for hand and LE placement/positioning with sit to stand transfers and toilet transfers with PT and OT.                   Point: Home exercise program (Done)     Learning Progress Summary           Patient Acceptance, E,TB, VU,NR by LN at 5/16/2019  3:35 PM    Acceptance, D, DU,NR,VU by BS at 5/14/2019  3:30 PM    Comment:  vc's for hand and LE placement/positioning with sit to stand transfers and toilet transfers with PT and OT.   Family Acceptance, D, DU,NR,VU by BS at 5/14/2019  3:30 PM    Comment:  vc's for hand and LE placement/positioning with sit to stand transfers and toilet transfers with PT and OT.                   Point: Body mechanics (Done)     Learning Progress Summary           Patient  Acceptance, E,TB, VU,NR by LN at 5/16/2019  3:35 PM    Acceptance, D, DU,NR,VU by BS at 5/14/2019  3:30 PM    Comment:  vc's for hand and LE placement/positioning with sit to stand transfers and toilet transfers with PT and OT.   Family Acceptance, D, DU,NR,VU by BS at 5/14/2019  3:30 PM    Comment:  vc's for hand and LE placement/positioning with sit to stand transfers and toilet transfers with PT and OT.                   Point: Precautions (Done)     Learning Progress Summary           Patient Acceptance, E,TB, VU,NR by LN at 5/16/2019  3:35 PM    Acceptance, D, DU,NR,VU by BS at 5/14/2019  3:30 PM    Comment:  vc's for hand and LE placement/positioning with sit to stand transfers and toilet transfers with PT and OT.   Family Acceptance, D, DU,NR,VU by BS at 5/14/2019  3:30 PM    Comment:  vc's for hand and LE placement/positioning with sit to stand transfers and toilet transfers with PT and OT.                               User Key     Initials Effective Dates Name Provider Type Discipline    LN 03/07/18 -  Kiara Milligan, PTA Physical Therapy Assistant PT    BS 04/08/18 -  Malcom Kaiser, PT Physical Therapist PT                PT Recommendation and Plan  Anticipated Discharge Disposition (PT): home with assist, home with home health  Outcome Summary/Treatment Plan (PT)  Plan for Continued Treatment (PT): cont  Anticipated Discharge Disposition (PT): home with assist, home with home health  Plan of Care Reviewed With: patient  Outcome Summary: sup-sit min of 1,sit-stand-sit min/cga of 1,amb 14' with rw and cga of 1,frequent rest breaks due to nausea and soa-no goal met-if d/c would benefit from short stay snf with PT  Outcome Measures     Row Name 05/16/19 1415 05/16/19 0955 05/15/19 1506       How much help from another person do you currently need...    Turning from your back to your side while in flat bed without using bedrails?  3  -LN  --  3  -TW    Moving from lying on back to sitting on the side of a  flat bed without bedrails?  3  -LN  --  3  -TW    Moving to and from a bed to a chair (including a wheelchair)?  3  -LN  --  3  -TW    Standing up from a chair using your arms (e.g., wheelchair, bedside chair)?  3  -LN  --  3  -TW    Climbing 3-5 steps with a railing?  2  -LN  --  2  -TW    To walk in hospital room?  3  -LN  --  3  -TW    AM-PAC 6 Clicks Score  17  -LN  --  17  -TW       How much help from another is currently needed...    Putting on and taking off regular lower body clothing?  --  3  -BB  --    Bathing (including washing, rinsing, and drying)  --  3  -BB  --    Toileting (which includes using toilet bed pan or urinal)  --  3  -BB  --    Putting on and taking off regular upper body clothing  --  4  -BB  --    Taking care of personal grooming (such as brushing teeth)  --  4  -BB  --    Eating meals  --  4  -BB  --    Score  --  21  -BB  --       Functional Assessment    Outcome Measure Options  AM-PAC 6 Clicks Basic Mobility (PT)  -LN  --  -[facility] 6 Clicks Basic Mobility (PT)  -TW    Row Name 05/14/19 1400 05/14/19 3856          How much help from another person do you currently need...    Turning from your back to your side while in flat bed without using bedrails?  3  -BS  --     Moving from lying on back to sitting on the side of a flat bed without bedrails?  2  -BS  --     Moving to and from a bed to a chair (including a wheelchair)?  3  -BS  --     Standing up from a chair using your arms (e.g., wheelchair, bedside chair)?  3  -BS  --     Climbing 3-5 steps with a railing?  2  -BS  --     To walk in hospital room?  3  -BS  --     AM-PAC 6 Clicks Score  16  -BS  --        How much help from another is currently needed...    Putting on and taking off regular lower body clothing?  --  3  -AS     Bathing (including washing, rinsing, and drying)  --  3  -AS     Toileting (which includes using toilet bed pan or urinal)  --  3  -AS     Putting on and taking off regular upper body clothing  --  4  -AS      Taking care of personal grooming (such as brushing teeth)  --  4  -AS     Eating meals  --  4  -AS     Score  --  21  -AS        Functional Assessment    Outcome Measure Options  AM-PAC 6 Clicks Basic Mobility (PT)  -BS  AM-PAC 6 Clicks Daily Activity (OT)  -AS       User Key  (r) = Recorded By, (t) = Taken By, (c) = Cosigned By    Initials Name Provider Type    LN Kiara Milligan PTA Physical Therapy Assistant    TW Carlitos Land, PTA Physical Therapy Assistant    BB Luz Elena Feliciano COTA/L Occupational Therapy Assistant    BS Malcom Kaiser, PT Physical Therapist    AS Ana Robles, OT Occupational Therapist         Time Calculation:   PT Charges     Row Name 19 1537             Time Calculation    Start Time  1415  -LN      Stop Time  1500  -LN      Time Calculation (min)  45 min  -LN      PT Received On  19  -LN         Time Calculation- PT    Total Timed Code Minutes- PT  45 minute(s)  -LN        User Key  (r) = Recorded By, (t) = Taken By, (c) = Cosigned By    Initials Name Provider Type    LN Kiara Milligan PTA Physical Therapy Assistant        Therapy Charges for Today     Code Description Service Date Service Provider Modifiers Qty    38341516483 HC GAIT TRAINING EA 15 MIN 2019 Kiara Milligan PTA GP 1    21108079007 HC PT THERAPEUTIC ACT EA 15 MIN 2019 Kiara Milligan PTA GP 2          PT G-Codes  Outcome Measure Options: AM-PAC 6 Clicks Basic Mobility (PT)  AM-PAC 6 Clicks Score: 17  Score: 21    Kiara Milligan PTA  2019         Electronically signed by Kiara Milligan PTA at 2019  3:38 PM          Occupational Therapy Notes (last 24 hours) (Notes from 2019 11:10 AM through 2019 11:10 AM)      Luz Elena Feliciano COTA/L at 2019  1:53 PM          Acute Care - Occupational Therapy Treatment Note  [facility]     Patient Name: Yaneli Bates  : 1940  MRN: [id number]  Today's Date: 2019  Onset of Illness/Injury or Date of Surgery:  05/13/19  Date of Referral to OT: 05/13/19  Referring Physician: Dr. ISABELLA Hi    Admit Date: 5/13/2019       ICD-10-CM ICD-9-CM   1. Generalized weakness R53.1 780.79   2. Symptomatic anemia D64.9 285.9   3. Hypomagnesemia E83.42 275.2   4. Impaired mobility and activities of daily living Z74.09 799.89   5. Impaired functional mobility, balance, gait, and endurance Z74.09 V49.89     Patient Active Problem List   Diagnosis   • S/P AVR (aortic valve replacement)   • Hypertension   • Chest pain, rule out acute myocardial infarction   • Anemia   • Symptomatic anemia   • Diabetes mellitus (CMS/HCC)   • Hematuria   • Hyponatremia   • Coronary artery disease   • Weight loss, unintentional   • Fatigue   • Generalized weakness   • Hemorrhagic cystitis   • Iron deficiency anemia due to chronic blood loss   • Normocytic anemia     Past Medical History:   Diagnosis Date   • Allergic    • Anemia    • Arthritis    • Coronary artery disease 2014    valave replacement   • Fibromyalgia, primary    • Headache    • Infectious viral hepatitis    • Low back pain    • Osteoporosis    • Visual impairment      Past Surgical History:   Procedure Laterality Date   • AORTIC VALVE REPAIR/REPLACEMENT  2014,may   • CARDIAC SURGERY     • COLONOSCOPY     • COLONOSCOPY N/A 4/22/2019    Procedure: COLONOSCOPY;  Surgeon: Carlos Schaeffer MD;  Location: [facility] ENDOSCOPY;  Service: Gastroenterology   • ENDOSCOPY N/A 3/13/2019    Procedure: ESOPHAGOGASTRODUODENOSCOPY;  Surgeon: Carlos Schaeffer MD;  Location: [facility] ENDOSCOPY;  Service: Gastroenterology   • ENDOSCOPY N/A 4/22/2019    Procedure: ESOPHAGOGASTRODUODENOSCOPY;  Surgeon: Carlos Schaeffer MD;  Location: [facility] ENDOSCOPY;  Service: Gastroenterology   • KNEE SURGERY Right 1967   • TONSILLECTOMY         Therapy Treatment    Rehabilitation Treatment Summary     Row Name 05/16/19 0955             Treatment Time/Intention    Discipline  occupational therapy assistant  -BB      Document Type   therapy note (daily note)  -BB      Subjective Information  complains of;pain  -BB      Mode of Treatment  individual therapy;occupational therapy  -BB      Total Minutes, Occupational Therapy Treatment  39  -BB      Therapy Frequency (OT Eval)  other (see comments) 5-7 days/wk  -BB      Patient Effort  good  -BB      Existing Precautions/Restrictions  fall  -BB      Recorded by [BB] Luz Elena Feliciano COTA/L 05/16/19 1346      Row Name 05/16/19 0955             Vital Signs    Pretreatment Heart Rate (beats/min)  73  -BB      Intratreatment Heart Rate (beats/min)  76  -BB      Posttreatment Heart Rate (beats/min)  83  -BB      Pre SpO2 (%)  94  -BB      O2 Delivery Pre Treatment  room air  -BB      Intra SpO2 (%)  95  -BB      O2 Delivery Intra Treatment  room air  -BB      Post SpO2 (%)  96  -BB      O2 Delivery Post Treatment  room air  -BB      Pre Patient Position  Supine  -BB      Intra Patient Position  -- lng sitting  -BB      Post Patient Position  Supine  -BB      Recorded by [BB] Luz Elena Feliciano COTA/L 05/16/19 1346      Row Name 05/16/19 0955             Cognitive Assessment/Intervention- PT/OT    Orientation Status (Cognition)  oriented x 4  -BB      Follows Commands (Cognition)  follows one step commands  -BB      Recorded by [BB] Luz Elena Feliciano COTA/L 05/16/19 1346      Row Name 05/16/19 0955             Grooming Assessment/Training    [place] Level (Grooming)  grooming skills;hair care, combing/brushing;oral care regimen;wash face, hands;set up;supervision  -BB      Grooming Position  long sitting  -BB      Recorded by [BB] Luz Elena Feliciano COTA/L 05/16/19 1346      Row Name 05/16/19 0955             Therapeutic Exercise    Upper Extremity Range of Motion (Therapeutic Exercise)  shoulder flexion/extension, left;shoulder internal/external rotation, bilateral;elbow flexion/extension, bilateral;forearm supination/pronation, bilateral;wrist flexion/extension, bilateral chest press   -BB      Hand (Therapeutic Exercise)  finger flexion/extension, bilateral  -BB      Weight/Resistance (Therapeutic Exercise)  1 pound  -BB      Exercise Type (Therapeutic Exercise)  AROM (active range of motion)  -BB      Position (Therapeutic Exercise)  seated  -BB      Sets/Reps (Therapeutic Exercise)  2x10  -BB      Equipment (Therapeutic Exercise)  free weight, barbell  -BB      Expected Outcome (Therapeutic Exercise)  improve functional tolerance, self-care activity;improve functional stability;improve performance, transfer skills  -BB      Recorded by [MITCHELL] Luz Elena Feliciano COTA/L 05/16/19 1349      Row Name 05/16/19 0955             Positioning and Restraints    Pre-Treatment Position  in bed  -BB      Post Treatment Position  bed  -BB      In Bed  supine;call light within reach;encouraged to call for assist;exit alarm on  -BB      Recorded by [MITCHELL] Luz Elena Feliciano COTA/L 05/16/19 1349      Row Name 05/16/19 0955             Pain Scale: Numbers Pre/Post-Treatment    Pain Scale: Numbers, Pretreatment  3/10  -BB      Pain Scale: Numbers, Post-Treatment  2/10  -BB      Pain Location  -- B LEs  -BB      Pain Intervention(s)  Medication (See MAR)  -BB      Recorded by [MITCHELL] Luz Elena Feliciano COTA/L 05/16/19 1349      Row Name 05/16/19 0955             Plan of Care Review    Plan of Care Reviewed With  patient  -BB      Recorded by [MITCHELL] Luz Elena Feliciano COTA/L 05/16/19 1349      Row Name 05/16/19 0955             Outcome Summary/Treatment Plan (OT)    Daily Summary of Progress (OT)  progress towards functional goals is fair  -BB      Plan for Continued Treatment (OT)  continue POC  -BB      Anticipated Discharge Disposition (OT)  skilled nursing facility;home with assist;home with home health  -BB      Recorded by [MITCHELL] Luz Elena Feliciano COTA/MERLINE 05/16/19 1349        User Key  (r) = Recorded By, (t) = Taken By, (c) = Cosigned By    Initials Name Effective Dates Discipline    BB Luz Elena Feliciano,  AMIN/L 03/07/18 -  OT           Rehab Goal Summary     Row Name 05/16/19 0955             Occupational Therapy Goals    Transfer Goal Selection (OT)  transfer, OT goal 1  -BB      Dressing Goal Selection (OT)  dressing, OT goal 1  -BB      Toileting Goal Selection (OT)  toileting, OT goal 1  -BB      Strength Goal Selection (OT)  strength, OT goal 1  -BB      Activity Tolerance Goal Selection (OT)  activity tolerance, OT goal 1  -BB         Transfer Goal 1 (OT)    Activity/Assistive Device (Transfer Goal 1, OT)  sit-to-stand/stand-to-sit;bed-to-chair/chair-to-bed;toilet  -BB      [place] Level/Cues Needed (Transfer Goal 1, OT)  supervision required  -BB      Time Frame (Transfer Goal 1, OT)  long term goal (LTG);by discharge  -BB      Progress/Outcome (Transfer Goal 1, OT)  goal not met  -BB         Dressing Goal 1 (OT)    Activity/Assistive Device (Dressing Goal 1, OT)  dressing skills, all  -BB      [place]/Cues Needed (Dressing Goal 1, OT)  supervision required;set-up required  -BB      Time Frame (Dressing Goal 1, OT)  long term goal (LTG);by discharge  -BB      Progress/Outcome (Dressing Goal 1, OT)  goal not met  -BB         Toileting Goal 1 (OT)    Activity/Device (Toileting Goal 1, OT)  toileting skills, all  -BB      [place] Level/Cues Needed (Toileting Goal 1, OT)  conditional independence  -BB      Time Frame (Toileting Goal 1, OT)  long term goal (LTG);by discharge  -BB      Progress/Outcome (Toileting Goal 1, OT)  goal not met  -BB         Strength Goal 1 (OT)    Strength Goal 1 (OT)  Pt will be independent in BUE strengthening HEP  -BB      Time Frame (Strength Goal 1, OT)  long term goal (LTG);by discharge  -BB      Progress/Outcome (Strength Goal 1, OT)  goal not met  -BB          Activity Tolerance Goal 1 (OT)    Activity Level (Endurance Goal 1, OT)  15 min activity functiona/therapeutic activities  -BB      Time Frame (Activity Tolerance Goal 1, OT)  long term goal (LTG);by  discharge  -BB      Progress/Outcome (Activity Tolerance Goal 1, OT)  goal met  -BB        User Key  (r) = Recorded By, (t) = Taken By, (c) = Cosigned By    Initials Name Provider Type Discipline    Luz Elena Pastor COTA/L Occupational Therapy Assistant OT        Occupational Therapy Education     Title: PT OT SLP Therapies (In Progress)     Topic: Occupational Therapy (In Progress)     Point: ADL training (In Progress)     Description: Instruct learner(s) on proper safety adaptation and remediation techniques during self care or transfers.   Instruct in proper use of assistive devices.    Learning Progress Summary           Patient Acceptance, E, NR by MITCHELL at 5/16/2019  1:51 PM    Acceptance, E, VU by AS at 5/14/2019  2:54 PM    Comment:  role of OT, OT POC, ADL training, transfer training   Family Acceptance, E, VU by AS at 5/14/2019  2:54 PM    Comment:  role of OT, OT POC, ADL training, transfer training                   Point: Home exercise program (In Progress)     Description: Instruct learner(s) on appropriate technique for monitoring, assisting and/or progressing therapeutic exercises/activities.    Learning Progress Summary           Patient Acceptance, E, NR by MITCHELL at 5/16/2019  1:51 PM                   Point: Precautions (Done)     Description: Instruct learner(s) on prescribed precautions during self-care and functional transfers.    Learning Progress Summary           Patient Acceptance, E, VU by AS at 5/14/2019  2:54 PM    Comment:  role of OT, OT POC, ADL training, transfer training   Family Acceptance, E, VU by AS at 5/14/2019  2:54 PM    Comment:  role of OT, OT POC, ADL training, transfer training                               User Key     Initials Effective Dates Name Provider Type Discipline    MITCHELL 03/07/18 -  Luz Elena Feliciano COTA/L Occupational Therapy Assistant OT    AS 03/25/19 -  Ana Robles OT Occupational Therapist OT              Non-skid socks and gait belt in place.  Toileting offered. Call light and needs within reach. Pt advised to not get up alone and call the nurse for assistance.  Bed alarm on.     OT Recommendation and Plan  Outcome Summary/Treatment Plan (OT)  Daily Summary of Progress (OT): progress towards functional goals is fair  Plan for Continued Treatment (OT): continue POC  Anticipated Discharge Disposition (OT): skilled nursing facility, home with assist, home with home health  Therapy Frequency (OT Eval): other (see comments)(5-7 days/wk)  Daily Summary of Progress (OT): progress towards functional goals is fair  Plan of Care Review  Plan of Care Reviewed With: patient  Plan of Care Reviewed With: patient  Outcome Summary: Pt performed B UE exercises with fair tolerance. One goal met this tx.  Outcome Measures     Row Name 05/16/19 0955 05/15/19 1506 05/14/19 1400       How much help from another person do you currently need...    Turning from your back to your side while in flat bed without using bedrails?  --  3  -TW  3  -BS    Moving from lying on back to sitting on the side of a flat bed without bedrails?  --  3  -TW  2  -BS    Moving to and from a bed to a chair (including a wheelchair)?  --  3  -TW  3  -BS    Standing up from a chair using your arms (e.g., wheelchair, bedside chair)?  --  3  -TW  3  -BS    Climbing 3-5 steps with a railing?  --  2  -TW  2  -BS    To walk in hospital room?  --  3  -TW  3  -BS    AM-PAC 6 Clicks Score  --  17  -TW  16  -BS       How much help from another is currently needed...    Putting on and taking off regular lower body clothing?  3  -BB  --  --    Bathing (including washing, rinsing, and drying)  3  -BB  --  --    Toileting (which includes using toilet bed pan or urinal)  3  -BB  --  --    Putting on and taking off regular upper body clothing  4  -BB  --  --    Taking care of personal grooming (such as brushing teeth)  4  -BB  --  --    Eating meals  4  -BB  --  --    Score  21  -BB  --  --       Functional Assessment     Outcome Measure Options  --  AM-PAC 6 Clicks Basic Mobility (PT)  -  AM-PAC 6 Clicks Basic Mobility (PT)  -BS    Row Name 05/14/19 8285             How much help from another is currently needed...    Putting on and taking off regular lower body clothing?  3  -AS      Bathing (including washing, rinsing, and drying)  3  -AS      Toileting (which includes using toilet bed pan or urinal)  3  -AS      Putting on and taking off regular upper body clothing  4  -AS      Taking care of personal grooming (such as brushing teeth)  4  -AS      Eating meals  4  -AS      Score  21  -AS         Functional Assessment    Outcome Measure Options  AM-PAC 6 Clicks Daily Activity (OT)  -AS        User Key  (r) = Recorded By, (t) = Taken By, (c) = Cosigned By    Initials Name Provider Type    TW Carlitos Land, PTA Physical Therapy Assistant    BB Luz Elena Feliciano COTA/L Occupational Therapy Assistant    BS Malcom Kaiser, PT Physical Therapist    AS Ana Robles, OT Occupational Therapist           Time Calculation:   Time Calculation- OT     Row Name 05/16/19 2176             Time Calculation- OT    OT Start Time  0955  -BB      OT Stop Time  1034  -BB      OT Time Calculation (min)  39 min  -BB      Total Timed Code Minutes- OT  39 minute(s)  -BB      OT Received On  05/16/19  -        User Key  (r) = Recorded By, (t) = Taken By, (c) = Cosigned By    Initials Name Provider Type    BB Luz Elena Feliciano COTA/L Occupational Therapy Assistant        Therapy Charges for Today     Code Description Service Date Service Provider Modifiers Qty    22077521785 HC OT SELF CARE/MGMT/TRAIN EA 15 MIN 5/16/2019 Luz Elena Feliciano COTA/L GO 1    29595255711 HC OT THER PROC EA 15 MIN 5/16/2019 Luz Elena Feliciano COTA/L  2               LILLIAN Ordonez  5/16/2019    Electronically signed by Luz Elena Feliciano COTA/L at 5/16/2019  1:54 PM     Luz Elena Feliciano COTA/L at 5/16/2019  1:53 PM          Problem:  Patient Care Overview  Goal: Plan of Care Review  Outcome: Ongoing (interventions implemented as appropriate)   05/16/19 6231   Coping/Psychosocial   Plan of Care Reviewed With patient   Plan of Care Review   Progress improving   OTHER   Outcome Summary Pt performed B UE exercises with fair tolerance. One goal met this tx.           Electronically signed by Luz Elena Feliciano COTA/L at 5/16/2019  1:53 PM

## 2019-05-18 PROBLEM — D72.829 LEUKOCYTOSIS: Status: ACTIVE | Noted: 2019-01-01

## 2019-05-18 NOTE — PLAN OF CARE
Problem: Patient Care Overview  Goal: Plan of Care Review  Outcome: Ongoing (interventions implemented as appropriate)   05/18/19 1011   Coping/Psychosocial   Plan of Care Reviewed With patient   Plan of Care Review   Progress declining   OTHER   Outcome Summary vss, appetite slightly increased at breakfast, 24 hour urine to be completed at 3:50pm.

## 2019-05-18 NOTE — PROGRESS NOTES
Yaneli Bates  [id number]  1940    Subjective   Ms Bates was seen in follow up of plasma cell disorder.   Feels slightly better.   Nausea improved.   Appetite slightly improved.   Still very weak and tired.   ROS as below.     Past Medical History, Past Surgical History, Social History, Family History have been reviewed and are without significant changes except as mentioned.    Review of Systems   CONSTITUTIONAL: fatigue + weakness + No weight loss, fever, chills.  HEENT: Eyes: No visual loss, blurred vision, double vision or yellow sclerae. Ears, Nose, Throat: No hearing loss, sneezing, congestion, runny nose or sore throat.  SKIN: No rash or itching.  CARDIOVASCULAR: No chest pain, chest pressure or chest discomfort. No palpitations. Edema +   RESPIRATORY: No shortness of breath, cough or sputum.  GASTROINTESTINAL: anorexia + nausea + No  vomiting or diarrhea. No abdominal pain or blood.  GENITOURINARY: Negative for urgency, frequency or dysuria.   NEUROLOGICAL: numbness or tingling in the extremities + migraine headaches + No dizziness, syncope, paralysis, ataxia. No change in bowel or bladder control.  MUSCULOSKELETAL: leg pain + back pain +   HEMATOLOGIC: anemia + No bleeding or bruising.  LYMPHATICS: No enlarged nodes. No history of splenectomy.  PSYCHIATRIC: No history of depression or anxiety.  ENDOCRINOLOGIC: No reports of sweating, cold or heat intolerance. No polyuria or polydipsia.  ALLERGIES: No history of asthma, hives, eczema or rhinitis.             Medications:  The current medication list was reviewed in the EMR    ALLERGIES:    Allergies   Allergen Reactions   • Clindamycin/Lincomycin Other (See Comments)     Migraine headaches   • Corn-Containing Products Other (See Comments)     migraines   • Tomato Flavor [Flavoring Agent] Other (See Comments)     migraines   • Vinegar [Acetic Acid] Other (See Comments)     migraines   • Amoxicillin Rash   • Codeine GI Intolerance   • Penicillins  Hives   • Sulfa Antibiotics Hives   • Vicodin [Hydrocodone-Acetaminophen] GI Intolerance     vomiting       Objective      Vitals:    05/17/19 1900 05/17/19 2320 05/18/19 0414 05/18/19 1328   BP: 162/81 162/85 166/88 140/90   BP Location:  Left arm Left arm Left arm   Patient Position:  Lying Lying Lying   Pulse: 81 73 71 78   Resp: 18 18 18 18   Temp: 96.7 °F (35.9 °C) 97.2 °F (36.2 °C) 97.6 °F (36.4 °C) 97 °F (36.1 °C)   TempSrc:  Oral Oral Oral   SpO2: 90% 94% 96% 94%   Weight:       Height:         No flowsheet data found.    Physical Exam      General: Alert, awake, oriented.  Vitals as above.   HEAD: normocephalic, atraumatic.   EYES: PERRL, EOMI. Conjunctival pallor +   Neck: Supple, no adenopathy or thyromegaly.   Throat: normal oral cavity and pharynx. No inflammation, swelling, exudate, or lesions.  CARDIAC: Normal S1 and S2. No S3, S4 or murmurs. Rhythm is regular.Extremities are warm and well perfused.   LUNGS: Clear to auscultation and percussion without rales, rhonchi, wheezing or diminished breath sounds.  ABDOMEN: Positive bowel sounds. Soft, nondistended, nontender  . No guarding or rebound. No masses.  Back: Hunched back. No bony tenderness.   EXTREMITIES: No significant deformity or joint abnormality. Bilateral pitting lower extremity swelling +  Peripheral pulses intact. No varicosities.  Skin: No rash or bruising.  Neurological: Grossly non-focal exam. No focal weakness. Gait: Not assessed due to clinical condition.   Psych: Mood and affect normal. No hallucination or suicidal thoughts.   Lymphatics: No palpable adenopathy.           RECENT LABS:Independently reviewed and summarized.  Hematology WBC   Date Value Ref Range Status   05/18/2019 15.24 (H) 3.40 - 10.80 10*3/mm3 Final     RBC   Date Value Ref Range Status   05/18/2019 3.05 (L) 3.77 - 5.28 10*6/mm3 Final     Hemoglobin   Date Value Ref Range Status   05/18/2019 8.6 (L) 12.0 - 15.9 g/dL Final     Hematocrit   Date Value Ref Range Status    05/18/2019 27.0 (L) 34.0 - 46.6 % Final     Platelets   Date Value Ref Range Status   05/18/2019 190 140 - 450 10*3/mm3 Final          Imaging (independently reviewed and summarized):   CT C/A/P reviewed which showed small bilateral pleural effusions. No lymphadenopathy or HSM. No prior comparison available.         Upper GI endoscopy from 4/22/19 reviewed. Showed - diffuse moderate inflammation in the stomach. No bleeding source identified.      Colonoscopy result from 4/22/19 reviewed. Showed -  No bleeding source. Normal exam.      Echo result reviewed (3/11/19)  · Mild mitral valve regurgitation is present  · Mild tricuspid valve regurgitation is present.  · Left atrial cavity size is mild-to-moderately dilated.  · Estimated EF = 60%.  · Left ventricular systolic function is normal.           Pathology (result reviewed):   Final Diagnosis   1.  GASTRIC ANTRUM, MUCOSAL BIOPSY:  MILD CHRONIC GASTRITIS.  NO EVIDENCE OF HELICOBACTER PYLORI.     2.  COLONIC MUCOSAL BIOPSY, RANDOM:  NO SIGNIFICANT PATHOLOGIC DIAGNOSIS.         Diagnosis  (1) Plasma cell disorder IgG/kappa   (2) Unintentional weight loss  (3) Hyponatremia  (4) Hematuria  (5) Intractable nausea   (6) Hypoalbuminemia            Assessment/Plan     (1) Plasma cell disorder (IgG/lambda)     Patient with normocytic anemia. No hyercalcemia or renal failure. CT no bony lesions.     No lymphadenopathy or hepatosplenomegaly.     FLCR of involved/uninvolved light chain - 59.5.     M protein 2.5.     Recommend checking 24 hour urine protein electrophoresis.     Discussed potential diagnosis/prognosis and treatment options at length.     Differentials include smoldering myeloma, MGUS, multiple myeloma, amyloidosis or lymphoplasmacytic lymphoma.     She will need bone marrow aspiration/biopsy to establish diagnosis.     I also discussed case with Dr Rooney in pathology. He will perform amyloid testing on colon/gastric biopsy.     Discussed bone marrow  aspiration/biopsy procedure at length with patient/family.     Treatment would depend on the etiology. The result of testing will take at least 7-10 days to come back. Meanwhile, we will start patient on dexamethasone 8 mg bid.     (2) Protein caloric malnutrition: Etiology unclear. She had gastric emptying study performed which was normal. Nutrition following.      (3) Hyponatremia: Nephrology following. Likely this is due to volume overload- third spacing from hypoalbuminuria.      (4) Hematuria: Urology following.Urine cytology pending. No hematuria today.      (5) Intractable nausea:Improved. Continue zofran. Dexamethasone 8 mg bid.     (6) Hypoalbuminemia: Could be due to plasma cell disorder involving kidneys. We will check 24 hour urine.       Plan for bone marrow asp/biopsy on Monday.     Judah Sher MD               5/18/2019      CC:

## 2019-05-18 NOTE — THERAPY TREATMENT NOTE
"Acute Care - Physical Therapy Treatment Note  [facility]     Patient Name: Yaneli Bates  : 1940  MRN: [id number]  Today's Date: 2019  Onset of Illness/Injury or Date of Surgery: 19  Date of Referral to PT: 19  Referring Physician: Dr. SIABELLA Hi    Admit Date: 2019    Visit Dx:    ICD-10-CM ICD-9-CM   1. Generalized weakness R53.1 780.79   2. Symptomatic anemia D64.9 285.9   3. Hypomagnesemia E83.42 275.2   4. Impaired mobility and activities of daily living Z74.09 799.89   5. Impaired functional mobility, balance, gait, and endurance Z74.09 V49.89     Patient Active Problem List   Diagnosis   • S/P AVR (aortic valve replacement)   • Hypertension   • Chest pain, rule out acute myocardial infarction   • Anemia   • Symptomatic anemia   • Diabetes mellitus (CMS/HCC)   • Hematuria   • Hyponatremia   • Coronary artery disease   • Weight loss, unintentional   • Fatigue   • Generalized weakness   • Hemorrhagic cystitis   • Iron deficiency anemia due to chronic blood loss   • Normocytic anemia   • Leukocytosis       Therapy Treatment    Rehabilitation Treatment Summary     Row Name 19 1056             Treatment Time/Intention    Discipline  physical therapy assistant  -EM      Document Type  therapy note (daily note)  -EM      Subjective Information  complains of;weakness;fatigue;dyspnea nsg aware  -EM      Mode of Treatment  physical therapy;individual therapy  -EM      Patient/Family Observations  Pt declines EOB or OOB due to weakness and \"having a hard time breathing\", but was agreeable to therex in supine  -EM      Patient Effort  adequate  -EM      Existing Precautions/Restrictions  fall  -EM      Recorded by [EM] Chris Ellison, PTA 19 1328      Row Name 19 1056             Vital Signs    Pretreatment Heart Rate (beats/min)  76  -EM      Pre SpO2 (%)  94  -EM      O2 Delivery Pre Treatment  room air  -EM      Pre Patient Position  Supine  -EM      Recorded by " [EM] Chris Ellison, [facility] 05/18/19 1328      Row Name 05/18/19 1056             Cognitive Assessment/Intervention- PT/OT    Orientation Status (Cognition)  oriented x 4  -EM      Follows Commands (Cognition)  follows one step commands  -EM      Recorded by [EM] Chris Ellison, [facility] 05/18/19 1328      Row Name 05/18/19 1056             Lower Extremity Supine Therapeutic Exercise    Performed, Supine Lower Extremity (Therapeutic Exercise)  ankle pumps;gluteal sets;quadriceps sets;SAQ (short arc quad) over bolster;ankle dorsiflexion/plantarflexion SLR(AAROM), Add Pillow squeeze  -EM      Exercise Type, Supine Lower Extremity (Therapeutic Exercise)  AROM (active range of motion);AAROM (active assistive range of motion)  -EM      Sets/Reps Detail, Supine Lower Extremity (Therapeutic Exercise)  1x20  -EM      Recorded by [EM] Chris Ellison, [facility] 05/18/19 1328      Row Name 05/18/19 1056             Positioning and Restraints    Pre-Treatment Position  in bed  -EM      Post Treatment Position  bed  -EM      In Bed  supine;call light within reach;encouraged to call for assist;exit alarm on;with family/caregiver  -EM      Recorded by [EM] Chris Ellison, [facility] 05/18/19 1328      Row Name 05/18/19 1056             Pain Scale: Numbers Pre/Post-Treatment    Pain Scale: Numbers, Pretreatment  5/10  -EM      Pain Scale: Numbers, Post-Treatment  5/10  -EM      Pain Intervention(s)  Medication (See MAR)  -EM      Recorded by [EM] Chris Ellison, [facility] 05/18/19 1328      Row Name 05/18/19 1056             Outcome Summary/Treatment Plan (PT)    Daily Summary of Progress (PT)  progress towards functional goals is fair  -EM      Plan for Continued Treatment (PT)  Continue. OOB to chair as able  -EM      Anticipated Discharge Disposition (PT)  home with 24/7 care;home with home health  -EM      Recorded by [EM] Chris Ellison, [facility] 05/18/19 1328        User Key  (r) = Recorded By, (t) = Taken By, (c) = Cosigned By    Initials Name Effective  Dates Discipline    EM EllisonChris KATHERINE, PTA 08/11/15 -  PT               Rehab Goal Summary     Row Name 05/18/19 1300             Physical Therapy Goals    Bed Mobility Goal Selection (PT)  bed mobility, PT goal 1  -EM      Transfer Goal Selection (PT)  transfer, PT goal 1  -EM      Gait Training Goal Selection (PT)  gait training, PT goal 1  -EM      Stairs Goal Selection (PT)  stairs, PT goal 1  -EM         Bed Mobility Goal 1 (PT)    Activity/Assistive Device (Bed Mobility Goal 1, PT)  sit to supine;supine to sit;scooting  -EM      [place] Level/Cues Needed (Bed Mobility Goal 1, PT)  standby assist  -EM      Time Frame (Bed Mobility Goal 1, PT)  by discharge  -EM      Barriers (Bed Mobility Goal 1, PT)  leg pain, nausea  -EM      Progress/Outcomes (Bed Mobility Goal 1, PT)  goal not met  -EM         Transfer Goal 1 (PT)    Activity/Assistive Device (Transfer Goal 1, PT)  sit-to-stand/stand-to-sit;walker, rolling  -EM      [place] Level/Cues Needed (Transfer Goal 1, PT)  standby assist  -EM      Time Frame (Transfer Goal 1, PT)  by discharge  -EM      Progress/Outcome (Transfer Goal 1, PT)  goal not met  -EM         Gait Training Goal 1 (PT)    Activity/Assistive Device (Gait Training Goal 1, PT)  gait (walking locomotion);walker, rolling  -EM      [place] Level (Gait Training Goal 1, PT)  standby assist  -EM      Distance (Gait Goal 1, PT)  75' x 2  -EM      Time Frame (Gait Training Goal 1, PT)  by discharge  -EM      Barriers (Gait Training Goal 1, PT)  urgency to urinate-on lasix  -EM      Progress/Outcome (Gait Training Goal 1, PT)  goal not met  -EM         Stairs Goal 1 (PT)    Activity/Assistive Device (Stairs Goal 1, PT)  stairs, all skills;using handrail, right;using handrail, left;ascending stairs;descending stairs  -EM      [place] Level/Cues Needed (Stairs Goal 1, PT)  standby assist  -EM      Number of Stairs (Stairs Goal 1, PT)  2  -EM      Time Frame (Stairs Goal 1, PT)  by  discharge  -EM      Barriers (Stairs Goal 1, PT)  pain, fatigue  -EM      Progress/Outcome (Stairs Goal 1, PT)  goal not met  -EM        User Key  (r) = Recorded By, (t) = Taken By, (c) = Cosigned By    Initials Name Provider Type Discipline    Chris Campos, PTA Physical Therapy Assistant PT          Physical Therapy Education     Title: PT OT SLP Therapies (In Progress)     Topic: Physical Therapy (Done)     Point: Mobility training (Done)     Learning Progress Summary           Patient Acceptance, E,TB, VU,NR by LN at 5/16/2019  3:35 PM    Acceptance, D, DU,NR,VU by BS at 5/14/2019  3:30 PM    Comment:  vc's for hand and LE placement/positioning with sit to stand transfers and toilet transfers with PT and OT.   Family Acceptance, D, DU,NR,VU by BS at 5/14/2019  3:30 PM    Comment:  vc's for hand and LE placement/positioning with sit to stand transfers and toilet transfers with PT and OT.                   Point: Home exercise program (Done)     Learning Progress Summary           Patient Acceptance, E,TB, VU,NR by LN at 5/16/2019  3:35 PM    Acceptance, D, DU,NR,VU by BS at 5/14/2019  3:30 PM    Comment:  vc's for hand and LE placement/positioning with sit to stand transfers and toilet transfers with PT and OT.   Family Acceptance, D, DU,NR,VU by BS at 5/14/2019  3:30 PM    Comment:  vc's for hand and LE placement/positioning with sit to stand transfers and toilet transfers with PT and OT.                   Point: Body mechanics (Done)     Learning Progress Summary           Patient Acceptance, E,TB, VU,NR by LN at 5/16/2019  3:35 PM    Acceptance, D, DU,NR,VU by BS at 5/14/2019  3:30 PM    Comment:  vc's for hand and LE placement/positioning with sit to stand transfers and toilet transfers with PT and OT.   Family Acceptance, D, DU,NR,VU by BS at 5/14/2019  3:30 PM    Comment:  vc's for hand and LE placement/positioning with sit to stand transfers and toilet transfers with PT and OT.                   Point:  "Precautions (Done)     Learning Progress Summary           Patient Acceptance, E,TB, VU,NR by LN at 5/16/2019  3:35 PM    Acceptance, D, DU,NR,VU by BS at 5/14/2019  3:30 PM    Comment:  vc's for hand and LE placement/positioning with sit to stand transfers and toilet transfers with PT and OT.   Family Acceptance, D, DU,NR,VU by BS at 5/14/2019  3:30 PM    Comment:  vc's for hand and LE placement/positioning with sit to stand transfers and toilet transfers with PT and OT.                               User Key     Initials Effective Dates Name Provider Type Discipline    LN 03/07/18 -  Kiara Milligan, PTA Physical Therapy Assistant PT    BS 04/08/18 -  Malcom Kaiser, PT Physical Therapist PT                PT Recommendation and Plan  Anticipated Discharge Disposition (PT): home with 24/7 care, home with home health  Outcome Summary/Treatment Plan (PT)  Daily Summary of Progress (PT): progress towards functional goals is fair  Plan for Continued Treatment (PT): Continue. OOB to chair as able  Anticipated Discharge Disposition (PT): home with 24/7 care, home with home health  Outcome Summary: Pt agreeable to therex in supine only, declining EOB and OOB due to weakness and \"a hard time breathing\". Pt performed therex in supine for B LE. No goals met this tx.   Outcome Measures     Row Name 05/18/19 1056 05/17/19 1300 05/16/19 1415       How much help from another person do you currently need...    Turning from your back to your side while in flat bed without using bedrails?  3  -EM  --  3  -LN    Moving from lying on back to sitting on the side of a flat bed without bedrails?  3  -EM  --  3  -LN    Moving to and from a bed to a chair (including a wheelchair)?  3  -EM  --  3  -LN    Standing up from a chair using your arms (e.g., wheelchair, bedside chair)?  3  -EM  --  3  -LN    Climbing 3-5 steps with a railing?  2  -EM  --  2  -LN    To walk in hospital room?  3  -EM  --  3  -LN    AM-PAC 6 Clicks Score  17  -EM  --  " 17  -LN       How much help from another is currently needed...    Putting on and taking off regular lower body clothing?  --  3  -CS  --    Bathing (including washing, rinsing, and drying)  --  3  -CS  --    Toileting (which includes using toilet bed pan or urinal)  --  3  -CS  --    Putting on and taking off regular upper body clothing  --  4  -CS  --    Taking care of personal grooming (such as brushing teeth)  --  4  -CS  --    Eating meals  --  4  -CS  --    Score  --  21  -CS  --       Functional Assessment    Outcome Measure Options  AM-PAC 6 Clicks Daily Activity (OT)  -EM  AM-PAC 6 Clicks Daily Activity (OT)  -CS  AM-PAC 6 Clicks Basic Mobility (PT)  -LN    Row Name 05/16/19 0955 05/15/19 1506          How much help from another person do you currently need...    Turning from your back to your side while in flat bed without using bedrails?  --  3  -TW     Moving from lying on back to sitting on the side of a flat bed without bedrails?  --  3  -TW     Moving to and from a bed to a chair (including a wheelchair)?  --  3  -TW     Standing up from a chair using your arms (e.g., wheelchair, bedside chair)?  --  3  -TW     Climbing 3-5 steps with a railing?  --  2  -TW     To walk in hospital room?  --  3  -TW     AM-PAC 6 Clicks Score  --  17  -TW        How much help from another is currently needed...    Putting on and taking off regular lower body clothing?  3  -BB  --     Bathing (including washing, rinsing, and drying)  3  -BB  --     Toileting (which includes using toilet bed pan or urinal)  3  -BB  --     Putting on and taking off regular upper body clothing  4  -BB  --     Taking care of personal grooming (such as brushing teeth)  4  -BB  --     Eating meals  4  -BB  --     Score  21  -BB  --        Functional Assessment    Outcome Measure Options  --  AM-PAC 6 Clicks Basic Mobility (PT)  -TW       User Key  (r) = Recorded By, (t) = Taken By, (c) = Cosigned By    Initials Name Provider Type    TANNER Ellison  Chris MURPHY PTA Physical Therapy Assistant    LN Kiara Milligan, MANUELITO Physical Therapy Assistant    TW Carlitos Land, PTA Physical Therapy Assistant    BB Luz Elena Feliciano, AMIN/L Occupational Therapy Assistant    Mónica Shipman, AMIN/L Occupational Therapy Assistant         Time Calculation:   PT Charges     Row Name 05/18/19 1331             Time Calculation    Start Time  1056  -EM      Stop Time  1120  -EM      Time Calculation (min)  24 min  -EM         Time Calculation- PT    Total Timed Code Minutes- PT  24 minute(s)  -EM        User Key  (r) = Recorded By, (t) = Taken By, (c) = Cosigned By    Initials Name Provider Type    EM Chris Ellison PTA Physical Therapy Assistant        Therapy Charges for Today     Code Description Service Date Service Provider Modifiers Qty    28369224034 HC PT THER PROC EA 15 MIN 5/18/2019 Chris Ellison PTA GP 2          PT G-Codes  Outcome Measure Options: AM-PAC 6 Clicks Daily Activity (OT)  AM-PAC 6 Clicks Score: 17  Score: 21    Chris Ellison PTA  5/18/2019

## 2019-05-18 NOTE — PLAN OF CARE
Problem: Patient Care Overview  Goal: Plan of Care Review  Outcome: Ongoing (interventions implemented as appropriate)      Problem: Fall Risk (Adult)  Goal: Identify Related Risk Factors and Signs and Symptoms  Outcome: Ongoing (interventions implemented as appropriate)      Problem: Activity Intolerance (Adult)  Goal: Activity Tolerance  Outcome: Ongoing (interventions implemented as appropriate)

## 2019-05-18 NOTE — PLAN OF CARE
"Problem: Patient Care Overview  Goal: Plan of Care Review  Outcome: Ongoing (interventions implemented as appropriate)   05/18/19 1011 05/18/19 1329   Coping/Psychosocial   Plan of Care Reviewed With patient --    Plan of Care Review   Progress declining --    OTHER   Outcome Summary --  Pt agreeable to therex in supine only, declining EOB and OOB due to weakness and \"a hard time breathing\". Pt performed therex in supine for B LE. No goals met this tx.          "

## 2019-05-18 NOTE — PROGRESS NOTES
FAMILY MEDICINE DAILY PROGRESS NOTE    NAME: Yaneli Bates  : 1940  MRN: [id number]      LOS: 3 days     PROVIDER OF SERVICE: Ashley Carrizales MD    Chief Complaint: Generalized weakness    Subjective:     Interval History:  History taken from: patient chart family RN  Patient reports that she was able to eat her dinner, denies any nausea or vomiting.  Feels that may be her sense of appetite may be improving on initiated treatment from yesterday.  Patient has [place] in place for collection of 24-hour urine.  Patient appears fatigued.   at bedside side.  All questions and concerns answered at time of visit.    Review of Systems:   Review of Systems   Constitutional: Positive for appetite change and fatigue. Negative for fever.   HENT: Negative for ear pain and sore throat.    Eyes: Negative for pain and visual disturbance.   Respiratory: Negative for cough and shortness of breath.    Cardiovascular: Positive for leg swelling. Negative for chest pain and palpitations.   Gastrointestinal: Negative for abdominal pain, constipation, diarrhea, nausea and vomiting.   Endocrine: Negative for cold intolerance and heat intolerance.   Genitourinary: Negative for difficulty urinating and dysuria.   Musculoskeletal: Negative for arthralgias and gait problem.   Skin: Negative for color change and rash.   Neurological: Positive for weakness. Negative for dizziness and headaches.   Hematological: Negative for adenopathy. Does not bruise/bleed easily.   Psychiatric/Behavioral: Negative for agitation, confusion and sleep disturbance.       Objective:     Vital Signs  Temp:  [96.7 °F (35.9 °C)-97.6 °F (36.4 °C)] 97.6 °F (36.4 °C)  Heart Rate:  [71-85] 71  Resp:  [18] 18  BP: (150-166)/(74-88) 166/88  Body mass index is 23.68 kg/m².    Physical Exam  Physical Exam   Constitutional: She is oriented to person, place, and time. She appears well-developed and well-nourished.   HENT:   Head: Normocephalic and  atraumatic.   Eyes: Conjunctivae, EOM and lids are normal. Pupils are equal, round, and reactive to light.   Neck: Normal range of motion. Neck supple.   Cardiovascular: Normal rate, regular rhythm and normal heart sounds. Exam reveals no gallop and no friction rub.   No murmur heard.  Pulmonary/Chest: Effort normal and breath sounds normal.   Abdominal: Soft. Normal appearance and bowel sounds are normal. There is no tenderness.   Musculoskeletal: Normal range of motion.   Neurological: She is alert and oriented to person, place, and time.   Skin: Skin is warm, dry and intact. Capillary refill takes less than 2 seconds.   Psychiatric: She has a normal mood and affect. Her speech is normal and behavior is normal. Judgment and thought content normal. Cognition and memory are normal.       Medication Review    Current Facility-Administered Medications:   •  acetaminophen (TYLENOL) tablet 650 mg, 650 mg, Oral, Q4H PRN, Rodrigo Nazario MD  •  famotidine (PEPCID) tablet 20 mg, 20 mg, Oral, Daily, Rodrigo Nazario MD, 20 mg at 05/18/19 0801  •  ferrous sulfate EC tablet 324 mg, 324 mg, Oral, BID With Meals, Rodrigo Nazario MD, 324 mg at 05/18/19 0801  •  metoclopramide (REGLAN) 5 MG/5ML solution 10 mg, 10 mg, Oral, 4x Daily AC & at Bedtime, Ashley Carrizales MD, 10 mg at 05/18/19 0801  •  metoprolol succinate XL (TOPROL-XL) 24 hr tablet 50 mg, 50 mg, Oral, Daily With Dinner, Rodrigo Nazario MD, 50 mg at 05/17/19 1739  •  mirtazapine (REMERON) tablet 15 mg, 15 mg, Oral, Nightly, Ashley Carrizales MD, 15 mg at 05/17/19 2032  •  ondansetron (ZOFRAN) tablet 4 mg, 4 mg, Oral, Q6H PRN, 4 mg at 05/17/19 0835 **OR** ondansetron (ZOFRAN) injection 4 mg, 4 mg, Intravenous, Q6H PRN, Ashley Carrizales MD, 4 mg at 05/16/19 1452  •  ondansetron (ZOFRAN) injection 4 mg, 4 mg, Intravenous, Once, Janice Nazario MD  •  potassium chloride (KLOR-CON) packet 40 mEq, 40 mEq, Oral, Once, Rodrigo Nazario MD  •  potassium chloride  (MICRO-K) CR capsule 40 mEq, 40 mEq, Oral, Daily, Ashley Carrizales MD, 40 mEq at 05/18/19 0801  •  sodium chloride 0.9 % flush 3 mL, 3 mL, Intravenous, Q12H, Rodrigo Nazario MD, 3 mL at 05/17/19 2032  •  sodium chloride 0.9 % flush 3-10 mL, 3-10 mL, Intravenous, PRN, Rodrigo Nazario MD  •  sodium chloride 0.9 % infusion, 100 mL/hr, Intravenous, Continuous, Rodrigo Nazario MD, Last Rate: 100 mL/hr at 05/15/19 1742, 100 mL/hr at 05/15/19 1742  •  traMADol (ULTRAM) tablet 50 mg, 50 mg, Oral, BID PRN, Rodrigo Nazario MD, 50 mg at 05/17/19 1329     Diagnostic Data    Lab Results (last 24 hours)     Procedure Component Value Units Date/Time    CBC & Differential [[id number]] Collected:  05/18/19 0548    Specimen:  Blood Updated:  05/18/19 0617    Narrative:       The following orders were created for panel order CBC & Differential.  Procedure                               Abnormality         Status                     ---------                               -----------         ------                     CBC Auto Differential[[id number]]        Abnormal            Final result                 Please view results for these tests on the individual orders.    CBC Auto Differential [[id number]]  (Abnormal) Collected:  05/18/19 0548    Specimen:  Blood Updated:  05/18/19 0617     WBC 15.24 10*3/mm3      RBC 3.05 10*6/mm3      Hemoglobin 8.6 g/dL      Hematocrit 27.0 %      MCV 88.5 fL      MCH 28.2 pg      MCHC 31.9 g/dL      RDW 17.6 %      RDW-SD 56.9 fl      MPV 9.8 fL      Platelets 190 10*3/mm3      Neutrophil % 89.6 %      Lymphocyte % 4.1 %      Monocyte % 5.2 %      Eosinophil % 0.0 %      Basophil % 0.2 %      Immature Grans % 0.9 %      Neutrophils, Absolute 13.66 10*3/mm3      Lymphocytes, Absolute 0.62 10*3/mm3      Monocytes, Absolute 0.80 10*3/mm3      Eosinophils, Absolute 0.00 10*3/mm3      Basophils, Absolute 0.03 10*3/mm3      Immature Grans, Absolute 0.13 10*3/mm3      nRBC 0.0 /100 WBC     Comprehensive  Metabolic Panel [[id number]] Collected:  05/18/19 0548    Specimen:  Blood Updated:  05/18/19 0600    Food Allergy Profile [[id number]] Collected:  05/17/19 1510    Specimen:  Blood Updated:  05/17/19 1515    Protein Elec + Interp, Serum [[id number]]  (Abnormal) Collected:  05/16/19 0703    Specimen:  Blood Updated:  05/17/19 1409     Total Protein 6.2 g/dL      Albumin 1.7 g/dL      Alpha-1-Globulin 0.5 g/dL      Alpha-2-Globulin 0.8 g/dL      Beta Globulin 0.5 g/dL      Gamma Globulin 2.7 g/dL      M-Nehemiah 2.5 g/dL      Globulin 4.5 g/dL      A/G Ratio 0.4     Please note Comment     Comment: Protein electrophoresis scan will follow via computer, mail, or   delivery.        SPE Interpretation Comment     Comment: The SPE pattern demonstrates a single peak (M-spike) in the gamma  region which may represent monoclonal protein. This peak may also be  caused by circulating immune complexes, cryoglobulins, C-reactive  protein, fibrinogen or hemolysis.  If clinically indicated, the  presence of a monoclonal gammopathy may be confirmed by immuno-  fixation, as well as an evaluation of the urine for the presence of  Bence-Garrett protein.       Narrative:       Performed at:  [address] [place] [address]  [id number]  : Tyler Bhatia PhD, Phone:  [phone]            I reviewed the patient's new clinical results.    Assessment/Plan:     Active Hospital Problems    Diagnosis POA   • **Generalized weakness [R53.1] Yes     Possible malignancy   -Have investigated for pancreatic source    - lipase w/n/l, CT abdomen has shown no mass   -Have investigated for GI source    -Colonoscopy, EGD no signs of malignant process     -gastric emptying study was normal.    -Urology consult for possible bladder cx    - U/A shows large blood. urine cytology pending    - Urology consult: Recommended outpatient pelvic exam.    - hemoglobin 7.7; will transfuse 2 units PRBC if less than 7.    - will continue  to monitor     - Continue IVF, Dietician consult.      • Leukocytosis [D72.829] Yes     Unsure etiology, patient denies fever chills, no fever noted on vital signs.  Vital signs have been stable.  No current signs of infection.  Concern this is secondary to possible plasma cell dyscrasia.  Patient has bone marrow aspirate scheduled for Monday.     • Normocytic anemia [D64.9] Yes     Heme/onc on board  Dr So recommends spep/immunofixation/free light chains- iGG elevated, iga/igm decreased, light chains 792; differential diagnosis of amyloidosis or subtype of multiple myeloma  Haptoglobin and ldh to rule out hemolysis pending  Ct of chest abdomen pelvis was negative for lytic lesions or lad        • Hemorrhagic cystitis [N30.91] Yes     Patient was seen by Dr. Gilmore, Urology previous admission. Was treated with Azactam and diflucan during that time. On discharge was scheduled for cystoscopy with Dr. Damico this Wednesday.     - patient was seen on 5/10/19 by PCP where she was diagnosed with cystitis. Started on doxy. Has taken 2 days.   - U/A on admission : large blood, 2+ protein   - urine cytology pending   - Urology, Dr. D'amico recommends outpatient pelvic exam         • Iron deficiency anemia due to chronic blood loss [D50.0] Yes     H/H: 7.7/25.0 in the ED  -continue home ferrous sulfate BID  -continue to trend H/H  -received 2 units of blood on 5/14; Hb today pending        • Weight loss, unintentional [R63.4] Yes     - Dietitian following  - patient has many food allergies  - Have initiated mirtazapine, Marinol, and Decadron for appetite stimulation.     • Hyponatremia [E87.1] Yes     In the ED sodium level is 131. Patient sees Dr. Mckenzie, Nephrology outpatient. 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   - continue home sodium tablets 1 g TID  - 1.6 L fluid restriction daily recommended by nephro         • Diabetes mellitus (CMS/HCC) [E11.9] Yes     - Will hold home metformin  - SSI       • Hypertension [I10] Yes      Will continue toprol-xl 50 daily   Continue to monitor per hospital protocol      • Coronary artery disease [I25.10] Yes     History of AVR 2014  Continue metoprolol 50 mg Daily          DVT prophylaxis: SCDs/TEDs  Code status is   Code Status and Medical Interventions:   Ordered at: 05/13/19 1811     Level Of Support Discussed With:    Patient     Code Status:    CPR     Medical Interventions (Level of Support Prior to Arrest):    Full       Plan for disposition:Where: SNF and rehab and When:  2-3days      Time: 30 minutes     Ashley Carrizales MD PGY3  Family [place] [address]  Office: [phone]          This document has been electronically signed by Ashley Carrizales MD on May 18, 2019 8:23 AM

## 2019-05-19 PROBLEM — I50.21 ACUTE SYSTOLIC CHF (CONGESTIVE HEART FAILURE) (HCC): Status: ACTIVE | Noted: 2019-01-01

## 2019-05-19 PROBLEM — J81.0 ACUTE PULMONARY EDEMA (HCC): Status: ACTIVE | Noted: 2019-01-01

## 2019-05-19 PROBLEM — R57.0 CARDIOGENIC SHOCK (HCC): Status: ACTIVE | Noted: 2019-01-01

## 2019-05-19 NOTE — NURSING NOTE
Pt. Rings(2) one gold band and one white gold band removed and given to daughter (Hubert) for care.

## 2019-05-19 NOTE — SIGNIFICANT NOTE
05/19/19 0740   Rehab Treatment   Discipline occupational therapy assistant   Reason Treatment Not Performed unable to treat, medical status change  (Pt transfer to CCU. Pt will need restart orders when appropriate.)

## 2019-05-19 NOTE — PLAN OF CARE
Problem: NPPV/CPAP (Adult)  Intervention: Prevent/Minimize Device Friction/Shearing and Pressure Points  Pt is unable to be off bipap SaO2 drops in 80's and become very hard for her to breathe    Goal: Signs and Symptoms of Listed Potential Problems Will be Absent, Minimized or Managed (NPPV/CPAP)  Outcome: Ongoing (interventions implemented as appropriate)

## 2019-05-19 NOTE — PROGRESS NOTES
Yaneli Bates  [id number]  1940    Subjective   Ms Bates was seen in follow up of plasma cell disorder.   She is not doing well.   Was moved to ICU due to worsening respiratory status.   She is sedated and is on Bipap.   ROS as below.     Past Medical History, Past Surgical History, Social History, Family History have been reviewed and are without significant changes except as mentioned.    Review of Systems   Unable to obtain as patient is critically ill, on bipap and sedated.              Medications:  The current medication list was reviewed in the EMR    ALLERGIES:    Allergies   Allergen Reactions   • Reglan [Metoclopramide] Shortness Of Breath     Allergy d/t containing corn derivative, Pt SOB post admin.   • Clindamycin/Lincomycin Other (See Comments)     Migraine headaches   • Corn-Containing Products Other (See Comments)     migraines   • Tomato Flavor [Flavoring Agent] Other (See Comments)     migraines   • Vinegar [Acetic Acid] Other (See Comments)     migraines   • Amoxicillin Rash   • Codeine GI Intolerance   • Penicillins Hives   • Sulfa Antibiotics Hives   • Vicodin [Hydrocodone-Acetaminophen] GI Intolerance     vomiting       Objective      Vitals:    05/19/19 0824 05/19/19 0844 05/19/19 0850 05/19/19 1100   BP:       Pulse: 96 92 92 87   Resp: (!) 32 (!) 29  (!) 34   Temp:       TempSrc:       SpO2: 91% 96% 97% 98%   Weight:       Height:         No flowsheet data found.    Physical Exam      General: On bipap. Sleeping.  Vitals as above.   HEAD: normocephalic, atraumatic.   EYES: PERRL, EOMI. Conjunctival pallor +   Neck: Supple, no adenopathy or thyromegaly.   Throat: normal oral cavity and pharynx. No inflammation, swelling, exudate, or lesions.  CARDIAC: Normal S1 and S2. No S3, S4 or murmurs. Rhythm is regular.Extremities are warm and well perfused.   LUNGS: tachypnea + crackles at lung bases +   ABDOMEN: Positive bowel sounds. Soft, nondistended, nontender  . No guarding or rebound.  No masses.  Back: Hunched back. No bony tenderness.   EXTREMITIES: No significant deformity or joint abnormality. Bilateral pitting lower extremity swelling +  Peripheral pulses intact. No varicosities.  Skin: No rash or bruising.  Neurological: Unable to obtain as patient is sedated and on bipap.  Gait: Not assessed due to clinical condition.   Psych: Unable to obtain as patient is sedated and on bipap.    Lymphatics: No palpable adenopathy.           RECENT LABS:Independently reviewed and summarized.  Hematology WBC   Date Value Ref Range Status   05/19/2019 16.89 (H) 3.40 - 10.80 10*3/mm3 Final     RBC   Date Value Ref Range Status   05/19/2019 3.70 (L) 3.77 - 5.28 10*6/mm3 Final     Hemoglobin   Date Value Ref Range Status   05/19/2019 10.3 (L) 12.0 - 15.9 g/dL Final     Hematocrit   Date Value Ref Range Status   05/19/2019 33.5 (L) 34.0 - 46.6 % Final     Platelets   Date Value Ref Range Status   05/19/2019 228 140 - 450 10*3/mm3 Final          Imaging (independently reviewed and summarized):   CT C/A/P reviewed which showed small bilateral pleural effusions. No lymphadenopathy or HSM. No prior comparison available.         Upper GI endoscopy from 4/22/19 reviewed. Showed - diffuse moderate inflammation in the stomach. No bleeding source identified.      Colonoscopy result from 4/22/19 reviewed. Showed -  No bleeding source. Normal exam.      Echo result reviewed (3/11/19)  · Mild mitral valve regurgitation is present  · Mild tricuspid valve regurgitation is present.  · Left atrial cavity size is mild-to-moderately dilated.  · Estimated EF = 60%.  · Left ventricular systolic function is normal.           Pathology (result reviewed):   Final Diagnosis   1.  GASTRIC ANTRUM, MUCOSAL BIOPSY:  MILD CHRONIC GASTRITIS.  NO EVIDENCE OF HELICOBACTER PYLORI.     2.  COLONIC MUCOSAL BIOPSY, RANDOM:  NO SIGNIFICANT PATHOLOGIC DIAGNOSIS.         Diagnosis  (1) Plasma cell disorder IgG/kappa   (2) Hypoxic respiratory failure  (new issue)   (3) Unintentional weight loss  (4) Hyponatremia  (5) Hematuria  (6) Intractable nausea   (7) Hypoalbuminemia    (8) Goals of care discussion         Assessment/Plan     (1) Plasma cell disorder (IgG/lambda)   - Discussed overall prognosis with the patient's family.   - If she is clinically improve from her current respiratory status, she will need bone marrow asp/biopsy. Her respiratory status needs to improve before we marrow her.     (2) Hypoxic respiratory failure (new issue): On Bipap. Continue aggressive diuresis. Plan for repeat echo today.    (3) Protein caloric malnutrition: Plan for starting her on TPN today.      (4) Hyponatremia:  Likely this is due to volume overload- third spacing from hypoalbuminuria.      (5) Hematuria: Urology following.Urine cytology pending. No hematuria today.      (6) Intractable nausea:Improved. Continue zofran. Dexamethasone 8 mg bid.     (7) Hypoalbuminemia: Could be due to plasma cell disorder involving kidneys. We will check 24 hour urine.     (8) Goals of care discussion   Discussed goals of care with patient's daughter in light of potential plasma cell disorder and respiratory failure.    not available.   Continue aggressive supportive care for now. Discussed patient's family to discuss about DNR/DNI. Remains full code for now.     Judah Sher MD               5/19/2019      CC:

## 2019-05-19 NOTE — NURSING NOTE
Spoke with Dr Powell about pt c/o shortness of air with and after meals. Pt daughter made mention that she thought it could be the Reglan. Reglan was d/c per Dr. Powell and documented as an allergy. Pt was also assessed and stated that she was feeling better. Pt o2 sat was WNL.

## 2019-05-19 NOTE — PLAN OF CARE
Problem: Patient Care Overview  Goal: Plan of Care Review  Outcome: Ongoing (interventions implemented as appropriate)   05/19/19 0648   Coping/Psychosocial   Plan of Care Reviewed With spouse;daughter   Plan of Care Review   Progress no change   OTHER   Outcome Summary Pt transferred to CCU at 2005 per RRT. Currently on Bipap at 40% and has adequate O2 saturation and tolerance of device. Pt was not able to tolerate NC at 6L last night so bipap was resumed. Ativan .25mg was required x2 one time doses during the hightened anxiety period and 40 mg lasix x2 was needed to reduce wet lung sounds. Pt resting comfortably and awaiting bone marrow biopsy on 5/20. Spiritual care session with Johnathan Allen requested because the pt's family needes clarification on Advance directive currently on file for pt.        Problem: Fall Risk (Adult)  Goal: Identify Related Risk Factors and Signs and Symptoms  Outcome: Ongoing (interventions implemented as appropriate)    Goal: Absence of Fall  Outcome: Ongoing (interventions implemented as appropriate)      Problem: Activity Intolerance (Adult)  Goal: Effective Energy Conservation Techniques  Outcome: Ongoing (interventions implemented as appropriate)      Problem: Skin Injury Risk (Adult)  Goal: Identify Related Risk Factors and Signs and Symptoms  Outcome: Ongoing (interventions implemented as appropriate)    Goal: Skin Health and Integrity  Outcome: Ongoing (interventions implemented as appropriate)      Problem: Breathing Pattern Ineffective (Adult)  Goal: Identify Related Risk Factors and Signs and Symptoms  Outcome: Ongoing (interventions implemented as appropriate)    Goal: Effective Oxygenation/Ventilation  Outcome: Ongoing (interventions implemented as appropriate)    Goal: Anxiety/Fear Reduction  Outcome: Ongoing (interventions implemented as appropriate)

## 2019-05-19 NOTE — PROGRESS NOTES
TWO PATIENT IDENTIFIERS WERE USED. CONSENT WAS SIGNED PER FAMILY EDUCATION MATERIAL WAS GIVEN TO PATIENT AND / OR FAMILY. THE PATIENT WAS DRAPED WITH FULL BODY DRAPE AND PATIENT'S RIGHT ARM WAS PREPPED WITH CHLORAPREP.  ULTRASOUND WAS USED TO LOCALIZE THERIGHT BASILIC  VEIN. SUBCUTANEOUS TISSUE AT THE CATHETER SITE WAS INFILTRATED WITH 2% LIDOCAINE. UNDER ULTRASOUND GUIDANCE, THE VEIN WAS ACCESSED WITH A 21GAUGE  NEEDLE. AN 0.018 WIRE WAS THEN THREADED THROUGH THE NEEDLE INTO THE CENTRAL VENOUS SYSTEM. THE 21GAUGE  NEEDLE WAS REMOVED AND A 5 [place] PEEL AWAY SHEATH WAS PLACED OVER THE WIRE. THE PICC LINE CATHETER WAS CUT AT 20 CM. THE PICC LINE CATHETER WAS THEN PLACED OVER THE WIRE INTO THE VEIN, THE SHEATH WAS PEELED AWAY,WIRE WAS REMOVED. CATHETER WAS FLUSHED WITH NORMAL SALINE AND TIPS APPLIED. BIOPATCH PLACED. CATHETER SECURED WITH STATLOCK AND TEGADERM. PATIENT TOLERATED PROCEDURE WELL. THIS WAS DONE IN THE    OTHER      IMPRESSION: SUCCESSFUL PLACEMENT OF TRIPLE LUMEN SOLO PICC        Ana Rodriguez  5/19/2019  12:11 PM

## 2019-05-19 NOTE — PROGRESS NOTES
FAMILY MEDICINE DAILY PROGRESS NOTE    NAME: Yaneli Bates  : 1940  MRN: [id number]      LOS: 4 days     PROVIDER OF SERVICE: Ashley Carrizales MD    Chief Complaint: Generalized weakness    Subjective:     Interval History:  History taken from: patient chart family RN  Last night patient began feeling short of air and was put on BiPAP, this continued to worsen and a chest x-ray showed significant fluid infiltrates concerning for pulmonary edema.  Review of chart demonstrates echo in March with no indication of heart failure and ejection fraction 60%.  Chest x-ray this morning is much improved.  Patient was somnolent and has displayed anxiety requiring Ativan.  Did not answer any questions but was able to follow direction.  Discussed with patient's  and daughter at length possible pathology behind current stabilization of patient's health secondary to possible cardiac causes and her poor nutritional status.    Review of Systems:   Review of Systems    Objective:     Vital Signs  Temp:  [97 °F (36.1 °C)-98 °F (36.7 °C)] 97.3 °F (36.3 °C)  Heart Rate:  [] 92  Resp:  [18-33] 29  BP: (131-195)/() 157/92  FiO2 (%):  [30 %-50 %] 50 %  Body mass index is 26.78 kg/m².    Physical Exam  Physical Exam   Constitutional: She appears well-developed. She appears lethargic. She has a sickly appearance. Face mask in place.   HENT:   Head: Normocephalic and atraumatic.   Eyes: Conjunctivae, EOM and lids are normal. Pupils are equal, round, and reactive to light.   Neck: Normal range of motion. Neck supple.   Cardiovascular: Regular rhythm and normal heart sounds. Tachycardia present. Exam reveals no gallop and no friction rub.   No murmur heard.  Pulmonary/Chest: Effort normal. She has decreased breath sounds. She has wheezes. She has rhonchi.   Abdominal: Soft. Normal appearance and bowel sounds are normal. There is no tenderness.   Musculoskeletal: Normal range of motion. She exhibits  edema (3+ pitting above knees and of upper extremities).   Neurological: She appears lethargic. GCS eye subscore is 3. GCS motor subscore is 6.   Skin: Skin is warm, dry and intact. Capillary refill takes more than 3 seconds. There is pallor.       Medication Review    Current Facility-Administered Medications:   •  acetaminophen (TYLENOL) tablet 650 mg, 650 mg, Oral, Q4H PRN, Rodrigo Nazario MD  •  budesonide-formoterol (SYMBICORT) 80-4.5 MCG/ACT inhaler 2 puff, 2 puff, Inhalation, BID - RT, Ashley Carrizales MD  •  dexamethasone (DECADRON) injection 4 mg, 4 mg, Intravenous, Q8H, Ashley Carrizales MD  •  dexamethasone (DECADRON) tablet 8 mg, 8 mg, Oral, Q12H, Suzy Sher MD, 8 mg at 05/18/19 1112  •  famotidine (PEPCID) tablet 20 mg, 20 mg, Oral, Daily, Rdorigo Nazario MD, 20 mg at 05/18/19 0801  •  furosemide (LASIX) injection 20 mg, 20 mg, Intravenous, Q12H, Ashley Carrizales MD, 20 mg at 05/19/19 0854  •  ipratropium-albuterol (DUO-NEB) nebulizer solution 3 mL, 3 mL, Nebulization, Q6H While Awake - RT, Ashley Carrizales MD, 3 mL at 05/19/19 0843  •  LORazepam (ATIVAN) injection 0.25 mg, 0.25 mg, Intravenous, Q4H PRN, Ashley Carrizales MD, 0.25 mg at 05/19/19 0853  •  metoprolol succinate XL (TOPROL-XL) 24 hr tablet 50 mg, 50 mg, Oral, Daily With Dinner, Rodrigo Nazario MD, 50 mg at 05/18/19 1751  •  mirtazapine (REMERON) tablet 15 mg, 15 mg, Oral, Nightly, Ashley Carrizales MD, 15 mg at 05/17/19 2032  •  ondansetron (ZOFRAN) tablet 4 mg, 4 mg, Oral, Q6H PRN, 4 mg at 05/17/19 0835 **OR** ondansetron (ZOFRAN) injection 4 mg, 4 mg, Intravenous, Q6H PRN, Ashley Carrizales MD, 4 mg at 05/16/19 1452  •  ondansetron (ZOFRAN) injection 4 mg, 4 mg, Intravenous, Once, Janice Nazario MD  •  potassium chloride (KLOR-CON) packet 40 mEq, 40 mEq, Oral, Once, Rodrigo Nazario MD  •  potassium chloride (MICRO-K) CR capsule 40 mEq, 40 mEq, Oral, Daily,  Ashley Carrizales MD, 40 mEq at 05/18/19 0801  •  promethazine (PHENERGAN) tablet 12.5 mg, 12.5 mg, Oral, Q6H PRN **OR** promethazine (PHENERGAN) injection 12.5 mg, 12.5 mg, Intramuscular, Q6H PRN **OR** promethazine (PHENERGAN) suppository 12.5 mg, 12.5 mg, Rectal, Q6H PRN, Ashley Carrizales MD  •  sodium chloride 0.9 % flush 3 mL, 3 mL, Intravenous, Q12H, Rodrigo Nazario MD, 3 mL at 05/19/19 0854  •  sodium chloride 0.9 % flush 3-10 mL, 3-10 mL, Intravenous, PRN, Rodrigo Nazario MD  •  sodium chloride 0.9 % infusion, 100 mL/hr, Intravenous, Continuous, Rodrigo Nazario MD, Stopped at 05/18/19 2000  •  traMADol (ULTRAM) tablet 50 mg, 50 mg, Oral, BID PRN, Rodrigo Nazairo MD, 50 mg at 05/17/19 1329     Diagnostic Data    Lab Results (last 24 hours)     Procedure Component Value Units Date/Time    Comprehensive Metabolic Panel [[id number]]  (Abnormal) Collected:  05/19/19 0403    Specimen:  Blood Updated:  05/19/19 0459     Glucose 217 mg/dL      BUN 24 mg/dL      Creatinine 0.88 mg/dL      Sodium 133 mmol/L      Potassium 4.6 mmol/L      Chloride 100 mmol/L      CO2 16.0 mmol/L      Calcium 7.4 mg/dL      Total Protein 7.8 g/dL      Albumin 1.80 g/dL      ALT (SGPT) 30 U/L      AST (SGOT) 112 U/L      Alkaline Phosphatase 194 U/L      Total Bilirubin 0.5 mg/dL      eGFR Non African Amer 62 mL/min/1.73      Globulin 6.0 gm/dL      A/G Ratio 0.3 g/dL      BUN/Creatinine Ratio 27.3     Anion Gap 17.0 mmol/L     Narrative:       GFR Normal >60  Chronic Kidney Disease <60  Kidney Failure <15    CBC & Differential [[id number]] Collected:  05/19/19 0403    Specimen:  Blood Updated:  05/19/19 0446    Narrative:       The following orders were created for panel order CBC & Differential.  Procedure                               Abnormality         Status                     ---------                               -----------         ------                     CBC Auto Differential[[id number]]        Abnormal             Final result                 Please view results for these tests on the individual orders.    CBC Auto Differential [[id number]]  (Abnormal) Collected:  05/19/19 0403    Specimen:  Blood Updated:  05/19/19 0446     WBC 16.89 10*3/mm3      RBC 3.70 10*6/mm3      Hemoglobin 10.3 g/dL      Hematocrit 33.5 %      MCV 90.5 fL      MCH 27.8 pg      MCHC 30.7 g/dL      RDW 18.4 %      RDW-SD 59.5 fl      MPV 10.1 fL      Platelets 228 10*3/mm3      Neutrophil % 87.6 %      Lymphocyte % 3.7 %      Monocyte % 7.3 %      Eosinophil % 0.0 %      Basophil % 0.2 %      Immature Grans % 1.2 %      Neutrophils, Absolute 14.80 10*3/mm3      Lymphocytes, Absolute 0.62 10*3/mm3      Monocytes, Absolute 1.23 10*3/mm3      Eosinophils, Absolute 0.00 10*3/mm3      Basophils, Absolute 0.03 10*3/mm3      Immature Grans, Absolute 0.21 10*3/mm3      nRBC 0.0 /100 WBC     POC Glucose Once [[id number]]  (Abnormal) Collected:  05/18/19 2030    Specimen:  Blood Updated:  05/18/19 2206     Glucose 231 mg/dL      Comment: RN NotifiedOperator: [id number] HUNG Spivey ID: [id number]       Blood Gas, Arterial [[id number]]  (Abnormal) Collected:  05/18/19 1952    Specimen:  Arterial Blood Updated:  05/18/19 1959     Site Right Radial     Maxi's Test N/A     pH, Arterial 7.395 pH units      pCO2, Arterial 30.7 mm Hg      Comment: 84 Value below reference range        pO2, Arterial 135.0 mm Hg      Comment: 83 Value above reference range        HCO3, Arterial 18.8 mmol/L      Comment: 84 Value below reference range        Base Excess, Arterial -5.2 mmol/L      Comment: 84 Value below reference range        O2 Saturation, Arterial 98.8 %      Barometric Pressure for Blood Gas 743 mmHg      Modality NRB     FIO2 100 %      Ventilator Mode NA     Collected by TR     Comment: Meter: [id number]     :  [id number]       Protein Electrophoresis, 24 Hr Urine - Urine, Clean Catch [[id number]] Collected:  05/18/19 4695    Specimen:  Urine,  Clean Catch Updated:  05/18/19 1559    Creatinine, Total Protein , Electrophoresis & Immunofixation 24 Hr Urine - Urine, Clean Catch [[id number]] Collected:  05/18/19 1559    Specimen:  Urine, Clean Catch Updated:  05/18/19 1559    Copper, WB [[id number]] Collected:  05/16/19 0702    Specimen:  Blood Updated:  05/18/19 1008     Copper, WB  1.05 ug/mL      Comment: Analysis performed by Inductively-Coupled Plasma/Mass  Spectrometry (ICP/MS).  This test was developed and its performance characteristics  determined by [employer]. It has not been cleared or approved  by the Food and Drug Administration.       Narrative:       Performed at:  [facility] [address]  [id number]  : [place] [place] [facility], Phone:  [phone]    Comprehensive Metabolic Panel [[id number]]  (Abnormal) Collected:  05/18/19 0838    Specimen:  Blood Updated:  05/18/19 0908     Glucose 144 mg/dL      BUN 18 mg/dL      Creatinine 0.75 mg/dL      Sodium 132 mmol/L      Potassium 3.6 mmol/L      Chloride 100 mmol/L      CO2 20.0 mmol/L      Calcium 7.2 mg/dL      Total Protein 7.5 g/dL      Albumin 1.60 g/dL      ALT (SGPT) 7 U/L      AST (SGOT) 19 U/L      Alkaline Phosphatase 100 U/L      Total Bilirubin 0.5 mg/dL      eGFR Non African Amer 75 mL/min/1.73      Globulin 5.9 gm/dL      A/G Ratio 0.3 g/dL      BUN/Creatinine Ratio 24.0     Anion Gap 12.0 mmol/L     Narrative:       GFR Normal >60  Chronic Kidney Disease <60  Kidney Failure <15            I reviewed the patient's new clinical results.    Assessment/Plan:     Active Hospital Problems    Diagnosis POA   • **Acute pulmonary edema (CMS/HCC) [J81.0] No     Patient currently improving on BiPAP  Lasix 20 mg twice daily  Dexamethasone changed to IV until patient is able to take p.o. Medications  Symbicort and DuoNeb's  Repeat echo  Attempt to transition patient to high flow nasal cannula to initiate p.o. medications as well as p.o. nutrition     •  Leukocytosis [D72.829] Yes     Unsure etiology, patient denies fever chills, no fever noted on vital signs.  Vital signs have been stable.  No current signs of infection.  Concern this is secondary to possible plasma cell dyscrasia.  Patient has bone marrow aspirate scheduled for Monday.     • Normocytic anemia [D64.9] Yes     Heme/onc on board  Dr So recommends spep/immunofixation/free light chains- iGG elevated, iga/igm decreased, light chains 792; differential diagnosis of amyloidosis or subtype of multiple myeloma  Haptoglobin and ldh to rule out hemolysis pending  Ct of chest abdomen pelvis was negative for lytic lesions or lad        • Generalized weakness [R53.1] Yes     Possible malignancy   -Have investigated for pancreatic source    - lipase w/n/l, CT abdomen has shown no mass   -Have investigated for GI source    -Colonoscopy, EGD no signs of malignant process     -gastric emptying study was normal.    -Urology consult for possible bladder cx    - U/A shows large blood. urine cytology pending    - Urology consult: Recommended outpatient pelvic exam.    - hemoglobin 7.7; will transfuse 2 units PRBC if less than 7.    - will continue to monitor     - Continue IVF, Dietician consult.      • Hemorrhagic cystitis [N30.91] Yes     Patient was seen by Dr. Gilmore, Urology previous admission. Was treated with Azactam and diflucan during that time. On discharge was scheduled for cystoscopy with Dr. Damico this Wednesday.     - patient was seen on 5/10/19 by PCP where she was diagnosed with cystitis. Started on doxy. Has taken 2 days.   - U/A on admission : large blood, 2+ protein   - urine cytology pending   - Urology, Dr. D'amico recommends outpatient pelvic exam         • Iron deficiency anemia due to chronic blood loss [D50.0] Yes     H/H: 7.7/25.0 in the ED  -continue home ferrous sulfate BID  -continue to trend H/H  -received 2 units of blood on 5/14; Hb today pending        • Weight loss, unintentional  [R63.4] Yes     - Dietitian following  - patient has many food allergies  - Have initiated mirtazapine, Marinol, and Decadron for appetite stimulation.     • Hyponatremia [E87.1] Yes     In the ED sodium level is 131. Patient sees Dr. Mckenzie, Nephrology outpatient. 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   - continue home sodium tablets 1 g TID  - 1.6 L fluid restriction daily recommended by nephro         • Diabetes mellitus (CMS/HCC) [E11.9] Yes     - Will hold home metformin  - SSI       • Hypertension [I10] Yes     Will continue toprol-xl 50 daily   Continue to monitor per hospital protocol      • Coronary artery disease [I25.10] Yes     History of AVR 2014  Continue metoprolol 50 mg Daily          DVT prophylaxis: SCDs/TEDs  Code status is   Code Status and Medical Interventions:   Ordered at: 05/13/19 1811     Level Of Support Discussed With:    Patient     Code Status:    CPR     Medical Interventions (Level of Support Prior to Arrest):    Full       Plan for disposition:Where: SNF and rehab and When:  2-3days      Time: 30 minutes     Ashley Carrizales MD PGY3  [place] [address]  Office: [phone]          This document has been electronically signed by Ashley Carrizales MD on May 19, 2019 8:54 AM

## 2019-05-19 NOTE — PROGRESS NOTES
Rapid response was called on this 78-year-old patient was noted to have been into severe respiratory distress.  Patient was seen and examined.  She has increased work of breathing and has been placed on 100% nonrebreathing mask.  Cardia vascular exam: Regular rate and rhythm with tachycardia.  Lungs: Distant breath sounds bilaterally with bibasilar rales.  Plan: Stat ABGs, chest x-ray and Accu-Cheks were done.  ABG was unremarkable but chest x-ray showed evidence of fluid overload.  Patient was given a dose of Lasix and started on noninvasive respiratory therapy.  Care was handed over to the family resident on call.

## 2019-05-19 NOTE — SIGNIFICANT NOTE
Family meeting with patient's  and daughter with additional conversation about patient's current condition as well as the results of the echo which showed significantly decreased ejection fraction.  Family has decided that they would like the patient to be made DNR/DNI.  They do not feel that she would want to be intubated and placed on a ventilator, and that they do not want any heroic measures done with the risk of harm from CPR should patient is continued to decline.  They do wish for TPN to be initiated and for treatment to be continued at this time.  We did discuss comfort measures and what that would mean for the patient in terms of treatment, discontinuation of treatment, as well as medications used to make patient comfortable.    Ashley Carrizales MD PGY3  [place] [address]  Office: [phone]          This document has been electronically signed by Ashley Carrizales MD on May 19, 2019 1:24 PM

## 2019-05-19 NOTE — PROGRESS NOTES
"Parenteral Nutrition by Pharmacy    Patient: Yaneli Bates is a 78 y.o. female  [Ht: 154.9 cm (61\"); Wt: 64.3 kg (141 lb 12.1 oz)]  MRN#: [id number]  Attending: No name on file.  Admission Date: 051319    Subjective/Objective     Diagnosis - see below  Assessment      Lab Results   Component Value Date    GLUCOSE 217 (H) 05/19/2019    BUN 24 (H) 05/19/2019    CREATININE 0.88 05/19/2019    EGFRIFNONA 62 05/19/2019    BCR 27.3 (H) 05/19/2019    K 4.6 05/19/2019    CO2 16.0 (L) 05/19/2019    CALCIUM 7.4 (L) 05/19/2019    PROTENTOTREF 6.2 05/16/2019    ALBUMIN 1.80 (L) 05/19/2019    LABIL2 0.4 (L) 05/16/2019     (H) 05/19/2019    ALT 30 05/19/2019     Lab Results   Component Value Date    GLUCOSE 217 (H) 05/19/2019    CALCIUM 7.4 (L) 05/19/2019     (L) 05/19/2019    K 4.6 05/19/2019    CO2 16.0 (L) 05/19/2019     05/19/2019    BUN 24 (H) 05/19/2019    CREATININE 0.88 05/19/2019    EGFRIFNONA 62 05/19/2019    BCR 27.3 (H) 05/19/2019    ANIONGAP 17.0 05/19/2019     Magnesium   Date Value Ref Range Status   05/19/2019 1.4 (L) 1.6 - 2.4 mg/dL Final     Phosphorus   Date Value Ref Range Status   05/19/2019 3.8 2.5 - 4.5 mg/dL Final     Calcium   Date Value Ref Range Status   05/19/2019 7.4 (L) 8.6 - 10.5 mg/dL Final     Triglycerides   Date Value Ref Range Status   05/19/2019 335 (H) 0 - 150 mg/dL Final     Plan   Indication: nutrition unable to provide requirements from formulary due to diet intolerances  Day 1  Above labs reviewed    Electrolytes:    Calcium 7.4, below normal limits, albumin 1.8, corrected calcium 9.16  Ionized calcium 3.71    Na slightly below christian limits  Cl WNL  CO2 below normal limits, will trend after TPN initiated    Mg below normal limits  Ph WNL  K WNL, trended up 1 overnight, 40 mEq daily ordered    Glucose:  DM II   Above goal of 180 before initiation of TPN  Novolog sliding scale on MAR started today  No insulin or oral blood sugar medications on PTA med list; will " increase sliding scale as needed.  Patient may require nightly levemir while on TPN.    Lipids:  TG today 335  No mention of triglyceridemia  Will utilize 10% of total kcal as lipids to start, then increase to 20% of total kcal when we recheck lipids next week    Fluids:  TPN = 1560 ml  Lipids = 63.5 ml  No other maintenance fluids running at this time    Other:  May consider discontinuing remeron for appetite stimulation while on TPN    Estimated REE:  REE: 1060 kcal x1.2 stress factor = 2070 kcal    TPN will provide:  Lipids: 10% total 1270 = 127 kcal = 63.5 ml over 12 hours   Protein: 1 g / AdjBW of 51.9 kg = 52 grams = 208 kcal  Dextrose: 935 kcal =  275 grams  Total: 1270 kcal      TPN formula:  Clinimix 5% AA / 15% Dex / 2000 ml  Sodium Chloride 100 mEq  Sodium Phosphate 20 mEq / 15 mmol  Sodium Acetate 60 mEq  Calcium Gluconate 14 mEq  Magnesium Sulfate 12 mEq  Multivitamin 10 ml    Rate 40 ml/hr x 4 hrs, then 65 ml/hr thereafter    Pharmacy to monitor and dose adjust TPN as appropriate      Louis Smith [facility]  05/19/19  1:26 PM

## 2019-05-19 NOTE — CONSULTS
CARDIOLOGY CONSULTATION NOTE    Referring Provider: Arcadio Schulz MD    Reason for Consultation: We have been asked to see the patient for acute systolic heart failure in the setting of known previous nonobstructive CAD and prior aortic valve replacement.    Yaneli Bates  1940  78 y.o. female      HPI    The patient is a 78-year-old female with history of aortic valve replacement in 2014.  Cardiac catheterization at that time showed nonobstructive epicardial coronary artery disease.  The patient has done well from a cardiac standpoint.  Echocardiogram in March showed normal LV systolic function with what appeared to be a well-functioning bioprosthetic aortic valve.  She now presents with complaints of increasing edema and shortness of breath with fatigue/loss of appetite over the past several months.  On presentation the patient was grossly edematous.  Chest x-ray was consistent with pulmonary edema.  BNP level was profoundly elevated.  She has been admitted and initiated on IV diuretics.  An echocardiogram has been performed which shows an ejection fraction less than 20%.  No obvious abnormality with the aortic valve is noted.  We have been asked to consult regarding management of patient's severe congestive heart failure.  She is currently somnolent.  She is on BiPAP.  She is unable to complete review of systems.  The majority the history is obtained from family members present in the room.    SUBJECTIVE    Past Medical History:   Diagnosis Date   • Allergic    • Anemia    • Arthritis    • Coronary artery disease 2014    valave replacement   • Fibromyalgia, primary    • Headache    • Infectious viral hepatitis    • Low back pain    • Osteoporosis    • Visual impairment          Past Surgical History:   Procedure Laterality Date   • AORTIC VALVE REPAIR/REPLACEMENT  2014,may   • CARDIAC SURGERY     • COLONOSCOPY     • COLONOSCOPY N/A 4/22/2019    Procedure: COLONOSCOPY;  Surgeon: Carlos Schaeffer MD;   Location: [facility] ENDOSCOPY;  Service: Gastroenterology   • ENDOSCOPY N/A 3/13/2019    Procedure: ESOPHAGOGASTRODUODENOSCOPY;  Surgeon: Carlos Schaeffer MD;  Location: [facility] ENDOSCOPY;  Service: Gastroenterology   • ENDOSCOPY N/A 4/22/2019    Procedure: ESOPHAGOGASTRODUODENOSCOPY;  Surgeon: Carlos Schaeffer MD;  Location: [facility] ENDOSCOPY;  Service: Gastroenterology   • KNEE SURGERY Right 1967   • TONSILLECTOMY           Family History   Problem Relation Age of Onset   • Arthritis Mother    • Cancer Mother    • Heart disease Mother    • Hyperlipidemia Mother    • Hypertension Mother    • Vision loss Mother    • Arthritis Father    • Hearing loss Father    • Heart disease Father    • Hyperlipidemia Father    • Hypertension Father    • Vision loss Father    • Hypertension Sister    • Vision loss Sister    • Osteoporosis Sister    • Cataracts Sister    • Hyperlipidemia Brother    • Vision loss Brother    • Heart disease Brother    • Cancer Daughter    • Early death Daughter    • Osteoporosis Sister    • Parkinsonism Brother    • Hyperlipidemia Brother    • Thyroid disease Daughter          Social History     Socioeconomic History   • Marital status:      Spouse name: Not on file   • Number of children: Not on file   • Years of education: Not on file   • Highest education level: Not on file   Tobacco Use   • Smoking status: Never Smoker   • Smokeless tobacco: Never Used   Substance and Sexual Activity   • Alcohol use: No   • Drug use: No   • Sexual activity: No         Prior to Admission medications    Medication Sig Start Date End Date Taking? Authorizing Provider   traMADol (ULTRAM) 50 MG tablet Take 50 mg by mouth 2 (Two) Times a Day As Needed for Moderate Pain .   Yes Provider, MD Klarissa   alendronate (FOSAMAX) 70 MG tablet Take 1 tablet by mouth Every 7 (Seven) Days. 4/24/19   Elian Pacheco APRN   doxycycline (VIBRAMYCIN) 100 MG capsule Take 1 capsule by mouth 2 (Two) Times a Day. 5/10/19   Junior  "Elian KELLEY APRKYLE   ferrous sulfate 324 (65 Fe) MG tablet delayed-release EC tablet Take 1 tablet by mouth 2 (Two) Times a Day With Meals for 30 days. 5/10/19 6/9/19  Elian Pacheco APRKYLE   lactulose (CHRONULAC) 10 GM/15ML solution Take 30 mL by mouth 2 (Two) Times a Day As Needed (constipation). 4/26/19   Junior Elian ALLIEDIOMEDES   metoprolol succinate XL (TOPROL-XL) 50 MG 24 hr tablet Take 1 tablet by mouth Daily With Dinner. 2/15/19   Toro Pryor MD   ondansetron ODT (ZOFRAN ODT) 4 MG disintegrating tablet Take 1 tablet by mouth Every 8 (Eight) Hours As Needed for Nausea or Vomiting. 5/2/19   Junior Elian ALLIEDIOMEDES   raNITIdine (ZANTAC) 75 MG tablet Take 75 mg by mouth 2 (Two) Times a Day.    Provider, MD Klarissa   sodium chloride 1 g tablet Take 1 tablet by mouth 3 (Three) Times a Day With Meals. 4/23/19   Lewis Tsang MD         OBJECTIVE    /89   Pulse 93   Temp 96.9 °F (36.1 °C) (Temporal)   Resp (!) 32   Ht 154.9 cm (61\")   Wt 64.3 kg (141 lb 12.1 oz)   LMP 09/25/1996 (Within Months)   SpO2 97%   BMI 26.78 kg/m²       Review of Systems -Cannot be obtained due to clinical factors      Physical Exam     Constitutional: Appears stated age, appears weak.     HENT:   Head: Normocephalic, normal hair patterns, no masses or tenderness.    Neck: No palpable masses or adenopathy, no thyromegaly, positive JVD, carotid pulses are full and equal bilaterally and without  Bruits.     Cardiovascular: Regular rhythm, S1 and S2 normal, Apical 2/6 murmur.       Pulmonary: Manage breath sounds left side, crackles noted.    Abdominal: Abdomen soft, bowel sounds normoactive, no masses, no hepatosplenomegaly, non-tender, no bruits.     Musculoskeletal: No deformities, clubbing, cyanosis, erythema, or edema observed. There are no spinal abnormalities noted. Normal muscle strength and tone. Pulses full and equal in all extremities, no bruits auscultated.     Neurological: No gross motor or sensory " deficits noted,     Skin: Warm and dry to the touch, no apparent skin lesions or masses noted.     RESULTS  Lab Results (last 24 hours)     Procedure Component Value Units Date/Time    Urinalysis, Microscopic Only - Urine, Clean Catch [[id number]]  (Abnormal) Collected:  05/19/19 1608    Specimen:  Urine, Clean Catch Updated:  05/19/19 1658     RBC, UA 13-20 /HPF      WBC, UA 6-12 /HPF      Bacteria, UA 2+ /HPF      Squamous Epithelial Cells, UA 3-5 /HPF      Yeast, UA Small/1+ Budding Yeast /HPF      Hyaline Casts, UA 31-50 /LPF      Coarse Granular Casts, UA 3-6 /LPF      Mucus, UA Small/1+ /HPF      Methodology Manual Light Microscopy    Urinalysis With Microscopic If Indicated (No Culture) - Urine, Clean Catch [[id number]]  (Abnormal) Collected:  05/19/19 1608    Specimen:  Urine, Clean Catch Updated:  05/19/19 1640     Color, UA Yellow     Appearance, UA Cloudy     pH, UA <=5.0     Specific Gravity, UA 1.010     Glucose, UA Negative     Ketones, UA Negative     Bilirubin, UA Negative     Blood, UA Large (3+)     Protein, UA 30 mg/dL (1+)     Leuk Esterase, UA Small (1+)     Nitrite, UA Negative     Urobilinogen, UA 0.2 E.U./dL    Troponin [[id number]]  (Abnormal) Collected:  05/19/19 1507    Specimen:  Blood Updated:  05/19/19 1537     Troponin T 0.078 ng/mL     Narrative:       Troponin T Reference Range:  <= 0.03 ng/mL-   Negative for AMI  >0.03 ng/mL-     Abnormal for myocardial necrosis.  Clinicians would have to utilize clinical acumen, EKG, Troponin and serial changes to determine if it is an Acute Myocardial Infarction or myocardial injury due to an underlying chronic condition.     Blood Culture - Blood, Blood, Venous Line [[id number]] Collected:  05/19/19 1507    Specimen:  Blood, Venous Line Updated:  05/19/19 1515    Blood Culture - Blood, Blood, Venous Line [[id number]] Collected:  05/19/19 1507    Specimen:  Blood, Venous Line Updated:  05/19/19 1515    Extra Tubes [[id number]] Collected:  05/19/19 1201     Specimen:  Blood, Venous Line Updated:  05/19/19 1313    Narrative:       The following orders were created for panel order Extra Tubes.  Procedure                               Abnormality         Status                     ---------                               -----------         ------                     Lavender Top[[id number]]                                     Final result                 Please view results for these tests on the individual orders.    Lavender Top [[id number]] Collected:  05/19/19 1201    Specimen:  Blood Updated:  05/19/19 1315     Extra Tube hold for add-on     Comment: Auto resulted       Troponin [[id number]]  (Abnormal) Collected:  05/19/19 1159    Specimen:  Blood Updated:  05/19/19 1301     Troponin T 0.085 ng/mL     Narrative:       Troponin T Reference Range:  <= 0.03 ng/mL-   Negative for AMI  >0.03 ng/mL-     Abnormal for myocardial necrosis.  Clinicians would have to utilize clinical acumen, EKG, Troponin and serial changes to determine if it is an Acute Myocardial Infarction or myocardial injury due to an underlying chronic condition.     Calcium, Ionized [[id number]]  (Abnormal) Collected:  05/19/19 1247    Specimen:  Blood Updated:  05/19/19 1253     Ionized Calcium 3.71 mg/dL     Prealbumin [[id number]] Collected:  05/19/19 1247    Specimen:  Blood Updated:  05/19/19 1248    Cholesterol, Total [[id number]]  (Normal) Collected:  05/19/19 1159    Specimen:  Blood Updated:  05/19/19 1246     Total Cholesterol 122 mg/dL     Narrative:       Cholesterol Reference Ranges    (U.[place] Department fo Health and Human Services ATP III Classifications)    Desirable        <200 mg/dl  Borderline High  200-239 mg/dl  High Risk        >240 mg/dl    Triglycerides [[id number]]  (Abnormal) Collected:  05/19/19 1159    Specimen:  Blood Updated:  05/19/19 1246     Triglycerides 335 mg/dL     C-reactive Protein [[id number]]  (Abnormal) Collected:  05/19/19 1159    Specimen:  Blood Updated:  05/19/19  1246     C-Reactive Protein 11.52 mg/dL     Magnesium [[id number]]  (Abnormal) Collected:  05/19/19 1159    Specimen:  Blood Updated:  05/19/19 1246     Magnesium 1.4 mg/dL     Phosphorus [[id number]]  (Normal) Collected:  05/19/19 1159    Specimen:  Blood Updated:  05/19/19 1246     Phosphorus 3.8 mg/dL     Comprehensive Metabolic Panel [[id number]]  (Abnormal) Collected:  05/19/19 0403    Specimen:  Blood Updated:  05/19/19 0459     Glucose 217 mg/dL      BUN 24 mg/dL      Creatinine 0.88 mg/dL      Sodium 133 mmol/L      Potassium 4.6 mmol/L      Chloride 100 mmol/L      CO2 16.0 mmol/L      Calcium 7.4 mg/dL      Total Protein 7.8 g/dL      Albumin 1.80 g/dL      ALT (SGPT) 30 U/L      AST (SGOT) 112 U/L      Alkaline Phosphatase 194 U/L      Total Bilirubin 0.5 mg/dL      eGFR Non African Amer 62 mL/min/1.73      Globulin 6.0 gm/dL      A/G Ratio 0.3 g/dL      BUN/Creatinine Ratio 27.3     Anion Gap 17.0 mmol/L     Narrative:       GFR Normal >60  Chronic Kidney Disease <60  Kidney Failure <15    CBC & Differential [[id number]] Collected:  05/19/19 0403    Specimen:  Blood Updated:  05/19/19 0446    Narrative:       The following orders were created for panel order CBC & Differential.  Procedure                               Abnormality         Status                     ---------                               -----------         ------                     CBC Auto Differential[[id number]]        Abnormal            Final result                 Please view results for these tests on the individual orders.    CBC Auto Differential [[id number]]  (Abnormal) Collected:  05/19/19 0403    Specimen:  Blood Updated:  05/19/19 0446     WBC 16.89 10*3/mm3      RBC 3.70 10*6/mm3      Hemoglobin 10.3 g/dL      Hematocrit 33.5 %      MCV 90.5 fL      MCH 27.8 pg      MCHC 30.7 g/dL      RDW 18.4 %      RDW-SD 59.5 fl      MPV 10.1 fL      Platelets 228 10*3/mm3      Neutrophil % 87.6 %      Lymphocyte % 3.7 %      Monocyte % 7.3  %      Eosinophil % 0.0 %      Basophil % 0.2 %      Immature Grans % 1.2 %      Neutrophils, Absolute 14.80 10*3/mm3      Lymphocytes, Absolute 0.62 10*3/mm3      Monocytes, Absolute 1.23 10*3/mm3      Eosinophils, Absolute 0.00 10*3/mm3      Basophils, Absolute 0.03 10*3/mm3      Immature Grans, Absolute 0.21 10*3/mm3      nRBC 0.0 /100 WBC     POC Glucose Once [[id number]]  (Abnormal) Collected:  05/18/19 2030    Specimen:  Blood Updated:  05/18/19 2206     Glucose 231 mg/dL      Comment: RN NotifiedOperator: [id number] Spivey ID: [id number]       Blood Gas, Arterial [[id number]]  (Abnormal) Collected:  05/18/19 1952    Specimen:  Arterial Blood Updated:  05/18/19 1959     Site Right Radial     Maxi's Test N/A     pH, Arterial 7.395 pH units      pCO2, Arterial 30.7 mm Hg      Comment: 84 Value below reference range        pO2, Arterial 135.0 mm Hg      Comment: 83 Value above reference range        HCO3, Arterial 18.8 mmol/L      Comment: 84 Value below reference range        Base Excess, Arterial -5.2 mmol/L      Comment: 84 Value below reference range        O2 Saturation, Arterial 98.8 %      Barometric Pressure for Blood Gas 743 mmHg      Modality NRB     FIO2 100 %      Ventilator Mode NA     Collected by TR     Comment: Meter: [id number]     :  [id number]           Imaging Results (last 24 hours)     Procedure Component Value Units Date/Time    US Guidance PICC NC [[id number]] Resulted:  05/19/19 1224     Updated:  05/19/19 1224    Narrative:       This procedure was auto-finalized with no dictation required.    XR Chest Post CVA Port [[id number]] Collected:  05/19/19 1200     Updated:  05/19/19 1220    Narrative:         EXAM:         Radiograph(s), Chest   VIEWS:   Frontal  ; 1       DATE/TIME:  5/19/2019 12:17 PM CDT                INDICATION:   needs access, R53.1 Weakness D64.9 Anemia,  unspecified E83.42 Hypomagnesemia Z74.09 Other reduced mobility  Z74.09 Other reduced  mobility    COMPARISON:  CXR: 5/19/19             FINDINGS:             - lines/tubes:    Right approach PICC line in standard position.      - cardiac:         size within normal limits  my: Status post  valvular replacement       - mediastinum: contour within normal limits         - lungs:         Persistent severe interstitial edema with a  perihilar airspace component   , marginally improved          - pleura:         no evidence of  fluid                  - osseous:         unremarkable for age                  - misc.:         Impression:       CONCLUSION:        1. Right approach PICC line in standard position.  2. Resolving alveolar and interstitial edema presumably  attributable to left pump or valvular dysfunction.                                                              Electronically signed by:  LEXA Almonte MD  5/19/2019 12:19  PM CDT Workstation: [id number]    IR PICC W Imaging Guidance [[id number]] Resulted:  05/19/19 1212     Updated:  05/19/19 1212    Narrative:       This procedure was auto-finalized with no dictation required.    XR Chest 1 View [[id number]] Collected:  05/19/19 0705     Updated:  05/19/19 0720    Narrative:         EXAM:         Radiograph(s), Chest   VIEWS:   Frontal  ; 1       DATE/TIME:  5/19/2019 7:18 AM CDT                INDICATION:   pulmonary edema noted, R53.1 Weakness D64.9 Anemia,  unspecified E83.42 Hypomagnesemia Z74.09 Other reduced mobility  Z74.09 Other reduced mobility    COMPARISON:  CXR: 5/18/19             FINDINGS:             - lines/tubes:    none     - cardiac:         size within normal limits , status post  valvular replacement.        - mediastinum: contour within normal limits         - lungs:         Interval improvement regarding the degree of  bilateral perihilar alveolar edema.             - pleura:         no evidence of  fluid                  - osseous:         Status post median sternotomy                  - misc.:         Impression:        CONCLUSION:        1. Improving pulmonary alveolar edema.  2. Status post valvular replacement.                                                             Electronically signed by:  LEXA Almonte MD  5/19/2019 7:19  AM CDT Workstation: [id number]    XR Chest 1 View [[id number]] Collected:  05/18/19 1958     Updated:  05/18/19 2022    Narrative:         EXAM:         Radiograph(s), Chest   VIEWS:   Frontal  ; 1       DATE/TIME:  5/18/2019 8:20 PM CDT                INDICATION:   trouble breathing, R53.1 Weakness D64.9 Anemia,  unspecified E83.42 Hypomagnesemia Z74.09 Other reduced mobility  Z74.09 Other reduced mobility    COMPARISON:  CXR: 5/13/19             FINDINGS:             - lines/tubes:    none     - cardiac:         size within normal limits         - mediastinum: contour within normal limits         - lungs:         Bilateral perihilar airspace disease with marked  interstitial prominence likely attributable to the presence of  florid congestive failure.             - pleura:         no evidence of  fluid                  - osseous:         Status post median sternotomy                  - misc.:         Impression:       CONCLUSION:        1. Bilateral perihilar airspace disease with marked interstitial  prominence most likely attributable to left pump dysfunction and  florid congestive failure.                                                             Electronically signed by:  LEXA Almonte MD  5/18/2019 8:21  PM CDT Workstation: [id number]            ASSESSMENT AND PLAN      Acute pulmonary edema (CMS/HCC)    S/P AVR (aortic valve replacement)    Hypertension    Diabetes mellitus (CMS/HCC)    Hyponatremia    Coronary artery disease involving native coronary artery of native heart with angina pectoris (CMS/HCC)    Weight loss, unintentional    Generalized weakness    Hemorrhagic cystitis    Iron deficiency anemia due to chronic blood loss    Normocytic anemia    Leukocytosis    Acute systolic  CHF (congestive heart failure) (CMS/HCC)    Cardiogenic shock (CMS/HCC)      1. Acute systolic HF   -Severe, advanced  -Resultant cardiogenic shock with evidence of hypoperfusion/endorgan failure  -Etiology uncertain  -Less likely to be ischemic given recent CT angiography showing no obstructive disease  -Unclear if related to bioprosthetic valve, and thoracic echo suggest normal function  -Initiate inotropic support, given hypertension and resting tachycardia will opt for initiation of Primacor.  -Aggressive IV diuresis  -Aggressive afterload reduction  -Prognosis appears poor -would not disagree with consideration of comfort measures    2. AVR  -No obvious evidence of valvular malfunction on transthoracic echo  -Could consider ROSALES if  family desires aggressive approach    3. CAD   -Nonobstructive by cath 2014  -Nonobstructive by CTA in March  -No evidence of acute infarct  -May eventually need cardiac catheterization depending upon progression of course and response to therapy    4.  Disposition -prognosis is poor.  Lengthy discussion obtained with medical service and patient's family.  They are leaning towards more compassionate approach.  I feel this is an appropriate direction given the current clinical setting and prognosis.  The family is undergoing current discussions regarding direction of care.  I will hold off on initiation of inotropic support pending their final decision.      Luis Enrique Rao MD  5/19/2019  5:37 PM

## 2019-05-20 PROBLEM — Z51.5 COMFORT MEASURES ONLY STATUS: Status: ACTIVE | Noted: 2019-01-01

## 2019-05-20 NOTE — NURSING NOTE
Referral completed with spouse and daughter.  Pt unresponsive in bed with family at bedside. Pt appears comfortable at this time and family agrees pt is comfortable.  Pt does not meet GIP at this time.  Discussed with Radha ..  Instructed to call hospice with any concerns.  Understanding verbalized.

## 2019-05-20 NOTE — CONSULTS
N[facility]    Patient Name:  Yaneli Bates  YOB: 1940  MRN: [id number]  Admit Date:  5/13/2019    RD visited for follow-up.  Pt with a decline in overall condition.  She has been dxed with a very low Ejection fraction and resp distress.  She is now comfort care.  Family without any requests.  RD will monitor for any changes.     Electronically signed by:  Tanya Harkins RD  05/20/19 3:11 PM

## 2019-05-20 NOTE — PROGRESS NOTES
FAMILY MEDICINE DAILY PROGRESS NOTE    NAME: Yaneli Bates  : 1940  MRN: [id number]      LOS: 5 days     PROVIDER OF SERVICE: Ashley Carrizales MD    Chief Complaint: Acute pulmonary edema (CMS/HCC)    Subjective:     Interval History:  History taken from: patient chart family RN  Patient was seen by cardiologist last evening and was made comfort measures following that discussion.  Patient has been placed on scopolamine patch, morphine, and Ativan as needed for comfort and symptomatic control.  Patient was resting, not responsive on interview.    Review of Systems:   Review of Systems   Unable to perform ROS: Acuity of condition       Objective:     Vital Signs  Temp:  [96.4 °F (35.8 °C)-97.2 °F (36.2 °C)] 97.2 °F (36.2 °C)  Heart Rate:  [] 105  Resp:  [28-34] 32  BP: ()/() 91/63  FiO2 (%):  [50 %] 50 %  Body mass index is 26.99 kg/m².    Physical Exam  Physical Exam   Constitutional: She appears well-developed. She appears cachectic. She is sleeping. She appears toxic. She has a sickly appearance. She appears ill. No distress. Nasal cannula in place.   HENT:   Head: Normocephalic and atraumatic.   Eyes: Conjunctivae, EOM and lids are normal. Pupils are equal, round, and reactive to light.   Neck: Normal range of motion. Neck supple.   Cardiovascular: Regular rhythm and normal heart sounds. Tachycardia present. Exam reveals no gallop and no friction rub.   No murmur heard.  Pulmonary/Chest: Effort normal. She has decreased breath sounds. She has wheezes. She has rhonchi.   Abdominal: Soft. Normal appearance and bowel sounds are normal. There is no tenderness.   Musculoskeletal: She exhibits edema (3+ pitting above knees and of upper extremities).   Neurological: She is unresponsive.   Patient somnolent, does not open eyes, does not follow commands, no gag reflex on suction. Patient is on comfort measures, did not sternal rub to check for response to pain.   Skin: Skin is  warm, dry and intact. Capillary refill takes more than 3 seconds. There is pallor.       Medication Review    Current Facility-Administered Medications:   •  LORazepam (ATIVAN) injection 2 mg, 2 mg, Intravenous, Q1H PRN, Rodrigo Nazario MD, 2 mg at 05/19/19 2251  •  Morphine sulfate (PF) injection 6 mg, 6 mg, Intravenous, Q1H PRN, Rodrigo Nazario MD, 6 mg at 05/19/19 2252  •  Scopolamine (TRANSDERM-SCOP) 1.5 MG/3DAYS patch 1 patch, 1 patch, Transdermal, Q72H, Rodrigo Nazario MD, 1 patch at 05/19/19 1838     Diagnostic Data    Lab Results (last 24 hours)     Procedure Component Value Units Date/Time    Prealbumin [[id number]]  (Abnormal) Collected:  05/19/19 1247    Specimen:  Blood Updated:  05/20/19 0049     Prealbumin <3.0 mg/dL     POC Glucose Once [[id number]]  (Abnormal) Collected:  05/19/19 1232    Specimen:  Blood Updated:  05/19/19 1836     Glucose 160 mg/dL      Comment: Sliding Scale AdminOperator: [id number] EVI JENNIFERMeter ID: [id number]       POC Glucose Once [[id number]]  (Abnormal) Collected:  05/19/19 1714    Specimen:  Blood Updated:  05/19/19 1742     Glucose 143 mg/dL      Comment: : [id number] RADHA Wilson ID: [id number]       Urinalysis, Microscopic Only - Urine, Clean Catch [[id number]]  (Abnormal) Collected:  05/19/19 1608    Specimen:  Urine, Clean Catch Updated:  05/19/19 1658     RBC, UA 13-20 /HPF      WBC, UA 6-12 /HPF      Bacteria, UA 2+ /HPF      Squamous Epithelial Cells, UA 3-5 /HPF      Yeast, UA Small/1+ Budding Yeast /HPF      Hyaline Casts, UA 31-50 /LPF      Coarse Granular Casts, UA 3-6 /LPF      Mucus, UA Small/1+ /HPF      Methodology Manual Light Microscopy    Urinalysis With Microscopic If Indicated (No Culture) - Urine, Clean Catch [[id number]]  (Abnormal) Collected:  05/19/19 1608    Specimen:  Urine, Clean Catch Updated:  05/19/19 1640     Color, UA Yellow     Appearance, UA Cloudy     pH, UA <=5.0     Specific Gravity, UA 1.010     Glucose, UA Negative      Ketones, UA Negative     Bilirubin, UA Negative     Blood, UA Large (3+)     Protein, UA 30 mg/dL (1+)     Leuk Esterase, UA Small (1+)     Nitrite, UA Negative     Urobilinogen, UA 0.2 E.U./dL    Troponin [[id number]]  (Abnormal) Collected:  05/19/19 1507    Specimen:  Blood Updated:  05/19/19 1537     Troponin T 0.078 ng/mL     Narrative:       Troponin T Reference Range:  <= 0.03 ng/mL-   Negative for AMI  >0.03 ng/mL-     Abnormal for myocardial necrosis.  Clinicians would have to utilize clinical acumen, EKG, Troponin and serial changes to determine if it is an Acute Myocardial Infarction or myocardial injury due to an underlying chronic condition.     Blood Culture - Blood, Blood, Venous Line [[id number]] Collected:  05/19/19 1507    Specimen:  Blood, Venous Line Updated:  05/19/19 1515    Blood Culture - Blood, Blood, Venous Line [[id number]] Collected:  05/19/19 1507    Specimen:  Blood, Venous Line Updated:  05/19/19 1515    Extra Tubes [[id number]] Collected:  05/19/19 1201    Specimen:  Blood, Venous Line Updated:  05/19/19 1315    Narrative:       The following orders were created for panel order Extra Tubes.  Procedure                               Abnormality         Status                     ---------                               -----------         ------                     Lavender Top[[id number]]                                     Final result                 Please view results for these tests on the individual orders.    Lavender Top [[id number]] Collected:  05/19/19 1201    Specimen:  Blood Updated:  05/19/19 1315     Extra Tube hold for add-on     Comment: Auto resulted       Troponin [[id number]]  (Abnormal) Collected:  05/19/19 1159    Specimen:  Blood Updated:  05/19/19 1301     Troponin T 0.085 ng/mL     Narrative:       Troponin T Reference Range:  <= 0.03 ng/mL-   Negative for AMI  >0.03 ng/mL-     Abnormal for myocardial necrosis.  Clinicians would have to utilize clinical acumen, EKG,  Troponin and serial changes to determine if it is an Acute Myocardial Infarction or myocardial injury due to an underlying chronic condition.     Calcium, Ionized [[id number]]  (Abnormal) Collected:  05/19/19 1247    Specimen:  Blood Updated:  05/19/19 1253     Ionized Calcium 3.71 mg/dL     Cholesterol, Total [[id number]]  (Normal) Collected:  05/19/19 1159    Specimen:  Blood Updated:  05/19/19 1246     Total Cholesterol 122 mg/dL     Narrative:       Cholesterol Reference Ranges    ([place] Department fo Health and Human Services ATP III Classifications)    Desirable        <200 mg/dl  Borderline High  200-239 mg/dl  High Risk        >240 mg/dl    Triglycerides [[id number]]  (Abnormal) Collected:  05/19/19 1159    Specimen:  Blood Updated:  05/19/19 1246     Triglycerides 335 mg/dL     C-reactive Protein [[id number]]  (Abnormal) Collected:  05/19/19 1159    Specimen:  Blood Updated:  05/19/19 1246     C-Reactive Protein 11.52 mg/dL     Magnesium [[id number]]  (Abnormal) Collected:  05/19/19 1159    Specimen:  Blood Updated:  05/19/19 1246     Magnesium 1.4 mg/dL     Phosphorus [[id number]]  (Normal) Collected:  05/19/19 1159    Specimen:  Blood Updated:  05/19/19 1246     Phosphorus 3.8 mg/dL             I reviewed the patient's new clinical results.    Assessment/Plan:     Active Hospital Problems    Diagnosis POA   • **Comfort measures only status [Z51.5] Not Applicable     Scopolamine patch, morphine, and Ativan for symptom control.     • Acute pulmonary edema (CMS/HCC) [J81.0] No     No longer evaluating this patient has been made comfort measures     • Acute systolic CHF (congestive heart failure) (CMS/HCC) [I50.21] Yes     No longer evaluating this patient has been made comfort measures     • Cardiogenic shock (CMS/HCC) [R57.0] Yes     No longer evaluating this patient has been made comfort measures     • Leukocytosis [D72.829] Yes     No longer evaluating this patient has been made comfort measures     • Normocytic  anemia [D64.9] Yes     No longer evaluating this patient has been made comfort measures       • Generalized weakness [R53.1] Yes     Patient has been made comfort measures       • Hemorrhagic cystitis [N30.91] Yes     Patient has been made comfort measures        • Iron deficiency anemia due to chronic blood loss [D50.0] Yes     Patient has been made comfort measures       • Weight loss, unintentional [R63.4] Yes     Patient has been made comfort measures     • Hyponatremia [E87.1] Yes     Has stopped treatment as patient has been made comfort measures       • Diabetes mellitus (CMS/[facility]) [E11.9] Yes     Patient is comfort measures       • Hypertension [I10] Yes     Medications have been discontinued as patient is comfort measures     • S/P AVR (aortic valve replacement) [Z95.2] Not Applicable     Patient comfort measures     • Coronary artery disease involving native coronary artery of native heart with angina pectoris (CMS/[facility]) [I25.119] Yes     She has been made comfort measures         DVT prophylaxis: SCDs/TEDs  Code status is   Code Status and Medical Interventions:   Ordered at: 05/19/19 1903     Code Status:    No CPR     Medical Interventions (Level of Support Prior to Arrest):    Comfort Measures       Plan for disposition:Patient has been made comfort measures, expect death in the next few days      Time: 30 minutes     Ashley Carrizales MD PGY3  [place] [address]  Office: [phone]          This document has been electronically signed by Ashley Carrizales MD on May 20, 2019 7:26 AM

## 2019-05-20 NOTE — SIGNIFICANT NOTE
Spoke with family after RN notified me they would like to go on comfort measures. Discussed with them at length what that would entail.  Family feels given the poor prognosis the patient would do best feeling comfortable without interventions. I have added medications for pain, anxiety, secretion control.           This document has been electronically signed by Rodrigo Nazario MD on May 19, 2019 10:50 PM

## 2019-05-20 NOTE — PLAN OF CARE
Problem: Patient Care Overview  Goal: Plan of Care Review  Outcome: Ongoing (interventions implemented as appropriate)   05/20/19 0522   Coping/Psychosocial   Plan of Care Reviewed With spouse;daughter   Plan of Care Review   Progress declining   OTHER   Outcome Summary Pt code status changed to DNR/DNI and comfort measures. EF 18%. Pt on 2L NC with diminished breath sounds, rhonci.  in last 12 hours.Pt is on Morphine and Ativan q1 for comfort.        Problem: Fall Risk (Adult)  Goal: Identify Related Risk Factors and Signs and Symptoms  Outcome: Ongoing (interventions implemented as appropriate)    Goal: Absence of Fall  Outcome: Ongoing (interventions implemented as appropriate)      Problem: Activity Intolerance (Adult)  Goal: Effective Energy Conservation Techniques  Outcome: Ongoing (interventions implemented as appropriate)      Problem: Skin Injury Risk (Adult)  Goal: Identify Related Risk Factors and Signs and Symptoms  Outcome: Ongoing (interventions implemented as appropriate)    Goal: Skin Health and Integrity  Outcome: Ongoing (interventions implemented as appropriate)      Problem: Breathing Pattern Ineffective (Adult)  Goal: Identify Related Risk Factors and Signs and Symptoms  Outcome: Ongoing (interventions implemented as appropriate)    Goal: Effective Oxygenation/Ventilation  Outcome: Ongoing (interventions implemented as appropriate)    Goal: Anxiety/Fear Reduction  Outcome: Ongoing (interventions implemented as appropriate)      Problem: Anemia (Adult)  Goal: Identify Related Risk Factors and Signs and Symptoms  Outcome: Ongoing (interventions implemented as appropriate)    Goal: Symptom Improvement  Outcome: Ongoing (interventions implemented as appropriate)      Problem: Diabetes, Type 2 (Adult)  Goal: Signs and Symptoms of Listed Potential Problems Will be Absent, Minimized or Managed (Diabetes, Type 2)  Outcome: Ongoing (interventions implemented as appropriate)      Problem:  Hypertensive Disease/Crisis (Arterial) (Adult)  Goal: Signs and Symptoms of Listed Potential Problems Will be Absent, Minimized or Managed (Hypertensive Disease/Crisis)  Outcome: Ongoing (interventions implemented as appropriate)      Problem: Pain, Chronic (Adult)  Goal: Identify Related Risk Factors and Signs and Symptoms  Outcome: Ongoing (interventions implemented as appropriate)    Goal: Acceptable Pain/Comfort Level and Functional Ability  Outcome: Ongoing (interventions implemented as appropriate)      Problem: NPPV/CPAP (Adult)  Goal: Signs and Symptoms of Listed Potential Problems Will be Absent, Minimized or Managed (NPPV/CPAP)  Outcome: Outcome(s) achieved Date Met: 05/20/19

## 2019-05-21 NOTE — DISCHARGE SUMMARY
DISCHARGE SUMMARY    PATIENT NAME: Yaneli Bates       PHYSICIAN: Ashley Crarizales MD  : 1940  MRN: [id number]    ADMITTED: 2019     DISCHARGED: 2019    ADMISSION DIAGNOSES:  Active Hospital Problems    Diagnosis  POA   • **Comfort measures only status [Z51.5]  Not Applicable   • Acute pulmonary edema (CMS/HCC) [J81.0]  No   • Acute systolic CHF (congestive heart failure) (CMS/HCC) [I50.21]  Yes   • Cardiogenic shock (CMS/HCC) [R57.0]  Yes   • Leukocytosis [D72.829]  Yes   • Normocytic anemia [D64.9]  Yes   • Generalized weakness [R53.1]  Yes   • Hemorrhagic cystitis [N30.91]  Yes   • Iron deficiency anemia due to chronic blood loss [D50.0]  Yes   • Weight loss, unintentional [R63.4]  Yes   • Hyponatremia [E87.1]  Yes   • Diabetes mellitus (CMS/HCC) [E11.9]  Yes   • Hypertension [I10]  Yes   • S/P AVR (aortic valve replacement) [Z95.2]  Not Applicable   • Coronary artery disease involving native coronary artery of native heart with angina pectoris (CMS/HCC) [I25.119]  Yes      Resolved Hospital Problems   No resolved problems to display.     DISCHARGE DIAGNOSES:   Active Hospital Problems    Diagnosis  POA   • **Comfort measures only status [Z51.5]  Not Applicable   • Acute pulmonary edema (CMS/HCC) [J81.0]  No   • Acute systolic CHF (congestive heart failure) (CMS/HCC) [I50.21]  Yes   • Cardiogenic shock (CMS/HCC) [R57.0]  Yes   • Leukocytosis [D72.829]  Yes   • Normocytic anemia [D64.9]  Yes   • Generalized weakness [R53.1]  Yes   • Hemorrhagic cystitis [N30.91]  Yes   • Iron deficiency anemia due to chronic blood loss [D50.0]  Yes   • Weight loss, unintentional [R63.4]  Yes   • Hyponatremia [E87.1]  Yes   • Diabetes mellitus (CMS/HCC) [E11.9]  Yes   • Hypertension [I10]  Yes   • S/P AVR (aortic valve replacement) [Z95.2]  Not Applicable   • Coronary artery disease involving native coronary artery of native heart with angina pectoris (CMS/HCC) [I25.119]  Yes      Resolved  Hospital Problems   No resolved problems to display.       SERVICE: Family Medicine.  Attending: Dr. Andrews  Resident: Ashley Carrizales MD    CONSULTS:   Consult Orders (all) (From admission, onward)    Start     Ordered    05/19/19 1450  Inpatient Cardiology Consult  Once,   Status:  Canceled     Specialty:  Cardiology  Provider:  Luis Enrique Rao MD    05/19/19 1449    05/19/19 1220  Inpatient Consult to Nutrition Services  Once,   Status:  Canceled     Provider:  (Not yet assigned)    05/19/19 1221    05/19/19 1201  Inpatient Consult to Nutrition Services  Once,   Status:  Canceled     Provider:  (Not yet assigned)    05/19/19 1202    05/19/19 0455  Inpatient Spiritual Care Consult  Once,   Status:  Canceled     Provider:  (Not yet assigned)    05/19/19 0502    05/15/19 0831  Hematology & Oncology Inpatient Consult  Once,   Status:  Canceled     Specialty:  Hematology and Oncology  Provider:  Suzy Sher MD    05/15/19 0831    05/14/19 0621  Inpatient Urology Consult  Once     Specialty:  Urology  Provider:  D'Amico, Anna M., MD    05/14/19 0620    05/14/19 0621  Inpatient Gastroenterology Consult  Once     Specialty:  Gastroenterology  Provider:  Carlos Schaeffer MD    05/14/19 0620    05/13/19 2152  Inpatient Urology Consult  Once,   Status:  Canceled     Specialty:  Urology  Provider:  (Not yet assigned)    05/13/19 2151    05/13/19 2151  Inpatient Gastroenterology Consult  Once,   Status:  Canceled     Specialty:  Gastroenterology  Provider:  (Not yet assigned)    05/13/19 2151    05/13/19 1845  Inpatient Nutrition Consult  Once     Provider:  (Not yet assigned)    05/13/19 1844          PROCEDURES:   none    HISTORY OF PRESENT ILLNESS and HOSPITAL COURSE:   Yaneli Bates is a 78 y.o. female with concurrent medical history of chronic blood loss, hypertension, type 2 diabetes, coronary artery disease with history of AVR, fibromyalgia, degenerative disc disease, who  presented with worsening generalized weakness.  Patient was admitted in April with significant anemia requiring blood transfusions and was discharged home after stabilization and evaluation by GI.  EGD and colonoscopy did not show any active bleeding or malignancy at that time.  She had followed up with [facility] outpatient and was scheduled for a Capsule study for further evaluation for possible continued anemia.  It was also recommended for an outpatient cystoscopy by Dr. Damico because of blood noted on UA.  She was previously evaluated by Dr. Brannon because of hyponatremia and was initiated on 1 g sodium tablets daily, fluid restrictions of 1.6 L daily.    She presented to the emergency room after her symptoms have begun to worsen again for the past 2 weeks with significant decline on the weekend prior to admission.  She was too weak to be able to get out of bed and family brought her into the emergency room for evaluation where she was found to have a hemoglobin of 7.7, and a white blood cell count of 14.04, continued hyponatremia.  She was also noted to still have blood in her urine, and FOBT was negative.  On hospital day 1 patient's hemoglobin dropped below 7 and she did receive 2 units of packed red blood cells    Patient symptoms initiated in October/November with worsening appetite, significant weight loss, and worsening peripheral edema of the lower extremities and now of the upper extremities.  She had an echo that was done in March with normal ejection fraction no indications for CHF.  Her albumin was noted to be very low.  Patient was discharged from her previous admission with oral iron replacement.  Patient on admission did not have improvement of her reticulocyte count thus hematology/oncology was consulted for assistance in evaluation of patient's weakness and continued anemia requiring blood transfusions.    On further evaluation there was significant concern that patient had plasma cell  dyscrasia and was planned for a bone marrow biopsy for Monday, May 20.  She remained over the weekend in the hospital because of worsening appetite and inability to tolerate p.o. intake.  Patient was seen by Dr. Schaeffer with a gastric emptying study that was normal.  Patient was started on Reglan and steroids with some improvement.    On Saturday, May 19 patient experienced worsening shortness of air which progressed significantly until patient required BiPAP and was transferred down to the ICU secondary to worsening condition.  Patient did not tolerate any attempts to remove BiPAP for respiratory assistance and remains significantly tachypneic.  Chest x-ray at time of event demonstrated significant bilateral edema and a stat echo was obtained which demonstrated an ejection fraction less than 20%.  Cardiology was consulted for assistance in management of diuresis.    When patient was admitted to the hospital she was full code and after transition to the care unit patient's daughter and  after conversation with primary team about the events that had occurred overnight decided to make patient DNR/DNI as patient had previously mentioned that she did not wish to be kept on life support.  After conversation with the cardiologist family did decide to make the patient comfort measures and she was moved upstairs, initiated on morphine, Ativan, and scopolamine.  At this time patient was unresponsive on physical exam.  Patient was evaluated by inpatient hospice, but was not deemed appropriate for their admission.  Patient passed later that evening.    DIAGNOSTIC DATA:   Lab Results (last 24 hours)     Procedure Component Value Units Date/Time    Cytology, Urine [[id number]] Collected:  05/14/19 1436    Specimen:  Urine, Clean Catch Updated:  05/20/19 1621     Case Report --     Non-gynecologic Cytology                          Case: [id number]                                  Authorizing Provider:  Rodrigo Nazario MD        "     Collected:           2019 02:36 PM          Ordering Location:     [facility]             Received:            05/15/2019 07:15 AM                                 [address]                                                          Pathologist:           Tony Rooney MD                                                           Specimen:    Urine, Clean Catch, 20MLS, GOLD                                                             Final Diagnosis --     Urine, clean catch:  Negative       Specimen Adequacy Satisfactory for evaluation     Interpretation See final diagnosis     Gross Description --     Received as \"urine\" are 20 mls fresh gold fluid.  1 thin prep made.       Microscopic Description --     The thin prep reveals benign squamous cells with slight cellular degeneration.  Scattered inflammatory cells are present.      Prealbumin [[id number]]  (Abnormal) Collected:  19 1247    Specimen:  Blood Updated:  19 0049     Prealbumin <3.0 mg/dL     POC Glucose Once [[id number]]  (Abnormal) Collected:  19 1232    Specimen:  Blood Updated:  19 1836     Glucose 160 mg/dL      Comment: Sliding Scale AdminOperator: [id number] EVI JENNIFERMeter ID: [id number]       POC Glucose Once [[id number]]  (Abnormal) Collected:  19 1714    Specimen:  Blood Updated:  19 1742     Glucose 143 mg/dL      Comment: : [id number] RADHA Wilson ID: [id number]               DISCHARGE CONDITION:       DISPOSITION:      DISCHARGE MEDICATIONS     Discharge Medications      ASK your doctor about these medications      Instructions Start Date   alendronate 70 MG tablet  Commonly known as:  FOSAMAX   70 mg, Oral, Every 7 Days      doxycycline 100 MG capsule  Commonly known as:  VIBRAMYCIN   100 mg, Oral, 2 Times Daily      ferrous sulfate 324 (65 Fe) MG tablet delayed-release EC tablet   324 mg, Oral, 2 Times Daily With Meals      lactulose 10 GM/15ML " solution  Commonly known as:  CHRONULAC   20 g, Oral, 2 Times Daily PRN      metoprolol succinate XL 50 MG 24 hr tablet  Commonly known as:  TOPROL-XL   50 mg, Oral, Daily With Dinner      ondansetron ODT 4 MG disintegrating tablet  Commonly known as:  ZOFRAN ODT   4 mg, Oral, Every 8 Hours PRN      potassium chloride 10 MEQ CR tablet  Commonly known as:  K-DUR,KLOR-CON  Ask about: Should I take this medication?   10 mEq, Oral, Daily      raNITIdine 75 MG tablet  Commonly known as:  ZANTAC   75 mg, Oral, 2 Times Daily      sodium chloride 1 g tablet   1 g, Oral, 3 Times Daily With Meals      traMADol 50 MG tablet  Commonly known as:  ULTRAM   50 mg, Oral, 2 Times Daily PRN             INSTRUCTIONS:  Activity:   Diet:   Special instructions: Patient instructed to call MD or return to ED with worsening shortness of breath, chest pain, fever greater than 100.4 degrees F or any other medical concerns..    FOLLOW UP:    Contact information for follow-up providers     Elian Pacheco APRN .    Specialties:  Nurse Practitioner, Family Medicine, Emergency Medicine  Contact information:  [address]  [address]  [facility] [address] [phone]                   Contact information for after-discharge care     Destination     [facility] .    Service:  Skilled Nursing  Contact information:  [address] [facility] [address] [phone]                             PENDING TEST RESULTS AT DISCHARGE   Order Current Status    Creatinine, Total Protein , Electrophoresis & Immunofixation 24 Hr Urine - Urine, Clean Catch In process    Food Allergy Profile In process    Peripheral Blood Smear In process    Protein Electrophoresis, 24 Hr Urine - Urine, Clean Catch In process    Blood Culture - Blood, Blood, Venous Line Preliminary result    Blood Culture - Blood, Blood, Venous Line Preliminary result          Time: Discharge 45 minutes min    Dr. Andrews is the attending  at time of discharge, He is aware of the patient's status and agrees with the above discharge summary.    Ashley Carrizales MD PGY3  [place] [address]  Office: [phone]          This document has been electronically signed by Ashley Carrizales MD on May 21, 2019 4:28 PM

## 2019-05-22 LAB
ALBUMIN 24H MFR UR ELPH: 32.3 %
ALBUMIN 24H MFR UR ELPH: 35.5 %
ALPHA1 GLOB 24H MFR UR ELPH: 7.3 %
ALPHA1 GLOB 24H MFR UR ELPH: 8.2 %
ALPHA2 GLOB 24H MFR UR ELPH: 13.9 %
ALPHA2 GLOB 24H MFR UR ELPH: 14.1 %
B-GLOBULIN MFR UR ELPH: 9.1 %
B-GLOBULIN MFR UR ELPH: 9.2 %
CREAT 24H UR-MCNC: 48.5 MG/DL
CREAT 24H UR-MRATE: 461 MG/24 HR (ref 800–1800)
GAMMA GLOB 24H MFR UR ELPH: 33.2 %
GAMMA GLOB 24H MFR UR ELPH: 37.3 %
HIV 1 & 2 AB SER-IMP: ABNORMAL
INTERPRETATION UR IFE-IMP: ABNORMAL
Lab: ABNORMAL
M PROTEIN 24H MFR UR ELPH: 33.4 %
M PROTEIN 24H UR ELPH-MRATE: 494.4 MG/24 HR
M PROTEIN MFR UR ELPH: 28.8 %
PROT 24H UR-MRATE: 1717 MG/24 HR (ref 30–150)
PROT UR-MCNC: 180.7 MG/DL
PROT UR-MCNC: 182.7 MG/DL

## 2019-05-24 LAB
BACTERIA SPEC AEROBE CULT: NORMAL
BACTERIA SPEC AEROBE CULT: NORMAL

## 2019-05-26 LAB
CALIF WALNUT POLN IGE QN: <0.1 KU/L
CLAM IGE QN: <0.1 KU/L
CODFISH IGE QN: <0.1 KU/L
CONV CLASS DESCRIPTION: NORMAL
CORN IGE QN: <0.1 KU/L
COW MILK IGE QN: <0.1 KU/L
EGG WHITE IGE QN: <0.1 KU/L
PEANUT IGE QN: <0.1 KU/L
SCALLOP IGE QN: <0.1 KU/L
SESAME SEED IGE: <0.1 KU/L
SHRIMP IGE: <0.1 KU/L
SOYBEAN IGE QN: <0.1 KU/L
WHEAT IGE QN: <0.1 KU/L

## 2019-05-29 LAB
CYTOLOGIST CVX/VAG CYTO: NORMAL
PATH INTERP BLD-IMP: NORMAL

## 2024-08-21 NOTE — SIGNIFICANT NOTE
Blood Transfusion Consent    I spoke with the patient about the risks and benefits of blood transfusion, as outlined below:    Benefits:   Blood transfusion is a life-saving treatment that benefits patients by treating or preventing blood loss, which can lead to a seriously low hemoglobin level and cause damage to body organs due to a lack of oxygen.      Risks:   Patient acknowledged understanding that among the use of blood or blood products has the following general risks:      Uncommon (1-5%) chance)   • Mild reactions resulting in itching, rash, fever, headaches.      Rare (<1% chance)   • Respiratory distress (shortness of breath) or lung injury   • Exposure to blood borne micro-organisms (bacteria and parasites) that could result in an infection   • Possible effects on the immune system, which may decrease the body’s ability to fight infection   • Exposure to blood borne viruses such as hepatitis B (an inflammatory disease affecting the liver)   • Shock      Extremely rare (one in a million or less)   • Exposure to blood borne viruses such as hepatitis C (an inflammatory disease affecting the liver) and Human Immunodeficiency Virus (HIV, the virus that causes AIDS)   • Death     I answered all the patient's/family's questions, inquires, and concerns. Patient gave consent to receive blood products if it becomes medically necessary. Patient did not have any questions at the time I left the room.      Ashley Carrizales MD PGY3  [place] [address]  Office: [phone]          This document has been electronically signed by Ashley Carrizales MD on May 14, 2019 8:25 AM         Opt out

## (undated) DEVICE — CANN SMPL SOFTECH BIFLO ETCO2 A/M 7FT

## (undated) DEVICE — SINGLE-USE BIOPSY FORCEPS: Brand: RADIAL JAW 4

## (undated) DEVICE — BITEBLOCK ENDO W/STRAP 60F A/ LF DISP